# Patient Record
Sex: MALE | Race: WHITE | NOT HISPANIC OR LATINO | Employment: OTHER | URBAN - METROPOLITAN AREA
[De-identification: names, ages, dates, MRNs, and addresses within clinical notes are randomized per-mention and may not be internally consistent; named-entity substitution may affect disease eponyms.]

---

## 2019-08-26 DIAGNOSIS — I73.9 CLAUDICATION, INTERMITTENT: Primary | ICD-10-CM

## 2019-08-30 ENCOUNTER — HOSPITAL ENCOUNTER (OUTPATIENT)
Dept: RADIOLOGY | Facility: HOSPITAL | Age: 69
Discharge: HOME | End: 2019-08-30
Attending: INTERNAL MEDICINE
Payer: MEDICARE

## 2019-08-30 DIAGNOSIS — I73.9 CLAUDICATION, INTERMITTENT: ICD-10-CM

## 2019-08-30 PROCEDURE — 93925 LOWER EXTREMITY STUDY: CPT | Mod: 26,,, | Performed by: RADIOLOGY

## 2019-08-30 PROCEDURE — 93925 US LOWER EXTREMITY ARTERIES BILATERAL: ICD-10-PCS | Mod: 26,,, | Performed by: RADIOLOGY

## 2019-08-30 PROCEDURE — 93925 LOWER EXTREMITY STUDY: CPT | Mod: TC

## 2020-08-19 ENCOUNTER — LAB VISIT (OUTPATIENT)
Dept: LAB | Facility: HOSPITAL | Age: 70
End: 2020-08-19
Attending: INTERNAL MEDICINE
Payer: MEDICARE

## 2020-08-19 DIAGNOSIS — E11.65 TYPE 2 DIABETES MELLITUS WITH HYPERGLYCEMIA, WITH LONG-TERM CURRENT USE OF INSULIN: ICD-10-CM

## 2020-08-19 DIAGNOSIS — Z79.4 TYPE 2 DIABETES MELLITUS WITHOUT COMPLICATION, WITH LONG-TERM CURRENT USE OF INSULIN: ICD-10-CM

## 2020-08-19 DIAGNOSIS — Z79.4 TYPE 2 DIABETES MELLITUS WITH HYPERGLYCEMIA, WITH LONG-TERM CURRENT USE OF INSULIN: ICD-10-CM

## 2020-08-19 DIAGNOSIS — E11.9 TYPE 2 DIABETES MELLITUS WITHOUT COMPLICATION, WITH LONG-TERM CURRENT USE OF INSULIN: ICD-10-CM

## 2020-08-19 DIAGNOSIS — R60.0 BILATERAL LEG EDEMA: ICD-10-CM

## 2020-08-19 PROBLEM — K21.9 GASTRO-ESOPHAGEAL REFLUX DISEASE WITHOUT ESOPHAGITIS: Status: ACTIVE | Noted: 2018-04-23

## 2020-08-19 PROBLEM — I63.412 CEREBRAL INFARCTION DUE TO EMBOLISM OF LEFT MIDDLE CEREBRAL ARTERY: Status: ACTIVE | Noted: 2018-07-25

## 2020-08-19 PROBLEM — J44.9 COPD (CHRONIC OBSTRUCTIVE PULMONARY DISEASE): Status: ACTIVE | Noted: 2017-07-14

## 2020-08-19 PROBLEM — I73.9 CLAUDICATION: Status: ACTIVE | Noted: 2019-08-26

## 2020-08-19 PROBLEM — H26.9 CATARACT OF LEFT EYE: Status: ACTIVE | Noted: 2017-10-16

## 2020-08-19 PROBLEM — I69.351 HEMIPLEGIA AND HEMIPARESIS FOLLOWING CEREBRAL INFARCTION AFFECTING RIGHT DOMINANT SIDE: Status: ACTIVE | Noted: 2019-07-26

## 2020-08-19 PROBLEM — N52.9 MALE ERECTILE DYSFUNCTION: Status: ACTIVE | Noted: 2018-01-30

## 2020-08-19 LAB
ALBUMIN SERPL BCP-MCNC: 3.5 G/DL (ref 3.5–5.2)
ALP SERPL-CCNC: 94 U/L (ref 55–135)
ALT SERPL W/O P-5'-P-CCNC: 16 U/L (ref 10–44)
ANION GAP SERPL CALC-SCNC: 7 MMOL/L (ref 8–16)
AST SERPL-CCNC: 22 U/L (ref 10–40)
BILIRUB SERPL-MCNC: 0.3 MG/DL (ref 0.1–1)
BUN SERPL-MCNC: 16 MG/DL (ref 8–23)
CALCIUM SERPL-MCNC: 9.2 MG/DL (ref 8.7–10.5)
CHLORIDE SERPL-SCNC: 97 MMOL/L (ref 95–110)
CHOLEST SERPL-MCNC: 124 MG/DL (ref 120–199)
CHOLEST/HDLC SERPL: 2.8 {RATIO} (ref 2–5)
CO2 SERPL-SCNC: 36 MMOL/L (ref 23–29)
CREAT SERPL-MCNC: 1 MG/DL (ref 0.5–1.4)
ERYTHROCYTE [DISTWIDTH] IN BLOOD BY AUTOMATED COUNT: 14.1 % (ref 11.5–14.5)
EST. GFR  (AFRICAN AMERICAN): >60 ML/MIN/1.73 M^2
EST. GFR  (NON AFRICAN AMERICAN): >60 ML/MIN/1.73 M^2
ESTIMATED AVG GLUCOSE: 174 MG/DL (ref 68–131)
GLUCOSE SERPL-MCNC: 73 MG/DL (ref 70–110)
HBA1C MFR BLD HPLC: 7.7 % (ref 4–5.6)
HCT VFR BLD AUTO: 37.7 % (ref 40–54)
HDLC SERPL-MCNC: 44 MG/DL (ref 40–75)
HDLC SERPL: 35.5 % (ref 20–50)
HGB BLD-MCNC: 11.8 G/DL (ref 14–18)
LDLC SERPL CALC-MCNC: 53 MG/DL (ref 63–159)
MCH RBC QN AUTO: 28.6 PG (ref 27–31)
MCHC RBC AUTO-ENTMCNC: 31.3 G/DL (ref 32–36)
MCV RBC AUTO: 91 FL (ref 82–98)
NONHDLC SERPL-MCNC: 80 MG/DL
PLATELET # BLD AUTO: 428 K/UL (ref 150–350)
PMV BLD AUTO: 9.6 FL (ref 9.2–12.9)
POTASSIUM SERPL-SCNC: 3.8 MMOL/L (ref 3.5–5.1)
PROT SERPL-MCNC: 8 G/DL (ref 6–8.4)
RBC # BLD AUTO: 4.13 M/UL (ref 4.6–6.2)
SODIUM SERPL-SCNC: 140 MMOL/L (ref 136–145)
TRIGL SERPL-MCNC: 135 MG/DL (ref 30–150)
WBC # BLD AUTO: 7.7 K/UL (ref 3.9–12.7)

## 2020-08-19 PROCEDURE — 36415 COLL VENOUS BLD VENIPUNCTURE: CPT

## 2020-08-19 PROCEDURE — 85027 COMPLETE CBC AUTOMATED: CPT

## 2020-08-19 PROCEDURE — 83036 HEMOGLOBIN GLYCOSYLATED A1C: CPT

## 2020-08-19 PROCEDURE — 80053 COMPREHEN METABOLIC PANEL: CPT

## 2020-08-19 PROCEDURE — 80061 LIPID PANEL: CPT

## 2020-09-30 ENCOUNTER — HOSPITAL ENCOUNTER (OUTPATIENT)
Dept: CARDIOLOGY | Facility: HOSPITAL | Age: 70
Discharge: HOME | End: 2020-09-30
Attending: INTERNAL MEDICINE
Payer: MEDICARE

## 2020-09-30 VITALS — WEIGHT: 192 LBS | HEIGHT: 67 IN | BODY MASS INDEX: 30.13 KG/M2

## 2020-09-30 DIAGNOSIS — R60.0 BILATERAL LEG EDEMA: ICD-10-CM

## 2020-09-30 DIAGNOSIS — I73.9 PVD (PERIPHERAL VASCULAR DISEASE): ICD-10-CM

## 2020-09-30 LAB
AORTIC ROOT ANNULUS: 3.08 CM
AORTIC VALVE CUSP SEPERATION: 1.59 CM
AV INDEX (PROSTH): 0.89
AV MEAN GRADIENT: 4 MMHG
AV PEAK GRADIENT: 8 MMHG
AV VALVE AREA: 2.64 CM2
AV VELOCITY RATIO: 0.75
BSA FOR ECHO PROCEDURE: 2.03 M2
CV ECHO LV RWT: 0.6 CM
DOP CALC AO PEAK VEL: 1.4 M/S
DOP CALC AO VTI: 27.9 CM
DOP CALC LVOT AREA: 3 CM2
DOP CALC LVOT DIAMETER: 1.94 CM
DOP CALC LVOT PEAK VEL: 1.05 M/S
DOP CALC LVOT STROKE VOLUME: 73.65 CM3
DOP CALCLVOT PEAK VEL VTI: 24.93 CM
E WAVE DECELERATION TIME: 236.45 MSEC
E/A RATIO: 1.07
E/E' RATIO: 10.22 M/S
ECHO LV POSTERIOR WALL: 1.51 CM (ref 0.6–1.1)
FRACTIONAL SHORTENING: 27 % (ref 28–44)
INTERVENTRICULAR SEPTUM: 1.12 CM (ref 0.6–1.1)
IVRT: 58.99 MSEC
LA MAJOR: 5.64 CM
LA MINOR: 5.36 CM
LA WIDTH: 4.1 CM
LEFT ATRIUM SIZE: 2.3 CM
LEFT ATRIUM VOLUME INDEX: 22.2 ML/M2
LEFT ATRIUM VOLUME: 44.06 CM3
LEFT INTERNAL DIMENSION IN SYSTOLE: 3.65 CM (ref 2.1–4)
LEFT VENTRICLE DIASTOLIC VOLUME INDEX: 60.2 ML/M2
LEFT VENTRICLE DIASTOLIC VOLUME: 119.63 ML
LEFT VENTRICLE MASS INDEX: 135 G/M2
LEFT VENTRICLE SYSTOLIC VOLUME INDEX: 28.4 ML/M2
LEFT VENTRICLE SYSTOLIC VOLUME: 56.44 ML
LEFT VENTRICULAR INTERNAL DIMENSION IN DIASTOLE: 5.03 CM (ref 3.5–6)
LEFT VENTRICULAR MASS: 268.66 G
LV LATERAL E/E' RATIO: 10.22 M/S
LV SEPTAL E/E' RATIO: 10.22 M/S
MV PEAK A VEL: 0.86 M/S
MV PEAK E VEL: 0.92 M/S
MV STENOSIS PRESSURE HALF TIME: 68.57 MS
MV VALVE AREA P 1/2 METHOD: 3.21 CM2
PULM VEIN S/D RATIO: 1.8
PV PEAK D VEL: 0.45 M/S
PV PEAK S VEL: 0.81 M/S
PV PEAK VELOCITY: 0.95 CM/S
TDI LATERAL: 0.09 M/S
TDI SEPTAL: 0.09 M/S
TDI: 0.09 M/S

## 2020-09-30 PROCEDURE — 93306 ECHO (CUPID ONLY): ICD-10-PCS | Mod: 26,,, | Performed by: INTERNAL MEDICINE

## 2020-09-30 PROCEDURE — 93306 TTE W/DOPPLER COMPLETE: CPT | Mod: 26,,, | Performed by: INTERNAL MEDICINE

## 2020-09-30 PROCEDURE — 93306 TTE W/DOPPLER COMPLETE: CPT

## 2020-10-06 PROBLEM — I89.0 LYMPHEDEMA OF BOTH LOWER EXTREMITIES: Status: ACTIVE | Noted: 2020-10-06

## 2020-10-06 PROBLEM — I51.9 SYSTOLIC DYSFUNCTION: Status: ACTIVE | Noted: 2020-10-06

## 2020-10-28 ENCOUNTER — HOSPITAL ENCOUNTER (OUTPATIENT)
Dept: WOUND CARE | Facility: HOSPITAL | Age: 70
Discharge: HOME | End: 2020-10-28
Attending: SURGERY
Payer: MEDICARE

## 2020-10-28 VITALS
BODY MASS INDEX: 29.98 KG/M2 | HEIGHT: 67 IN | TEMPERATURE: 97 F | WEIGHT: 191 LBS | HEART RATE: 71 BPM | SYSTOLIC BLOOD PRESSURE: 156 MMHG | DIASTOLIC BLOOD PRESSURE: 70 MMHG

## 2020-10-28 DIAGNOSIS — E11.65 TYPE 2 DIABETES MELLITUS WITH HYPERGLYCEMIA, WITH LONG-TERM CURRENT USE OF INSULIN: ICD-10-CM

## 2020-10-28 DIAGNOSIS — E11.9 TYPE 2 DIABETES MELLITUS WITHOUT COMPLICATION, WITH LONG-TERM CURRENT USE OF INSULIN: ICD-10-CM

## 2020-10-28 DIAGNOSIS — Z79.4 TYPE 2 DIABETES MELLITUS WITHOUT COMPLICATION, WITH LONG-TERM CURRENT USE OF INSULIN: ICD-10-CM

## 2020-10-28 DIAGNOSIS — Z79.4 TYPE 2 DIABETES MELLITUS WITH HYPERGLYCEMIA, WITH LONG-TERM CURRENT USE OF INSULIN: ICD-10-CM

## 2020-10-28 DIAGNOSIS — I89.0 LYMPHEDEMA OF BOTH LOWER EXTREMITIES: Primary | ICD-10-CM

## 2020-10-28 PROCEDURE — 11720 DEBRIDE NAIL 1-5: CPT

## 2020-10-28 PROCEDURE — 11721 DEBRIDE NAIL 6 OR MORE: CPT

## 2020-10-28 PROCEDURE — 99203 OFFICE O/P NEW LOW 30 MIN: CPT

## 2020-10-28 PROCEDURE — 29581 APPL MULTLAYER CMPRN SYS LEG: CPT | Mod: 50

## 2020-10-28 RX ORDER — CEPHALEXIN 500 MG/1
500 CAPSULE ORAL EVERY 12 HOURS
Qty: 40 CAPSULE | Refills: 1 | Status: SHIPPED | OUTPATIENT
Start: 2020-10-28 | End: 2021-02-19

## 2020-10-28 NOTE — PROGRESS NOTES
"Subjective:       Patient ID: Mateo Foreman is a 70 y.o. male.    Chief Complaint: Venous Stasis (bilateral lower legs)    69 y/o male here today with bilateral lower extremity lymphedema. Seen by Dr. Silveira in his office 10/15/20. Currently taking Keflex and lasix 40mg. Daily. No open areas noted today. Patient states right leg "weeps" at times. BLE edema 2+. Doppler pulses audible. Seen by Dr. Silveira today. 1st set of measurements done for lymphedema pumps. Toenails 1-10 trimmed per Dr. Silveira. BLE Co-flex with calamine applied toes to knee. Patient to continue Keflex po. Rx for refill sent to pharmacy. Return in 1 week 11/4/20.    Review of Systems   Constitutional: Negative.    HENT: Negative.    Eyes: Negative.    Respiratory: Negative.    Cardiovascular: Negative.    Gastrointestinal: Negative.    Genitourinary: Negative.    Musculoskeletal: Negative.    Neurological: Negative.    Psychiatric/Behavioral: Negative.        Objective:      Physical Exam  Constitutional:       Appearance: He is well-developed.   HENT:      Head: Normocephalic.   Eyes:      Conjunctiva/sclera: Conjunctivae normal.      Pupils: Pupils are equal, round, and reactive to light.   Neck:      Musculoskeletal: Normal range of motion and neck supple.   Cardiovascular:      Rate and Rhythm: Normal rate and regular rhythm.      Heart sounds: Normal heart sounds.   Pulmonary:      Effort: Pulmonary effort is normal.      Breath sounds: Normal breath sounds.   Abdominal:      General: Bowel sounds are normal.      Palpations: Abdomen is soft.   Musculoskeletal: Normal range of motion.   Skin:     General: Skin is warm and dry.   Neurological:      Mental Status: He is alert and oriented to person, place, and time.      Deep Tendon Reflexes: Reflexes are normal and symmetric.         Assessment:       1. Lymphedema of both lower extremities           Wound 10/28/20 0830 Other (comment) Left lower Leg #1 (Active)   10/28/20 0830  "   Pre-existing: Yes   Primary Wound Type: Other   Side: Left   Orientation: lower   Location: Leg   Wound Number (optional): #1   Ankle-Brachial Index:    Pulses: doppler   Removal Indication and Assessment:    Wound Outcome:    (Retired) Wound Type:    (Retired) Wound Length (cm):    (Retired) Wound Width (cm):    (Retired) Depth (cm):    Wound Description (Comments):    Removal Indications:    Wound Image   10/28/20 1000   Dressing Appearance Open to air 10/28/20 1000   Drainage Amount None 10/28/20 1000   Appearance Red;Dry 10/28/20 1000   Tissue loss description Not applicable 10/28/20 1000   Red (%), Wound Tissue Color 100 % 10/28/20 1000   Periwound Area Edematous 10/28/20 1000   Wound Edges Undefined 10/28/20 1000   Care Cleansed with:;Soap and water 10/28/20 1000   Dressing Compression wrap 10/28/20 1000   Periwound Care Dry periwound area maintained 10/28/20 1000   Compression Two layer compression 10/28/20 1000   Dressing Change Due 11/04/20 10/28/20 1000            Wound 10/28/20 0830 Other (comment) Right lower Leg (Active)   10/28/20 0830    Pre-existing: Yes   Primary Wound Type: Other   Side: Right   Orientation: lower   Location: Leg   Wound Number (optional):    Ankle-Brachial Index:    Pulses: doppler   Removal Indication and Assessment:    Wound Outcome:    (Retired) Wound Type:    (Retired) Wound Length (cm):    (Retired) Wound Width (cm):    (Retired) Depth (cm):    Wound Description (Comments):    Removal Indications:    Wound Image   10/28/20 1000   Dressing Appearance Open to air 10/28/20 1000   Drainage Amount None 10/28/20 1000   Appearance Red;Blistered;Dry 10/28/20 1000   Tissue loss description Not applicable 10/28/20 1000   Red (%), Wound Tissue Color 100 % 10/28/20 1000   Periwound Area Edematous 10/28/20 1000   Wound Edges Undefined 10/28/20 1000   Care Cleansed with:;Soap and water 10/28/20 1000   Dressing Compression wrap;Applied 10/28/20 1000   Periwound Care Dry periwound area  maintained 10/28/20 1000   Compression Two layer compression 10/28/20 1000   Dressing Change Due 11/04/20 10/28/20 1000        Toenails trimmed per Dr. Silveira.  Cleansed with soap and water. Co-flex with calamine toes to knee BLE.  Change weekly.    Plan:        Dr. Silveira 11/4/20     10/28/2020 Edema Measurements  Edema:    Compression device in use:   Edema measurements:    Calf circumference measured at 21 cm from heel      Left: 38   cm  Right: 39.5      cm    Ankle circumference measured at 10cm from heel      Left: 31   cm  Right: 31      cm    Foot circumference measured at 11cm from heel      Left:  25  cm  Right:  26     cm      .With patient's verbal consent, nails were aggressively reduced and debrided x 10 to their soft tissue attachment mechanically and with electric , removing all offending nail and debris. Patient relates relief following the procedure. No anesthesia or hemostasis required. No blood loss.

## 2020-11-05 ENCOUNTER — HOSPITAL ENCOUNTER (OUTPATIENT)
Dept: WOUND CARE | Facility: HOSPITAL | Age: 70
Discharge: HOME | End: 2020-11-05
Attending: SURGERY
Payer: MEDICARE

## 2020-11-05 VITALS
DIASTOLIC BLOOD PRESSURE: 63 MMHG | WEIGHT: 191 LBS | TEMPERATURE: 98 F | SYSTOLIC BLOOD PRESSURE: 138 MMHG | HEIGHT: 67 IN | BODY MASS INDEX: 29.98 KG/M2 | HEART RATE: 74 BPM

## 2020-11-05 DIAGNOSIS — Z79.4 TYPE 2 DIABETES MELLITUS WITHOUT COMPLICATION, WITH LONG-TERM CURRENT USE OF INSULIN: ICD-10-CM

## 2020-11-05 DIAGNOSIS — E11.9 TYPE 2 DIABETES MELLITUS WITHOUT COMPLICATION, WITH LONG-TERM CURRENT USE OF INSULIN: ICD-10-CM

## 2020-11-05 DIAGNOSIS — I89.0 LYMPHEDEMA OF BOTH LOWER EXTREMITIES: Primary | ICD-10-CM

## 2020-11-05 DIAGNOSIS — I10 HTN, GOAL BELOW 130/80: ICD-10-CM

## 2020-11-05 DIAGNOSIS — J44.9 CHRONIC OBSTRUCTIVE PULMONARY DISEASE, UNSPECIFIED COPD TYPE: ICD-10-CM

## 2020-11-05 PROCEDURE — 29581 APPL MULTLAYER CMPRN SYS LEG: CPT | Mod: 50

## 2020-11-05 NOTE — PROGRESS NOTES
"Subjective:       Patient ID: Mateo Foreman is a 70 y.o. male.    Chief Complaint: No chief complaint on file.    69 y/o male here today with bilateral lower extremity lymphedema. Seen by Dr. Silveira in his office 10/15/20. Currently taking Keflex and lasix 40mg. Daily. No open areas noted today. Patient states right leg "weeps" at times. BLE edema 2+. Doppler pulses audible. Seen by Dr. Silveira today. 1st set of measurements done for lymphedema pumps. Toenails 1-10 trimmed per Dr. Silveira. BLE Co-flex with calamine applied toes to knee. Patient to continue Keflex po. Rx for refill sent to pharmacy. Return in 1 week 11/4/20.  11/05/2020- f/u with Dr. Silveira. 2nd set of lymphedema pump measurements done. Continue keflex. Cont POC. Pt to f/u with Dr. Silveira in 1 week 11/12/2020.    Review of Systems   Constitutional: Negative.    HENT: Negative.    Eyes: Negative.    Respiratory: Negative.    Cardiovascular: Negative.    Gastrointestinal: Negative.    Genitourinary: Negative.    Musculoskeletal: Negative.    Neurological: Negative.    Psychiatric/Behavioral: Negative.        Objective:      Physical Exam  Constitutional:       Appearance: He is well-developed.   HENT:      Head: Normocephalic.   Eyes:      Conjunctiva/sclera: Conjunctivae normal.      Pupils: Pupils are equal, round, and reactive to light.   Neck:      Musculoskeletal: Normal range of motion and neck supple.   Cardiovascular:      Rate and Rhythm: Normal rate and regular rhythm.      Heart sounds: Normal heart sounds.   Pulmonary:      Effort: Pulmonary effort is normal.      Breath sounds: Normal breath sounds.   Abdominal:      General: Bowel sounds are normal.      Palpations: Abdomen is soft.   Musculoskeletal: Normal range of motion.   Skin:     General: Skin is warm and dry.   Neurological:      Mental Status: He is alert and oriented to person, place, and time.      Deep Tendon Reflexes: Reflexes are normal and symmetric.         Assessment: "       1. Lymphedema of both lower extremities           Wound 10/28/20 0830 Other (comment) Left lower Leg #1 (Active)   10/28/20 0830    Pre-existing: Yes   Primary Wound Type: Other   Side: Left   Orientation: lower   Location: Leg   Wound Number (optional): #1   Ankle-Brachial Index:    Pulses: doppler   Removal Indication and Assessment:    Wound Outcome:    (Retired) Wound Type:    (Retired) Wound Length (cm):    (Retired) Wound Width (cm):    (Retired) Depth (cm):    Wound Description (Comments):    Removal Indications:    Wound Image   11/05/20 0800   Dressing Appearance Intact 11/05/20 0800   Drainage Amount None 11/05/20 0800   Appearance Pink 11/05/20 0800   Tissue loss description Not applicable 11/05/20 0800   Red (%), Wound Tissue Color 100 % 11/05/20 0800   Periwound Area Intact 11/05/20 0800   Wound Edges Rolled/closed 11/05/20 0800   Wound Length (cm) 0 cm 11/05/20 0800   Wound Width (cm) 0 cm 11/05/20 0800   Wound Depth (cm) 0 cm 11/05/20 0800   Wound Volume (cm^3) 0 cm^3 11/05/20 0800   Wound Surface Area (cm^2) 0 cm^2 11/05/20 0800   Care Cleansed with:;Soap and water 11/05/20 0800   Dressing Compression wrap 11/05/20 0800   Compression Two layer compression 11/05/20 0800   Dressing Change Due 11/12/20 11/05/20 0800            Wound 10/28/20 0830 Other (comment) Right lower Leg (Active)   10/28/20 0830    Pre-existing: Yes   Primary Wound Type: Other   Side: Right   Orientation: lower   Location: Leg   Wound Number (optional):    Ankle-Brachial Index:    Pulses: doppler   Removal Indication and Assessment:    Wound Outcome:    (Retired) Wound Type:    (Retired) Wound Length (cm):    (Retired) Wound Width (cm):    (Retired) Depth (cm):    Wound Description (Comments):    Removal Indications:    Wound Image    11/05/20 0800   Dressing Appearance Intact;Moist drainage 11/05/20 0800   Drainage Amount Small 11/05/20 0800   Drainage Characteristics/Odor Serosanguineous 11/05/20 0800   Appearance  Pink;Red;Moist 11/05/20 0800   Tissue loss description Partial thickness 11/05/20 0800   Red (%), Wound Tissue Color 100 % 11/05/20 0800   Periwound Area Intact;Edematous;Redness 11/05/20 0800   Wound Length (cm) 9 cm 11/05/20 0800   Wound Width (cm) 33 cm 11/05/20 0800   Wound Depth (cm) 0.1 cm 11/05/20 0800   Wound Volume (cm^3) 29.7 cm^3 11/05/20 0800   Wound Surface Area (cm^2) 297 cm^2 11/05/20 0800   Care Cleansed with:;Sterile normal saline 11/05/20 0800   Dressing Compression wrap 11/05/20 0800   Periwound Care Dry periwound area maintained 11/05/20 0800   Compression Two layer compression 11/05/20 0800   Dressing Change Due 11/12/20 11/05/20 0800       Cleanse wound with: Soap and water   Lidocaine: prn   Silver nitrate: prn   Primary dressing: Co-flex with calamine BLE toes to knee   Edema control: Co-flex with calamine BLE toes to knee   Frequency: Weekly   Follow-up: Dr. Silveira 11/12/20   Other Orders: Cont. Keflex po. 2nd set of lymphedema pump measurements done 11/05/20.    11/05/2020 Edema Measurements  Edema:    Compression device in use: coflex with calamine   Edema measurements:    Calf circumference measured at 21cm from heel      Left:  31  cm  Right:     35  cm    Ankle circumference measured at 10cm from heel      Left:  28  cm  Right:   29.1    cm    Foot circumference measured at 11cm from heel      Left:  26.1  cm  Right:    28.5   cm        Plan:                Follow up in about 1 week (around 11/12/2020).

## 2020-11-11 ENCOUNTER — HOSPITAL ENCOUNTER (OUTPATIENT)
Dept: WOUND CARE | Facility: HOSPITAL | Age: 70
Discharge: HOME | End: 2020-11-11
Attending: SURGERY
Payer: MEDICARE

## 2020-11-11 VITALS
DIASTOLIC BLOOD PRESSURE: 76 MMHG | SYSTOLIC BLOOD PRESSURE: 178 MMHG | BODY MASS INDEX: 29.98 KG/M2 | HEART RATE: 78 BPM | HEIGHT: 67 IN | WEIGHT: 191 LBS | TEMPERATURE: 97 F

## 2020-11-11 DIAGNOSIS — Z79.4 TYPE 2 DIABETES MELLITUS WITHOUT COMPLICATION, WITH LONG-TERM CURRENT USE OF INSULIN: ICD-10-CM

## 2020-11-11 DIAGNOSIS — I10 HTN, GOAL BELOW 130/80: ICD-10-CM

## 2020-11-11 DIAGNOSIS — E11.9 TYPE 2 DIABETES MELLITUS WITHOUT COMPLICATION, WITH LONG-TERM CURRENT USE OF INSULIN: ICD-10-CM

## 2020-11-11 DIAGNOSIS — I89.0 LYMPHEDEMA OF BOTH LOWER EXTREMITIES: Primary | ICD-10-CM

## 2020-11-11 PROCEDURE — 87076 CULTURE ANAEROBE IDENT EACH: CPT | Mod: 59

## 2020-11-11 PROCEDURE — 87070 CULTURE OTHR SPECIMN AEROBIC: CPT

## 2020-11-11 PROCEDURE — 87186 SC STD MICRODIL/AGAR DIL: CPT | Mod: 59

## 2020-11-11 PROCEDURE — 87077 CULTURE AEROBIC IDENTIFY: CPT

## 2020-11-11 PROCEDURE — 87075 CULTR BACTERIA EXCEPT BLOOD: CPT

## 2020-11-11 PROCEDURE — 29581 APPL MULTLAYER CMPRN SYS LEG: CPT | Mod: 50

## 2020-11-11 RX ORDER — SULFAMETHOXAZOLE AND TRIMETHOPRIM 400; 80 MG/1; MG/1
1 TABLET ORAL 2 TIMES DAILY
Qty: 30 TABLET | Refills: 1 | Status: SHIPPED | OUTPATIENT
Start: 2020-11-11 | End: 2020-12-03 | Stop reason: SDUPTHER

## 2020-11-11 NOTE — PROGRESS NOTES
"Subjective:       Patient ID: Mateo Foreman is a 70 y.o. male.    Chief Complaint: Wound Care    71 y/o male here today with bilateral lower extremity lymphedema. Seen by Dr. Silveira in his office 10/15/20. Currently taking Keflex and lasix 40mg. Daily. No open areas noted today. Patient states right leg "weeps" at times. BLE edema 2+. Doppler pulses audible. Seen by Dr. Silveira today. 1st set of measurements done for lymphedema pumps. Toenails 1-10 trimmed per Dr. Silveira. BLE Co-flex with calamine applied toes to knee. Patient to continue Keflex po. Rx for refill sent to pharmacy. Return in 1 week 11/4/20.  11/05/2020- f/u with Dr. Silveira. 2nd set of lymphedema pump measurements done. Continue keflex. Cont POC. Pt to f/u with Dr. Silveira in 1 week 11/12/2020.  11/11/2020: Follow up with . RLE reddened, edematous with slight weeping. Patient is to discontinue Keflex and   Bactrim is sent to patient's pharmacy. Lotrisone cream applied to RLE and co flex with calamine to BLE. 3rd set of lymphedema pump measurements taken in  Clinic. Follow up in 1 week with  on 11/18/2020.     Right leg:                                   Left Leg:  Base of foot: 25.5 cm               Base of foot: 24 cm  Ankle: 29 cm                             Ankle: 24 cm  Calf:40.5 cm                             Calf: 35 cm    Review of Systems   Constitutional: Negative.    HENT: Negative.    Eyes: Negative.    Respiratory: Negative.    Cardiovascular: Negative.    Gastrointestinal: Negative.    Genitourinary: Negative.    Musculoskeletal: Negative.    Neurological: Negative.    Psychiatric/Behavioral: Negative.        Objective:      Physical Exam  Constitutional:       Appearance: He is well-developed.   HENT:      Head: Normocephalic.   Eyes:      Conjunctiva/sclera: Conjunctivae normal.      Pupils: Pupils are equal, round, and reactive to light.   Neck:      Musculoskeletal: Normal range of motion and neck supple. "   Cardiovascular:      Rate and Rhythm: Normal rate and regular rhythm.      Heart sounds: Normal heart sounds.   Pulmonary:      Effort: Pulmonary effort is normal.      Breath sounds: Normal breath sounds.   Abdominal:      General: Bowel sounds are normal.      Palpations: Abdomen is soft.   Musculoskeletal: Normal range of motion.   Skin:     General: Skin is warm and dry.   Neurological:      Mental Status: He is alert and oriented to person, place, and time.      Deep Tendon Reflexes: Reflexes are normal and symmetric.         Assessment:       1. Lymphedema of both lower extremities    2. Type 2 diabetes mellitus without complication, with long-term current use of insulin           Wound 10/28/20 0830 Other (comment) Left lower Leg #1 (Active)   10/28/20 0830    Pre-existing: Yes   Primary Wound Type: Other   Side: Left   Orientation: lower   Location: Leg   Wound Number (optional): #1   Ankle-Brachial Index:    Pulses: doppler   Removal Indication and Assessment:    Wound Outcome:    (Retired) Wound Type:    (Retired) Wound Length (cm):    (Retired) Wound Width (cm):    (Retired) Depth (cm):    Wound Description (Comments):    Removal Indications:    Wound Image   11/11/20 0800   Dressing Appearance Intact;Dry 11/11/20 0800   Drainage Amount None 11/11/20 0800   Appearance Pink;Dry 11/11/20 0800   Tissue loss description Not applicable 11/11/20 0800   Red (%), Wound Tissue Color 100 % 11/11/20 0800   Periwound Area Edematous;Redness;Dry 11/11/20 0800   Wound Edges Rolled/closed 11/11/20 0800   Wound Length (cm) 0 cm 11/11/20 0800   Wound Width (cm) 0 cm 11/11/20 0800   Wound Depth (cm) 0 cm 11/11/20 0800   Wound Volume (cm^3) 0 cm^3 11/11/20 0800   Wound Surface Area (cm^2) 0 cm^2 11/11/20 0800   Care Cleansed with:;Sterile normal saline 11/11/20 0800   Dressing Applied;Compression wrap;Other (see comments) 11/11/20 0800   Compression Two layer compression 11/11/20 0800   Dressing Change Due 11/18/20  11/11/20 0800            Wound 10/28/20 0830 Other (comment) Right lower Leg (Active)   10/28/20 0830    Pre-existing: Yes   Primary Wound Type: Other   Side: Right   Orientation: lower   Location: Leg   Wound Number (optional):    Ankle-Brachial Index:    Pulses: doppler   Removal Indication and Assessment:    Wound Outcome:    (Retired) Wound Type:    (Retired) Wound Length (cm):    (Retired) Wound Width (cm):    (Retired) Depth (cm):    Wound Description (Comments):    Removal Indications:    Wound Image    11/11/20 0800   Dressing Appearance Intact;Moist drainage 11/11/20 0800   Drainage Amount Small 11/11/20 0800   Drainage Characteristics/Odor Serosanguineous;Malodorous 11/11/20 0800   Appearance Pink;Red;Moist 11/11/20 0800   Tissue loss description Partial thickness 11/11/20 0800   Red (%), Wound Tissue Color 100 % 11/11/20 0800   Periwound Area Edematous;Excoriated;Swelling 11/11/20 0800   Wound Length (cm) 9 cm 11/11/20 0800   Wound Width (cm) 33 cm 11/11/20 0800   Wound Depth (cm) 0 cm 11/11/20 0800   Wound Volume (cm^3) 0 cm^3 11/11/20 0800   Wound Surface Area (cm^2) 297 cm^2 11/11/20 0800   Care Cleansed with:;Sterile normal saline 11/11/20 0800   Dressing Applied;Compression wrap;Other (see comments) 11/11/20 0800   Periwound Care Topical treatment applied;Other (see comments) 11/11/20 0800   Compression Two layer compression 11/11/20 0800   Dressing Change Due 11/18/20 11/11/20 0800         Cleanse wound with: Soap and water   Lidocaine: prn   Silver nitrate: prn   Periwound care: Lotrisone cream to RLE  Primary dressing: Co-flex with calamine BLE toes to knee   Edema control: Co-flex with calamine BLE toes to knee   Frequency: Weekly   Follow-up: Dr. Silveira 11/12/20   Other Orders: Bactrim sent to patient's pharmacy. 3rd set of lymphedema pump measurements done 11/11/2020  Plan:                Follow up 11/18/2020

## 2020-11-15 LAB
BACTERIA SPEC AEROBE CULT: ABNORMAL

## 2020-11-16 LAB
BACTERIA SPEC ANAEROBE CULT: ABNORMAL
BACTERIA SPEC ANAEROBE CULT: ABNORMAL

## 2020-11-19 ENCOUNTER — HOSPITAL ENCOUNTER (OUTPATIENT)
Dept: WOUND CARE | Facility: HOSPITAL | Age: 70
Discharge: HOME | End: 2020-11-19
Attending: SURGERY
Payer: MEDICARE

## 2020-11-19 VITALS
HEART RATE: 76 BPM | TEMPERATURE: 97 F | WEIGHT: 191 LBS | SYSTOLIC BLOOD PRESSURE: 138 MMHG | DIASTOLIC BLOOD PRESSURE: 63 MMHG | HEIGHT: 67 IN | BODY MASS INDEX: 29.98 KG/M2

## 2020-11-19 DIAGNOSIS — Z79.4 TYPE 2 DIABETES MELLITUS WITH HYPERGLYCEMIA, WITH LONG-TERM CURRENT USE OF INSULIN: ICD-10-CM

## 2020-11-19 DIAGNOSIS — I89.0 LYMPHEDEMA OF BOTH LOWER EXTREMITIES: Primary | ICD-10-CM

## 2020-11-19 DIAGNOSIS — E11.65 TYPE 2 DIABETES MELLITUS WITH HYPERGLYCEMIA, WITH LONG-TERM CURRENT USE OF INSULIN: ICD-10-CM

## 2020-11-19 DIAGNOSIS — I73.9 CLAUDICATION: ICD-10-CM

## 2020-11-19 PROCEDURE — 29581 APPL MULTLAYER CMPRN SYS LEG: CPT

## 2020-11-19 NOTE — PROGRESS NOTES
"Subjective:       Patient ID: Mateo Foreman is a 70 y.o. male.    Chief Complaint: Wound Care    71 y/o male here today with bilateral lower extremity lymphedema. Seen by Dr. Silveira in his office 10/15/20. Currently taking Keflex and lasix 40mg. Daily. No open areas noted today. Patient states right leg "weeps" at times. BLE edema 2+. Doppler pulses audible. Seen by Dr. Silveira today. 1st set of measurements done for lymphedema pumps. Toenails 1-10 trimmed per Dr. Silveira. BLE Co-flex with calamine applied toes to knee. Patient to continue Keflex po. Rx for refill sent to pharmacy. Return in 1 week 11/4/20.  11/05/2020- f/u with Dr. Silveira. 2nd set of lymphedema pump measurements done. Continue keflex. Cont POC. Pt to f/u with Dr. Silveira in 1 week 11/12/2020.  11/11/2020: Follow up with . RLE reddened, edematous with slight weeping. Patient is to discontinue Keflex and   Bactrim is sent to patient's pharmacy. Lotrisone cream applied to RLE and co flex with calamine to BLE. 3rd set of lymphedema pump measurements taken in  Clinic. Follow up in 1 week with  on 11/18/2020.     Right leg:                                   Left Leg:  Base of foot: 25.5 cm               Base of foot: 24 cm  Ankle: 29 cm                             Ankle: 24 cm  Calf:40.5 cm                             Calf: 35 cm    11/19/2020- f/u with Dr. Silveira. 4th set of lymphedema pump measurements taken. Pt to complete bactrim. Nurse visit 11/25/2020, f/u with Dr. Silveira 12/03/2020.    Review of Systems   Constitutional: Negative.    HENT: Negative.    Eyes: Negative.    Respiratory: Negative.    Cardiovascular: Negative.    Gastrointestinal: Negative.    Genitourinary: Negative.    Musculoskeletal: Negative.    Neurological: Negative.    Psychiatric/Behavioral: Negative.        Objective:      Physical Exam  Constitutional:       Appearance: He is well-developed.   HENT:      Head: Normocephalic.   Eyes:      " Conjunctiva/sclera: Conjunctivae normal.      Pupils: Pupils are equal, round, and reactive to light.   Neck:      Musculoskeletal: Normal range of motion and neck supple.   Cardiovascular:      Rate and Rhythm: Normal rate and regular rhythm.      Heart sounds: Normal heart sounds.   Pulmonary:      Effort: Pulmonary effort is normal.      Breath sounds: Normal breath sounds.   Abdominal:      General: Bowel sounds are normal.      Palpations: Abdomen is soft.   Musculoskeletal: Normal range of motion.   Skin:     General: Skin is warm and dry.   Neurological:      Mental Status: He is alert and oriented to person, place, and time.      Deep Tendon Reflexes: Reflexes are normal and symmetric.         Assessment:       1. Lymphedema of both lower extremities           Wound 10/28/20 0830 Other (comment) Left lower Leg #1 (Active)   10/28/20 0830    Pre-existing: Yes   Primary Wound Type: Other   Side: Left   Orientation: lower   Location: Leg   Wound Number (optional): #1   Ankle-Brachial Index:    Pulses: doppler   Removal Indication and Assessment:    Wound Outcome:    (Retired) Wound Type:    (Retired) Wound Length (cm):    (Retired) Wound Width (cm):    (Retired) Depth (cm):    Wound Description (Comments):    Removal Indications:    Wound Image   11/19/20 0800   Dressing Appearance Dry;Intact 11/19/20 0800   Drainage Amount None 11/19/20 0800   Appearance Pink;Dry 11/19/20 0800   Tissue loss description Not applicable 11/19/20 0800   Red (%), Wound Tissue Color 100 % 11/19/20 0800   Periwound Area Edematous 11/19/20 0800   Wound Edges Rolled/closed 11/19/20 0800   Wound Length (cm) 0 cm 11/19/20 0800   Wound Width (cm) 0 cm 11/19/20 0800   Wound Depth (cm) 0 cm 11/19/20 0800   Wound Volume (cm^3) 0 cm^3 11/19/20 0800   Wound Surface Area (cm^2) 0 cm^2 11/19/20 0800   Care Cleansed with:;Soap and water 11/19/20 0800   Dressing Compression wrap 11/19/20 0800   Periwound Care Topical treatment applied 11/19/20  0800   Compression Two layer compression 11/19/20 0800   Dressing Change Due 11/25/20 11/19/20 0800            Wound 10/28/20 0830 Other (comment) Right lower Leg (Active)   10/28/20 0830    Pre-existing: Yes   Primary Wound Type: Other   Side: Right   Orientation: lower   Location: Leg   Wound Number (optional):    Ankle-Brachial Index:    Pulses: doppler   Removal Indication and Assessment:    Wound Outcome:    (Retired) Wound Type:    (Retired) Wound Length (cm):    (Retired) Wound Width (cm):    (Retired) Depth (cm):    Wound Description (Comments):    Removal Indications:    Wound Image    11/19/20 0800   Dressing Appearance Intact;Moist drainage 11/19/20 0800   Drainage Amount Small 11/19/20 0800   Drainage Characteristics/Odor Serosanguineous 11/19/20 0800   Appearance Pink;Red;Moist 11/19/20 0800   Tissue loss description Partial thickness 11/19/20 0800   Red (%), Wound Tissue Color 100 % 11/19/20 0800   Periwound Area Edematous 11/19/20 0800   Wound Length (cm) 9.7 cm 11/19/20 0800   Wound Width (cm) 32.5 cm 11/19/20 0800   Wound Depth (cm) 0 cm 11/19/20 0800   Wound Volume (cm^3) 0 cm^3 11/19/20 0800   Wound Surface Area (cm^2) 315.25 cm^2 11/19/20 0800   Care Cleansed with:;Sterile normal saline 11/19/20 0800   Dressing Compression wrap 11/19/20 0800   Periwound Care Topical treatment applied 11/19/20 0800   Compression Two layer compression 11/19/20 0800   Dressing Change Due 11/25/20 11/19/20 0800       Cleanse wound with: Soap and water   Lidocaine: prn   Silver nitrate: prn   Periwound care: Lotrisone cream to RLE   Primary dressing: Co-flex with calamine BLE toes to knee   Edema control: Co-flex with calamine BLE toes to knee   Frequency: Weekly   Follow-up: nurse visit 11/25/2020, Dr. Silveira 12/03/20   Other Orders: continue bactrim. 4th set of lymphedema pump measurements done 11/19/2020 11/19/2020 Edema Measurements  Edema:    Compression device in use:   Edema measurements:    Calf  circumference measured at 21cm from heel      Left: 29   cm  Right:   34.5    cm    Ankle circumference measured at 10cm from heel      Left: 27.2   cm  Right:    28   cm    Foot circumference measured at 11cm from heel      Left:  23.7  cm  Right:    25.5   cm        Plan:                Follow up in about 1 week (around 11/26/2020).

## 2020-11-25 ENCOUNTER — HOSPITAL ENCOUNTER (OUTPATIENT)
Dept: WOUND CARE | Facility: HOSPITAL | Age: 70
Discharge: HOME | End: 2020-11-25
Attending: SURGERY
Payer: MEDICARE

## 2020-11-25 DIAGNOSIS — I89.0 LYMPHEDEMA OF BOTH LOWER EXTREMITIES: Primary | ICD-10-CM

## 2020-11-25 PROCEDURE — 29581 APPL MULTLAYER CMPRN SYS LEG: CPT | Mod: 50

## 2020-11-25 NOTE — PROGRESS NOTES
"Subjective:       Patient ID: Mateo Foreman is a 70 y.o. male.    Chief Complaint: Wound Care    71 y/o male here today with bilateral lower extremity lymphedema. Seen by Dr. Silveira in his office 10/15/20. Currently taking Keflex and lasix 40mg. Daily. No open areas noted today. Patient states right leg "weeps" at times. BLE edema 2+. Doppler pulses audible. Seen by Dr. Silveira today. 1st set of measurements done for lymphedema pumps. Toenails 1-10 trimmed per Dr. Silveira. BLE Co-flex with calamine applied toes to knee. Patient to continue Keflex po. Rx for refill sent to pharmacy. Return in 1 week 11/4/20.  11/05/2020- f/u with Dr. Silveira. 2nd set of lymphedema pump measurements done. Continue keflex. Cont POC. Pt to f/u with Dr. Silveira in 1 week 11/12/2020.  11/11/2020: Follow up with . RLE reddened, edematous with slight weeping. Patient is to discontinue Keflex and   Bactrim is sent to patient's pharmacy. Lotrisone cream applied to RLE and co flex with calamine to BLE. 3rd set of lymphedema pump measurements taken in  Clinic. Follow up in 1 week with  on 11/18/2020.     Right leg:                                   Left Leg:  Base of foot: 25.5 cm               Base of foot: 24 cm  Ankle: 29 cm                             Ankle: 24 cm  Calf:40.5 cm                             Calf: 35 cm    11/19/2020- f/u with Dr. Silveira. 4th set of lymphedema pump measurements taken. Pt to complete bactrim. Nurse visit 11/25/2020, f/u with Dr. Silveira 12/03/2020.  11/25/2020- nurse visit for dressing change. Pt tolerated. F/u with Dr. Silveira 12/03/2020.    Review of Systems    Objective:      Physical Exam    Assessment:       1. Lymphedema of both lower extremities           Wound 10/28/20 0830 Other (comment) Left lower Leg #1 (Active)   10/28/20 0830    Pre-existing: Yes   Primary Wound Type: Other   Side: Left   Orientation: lower   Location: Leg   Wound Number (optional): #1   Ankle-Brachial " Index:    Pulses: doppler   Removal Indication and Assessment:    Wound Outcome:    (Retired) Wound Type:    (Retired) Wound Length (cm):    (Retired) Wound Width (cm):    (Retired) Depth (cm):    Wound Description (Comments):    Removal Indications:    Dressing Appearance Dry;Intact 11/25/20 0700   Drainage Amount None 11/25/20 0700   Appearance Pink;Dry 11/25/20 0700   Tissue loss description Not applicable 11/25/20 0700   Red (%), Wound Tissue Color 100 % 11/25/20 0700   Periwound Area Intact;Edematous 11/25/20 0700   Wound Edges Rolled/closed 11/25/20 0700   Wound Length (cm) 0 cm 11/25/20 0700   Wound Width (cm) 0 cm 11/25/20 0700   Wound Depth (cm) 0 cm 11/25/20 0700   Wound Volume (cm^3) 0 cm^3 11/25/20 0700   Wound Surface Area (cm^2) 0 cm^2 11/25/20 0700   Care Cleansed with:;Sterile normal saline 11/25/20 0700   Dressing Compression wrap 11/25/20 0700   Periwound Care Topical treatment applied 11/25/20 0700   Compression Two layer compression 11/25/20 0700   Dressing Change Due 12/03/20 11/25/20 0700            Wound 10/28/20 0830 Other (comment) Right lower Leg (Active)   10/28/20 0830    Pre-existing: Yes   Primary Wound Type: Other   Side: Right   Orientation: lower   Location: Leg   Wound Number (optional):    Ankle-Brachial Index:    Pulses: doppler   Removal Indication and Assessment:    Wound Outcome:    (Retired) Wound Type:    (Retired) Wound Length (cm):    (Retired) Wound Width (cm):    (Retired) Depth (cm):    Wound Description (Comments):    Removal Indications:    Dressing Appearance Intact;Moist drainage 11/25/20 0700   Drainage Amount Small 11/25/20 0700   Drainage Characteristics/Odor Serosanguineous 11/25/20 0700   Appearance Pink;Red;Moist 11/25/20 0700   Tissue loss description Partial thickness 11/25/20 0700   Red (%), Wound Tissue Color 100 % 11/25/20 0700   Periwound Area Edematous 11/25/20 0700   Wound Length (cm) 9.7 cm 11/25/20 0700   Wound Width (cm) 32.5 cm 11/25/20 0700   Wound  Depth (cm) 0 cm 11/25/20 0700   Wound Volume (cm^3) 0 cm^3 11/25/20 0700   Wound Surface Area (cm^2) 315.25 cm^2 11/25/20 0700   Care Cleansed with:;Sterile normal saline 11/25/20 0700   Dressing Compression wrap 11/25/20 0700   Periwound Care Topical treatment applied 11/25/20 0700   Compression Two layer compression 11/25/20 0700   Dressing Change Due 12/03/20 11/25/20 0700       Cleanse wound with: Soap and water   Lidocaine: prn   Silver nitrate: prn   Periwound care: Lotrisone cream to RLE   Primary dressing: Co-flex with calamine BLE toes to knee   Edema control: Co-flex with calamine BLE toes to knee   Frequency: Weekly   Follow-up: nurse visit 11/25/2020, Dr. Silveira 12/03/20   Other Orders: continue bactrim. 4th set of lymphedema pump measurements done 11/19/2020     Plan:

## 2020-12-03 ENCOUNTER — HOSPITAL ENCOUNTER (OUTPATIENT)
Dept: WOUND CARE | Facility: HOSPITAL | Age: 70
Discharge: HOME | End: 2020-12-03
Attending: SURGERY
Payer: MEDICARE

## 2020-12-03 VITALS
SYSTOLIC BLOOD PRESSURE: 162 MMHG | HEIGHT: 67 IN | WEIGHT: 191 LBS | TEMPERATURE: 98 F | DIASTOLIC BLOOD PRESSURE: 67 MMHG | HEART RATE: 70 BPM | BODY MASS INDEX: 29.98 KG/M2

## 2020-12-03 DIAGNOSIS — I89.0 LYMPHEDEMA OF BOTH LOWER EXTREMITIES: Primary | ICD-10-CM

## 2020-12-03 PROCEDURE — 29581 APPL MULTLAYER CMPRN SYS LEG: CPT | Mod: 50

## 2020-12-03 RX ORDER — SULFAMETHOXAZOLE AND TRIMETHOPRIM 400; 80 MG/1; MG/1
1 TABLET ORAL 2 TIMES DAILY
Qty: 30 TABLET | Refills: 1 | Status: SHIPPED | OUTPATIENT
Start: 2020-12-03 | End: 2021-01-12

## 2020-12-03 NOTE — PROGRESS NOTES
"Subjective:       Patient ID: Mateo Foreman is a 70 y.o. male.    Chief Complaint: Venous Stasis (bilateral lower legs)    69 y/o male here today with bilateral lower extremity lymphedema. Seen by Dr. Silveira in his office 10/15/20. Currently taking Keflex and lasix 40mg. Daily. No open areas noted today. Patient states right leg "weeps" at times. BLE edema 2+. Doppler pulses audible. Seen by Dr. Silveira today. 1st set of measurements done for lymphedema pumps. Toenails 1-10 trimmed per Dr. Silveira. BLE Co-flex with calamine applied toes to knee. Patient to continue Keflex po. Rx for refill sent to pharmacy. Return in 1 week 11/4/20.  11/05/2020- f/u with Dr. Silveira. 2nd set of lymphedema pump measurements done. Continue keflex. Cont POC. Pt to f/u with Dr. Silveira in 1 week 11/12/2020.  11/11/2020: Follow up with . RLE reddened, edematous with slight weeping. Patient is to discontinue Keflex and   Bactrim is sent to patient's pharmacy. Lotrisone cream applied to RLE and co flex with calamine to BLE. 3rd set of lymphedema pump measurements taken in  Clinic. Follow up in 1 week with  on 11/18/2020.     Right leg:                                   Left Leg:  Base of foot: 25.5 cm               Base of foot: 24 cm  Ankle: 29 cm                             Ankle: 24 cm  Calf:40.5 cm                             Calf: 35 cm    11/19/2020- f/u with Dr. Silveira. 4th set of lymphedema pump measurements taken. Pt to complete bactrim. Nurse visit 11/25/2020, f/u with Dr. Silveira 12/03/2020.  12/3/20: F/U with Dr. Silveira. BLE edema improved. Refill on Bactrim called in. Awaiting lymphedema pumps. Cont. POC. Next visit Dr. Silveira 12/10/20.    Review of Systems   Constitutional: Negative.    HENT: Negative.    Eyes: Negative.    Respiratory: Negative.    Cardiovascular: Negative.    Gastrointestinal: Negative.    Genitourinary: Negative.    Musculoskeletal: Negative.    Neurological: Negative.  "   Psychiatric/Behavioral: Negative.        Objective:      Physical Exam  Constitutional:       Appearance: He is well-developed.   HENT:      Head: Normocephalic.   Eyes:      Conjunctiva/sclera: Conjunctivae normal.      Pupils: Pupils are equal, round, and reactive to light.   Neck:      Musculoskeletal: Normal range of motion and neck supple.   Cardiovascular:      Rate and Rhythm: Normal rate and regular rhythm.      Heart sounds: Normal heart sounds.   Pulmonary:      Effort: Pulmonary effort is normal.      Breath sounds: Normal breath sounds.   Abdominal:      General: Bowel sounds are normal.      Palpations: Abdomen is soft.   Musculoskeletal: Normal range of motion.   Skin:     General: Skin is warm and dry.   Neurological:      Mental Status: He is alert and oriented to person, place, and time.      Deep Tendon Reflexes: Reflexes are normal and symmetric.         Assessment:       1. Lymphedema of both lower extremities           Wound 10/28/20 0830 Other (comment) Left lower Leg #1 (Active)   10/28/20 0830    Pre-existing: Yes   Primary Wound Type: Other   Side: Left   Orientation: lower   Location: Leg   Wound Number (optional): #1   Ankle-Brachial Index:    Pulses: doppler   Removal Indication and Assessment:    Wound Outcome:    (Retired) Wound Type:    (Retired) Wound Length (cm):    (Retired) Wound Width (cm):    (Retired) Depth (cm):    Wound Description (Comments):    Removal Indications:    Wound Image   12/03/20 0800   Dressing Appearance Intact;Dry 12/03/20 0800   Drainage Amount None 12/03/20 0800   Appearance Dry;Pink 12/03/20 0800   Tissue loss description Not applicable 12/03/20 0800   Red (%), Wound Tissue Color 100 % 12/03/20 0800   Periwound Area Hemosiderin Staining;Edematous 12/03/20 0800   Wound Length (cm) 0 cm 12/03/20 0800   Wound Width (cm) 0 cm 12/03/20 0800   Wound Depth (cm) 0 cm 12/03/20 0800   Wound Volume (cm^3) 0 cm^3 12/03/20 0800   Wound Surface Area (cm^2) 0 cm^2  12/03/20 0800   Care Cleansed with:;Soap and water 12/03/20 0800   Dressing Compression wrap 12/03/20 0800   Periwound Care Topical treatment applied 12/03/20 0800   Compression Two layer compression 12/03/20 0800   Dressing Change Due 12/10/20 12/03/20 0800            Wound 10/28/20 0830 Other (comment) Right lower Leg (Active)   10/28/20 0830    Pre-existing: Yes   Primary Wound Type: Other   Side: Right   Orientation: lower   Location: Leg   Wound Number (optional):    Ankle-Brachial Index:    Pulses: doppler   Removal Indication and Assessment:    Wound Outcome:    (Retired) Wound Type:    (Retired) Wound Length (cm):    (Retired) Wound Width (cm):    (Retired) Depth (cm):    Wound Description (Comments):    Removal Indications:    Wound Image   12/03/20 0800   Dressing Appearance Moist drainage;Intact 12/03/20 0800   Drainage Amount Small 12/03/20 0800   Drainage Characteristics/Odor Serosanguineous 12/03/20 0800   Appearance Red;Moist;Blistered 12/03/20 0800   Tissue loss description Partial thickness 12/03/20 0800   Red (%), Wound Tissue Color 100 % 12/03/20 0800   Periwound Area Edematous;Hemosiderin Staining;Blistered 12/03/20 0800   Wound Edges Undefined 12/03/20 0800   Care Cleansed with:;Sterile normal saline 12/03/20 0800   Dressing Compression wrap 12/03/20 0800   Periwound Care Topical treatment applied 12/03/20 0800   Compression Two layer compression 12/03/20 0800   Dressing Change Due 12/10/20 12/03/20 0800       Cleanse wound with: Soap and water   Lidocaine: prn   Silver nitrate: prn   Periwound care: Lotrisone cream to RLE   Primary dressing: Co-flex with calamine BLE toes to knee   Edema control: Co-flex with calamine BLE toes to knee   Frequency: Weekly   Follow-up:  Dr. Silveira 12/10/20   Other Orders: continue bactrim. 4th set of lymphedema pump measurements done 11/19/2020 12/03/2020 Edema Measurements  Edema:    Compression device in use:   Edema measurements:    Calf circumference  measured at 21cm from heel      Left: 29   cm  Right:   34.5    cm    Ankle circumference measured at 10cm from heel      Left: 27.2   cm  Right:    28   cm    Foot circumference measured at 11cm from heel      Left:  23.7  cm  Right:    25.5   cm        Plan:                Follow up in about 1 week (around 12/10/2020).

## 2020-12-10 ENCOUNTER — HOSPITAL ENCOUNTER (OUTPATIENT)
Dept: WOUND CARE | Facility: HOSPITAL | Age: 70
Discharge: HOME | End: 2020-12-10
Attending: SURGERY
Payer: MEDICARE

## 2020-12-10 VITALS
HEIGHT: 67 IN | TEMPERATURE: 97 F | BODY MASS INDEX: 29.98 KG/M2 | DIASTOLIC BLOOD PRESSURE: 73 MMHG | HEART RATE: 87 BPM | WEIGHT: 191 LBS | SYSTOLIC BLOOD PRESSURE: 157 MMHG

## 2020-12-10 DIAGNOSIS — J44.9 CHRONIC OBSTRUCTIVE PULMONARY DISEASE, UNSPECIFIED COPD TYPE: ICD-10-CM

## 2020-12-10 DIAGNOSIS — I69.351 HEMIPLEGIA AND HEMIPARESIS FOLLOWING CEREBRAL INFARCTION AFFECTING RIGHT DOMINANT SIDE: ICD-10-CM

## 2020-12-10 DIAGNOSIS — I10 HTN, GOAL BELOW 130/80: ICD-10-CM

## 2020-12-10 DIAGNOSIS — I89.0 LYMPHEDEMA OF BOTH LOWER EXTREMITIES: Primary | ICD-10-CM

## 2020-12-10 DIAGNOSIS — E78.5 DYSLIPIDEMIA ASSOCIATED WITH TYPE 2 DIABETES MELLITUS: ICD-10-CM

## 2020-12-10 DIAGNOSIS — E11.65 TYPE 2 DIABETES MELLITUS WITH HYPERGLYCEMIA, WITH LONG-TERM CURRENT USE OF INSULIN: ICD-10-CM

## 2020-12-10 DIAGNOSIS — Z79.4 TYPE 2 DIABETES MELLITUS WITH HYPERGLYCEMIA, WITH LONG-TERM CURRENT USE OF INSULIN: ICD-10-CM

## 2020-12-10 DIAGNOSIS — E11.69 DYSLIPIDEMIA ASSOCIATED WITH TYPE 2 DIABETES MELLITUS: ICD-10-CM

## 2020-12-10 PROCEDURE — 29581 APPL MULTLAYER CMPRN SYS LEG: CPT

## 2020-12-10 NOTE — PROGRESS NOTES
"Subjective:       Patient ID: Mateo Foreman is a 70 y.o. male.    Chief Complaint: Non-healing Wound Follow Up    71 y/o male here today with bilateral lower extremity lymphedema. Seen by Dr. Silveira in his office 10/15/20. Currently taking Keflex and lasix 40mg. Daily. No open areas noted today. Patient states right leg "weeps" at times. BLE edema 2+. Doppler pulses audible. Seen by Dr. Silveira today. 1st set of measurements done for lymphedema pumps. Toenails 1-10 trimmed per Dr. Silveira. BLE Co-flex with calamine applied toes to knee. Patient to continue Keflex po. Rx for refill sent to pharmacy. Return in 1 week 11/4/20.  11/05/2020- f/u with Dr. Silveira. 2nd set of lymphedema pump measurements done. Continue keflex. Cont POC. Pt to f/u with Dr. Silveira in 1 week 11/12/2020.  11/11/2020: Follow up with . RLE reddened, edematous with slight weeping. Patient is to discontinue Keflex and   Bactrim is sent to patient's pharmacy. Lotrisone cream applied to RLE and co flex with calamine to BLE. 3rd set of lymphedema pump measurements taken in  Clinic. Follow up in 1 week with  on 11/18/2020.     Right leg:                                   Left Leg:  Base of foot: 25.5 cm               Base of foot: 24 cm  Ankle: 29 cm                             Ankle: 24 cm  Calf:40.5 cm                             Calf: 35 cm    11/19/2020- f/u with Dr. Silveira. 4th set of lymphedema pump measurements taken. Pt to complete bactrim. Nurse visit 11/25/2020, f/u with Dr. Silveira 12/03/2020.  12/3/20: F/U with Dr. Silveira. BLE edema improved. Refill on Bactrim called in. Awaiting lymphedema pumps. Cont. POC. Next visit Dr. Silveira 12/10/20.  12/10/2020: Follow up with . BLE edema improving. Patient is awaiting lymphedema pumps and is instructed to bring them into the clinic when he receives them. Patient verbalized understanding. Continue current plan of care. Follow up in 1 week on 12/17/2020.     Review " of Systems   Constitutional: Negative.    HENT: Negative.    Eyes: Negative.    Respiratory: Negative.    Cardiovascular: Negative.    Gastrointestinal: Negative.    Genitourinary: Negative.    Musculoskeletal: Negative.    Neurological: Negative.    Psychiatric/Behavioral: Negative.        Objective:      Physical Exam  Constitutional:       Appearance: He is well-developed.   HENT:      Head: Normocephalic.   Eyes:      Conjunctiva/sclera: Conjunctivae normal.      Pupils: Pupils are equal, round, and reactive to light.   Neck:      Musculoskeletal: Normal range of motion and neck supple.   Cardiovascular:      Rate and Rhythm: Normal rate and regular rhythm.      Heart sounds: Normal heart sounds.   Pulmonary:      Effort: Pulmonary effort is normal.      Breath sounds: Normal breath sounds.   Abdominal:      General: Bowel sounds are normal.      Palpations: Abdomen is soft.   Musculoskeletal: Normal range of motion.   Skin:     General: Skin is warm and dry.   Neurological:      Mental Status: He is alert and oriented to person, place, and time.      Deep Tendon Reflexes: Reflexes are normal and symmetric.         Assessment:       1. Lymphedema of both lower extremities    2. Type 2 diabetes mellitus with hyperglycemia, with long-term current use of insulin           Wound 10/28/20 0830 Other (comment) Left lower Leg #1 (Active)   10/28/20 0830    Pre-existing: Yes   Primary Wound Type: Other   Side: Left   Orientation: lower   Location: Leg   Wound Number (optional): #1   Ankle-Brachial Index:    Pulses: doppler   Removal Indication and Assessment:    Wound Outcome:    (Retired) Wound Type:    (Retired) Wound Length (cm):    (Retired) Wound Width (cm):    (Retired) Depth (cm):    Wound Description (Comments):    Removal Indications:    Wound Image   12/10/20 0900   Dressing Appearance Intact;Dry 12/10/20 0900   Drainage Amount None 12/10/20 0900   Appearance Dry;Pink 12/10/20 0900   Periwound Area  Edematous;Dry;Hemosiderin Staining 12/10/20 0900   Care Cleansed with:;Sterile normal saline 12/10/20 0900   Dressing Applied;Compression wrap 12/10/20 0900   Compression Two layer compression 12/10/20 0900   Dressing Change Due 12/17/20 12/10/20 0900            Wound 10/28/20 0830 Other (comment) Right lower Leg (Active)   10/28/20 0830    Pre-existing: Yes   Primary Wound Type: Other   Side: Right   Orientation: lower   Location: Leg   Wound Number (optional):    Ankle-Brachial Index:    Pulses: doppler   Removal Indication and Assessment:    Wound Outcome:    (Retired) Wound Type:    (Retired) Wound Length (cm):    (Retired) Wound Width (cm):    (Retired) Depth (cm):    Wound Description (Comments):    Removal Indications:    Wound Image    12/10/20 0900   Dressing Appearance Intact;Moist drainage 12/10/20 0900   Drainage Amount Scant 12/10/20 0900   Appearance Red;Pink;Dry 12/10/20 0900   Tissue loss description Partial thickness 12/10/20 0900   Red (%), Wound Tissue Color 100 % 12/10/20 0900   Periwound Area Dry;Edematous 12/10/20 0900   Wound Edges Undefined 12/10/20 0900   Care Cleansed with:;Sterile normal saline 12/10/20 0900   Dressing Applied;Compression wrap;Other (see comments) 12/10/20 0900   Periwound Care Topical treatment applied;Other (see comments) 12/10/20 0900   Compression Two layer compression 12/10/20 0900   Dressing Change Due 12/17/20 12/10/20 0900         Cleanse wound with: Soap and water   Lidocaine: prn   Silver nitrate: prn   Periwound care: Lotrisone cream to RLE   Primary dressing: Co-flex with calamine BLE toes to knee   Edema control: Co-flex with calamine BLE toes to knee   Frequency: Weekly   Follow-up:  Dr. Silveira 12/17/2020   Other Orders: Awaiting lymphedema pumps    Plan:                Follow up 12/17/2020

## 2020-12-17 ENCOUNTER — HOSPITAL ENCOUNTER (OUTPATIENT)
Dept: WOUND CARE | Facility: HOSPITAL | Age: 70
Discharge: HOME | End: 2020-12-17
Attending: SURGERY
Payer: MEDICARE

## 2020-12-17 VITALS
SYSTOLIC BLOOD PRESSURE: 167 MMHG | DIASTOLIC BLOOD PRESSURE: 71 MMHG | WEIGHT: 191 LBS | TEMPERATURE: 97 F | HEART RATE: 75 BPM | HEIGHT: 67 IN | BODY MASS INDEX: 29.98 KG/M2

## 2020-12-17 DIAGNOSIS — Z79.4 TYPE 2 DIABETES MELLITUS WITH HYPERGLYCEMIA, WITH LONG-TERM CURRENT USE OF INSULIN: ICD-10-CM

## 2020-12-17 DIAGNOSIS — E11.65 TYPE 2 DIABETES MELLITUS WITH HYPERGLYCEMIA, WITH LONG-TERM CURRENT USE OF INSULIN: ICD-10-CM

## 2020-12-17 DIAGNOSIS — I89.0 LYMPHEDEMA OF BOTH LOWER EXTREMITIES: Primary | ICD-10-CM

## 2020-12-17 DIAGNOSIS — E11.69 DYSLIPIDEMIA ASSOCIATED WITH TYPE 2 DIABETES MELLITUS: ICD-10-CM

## 2020-12-17 DIAGNOSIS — E78.5 DYSLIPIDEMIA ASSOCIATED WITH TYPE 2 DIABETES MELLITUS: ICD-10-CM

## 2020-12-17 DIAGNOSIS — I73.9 CLAUDICATION: ICD-10-CM

## 2020-12-17 DIAGNOSIS — I10 HTN, GOAL BELOW 130/80: ICD-10-CM

## 2020-12-17 PROCEDURE — 29581 APPL MULTLAYER CMPRN SYS LEG: CPT | Mod: 50

## 2020-12-17 NOTE — PROGRESS NOTES
"Subjective:       Patient ID: Mateo Foreman is a 70 y.o. male.    Chief Complaint: Wound Care    71 y/o male here today with bilateral lower extremity lymphedema. Seen by Dr. Silveira in his office 10/15/20. Currently taking Keflex and lasix 40mg. Daily. No open areas noted today. Patient states right leg "weeps" at times. BLE edema 2+. Doppler pulses audible. Seen by Dr. Silveira today. 1st set of measurements done for lymphedema pumps. Toenails 1-10 trimmed per Dr. Silveira. BLE Co-flex with calamine applied toes to knee. Patient to continue Keflex po. Rx for refill sent to pharmacy. Return in 1 week 11/4/20.  11/05/2020- f/u with Dr. Silveira. 2nd set of lymphedema pump measurements done. Continue keflex. Cont POC. Pt to f/u with Dr. Silveira in 1 week 11/12/2020.  11/11/2020: Follow up with . RLE reddened, edematous with slight weeping. Patient is to discontinue Keflex and   Bactrim is sent to patient's pharmacy. Lotrisone cream applied to RLE and co flex with calamine to BLE. 3rd set of lymphedema pump measurements taken in  Clinic. Follow up in 1 week with  on 11/18/2020.     Right leg:                                   Left Leg:  Base of foot: 25.5 cm               Base of foot: 24 cm  Ankle: 29 cm                             Ankle: 24 cm  Calf:40.5 cm                             Calf: 35 cm    11/19/2020- f/u with Dr. Silveira. 4th set of lymphedema pump measurements taken. Pt to complete bactrim. Nurse visit 11/25/2020, f/u with Dr. Silveira 12/03/2020.  12/3/20: F/U with Dr. Silveira. BLE edema improved. Refill on Bactrim called in. Awaiting lymphedema pumps. Cont. POC. Next visit Dr. Silveira 12/10/20.  12/10/2020: Follow up with . BLE edema improving. Patient is awaiting lymphedema pumps and is instructed to bring them into the clinic when he receives them. Patient verbalized understanding. Continue current plan of care. Follow up in 1 week on 12/17/2020.   12/17/2020- f/u with Dr." Raoul. Pt has been using lymphedema pumps for one hour BID. Cont POC. Pt for nurse visit 12/24/2020 and f/u with Dr. Silveira 12/31/2020.     Review of Systems   Constitutional: Negative.    HENT: Negative.    Eyes: Negative.    Respiratory: Negative.    Cardiovascular: Negative.    Gastrointestinal: Negative.    Genitourinary: Negative.    Musculoskeletal: Negative.    Neurological: Negative.    Psychiatric/Behavioral: Negative.        Objective:      Physical Exam  Constitutional:       Appearance: He is well-developed.   HENT:      Head: Normocephalic.   Eyes:      Conjunctiva/sclera: Conjunctivae normal.      Pupils: Pupils are equal, round, and reactive to light.   Neck:      Musculoskeletal: Normal range of motion and neck supple.   Cardiovascular:      Rate and Rhythm: Normal rate and regular rhythm.      Heart sounds: Normal heart sounds.   Pulmonary:      Effort: Pulmonary effort is normal.      Breath sounds: Normal breath sounds.   Abdominal:      General: Bowel sounds are normal.      Palpations: Abdomen is soft.   Musculoskeletal: Normal range of motion.   Skin:     General: Skin is warm and dry.   Neurological:      Mental Status: He is alert and oriented to person, place, and time.      Deep Tendon Reflexes: Reflexes are normal and symmetric.         Assessment:       1. Lymphedema of both lower extremities           Wound 10/28/20 0830 Other (comment) Left lower Leg #1 (Active)   10/28/20 0830    Pre-existing: Yes   Primary Wound Type: Other   Side: Left   Orientation: lower   Location: Leg   Wound Number (optional): #1   Ankle-Brachial Index:    Pulses: doppler   Removal Indication and Assessment:    Wound Outcome:    (Retired) Wound Type:    (Retired) Wound Length (cm):    (Retired) Wound Width (cm):    (Retired) Depth (cm):    Wound Description (Comments):    Removal Indications:    Wound Image   12/17/20 0800   Dressing Appearance Intact 12/17/20 0800   Drainage Amount None 12/17/20 0800    Appearance Pink;Dry 12/17/20 0800   Tissue loss description Not applicable 12/17/20 0800   Red (%), Wound Tissue Color 100 % 12/17/20 0800   Periwound Area Edematous;Hemosiderin Staining 12/17/20 0800   Wound Length (cm) 0 cm 12/17/20 0800   Wound Width (cm) 0 cm 12/17/20 0800   Wound Depth (cm) 0 cm 12/17/20 0800   Wound Volume (cm^3) 0 cm^3 12/17/20 0800   Wound Surface Area (cm^2) 0 cm^2 12/17/20 0800   Care Cleansed with:;Sterile normal saline 12/17/20 0800   Dressing Compression wrap 12/17/20 0800   Compression Two layer compression 12/17/20 0800   Dressing Change Due 12/24/20 12/17/20 0800            Wound 10/28/20 0830 Other (comment) Right lower Leg (Active)   10/28/20 0830    Pre-existing: Yes   Primary Wound Type: Other   Side: Right   Orientation: lower   Location: Leg   Wound Number (optional):    Ankle-Brachial Index:    Pulses: doppler   Removal Indication and Assessment:    Wound Outcome:    (Retired) Wound Type:    (Retired) Wound Length (cm):    (Retired) Wound Width (cm):    (Retired) Depth (cm):    Wound Description (Comments):    Removal Indications:    Wound Image    12/17/20 0800   Dressing Appearance Intact 12/17/20 0800   Drainage Amount Scant 12/17/20 0800   Drainage Characteristics/Odor Serosanguineous 12/17/20 0800   Appearance Pink;Red;Moist 12/17/20 0800   Tissue loss description Partial thickness 12/17/20 0800   Red (%), Wound Tissue Color 100 % 12/17/20 0800   Periwound Area Edematous;Hemosiderin Staining 12/17/20 0800   Wound Edges Undefined 12/17/20 0800   Wound Length (cm) 5 cm 12/17/20 0800   Wound Width (cm) 5.5 cm 12/17/20 0800   Wound Depth (cm) 0.1 cm 12/17/20 0800   Wound Volume (cm^3) 2.75 cm^3 12/17/20 0800   Wound Surface Area (cm^2) 27.5 cm^2 12/17/20 0800   Care Cleansed with:;Sterile normal saline 12/17/20 0800   Dressing Compression wrap 12/17/20 0800   Periwound Care Topical treatment applied 12/17/20 0800   Compression Two layer compression 12/17/20 0800   Dressing  Change Due 12/24/20 12/17/20 0800       Cleanse wound with: Soap and water   Lidocaine: prn   Silver nitrate: prn   Periwound care: Lotrisone cream to RLE   Primary dressing: Co-flex with calamine BLE toes to knee   Edema control: Co-flex with calamine BLE toes to knee   Frequency: Weekly   Follow-up:  Dr. Silveira 12/24/2020   Other Orders: Continue to use lymphedema pumps for one hour twice a day.       Plan:                Follow up in about 2 weeks (around 12/31/2020).

## 2020-12-24 ENCOUNTER — HOSPITAL ENCOUNTER (OUTPATIENT)
Dept: WOUND CARE | Facility: HOSPITAL | Age: 70
Discharge: HOME | End: 2020-12-24
Attending: SURGERY
Payer: MEDICARE

## 2020-12-24 VITALS — SYSTOLIC BLOOD PRESSURE: 141 MMHG | HEART RATE: 77 BPM | TEMPERATURE: 98 F | DIASTOLIC BLOOD PRESSURE: 63 MMHG

## 2020-12-24 DIAGNOSIS — I89.0 LYMPHEDEMA OF BOTH LOWER EXTREMITIES: Primary | ICD-10-CM

## 2020-12-24 DIAGNOSIS — I10 HTN, GOAL BELOW 130/80: ICD-10-CM

## 2020-12-24 DIAGNOSIS — E11.69 DYSLIPIDEMIA ASSOCIATED WITH TYPE 2 DIABETES MELLITUS: ICD-10-CM

## 2020-12-24 DIAGNOSIS — Z79.4 TYPE 2 DIABETES MELLITUS WITH HYPERGLYCEMIA, WITH LONG-TERM CURRENT USE OF INSULIN: ICD-10-CM

## 2020-12-24 DIAGNOSIS — E11.65 TYPE 2 DIABETES MELLITUS WITH HYPERGLYCEMIA, WITH LONG-TERM CURRENT USE OF INSULIN: ICD-10-CM

## 2020-12-24 DIAGNOSIS — E78.5 DYSLIPIDEMIA ASSOCIATED WITH TYPE 2 DIABETES MELLITUS: ICD-10-CM

## 2020-12-24 DIAGNOSIS — I73.9 CLAUDICATION: ICD-10-CM

## 2020-12-24 PROCEDURE — 29581 APPL MULTLAYER CMPRN SYS LEG: CPT | Mod: 50

## 2020-12-24 NOTE — PROGRESS NOTES
"Subjective:       Patient ID: Mateo Foreman is a 70 y.o. male.    Chief Complaint: Non-healing Wound    Chief Complaint: Wound Care     71 y/o male here today with bilateral lower extremity lymphedema. Seen by Dr. Silveira in his office 10/15/20. Currently taking Keflex and lasix 40mg. Daily. No open areas noted today. Patient states right leg "weeps" at times. BLE edema 2+. Doppler pulses audible. Seen by Dr. Silveira today. 1st set of measurements done for lymphedema pumps. Toenails 1-10 trimmed per Dr. Silveira. BLE Co-flex with calamine applied toes to knee. Patient to continue Keflex po. Rx for refill sent to pharmacy. Return in 1 week 11/4/20.  11/05/2020- f/u with Dr. Silveira. 2nd set of lymphedema pump measurements done. Continue keflex. Cont POC. Pt to f/u with Dr. Silveira in 1 week 11/12/2020.  11/11/2020: Follow up with . RLE reddened, edematous with slight weeping. Patient is to discontinue Keflex and   Bactrim is sent to patient's pharmacy. Lotrisone cream applied to RLE and co flex with calamine to BLE. 3rd set of lymphedema pump measurements taken in  Clinic. Follow up in 1 week with  on 11/18/2020.      Right leg:                                   Left Leg:  Base of foot: 25.5 cm               Base of foot: 24 cm  Ankle: 29 cm                             Ankle: 24 cm  Calf:40.5 cm                             Calf: 35 cm     11/19/2020- f/u with Dr. Silveira. 4th set of lymphedema pump measurements taken. Pt to complete bactrim. Nurse visit 11/25/2020, f/u with Dr. Silveira 12/03/2020.  12/3/20: F/U with Dr. Silveira. BLE edema improved. Refill on Bactrim called in. Awaiting lymphedema pumps. Cont. POC. Next visit Dr. Silveira 12/10/20.  12/10/2020: Follow up with . BLE edema improving. Patient is awaiting lymphedema pumps and is instructed to bring them into the clinic when he receives them. Patient verbalized understanding. Continue current plan of care. Follow up in 1 week " on 12/17/2020.   12/17/2020- f/u with Dr. Silveira. Pt has been using lymphedema pumps for one hour BID. Cont POC. Pt for nurse visit 12/24/2020 and f/u with Dr. Silveira 12/31/2020.   12/24/20:  Nurse visit for wound assessment and dressing change.  2 layer coflex with calamine from toes to knee to BLEs.  F/U with Dr. Silveira in 1 week on 12/31/20.      Review of Systems    Objective:      Physical Exam    Assessment:       1. Lymphedema of both lower extremities    2. Dyslipidemia associated with type 2 diabetes mellitus    3. HTN, goal below 130/80    4. Claudication    5. Type 2 diabetes mellitus with hyperglycemia, with long-term current use of insulin           Wound 10/28/20 0830 Other (comment) Left lower Leg #1 (Active)   10/28/20 0830    Pre-existing: Yes   Primary Wound Type: Other   Side: Left   Orientation: lower   Location: Leg   Wound Number (optional): #1   Ankle-Brachial Index:    Pulses: doppler   Removal Indication and Assessment:    Wound Outcome:    (Retired) Wound Type:    (Retired) Wound Length (cm):    (Retired) Wound Width (cm):    (Retired) Depth (cm):    Wound Description (Comments):    Removal Indications:    Wound Image   12/24/20 0800   Dressing Appearance Intact 12/24/20 0800   Drainage Amount None 12/24/20 0800   Appearance Pink;Dry;Epithelialization 12/24/20 0800   Tissue loss description Not applicable 12/24/20 0800   Periwound Area Edematous;Hemosiderin Staining 12/24/20 0800   Care Cleansed with:;Soap and water 12/24/20 0800   Dressing Compression wrap 12/24/20 0800   Periwound Care Topical treatment applied 12/24/20 0800   Compression Two layer compression 12/24/20 0800   Dressing Change Due 12/31/20 12/24/20 0800            Wound 10/28/20 0830 Other (comment) Right lower Leg (Active)   10/28/20 0830    Pre-existing: Yes   Primary Wound Type: Other   Side: Right   Orientation: lower   Location: Leg   Wound Number (optional):    Ankle-Brachial Index:    Pulses: doppler   Removal  Indication and Assessment:    Wound Outcome:    (Retired) Wound Type:    (Retired) Wound Length (cm):    (Retired) Wound Width (cm):    (Retired) Depth (cm):    Wound Description (Comments):    Removal Indications:    Wound Image   12/24/20 0800   Dressing Appearance Intact 12/24/20 0800   Drainage Amount Scant 12/24/20 0800   Drainage Characteristics/Odor Serosanguineous 12/24/20 0800   Appearance Red;Epithelialization;Moist 12/24/20 0800   Tissue loss description Partial thickness 12/24/20 0800   Red (%), Wound Tissue Color 100 % 12/24/20 0800   Periwound Area Edematous;Hemosiderin Staining 12/24/20 0800   Wound Edges Undefined 12/24/20 0800   Wound Length (cm) 3.5 cm 12/24/20 0800   Wound Width (cm) 1 cm 12/24/20 0800   Wound Depth (cm) 0.1 cm 12/24/20 0800   Wound Volume (cm^3) 0.35 cm^3 12/24/20 0800   Wound Surface Area (cm^2) 3.5 cm^2 12/24/20 0800   Care Cleansed with:;Sterile normal saline 12/24/20 0800   Dressing Applied;Changed;Compression wrap 12/24/20 0800   Periwound Care Topical treatment applied 12/24/20 0800   Compression Two layer compression 12/24/20 0800   Dressing Change Due 12/31/20 12/24/20 0800         Lymphedema of both lower extremities  - Primary           Cleanse wound with: Soap and water   Lidocaine: prn   Silver nitrate: prn   Periwound care: Lotrisone cream to RLE   Primary dressing: Co-flex with calamine BLE toes to knee   Edema control: Co-flex with calamine BLE toes to knee   Frequency: Weekly     Other Orders: Continue to use lymphedema pumps for one hour twice a day.                       Plan:            Follow-up:  Dr. Silveira 12/31/2020

## 2020-12-31 ENCOUNTER — HOSPITAL ENCOUNTER (OUTPATIENT)
Dept: WOUND CARE | Facility: HOSPITAL | Age: 70
Discharge: HOME | End: 2020-12-31
Attending: SURGERY
Payer: MEDICARE

## 2020-12-31 VITALS
HEART RATE: 73 BPM | WEIGHT: 191 LBS | SYSTOLIC BLOOD PRESSURE: 143 MMHG | HEIGHT: 67 IN | BODY MASS INDEX: 29.98 KG/M2 | DIASTOLIC BLOOD PRESSURE: 79 MMHG | TEMPERATURE: 97 F

## 2020-12-31 DIAGNOSIS — Z79.4 TYPE 2 DIABETES MELLITUS WITH HYPERGLYCEMIA, WITH LONG-TERM CURRENT USE OF INSULIN: ICD-10-CM

## 2020-12-31 DIAGNOSIS — Z79.4 TYPE 2 DIABETES MELLITUS WITHOUT COMPLICATION, WITH LONG-TERM CURRENT USE OF INSULIN: ICD-10-CM

## 2020-12-31 DIAGNOSIS — I89.0 LYMPHEDEMA OF BOTH LOWER EXTREMITIES: Primary | ICD-10-CM

## 2020-12-31 DIAGNOSIS — E11.9 TYPE 2 DIABETES MELLITUS WITHOUT COMPLICATION, WITH LONG-TERM CURRENT USE OF INSULIN: ICD-10-CM

## 2020-12-31 DIAGNOSIS — E11.65 TYPE 2 DIABETES MELLITUS WITH HYPERGLYCEMIA, WITH LONG-TERM CURRENT USE OF INSULIN: ICD-10-CM

## 2020-12-31 PROCEDURE — 99213 OFFICE O/P EST LOW 20 MIN: CPT

## 2021-01-14 ENCOUNTER — HOSPITAL ENCOUNTER (OUTPATIENT)
Dept: WOUND CARE | Facility: HOSPITAL | Age: 71
Discharge: HOME | End: 2021-01-14
Attending: SURGERY
Payer: MEDICARE

## 2021-01-14 VITALS
SYSTOLIC BLOOD PRESSURE: 145 MMHG | HEART RATE: 78 BPM | HEIGHT: 67 IN | WEIGHT: 191 LBS | DIASTOLIC BLOOD PRESSURE: 65 MMHG | BODY MASS INDEX: 29.98 KG/M2 | TEMPERATURE: 98 F

## 2021-01-14 DIAGNOSIS — I73.9 CLAUDICATION: ICD-10-CM

## 2021-01-14 DIAGNOSIS — I89.0 LYMPHEDEMA OF BOTH LOWER EXTREMITIES: Primary | ICD-10-CM

## 2021-01-14 DIAGNOSIS — Z79.4 TYPE 2 DIABETES MELLITUS WITH HYPERGLYCEMIA, WITH LONG-TERM CURRENT USE OF INSULIN: ICD-10-CM

## 2021-01-14 DIAGNOSIS — E11.65 TYPE 2 DIABETES MELLITUS WITH HYPERGLYCEMIA, WITH LONG-TERM CURRENT USE OF INSULIN: ICD-10-CM

## 2021-01-14 DIAGNOSIS — I10 HTN, GOAL BELOW 130/80: ICD-10-CM

## 2021-01-14 PROCEDURE — 99212 OFFICE O/P EST SF 10 MIN: CPT

## 2021-02-19 ENCOUNTER — LAB VISIT (OUTPATIENT)
Dept: LAB | Facility: HOSPITAL | Age: 71
End: 2021-02-19
Attending: INTERNAL MEDICINE
Payer: MEDICARE

## 2021-02-19 DIAGNOSIS — E11.65 TYPE 2 DIABETES MELLITUS WITH HYPERGLYCEMIA, WITH LONG-TERM CURRENT USE OF INSULIN: ICD-10-CM

## 2021-02-19 DIAGNOSIS — Z12.5 PROSTATE CANCER SCREENING: ICD-10-CM

## 2021-02-19 DIAGNOSIS — Z79.4 TYPE 2 DIABETES MELLITUS WITH HYPERGLYCEMIA, WITH LONG-TERM CURRENT USE OF INSULIN: ICD-10-CM

## 2021-02-19 PROBLEM — I69.959 HEMIPLEGIA OF DOMINANT SIDE AS LATE EFFECT OF CEREBROVASCULAR DISEASE: Status: ACTIVE | Noted: 2021-02-19

## 2021-02-19 PROBLEM — E11.51 PERIPHERAL VASCULAR DISORDER DUE TO DIABETES MELLITUS: Status: ACTIVE | Noted: 2021-02-19

## 2021-02-19 PROBLEM — I63.9 CEREBROVASCULAR ACCIDENT: Status: ACTIVE | Noted: 2021-02-19

## 2021-02-19 LAB
ALBUMIN SERPL BCP-MCNC: 4 G/DL (ref 3.5–5.2)
ALP SERPL-CCNC: 69 U/L (ref 55–135)
ALT SERPL W/O P-5'-P-CCNC: 20 U/L (ref 10–44)
ANION GAP SERPL CALC-SCNC: 8 MMOL/L (ref 8–16)
AST SERPL-CCNC: 25 U/L (ref 10–40)
BASOPHILS # BLD AUTO: 0.03 K/UL (ref 0–0.2)
BASOPHILS NFR BLD: 0.5 % (ref 0–1.9)
BILIRUB SERPL-MCNC: 0.4 MG/DL (ref 0.1–1)
BUN SERPL-MCNC: 17 MG/DL (ref 8–23)
CALCIUM SERPL-MCNC: 9.4 MG/DL (ref 8.7–10.5)
CHLORIDE SERPL-SCNC: 101 MMOL/L (ref 95–110)
CHOLEST SERPL-MCNC: 119 MG/DL (ref 120–199)
CHOLEST/HDLC SERPL: 2.5 {RATIO} (ref 2–5)
CO2 SERPL-SCNC: 34 MMOL/L (ref 23–29)
COMPLEXED PSA SERPL-MCNC: 0.28 NG/ML (ref 0–4)
CREAT SERPL-MCNC: 1 MG/DL (ref 0.5–1.4)
DIFFERENTIAL METHOD: ABNORMAL
EOSINOPHIL # BLD AUTO: 0.2 K/UL (ref 0–0.5)
EOSINOPHIL NFR BLD: 2.5 % (ref 0–8)
ERYTHROCYTE [DISTWIDTH] IN BLOOD BY AUTOMATED COUNT: 14.7 % (ref 11.5–14.5)
EST. GFR  (AFRICAN AMERICAN): >60 ML/MIN/1.73 M^2
EST. GFR  (NON AFRICAN AMERICAN): >60 ML/MIN/1.73 M^2
ESTIMATED AVG GLUCOSE: 123 MG/DL (ref 68–131)
GLUCOSE SERPL-MCNC: 83 MG/DL (ref 70–110)
HBA1C MFR BLD: 5.9 % (ref 4–5.6)
HCT VFR BLD AUTO: 37.1 % (ref 40–54)
HDLC SERPL-MCNC: 47 MG/DL (ref 40–75)
HDLC SERPL: 39.5 % (ref 20–50)
HGB BLD-MCNC: 11.9 G/DL (ref 14–18)
IMM GRANULOCYTES # BLD AUTO: 0.02 K/UL (ref 0–0.04)
IMM GRANULOCYTES NFR BLD AUTO: 0.3 % (ref 0–0.5)
LDLC SERPL CALC-MCNC: 61.4 MG/DL (ref 63–159)
LYMPHOCYTES # BLD AUTO: 1.5 K/UL (ref 1–4.8)
LYMPHOCYTES NFR BLD: 23.6 % (ref 18–48)
MCH RBC QN AUTO: 29.5 PG (ref 27–31)
MCHC RBC AUTO-ENTMCNC: 32.1 G/DL (ref 32–36)
MCV RBC AUTO: 92 FL (ref 82–98)
MONOCYTES # BLD AUTO: 0.8 K/UL (ref 0.3–1)
MONOCYTES NFR BLD: 12.1 % (ref 4–15)
NEUTROPHILS # BLD AUTO: 3.9 K/UL (ref 1.8–7.7)
NEUTROPHILS NFR BLD: 61 % (ref 38–73)
NONHDLC SERPL-MCNC: 72 MG/DL
NRBC BLD-RTO: 0 /100 WBC
PLATELET # BLD AUTO: 344 K/UL (ref 150–350)
PMV BLD AUTO: 9.4 FL (ref 9.2–12.9)
POTASSIUM SERPL-SCNC: 3.9 MMOL/L (ref 3.5–5.1)
PROT SERPL-MCNC: 7.9 G/DL (ref 6–8.4)
RBC # BLD AUTO: 4.03 M/UL (ref 4.6–6.2)
SODIUM SERPL-SCNC: 143 MMOL/L (ref 136–145)
TRIGL SERPL-MCNC: 53 MG/DL (ref 30–150)
WBC # BLD AUTO: 6.44 K/UL (ref 3.9–12.7)

## 2021-02-19 PROCEDURE — 85025 COMPLETE CBC W/AUTO DIFF WBC: CPT

## 2021-02-19 PROCEDURE — 36415 COLL VENOUS BLD VENIPUNCTURE: CPT

## 2021-02-19 PROCEDURE — 80053 COMPREHEN METABOLIC PANEL: CPT

## 2021-02-19 PROCEDURE — 80061 LIPID PANEL: CPT

## 2021-02-19 PROCEDURE — 83036 HEMOGLOBIN GLYCOSYLATED A1C: CPT

## 2021-02-19 PROCEDURE — 84153 ASSAY OF PSA TOTAL: CPT

## 2021-08-20 PROBLEM — H26.9 CATARACT OF LEFT EYE: Status: RESOLVED | Noted: 2017-10-16 | Resolved: 2021-08-20

## 2021-08-20 PROBLEM — Z79.4 ENCOUNTER FOR LONG-TERM (CURRENT) USE OF INSULIN: Status: ACTIVE | Noted: 2021-08-20

## 2021-09-16 ENCOUNTER — HOSPITAL ENCOUNTER (INPATIENT)
Facility: HOSPITAL | Age: 71
LOS: 7 days | Discharge: NURSING, REHAB OR LONG-TERM CARE | DRG: 522 | End: 2021-09-23
Attending: EMERGENCY MEDICINE | Admitting: FAMILY MEDICINE
Payer: MEDICARE

## 2021-09-16 ENCOUNTER — ANESTHESIA EVENT (OUTPATIENT)
Dept: SURGERY | Facility: HOSPITAL | Age: 71
DRG: 522 | End: 2021-09-16
Payer: MEDICARE

## 2021-09-16 DIAGNOSIS — S72.001A CLOSED FRACTURE OF NECK OF RIGHT FEMUR, INITIAL ENCOUNTER: Primary | ICD-10-CM

## 2021-09-16 DIAGNOSIS — R52 PAIN: ICD-10-CM

## 2021-09-16 DIAGNOSIS — R06.02 SOB (SHORTNESS OF BREATH): ICD-10-CM

## 2021-09-16 DIAGNOSIS — Z79.4 TYPE 2 DIABETES MELLITUS WITHOUT COMPLICATION, WITH LONG-TERM CURRENT USE OF INSULIN: ICD-10-CM

## 2021-09-16 DIAGNOSIS — S72.001A CLOSED FRACTURE OF NECK OF RIGHT FEMUR: ICD-10-CM

## 2021-09-16 DIAGNOSIS — I15.2 HYPERTENSION ASSOCIATED WITH DIABETES: ICD-10-CM

## 2021-09-16 DIAGNOSIS — E78.5 DYSLIPIDEMIA ASSOCIATED WITH TYPE 2 DIABETES MELLITUS: ICD-10-CM

## 2021-09-16 DIAGNOSIS — E11.9 TYPE 2 DIABETES MELLITUS WITHOUT COMPLICATION, WITH LONG-TERM CURRENT USE OF INSULIN: ICD-10-CM

## 2021-09-16 DIAGNOSIS — E11.69 DYSLIPIDEMIA ASSOCIATED WITH TYPE 2 DIABETES MELLITUS: ICD-10-CM

## 2021-09-16 DIAGNOSIS — Z86.73 H/O: CVA (CEREBROVASCULAR ACCIDENT): ICD-10-CM

## 2021-09-16 DIAGNOSIS — J44.9 CHRONIC OBSTRUCTIVE PULMONARY DISEASE, UNSPECIFIED COPD TYPE: ICD-10-CM

## 2021-09-16 DIAGNOSIS — I89.0 LYMPHEDEMA OF BOTH LOWER EXTREMITIES: ICD-10-CM
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
## 2021-09-16 DIAGNOSIS — E11.59 HYPERTENSION ASSOCIATED WITH DIABETES: ICD-10-CM

## 2021-09-16 DIAGNOSIS — R09.02 OXYGEN DESATURATION: ICD-10-CM

## 2021-09-16 LAB
ALBUMIN SERPL BCP-MCNC: 3.7 G/DL (ref 3.5–5.2)
ALP SERPL-CCNC: 68 U/L (ref 55–135)
ALT SERPL W/O P-5'-P-CCNC: 15 U/L (ref 10–44)
ANION GAP SERPL CALC-SCNC: 14 MMOL/L (ref 8–16)
AST SERPL-CCNC: 22 U/L (ref 10–40)
BASOPHILS # BLD AUTO: 0.04 K/UL (ref 0–0.2)
BASOPHILS NFR BLD: 0.3 % (ref 0–1.9)
BILIRUB SERPL-MCNC: 0.4 MG/DL (ref 0.1–1)
BILIRUB UR QL STRIP: NEGATIVE
BUN SERPL-MCNC: 11 MG/DL (ref 8–23)
CALCIUM SERPL-MCNC: 9.2 MG/DL (ref 8.7–10.5)
CHLORIDE SERPL-SCNC: 103 MMOL/L (ref 95–110)
CLARITY UR: CLEAR
CO2 SERPL-SCNC: 26 MMOL/L (ref 23–29)
COLOR UR: YELLOW
CREAT SERPL-MCNC: 1.1 MG/DL (ref 0.5–1.4)
CTP QC/QA: YES
DIFFERENTIAL METHOD: ABNORMAL
EOSINOPHIL # BLD AUTO: 0.1 K/UL (ref 0–0.5)
EOSINOPHIL NFR BLD: 1 % (ref 0–8)
ERYTHROCYTE [DISTWIDTH] IN BLOOD BY AUTOMATED COUNT: 14.1 % (ref 11.5–14.5)
EST. GFR  (AFRICAN AMERICAN): >60 ML/MIN/1.73 M^2
EST. GFR  (NON AFRICAN AMERICAN): >60 ML/MIN/1.73 M^2
GLUCOSE SERPL-MCNC: 99 MG/DL (ref 70–110)
GLUCOSE UR QL STRIP: NEGATIVE
HCT VFR BLD AUTO: 36.7 % (ref 40–54)
HGB BLD-MCNC: 11.7 G/DL (ref 14–18)
HGB UR QL STRIP: NEGATIVE
IMM GRANULOCYTES # BLD AUTO: 0.12 K/UL (ref 0–0.04)
IMM GRANULOCYTES NFR BLD AUTO: 1 % (ref 0–0.5)
INR PPP: 1 (ref 0.8–1.2)
KETONES UR QL STRIP: NEGATIVE
LEUKOCYTE ESTERASE UR QL STRIP: NEGATIVE
LYMPHOCYTES # BLD AUTO: 1.2 K/UL (ref 1–4.8)
LYMPHOCYTES NFR BLD: 9.6 % (ref 18–48)
MCH RBC QN AUTO: 29.8 PG (ref 27–31)
MCHC RBC AUTO-ENTMCNC: 31.9 G/DL (ref 32–36)
MCV RBC AUTO: 94 FL (ref 82–98)
MONOCYTES # BLD AUTO: 0.9 K/UL (ref 0.3–1)
MONOCYTES NFR BLD: 7.3 % (ref 4–15)
NEUTROPHILS # BLD AUTO: 10 K/UL (ref 1.8–7.7)
NEUTROPHILS NFR BLD: 80.8 % (ref 38–73)
NITRITE UR QL STRIP: NEGATIVE
NRBC BLD-RTO: 0 /100 WBC
PH UR STRIP: 5 [PH] (ref 5–8)
PLATELET # BLD AUTO: 363 K/UL (ref 150–450)
PMV BLD AUTO: 9.5 FL (ref 9.2–12.9)
POCT GLUCOSE: 113 MG/DL (ref 70–110)
POCT GLUCOSE: 71 MG/DL (ref 70–110)
POTASSIUM SERPL-SCNC: 3.6 MMOL/L (ref 3.5–5.1)
PROT SERPL-MCNC: 7.4 G/DL (ref 6–8.4)
PROT UR QL STRIP: NEGATIVE
PROTHROMBIN TIME: 10.6 SEC (ref 9–12.5)
RBC # BLD AUTO: 3.92 M/UL (ref 4.6–6.2)
SARS-COV-2 RDRP RESP QL NAA+PROBE: NEGATIVE
SODIUM SERPL-SCNC: 143 MMOL/L (ref 136–145)
SP GR UR STRIP: 1.01 (ref 1–1.03)
URN SPEC COLLECT METH UR: NORMAL
UROBILINOGEN UR STRIP-ACNC: NEGATIVE EU/DL
WBC # BLD AUTO: 12.31 K/UL (ref 3.9–12.7)

## 2021-09-16 PROCEDURE — 25000003 PHARM REV CODE 250: Performed by: STUDENT IN AN ORGANIZED HEALTH CARE EDUCATION/TRAINING PROGRAM

## 2021-09-16 PROCEDURE — 80053 COMPREHEN METABOLIC PANEL: CPT | Performed by: EMERGENCY MEDICINE

## 2021-09-16 PROCEDURE — 85025 COMPLETE CBC W/AUTO DIFF WBC: CPT | Performed by: EMERGENCY MEDICINE

## 2021-09-16 PROCEDURE — 93010 ELECTROCARDIOGRAM REPORT: CPT | Mod: ,,, | Performed by: INTERNAL MEDICINE

## 2021-09-16 PROCEDURE — 63600175 PHARM REV CODE 636 W HCPCS: Performed by: STUDENT IN AN ORGANIZED HEALTH CARE EDUCATION/TRAINING PROGRAM

## 2021-09-16 PROCEDURE — 99285 EMERGENCY DEPT VISIT HI MDM: CPT | Mod: 25

## 2021-09-16 PROCEDURE — 85610 PROTHROMBIN TIME: CPT | Performed by: EMERGENCY MEDICINE

## 2021-09-16 PROCEDURE — 93010 EKG 12-LEAD: ICD-10-PCS | Mod: ,,, | Performed by: INTERNAL MEDICINE

## 2021-09-16 PROCEDURE — 11000001 HC ACUTE MED/SURG PRIVATE ROOM

## 2021-09-16 PROCEDURE — 93005 ELECTROCARDIOGRAM TRACING: CPT

## 2021-09-16 PROCEDURE — 25000003 PHARM REV CODE 250: Performed by: EMERGENCY MEDICINE

## 2021-09-16 PROCEDURE — 81003 URINALYSIS AUTO W/O SCOPE: CPT | Performed by: EMERGENCY MEDICINE

## 2021-09-16 PROCEDURE — U0002 COVID-19 LAB TEST NON-CDC: HCPCS | Performed by: EMERGENCY MEDICINE

## 2021-09-16 RX ORDER — TALC
6 POWDER (GRAM) TOPICAL NIGHTLY PRN
Status: DISCONTINUED | OUTPATIENT
Start: 2021-09-16 | End: 2021-09-23 | Stop reason: HOSPADM

## 2021-09-16 RX ORDER — AMOXICILLIN 250 MG
1 CAPSULE ORAL 2 TIMES DAILY
Status: DISCONTINUED | OUTPATIENT
Start: 2021-09-16 | End: 2021-09-23 | Stop reason: HOSPADM

## 2021-09-16 RX ORDER — SODIUM CHLORIDE 0.9 % (FLUSH) 0.9 %
5 SYRINGE (ML) INJECTION
Status: DISCONTINUED | OUTPATIENT
Start: 2021-09-16 | End: 2021-09-23 | Stop reason: HOSPADM

## 2021-09-16 RX ORDER — INSULIN ASPART 100 [IU]/ML
1-10 INJECTION, SOLUTION INTRAVENOUS; SUBCUTANEOUS
Status: DISCONTINUED | OUTPATIENT
Start: 2021-09-16 | End: 2021-09-23 | Stop reason: HOSPADM

## 2021-09-16 RX ORDER — LANOLIN ALCOHOL/MO/W.PET/CERES
1 CREAM (GRAM) TOPICAL DAILY
Status: DISCONTINUED | OUTPATIENT
Start: 2021-09-16 | End: 2021-09-23 | Stop reason: HOSPADM

## 2021-09-16 RX ORDER — MORPHINE SULFATE 2 MG/ML
2 INJECTION, SOLUTION INTRAMUSCULAR; INTRAVENOUS ONCE
Status: COMPLETED | OUTPATIENT
Start: 2021-09-16 | End: 2021-09-16

## 2021-09-16 RX ORDER — MORPHINE SULFATE 4 MG/ML
4 INJECTION, SOLUTION INTRAMUSCULAR; INTRAVENOUS ONCE
Status: DISCONTINUED | OUTPATIENT
Start: 2021-09-16 | End: 2021-09-16

## 2021-09-16 RX ORDER — ATORVASTATIN CALCIUM 20 MG/1
20 TABLET, FILM COATED ORAL NIGHTLY
Status: DISCONTINUED | OUTPATIENT
Start: 2021-09-16 | End: 2021-09-23 | Stop reason: HOSPADM

## 2021-09-16 RX ORDER — LATANOPROST 50 UG/ML
1 SOLUTION/ DROPS OPHTHALMIC NIGHTLY
Status: DISCONTINUED | OUTPATIENT
Start: 2021-09-16 | End: 2021-09-23 | Stop reason: HOSPADM

## 2021-09-16 RX ORDER — IBUPROFEN 200 MG
16 TABLET ORAL
Status: DISCONTINUED | OUTPATIENT
Start: 2021-09-16 | End: 2021-09-23 | Stop reason: HOSPADM

## 2021-09-16 RX ORDER — NALOXONE HCL 0.4 MG/ML
0.02 VIAL (ML) INJECTION
Status: DISCONTINUED | OUTPATIENT
Start: 2021-09-16 | End: 2021-09-23 | Stop reason: HOSPADM

## 2021-09-16 RX ORDER — ACETAMINOPHEN 325 MG/1
650 TABLET ORAL EVERY 8 HOURS PRN
Status: DISCONTINUED | OUTPATIENT
Start: 2021-09-16 | End: 2021-09-16

## 2021-09-16 RX ORDER — ACETAMINOPHEN 500 MG
1000 TABLET ORAL EVERY 8 HOURS PRN
Status: DISCONTINUED | OUTPATIENT
Start: 2021-09-16 | End: 2021-09-20

## 2021-09-16 RX ORDER — GLUCAGON 1 MG
1 KIT INJECTION
Status: DISCONTINUED | OUTPATIENT
Start: 2021-09-16 | End: 2021-09-23 | Stop reason: HOSPADM

## 2021-09-16 RX ORDER — IBUPROFEN 200 MG
24 TABLET ORAL
Status: DISCONTINUED | OUTPATIENT
Start: 2021-09-16 | End: 2021-09-23 | Stop reason: HOSPADM

## 2021-09-16 RX ORDER — LIDOCAINE 50 MG/G
1 PATCH TOPICAL DAILY PRN
Status: DISCONTINUED | OUTPATIENT
Start: 2021-09-16 | End: 2021-09-23 | Stop reason: HOSPADM

## 2021-09-16 RX ORDER — KETOROLAC TROMETHAMINE 30 MG/ML
30 INJECTION, SOLUTION INTRAMUSCULAR; INTRAVENOUS EVERY 6 HOURS PRN
Status: DISPENSED | OUTPATIENT
Start: 2021-09-16 | End: 2021-09-17

## 2021-09-16 RX ORDER — TRAZODONE HYDROCHLORIDE 100 MG/1
100 TABLET ORAL NIGHTLY
Status: DISCONTINUED | OUTPATIENT
Start: 2021-09-16 | End: 2021-09-23 | Stop reason: HOSPADM

## 2021-09-16 RX ORDER — FUROSEMIDE 40 MG/1
40 TABLET ORAL DAILY
Status: DISCONTINUED | OUTPATIENT
Start: 2021-09-16 | End: 2021-09-23 | Stop reason: HOSPADM

## 2021-09-16 RX ORDER — ACETAMINOPHEN 500 MG
1000 TABLET ORAL
Status: COMPLETED | OUTPATIENT
Start: 2021-09-16 | End: 2021-09-16

## 2021-09-16 RX ORDER — TALC
8 POWDER (GRAM) TOPICAL NIGHTLY PRN
Status: DISCONTINUED | OUTPATIENT
Start: 2021-09-16 | End: 2021-09-16 | Stop reason: DRUGHIGH

## 2021-09-16 RX ADMIN — MORPHINE SULFATE 2 MG: 2 INJECTION, SOLUTION INTRAMUSCULAR; INTRAVENOUS at 03:09

## 2021-09-16 RX ADMIN — ACETAMINOPHEN 1000 MG: 500 TABLET ORAL at 10:09

## 2021-09-16 RX ADMIN — ATORVASTATIN CALCIUM 20 MG: 20 TABLET, FILM COATED ORAL at 09:09

## 2021-09-16 RX ADMIN — TRAZODONE HYDROCHLORIDE 100 MG: 100 TABLET ORAL at 09:09

## 2021-09-16 RX ADMIN — FUROSEMIDE 40 MG: 40 TABLET ORAL at 03:09

## 2021-09-16 RX ADMIN — LATANOPROST 1 DROP: 50 SOLUTION OPHTHALMIC at 10:09

## 2021-09-16 RX ADMIN — FERROUS SULFATE TAB EC 325 MG (65 MG FE EQUIVALENT) 1 EACH: 325 (65 FE) TABLET DELAYED RESPONSE at 03:09

## 2021-09-16 RX ADMIN — KETOROLAC TROMETHAMINE 30 MG: 30 INJECTION, SOLUTION INTRAMUSCULAR at 09:09

## 2021-09-16 RX ADMIN — Medication 1 TABLET: at 09:09

## 2021-09-17 ENCOUNTER — ANESTHESIA (OUTPATIENT)
Dept: SURGERY | Facility: HOSPITAL | Age: 71
DRG: 522 | End: 2021-09-17
Payer: MEDICARE

## 2021-09-17 ENCOUNTER — PATIENT OUTREACH (OUTPATIENT)
Dept: ADMINISTRATIVE | Facility: OTHER | Age: 71
End: 2021-09-17

## 2021-09-17 LAB
ABO + RH BLD: NORMAL
ALBUMIN SERPL BCP-MCNC: 3.1 G/DL (ref 3.5–5.2)
ALP SERPL-CCNC: 71 U/L (ref 55–135)
ALT SERPL W/O P-5'-P-CCNC: 9 U/L (ref 10–44)
ANION GAP SERPL CALC-SCNC: 10 MMOL/L (ref 8–16)
AST SERPL-CCNC: 19 U/L (ref 10–40)
BILIRUB SERPL-MCNC: 0.5 MG/DL (ref 0.1–1)
BLD GP AB SCN CELLS X3 SERPL QL: NORMAL
BUN SERPL-MCNC: 13 MG/DL (ref 8–23)
CALCIUM SERPL-MCNC: 9.1 MG/DL (ref 8.7–10.5)
CHLORIDE SERPL-SCNC: 103 MMOL/L (ref 95–110)
CO2 SERPL-SCNC: 32 MMOL/L (ref 23–29)
CREAT SERPL-MCNC: 1.2 MG/DL (ref 0.5–1.4)
EST. GFR  (AFRICAN AMERICAN): >60 ML/MIN/1.73 M^2
EST. GFR  (NON AFRICAN AMERICAN): >60 ML/MIN/1.73 M^2
GLUCOSE SERPL-MCNC: 90 MG/DL (ref 70–110)
POCT GLUCOSE: 118 MG/DL (ref 70–110)
POCT GLUCOSE: 75 MG/DL (ref 70–110)
POCT GLUCOSE: 77 MG/DL (ref 70–110)
POTASSIUM SERPL-SCNC: 5.3 MMOL/L (ref 3.5–5.1)
PROT SERPL-MCNC: 6.3 G/DL (ref 6–8.4)
SODIUM SERPL-SCNC: 145 MMOL/L (ref 136–145)

## 2021-09-17 PROCEDURE — 37000008 HC ANESTHESIA 1ST 15 MINUTES: Performed by: ORTHOPAEDIC SURGERY

## 2021-09-17 PROCEDURE — 36415 COLL VENOUS BLD VENIPUNCTURE: CPT | Performed by: FAMILY MEDICINE

## 2021-09-17 PROCEDURE — 25000242 PHARM REV CODE 250 ALT 637 W/ HCPCS: Performed by: STUDENT IN AN ORGANIZED HEALTH CARE EDUCATION/TRAINING PROGRAM

## 2021-09-17 PROCEDURE — 27236 TREAT THIGH FRACTURE: CPT | Mod: RT,,, | Performed by: ORTHOPAEDIC SURGERY

## 2021-09-17 PROCEDURE — 37000009 HC ANESTHESIA EA ADD 15 MINS: Performed by: ORTHOPAEDIC SURGERY

## 2021-09-17 PROCEDURE — 36000710: Performed by: ORTHOPAEDIC SURGERY

## 2021-09-17 PROCEDURE — 63600175 PHARM REV CODE 636 W HCPCS: Performed by: STUDENT IN AN ORGANIZED HEALTH CARE EDUCATION/TRAINING PROGRAM

## 2021-09-17 PROCEDURE — 36415 COLL VENOUS BLD VENIPUNCTURE: CPT | Performed by: ANESTHESIOLOGY

## 2021-09-17 PROCEDURE — 27201423 OPTIME MED/SURG SUP & DEVICES STERILE SUPPLY: Performed by: ORTHOPAEDIC SURGERY

## 2021-09-17 PROCEDURE — 86900 BLOOD TYPING SEROLOGIC ABO: CPT | Performed by: ANESTHESIOLOGY

## 2021-09-17 PROCEDURE — 63600175 PHARM REV CODE 636 W HCPCS: Performed by: NURSE ANESTHETIST, CERTIFIED REGISTERED

## 2021-09-17 PROCEDURE — C1713 ANCHOR/SCREW BN/BN,TIS/BN: HCPCS | Performed by: ORTHOPAEDIC SURGERY

## 2021-09-17 PROCEDURE — 99223 PR INITIAL HOSPITAL CARE,LEVL III: ICD-10-PCS | Mod: 57,,, | Performed by: ORTHOPAEDIC SURGERY

## 2021-09-17 PROCEDURE — C9399 UNCLASSIFIED DRUGS OR BIOLOG: HCPCS | Performed by: STUDENT IN AN ORGANIZED HEALTH CARE EDUCATION/TRAINING PROGRAM

## 2021-09-17 PROCEDURE — 11000001 HC ACUTE MED/SURG PRIVATE ROOM

## 2021-09-17 PROCEDURE — 25000003 PHARM REV CODE 250: Performed by: STUDENT IN AN ORGANIZED HEALTH CARE EDUCATION/TRAINING PROGRAM

## 2021-09-17 PROCEDURE — 36000711: Performed by: ORTHOPAEDIC SURGERY

## 2021-09-17 PROCEDURE — 36415 COLL VENOUS BLD VENIPUNCTURE: CPT | Performed by: STUDENT IN AN ORGANIZED HEALTH CARE EDUCATION/TRAINING PROGRAM

## 2021-09-17 PROCEDURE — 27236 PR FEMORAL FX, OPEN TX: ICD-10-PCS | Mod: RT,,, | Performed by: ORTHOPAEDIC SURGERY

## 2021-09-17 PROCEDURE — 99223 1ST HOSP IP/OBS HIGH 75: CPT | Mod: 57,,, | Performed by: ORTHOPAEDIC SURGERY

## 2021-09-17 PROCEDURE — 25000003 PHARM REV CODE 250: Performed by: NURSE ANESTHETIST, CERTIFIED REGISTERED

## 2021-09-17 PROCEDURE — C1776 JOINT DEVICE (IMPLANTABLE): HCPCS | Performed by: ORTHOPAEDIC SURGERY

## 2021-09-17 PROCEDURE — 80053 COMPREHEN METABOLIC PANEL: CPT | Performed by: STUDENT IN AN ORGANIZED HEALTH CARE EDUCATION/TRAINING PROGRAM

## 2021-09-17 PROCEDURE — 71000033 HC RECOVERY, INTIAL HOUR: Performed by: ORTHOPAEDIC SURGERY

## 2021-09-17 DEVICE — HEAD FEMORAL UHR 28X48MM: Type: IMPLANTABLE DEVICE | Site: HIP | Status: FUNCTIONAL

## 2021-09-17 DEVICE — STEM FEMORAL CEM SZ 7 132 DEG: Type: IMPLANTABLE DEVICE | Site: HIP | Status: FUNCTIONAL

## 2021-09-17 DEVICE — CEMENT BONE ANTIBIO SIMPLEX P: Type: IMPLANTABLE DEVICE | Site: HIP | Status: FUNCTIONAL

## 2021-09-17 DEVICE — SPACER CENTRALIZER DIST UNIV 1: Type: IMPLANTABLE DEVICE | Site: HIP | Status: FUNCTIONAL

## 2021-09-17 DEVICE — PLUG BONE: Type: IMPLANTABLE DEVICE | Site: HIP | Status: FUNCTIONAL

## 2021-09-17 DEVICE — HEAD +0: Type: IMPLANTABLE DEVICE | Site: HIP | Status: FUNCTIONAL

## 2021-09-17 RX ORDER — ROCURONIUM BROMIDE 10 MG/ML
INJECTION, SOLUTION INTRAVENOUS
Status: DISCONTINUED | OUTPATIENT
Start: 2021-09-17 | End: 2021-09-17

## 2021-09-17 RX ORDER — NEOSTIGMINE METHYLSULFATE 1 MG/ML
INJECTION, SOLUTION INTRAVENOUS
Status: DISCONTINUED | OUTPATIENT
Start: 2021-09-17 | End: 2021-09-17

## 2021-09-17 RX ORDER — FENTANYL CITRATE 50 UG/ML
INJECTION, SOLUTION INTRAMUSCULAR; INTRAVENOUS
Status: DISCONTINUED | OUTPATIENT
Start: 2021-09-17 | End: 2021-09-17

## 2021-09-17 RX ORDER — HYDROMORPHONE HYDROCHLORIDE 2 MG/ML
0.2 INJECTION, SOLUTION INTRAMUSCULAR; INTRAVENOUS; SUBCUTANEOUS EVERY 5 MIN PRN
Status: DISCONTINUED | OUTPATIENT
Start: 2021-09-17 | End: 2021-09-17

## 2021-09-17 RX ORDER — CEFAZOLIN SODIUM 2 G/50ML
2 SOLUTION INTRAVENOUS
Status: COMPLETED | OUTPATIENT
Start: 2021-09-17 | End: 2021-09-18

## 2021-09-17 RX ORDER — ALBUTEROL SULFATE 2.5 MG/.5ML
10 SOLUTION RESPIRATORY (INHALATION)
Status: COMPLETED | OUTPATIENT
Start: 2021-09-17 | End: 2021-09-17

## 2021-09-17 RX ORDER — CARBOXYMETHYLCELLULOSE SODIUM 5 MG/ML
SOLUTION/ DROPS OPHTHALMIC
Status: DISCONTINUED | OUTPATIENT
Start: 2021-09-17 | End: 2021-09-17

## 2021-09-17 RX ORDER — PROPOFOL 10 MG/ML
VIAL (ML) INTRAVENOUS
Status: DISCONTINUED | OUTPATIENT
Start: 2021-09-17 | End: 2021-09-17

## 2021-09-17 RX ORDER — ENOXAPARIN SODIUM 100 MG/ML
40 INJECTION SUBCUTANEOUS EVERY 24 HOURS
Status: DISCONTINUED | OUTPATIENT
Start: 2021-09-17 | End: 2021-09-17

## 2021-09-17 RX ORDER — TRANEXAMIC ACID 100 MG/ML
INJECTION, SOLUTION INTRAVENOUS
Status: DISCONTINUED | OUTPATIENT
Start: 2021-09-17 | End: 2021-09-17

## 2021-09-17 RX ORDER — HYDROMORPHONE HYDROCHLORIDE 2 MG/ML
0.5 INJECTION, SOLUTION INTRAMUSCULAR; INTRAVENOUS; SUBCUTANEOUS EVERY 5 MIN PRN
Status: DISCONTINUED | OUTPATIENT
Start: 2021-09-17 | End: 2021-09-17

## 2021-09-17 RX ORDER — SODIUM CHLORIDE 0.9 % (FLUSH) 0.9 %
.1-1 SYRINGE (ML) INJECTION
Status: DISCONTINUED | OUTPATIENT
Start: 2021-09-17 | End: 2021-09-23 | Stop reason: HOSPADM

## 2021-09-17 RX ORDER — LIDOCAINE HYDROCHLORIDE 20 MG/ML
INJECTION, SOLUTION EPIDURAL; INFILTRATION; INTRACAUDAL; PERINEURAL
Status: DISCONTINUED | OUTPATIENT
Start: 2021-09-17 | End: 2021-09-17

## 2021-09-17 RX ORDER — CEFAZOLIN SODIUM 1 G/3ML
INJECTION, POWDER, FOR SOLUTION INTRAMUSCULAR; INTRAVENOUS
Status: DISCONTINUED | OUTPATIENT
Start: 2021-09-17 | End: 2021-09-17

## 2021-09-17 RX ORDER — MUPIROCIN 20 MG/G
OINTMENT TOPICAL 2 TIMES DAILY
Status: COMPLETED | OUTPATIENT
Start: 2021-09-17 | End: 2021-09-22

## 2021-09-17 RX ORDER — DEXAMETHASONE SODIUM PHOSPHATE 4 MG/ML
INJECTION, SOLUTION INTRA-ARTICULAR; INTRALESIONAL; INTRAMUSCULAR; INTRAVENOUS; SOFT TISSUE
Status: DISCONTINUED | OUTPATIENT
Start: 2021-09-17 | End: 2021-09-17

## 2021-09-17 RX ORDER — ACETAMINOPHEN 10 MG/ML
INJECTION, SOLUTION INTRAVENOUS
Status: DISCONTINUED | OUTPATIENT
Start: 2021-09-17 | End: 2021-09-17

## 2021-09-17 RX ORDER — SODIUM CHLORIDE, SODIUM LACTATE, POTASSIUM CHLORIDE, CALCIUM CHLORIDE 600; 310; 30; 20 MG/100ML; MG/100ML; MG/100ML; MG/100ML
INJECTION, SOLUTION INTRAVENOUS CONTINUOUS PRN
Status: DISCONTINUED | OUTPATIENT
Start: 2021-09-17 | End: 2021-09-17

## 2021-09-17 RX ORDER — ONDANSETRON 2 MG/ML
4 INJECTION INTRAMUSCULAR; INTRAVENOUS DAILY PRN
Status: COMPLETED | OUTPATIENT
Start: 2021-09-17 | End: 2021-09-17

## 2021-09-17 RX ORDER — MIDAZOLAM HYDROCHLORIDE 1 MG/ML
INJECTION INTRAMUSCULAR; INTRAVENOUS
Status: DISCONTINUED | OUTPATIENT
Start: 2021-09-17 | End: 2021-09-17

## 2021-09-17 RX ORDER — SODIUM CHLORIDE 0.9 % (FLUSH) 0.9 %
3 SYRINGE (ML) INJECTION
Status: DISCONTINUED | OUTPATIENT
Start: 2021-09-17 | End: 2021-09-23 | Stop reason: HOSPADM

## 2021-09-17 RX ORDER — ENOXAPARIN SODIUM 100 MG/ML
40 INJECTION SUBCUTANEOUS DAILY
Status: DISCONTINUED | OUTPATIENT
Start: 2021-09-18 | End: 2021-09-23 | Stop reason: HOSPADM

## 2021-09-17 RX ADMIN — FENTANYL CITRATE 50 MCG: 50 INJECTION, SOLUTION INTRAMUSCULAR; INTRAVENOUS at 02:09

## 2021-09-17 RX ADMIN — LIDOCAINE HYDROCHLORIDE 100 MG: 20 INJECTION, SOLUTION EPIDURAL; INFILTRATION; INTRACAUDAL; PERINEURAL at 01:09

## 2021-09-17 RX ADMIN — ALBUTEROL SULFATE 2.5 MG: 2.5 SOLUTION RESPIRATORY (INHALATION) at 11:09

## 2021-09-17 RX ADMIN — MUPIROCIN: 20 OINTMENT TOPICAL at 08:09

## 2021-09-17 RX ADMIN — CEFAZOLIN SODIUM 2 G: 2 SOLUTION INTRAVENOUS at 09:09

## 2021-09-17 RX ADMIN — ATORVASTATIN CALCIUM 20 MG: 20 TABLET, FILM COATED ORAL at 08:09

## 2021-09-17 RX ADMIN — PROPOFOL 30 MG: 10 INJECTION, EMULSION INTRAVENOUS at 02:09

## 2021-09-17 RX ADMIN — MIDAZOLAM HYDROCHLORIDE 0.5 MG: 1 INJECTION, SOLUTION INTRAMUSCULAR; INTRAVENOUS at 12:09

## 2021-09-17 RX ADMIN — FENTANYL CITRATE 25 MCG: 50 INJECTION, SOLUTION INTRAMUSCULAR; INTRAVENOUS at 02:09

## 2021-09-17 RX ADMIN — Medication 1 TABLET: at 08:09

## 2021-09-17 RX ADMIN — FERROUS SULFATE TAB EC 325 MG (65 MG FE EQUIVALENT) 1 EACH: 325 (65 FE) TABLET DELAYED RESPONSE at 08:09

## 2021-09-17 RX ADMIN — GLYCOPYRROLATE 0.6 MG: 0.2 INJECTION, SOLUTION INTRAMUSCULAR; INTRAVITREAL at 03:09

## 2021-09-17 RX ADMIN — FENTANYL CITRATE 100 MCG: 50 INJECTION, SOLUTION INTRAMUSCULAR; INTRAVENOUS at 01:09

## 2021-09-17 RX ADMIN — CEFAZOLIN 2 G: 330 INJECTION, POWDER, FOR SOLUTION INTRAMUSCULAR; INTRAVENOUS at 01:09

## 2021-09-17 RX ADMIN — FENTANYL CITRATE 50 MCG: 50 INJECTION, SOLUTION INTRAMUSCULAR; INTRAVENOUS at 01:09

## 2021-09-17 RX ADMIN — INSULIN DETEMIR 20 UNITS: 100 INJECTION, SOLUTION SUBCUTANEOUS at 08:09

## 2021-09-17 RX ADMIN — CARBOXYMETHYLCELLULOSE SODIUM 2 DROP: 5 SOLUTION/ DROPS OPHTHALMIC at 01:09

## 2021-09-17 RX ADMIN — TRANEXAMIC ACID 1000 MG: 100 INJECTION, SOLUTION INTRAVENOUS at 01:09

## 2021-09-17 RX ADMIN — SODIUM CHLORIDE, SODIUM LACTATE, POTASSIUM CHLORIDE, AND CALCIUM CHLORIDE: .6; .31; .03; .02 INJECTION, SOLUTION INTRAVENOUS at 12:09

## 2021-09-17 RX ADMIN — LATANOPROST 1 DROP: 50 SOLUTION OPHTHALMIC at 08:09

## 2021-09-17 RX ADMIN — KETOROLAC TROMETHAMINE 30 MG: 30 INJECTION, SOLUTION INTRAMUSCULAR at 09:09

## 2021-09-17 RX ADMIN — ROCURONIUM BROMIDE 50 MG: 10 INJECTION, SOLUTION INTRAVENOUS at 01:09

## 2021-09-17 RX ADMIN — ACETAMINOPHEN 1000 MG: 10 INJECTION, SOLUTION INTRAVENOUS at 03:09

## 2021-09-17 RX ADMIN — FENTANYL CITRATE 25 MCG: 50 INJECTION, SOLUTION INTRAMUSCULAR; INTRAVENOUS at 03:09

## 2021-09-17 RX ADMIN — SODIUM CHLORIDE, SODIUM LACTATE, POTASSIUM CHLORIDE, AND CALCIUM CHLORIDE: .6; .31; .03; .02 INJECTION, SOLUTION INTRAVENOUS at 03:09

## 2021-09-17 RX ADMIN — INSULIN ASPART 2 UNITS: 100 INJECTION, SOLUTION INTRAVENOUS; SUBCUTANEOUS at 09:09

## 2021-09-17 RX ADMIN — ACETAMINOPHEN 1000 MG: 500 TABLET ORAL at 09:09

## 2021-09-17 RX ADMIN — PROPOFOL 170 MG: 10 INJECTION, EMULSION INTRAVENOUS at 01:09

## 2021-09-17 RX ADMIN — TRAZODONE HYDROCHLORIDE 100 MG: 100 TABLET ORAL at 08:09

## 2021-09-17 RX ADMIN — NEOSTIGMINE METHYLSULFATE 4 MG: 1 INJECTION INTRAVENOUS at 03:09

## 2021-09-17 RX ADMIN — FUROSEMIDE 40 MG: 40 TABLET ORAL at 08:09

## 2021-09-17 RX ADMIN — FENTANYL CITRATE 50 MCG: 50 INJECTION, SOLUTION INTRAMUSCULAR; INTRAVENOUS at 12:09

## 2021-09-17 RX ADMIN — ONDANSETRON 8 MG: 2 INJECTION, SOLUTION INTRAMUSCULAR; INTRAVENOUS at 03:09

## 2021-09-17 RX ADMIN — DEXAMETHASONE SODIUM PHOSPHATE 4 MG: 4 INJECTION, SOLUTION INTRA-ARTICULAR; INTRALESIONAL; INTRAMUSCULAR; INTRAVENOUS; SOFT TISSUE at 01:09

## 2021-09-17 RX ADMIN — TRANEXAMIC ACID 1000 MG: 100 INJECTION, SOLUTION INTRAVENOUS at 03:09

## 2021-09-18 LAB
ALBUMIN SERPL BCP-MCNC: 2.8 G/DL (ref 3.5–5.2)
ALP SERPL-CCNC: 67 U/L (ref 55–135)
ALT SERPL W/O P-5'-P-CCNC: 9 U/L (ref 10–44)
ANION GAP SERPL CALC-SCNC: 10 MMOL/L (ref 8–16)
AST SERPL-CCNC: 34 U/L (ref 10–40)
BASOPHILS # BLD AUTO: 0.01 K/UL (ref 0–0.2)
BASOPHILS NFR BLD: 0.1 % (ref 0–1.9)
BILIRUB SERPL-MCNC: 0.4 MG/DL (ref 0.1–1)
BUN SERPL-MCNC: 15 MG/DL (ref 8–23)
CALCIUM SERPL-MCNC: 8 MG/DL (ref 8.7–10.5)
CHLORIDE SERPL-SCNC: 97 MMOL/L (ref 95–110)
CO2 SERPL-SCNC: 31 MMOL/L (ref 23–29)
CREAT SERPL-MCNC: 1.4 MG/DL (ref 0.5–1.4)
DIFFERENTIAL METHOD: ABNORMAL
EOSINOPHIL # BLD AUTO: 0 K/UL (ref 0–0.5)
EOSINOPHIL NFR BLD: 0 % (ref 0–8)
ERYTHROCYTE [DISTWIDTH] IN BLOOD BY AUTOMATED COUNT: 13.9 % (ref 11.5–14.5)
EST. GFR  (AFRICAN AMERICAN): 58 ML/MIN/1.73 M^2
EST. GFR  (NON AFRICAN AMERICAN): 51 ML/MIN/1.73 M^2
GLUCOSE SERPL-MCNC: 187 MG/DL (ref 70–110)
HCT VFR BLD AUTO: 31.7 % (ref 40–54)
HGB BLD-MCNC: 10.1 G/DL (ref 14–18)
IMM GRANULOCYTES # BLD AUTO: 0.02 K/UL (ref 0–0.04)
IMM GRANULOCYTES NFR BLD AUTO: 0.3 % (ref 0–0.5)
LYMPHOCYTES # BLD AUTO: 0.7 K/UL (ref 1–4.8)
LYMPHOCYTES NFR BLD: 9.5 % (ref 18–48)
MAGNESIUM SERPL-MCNC: 1.2 MG/DL (ref 1.6–2.6)
MCH RBC QN AUTO: 28.9 PG (ref 27–31)
MCHC RBC AUTO-ENTMCNC: 31.9 G/DL (ref 32–36)
MCV RBC AUTO: 91 FL (ref 82–98)
MONOCYTES # BLD AUTO: 0.9 K/UL (ref 0.3–1)
MONOCYTES NFR BLD: 11.4 % (ref 4–15)
NEUTROPHILS # BLD AUTO: 6.1 K/UL (ref 1.8–7.7)
NEUTROPHILS NFR BLD: 78.7 % (ref 38–73)
NRBC BLD-RTO: 0 /100 WBC
PHOSPHATE SERPL-MCNC: 4.4 MG/DL (ref 2.7–4.5)
PLATELET # BLD AUTO: 282 K/UL (ref 150–450)
PMV BLD AUTO: 9.3 FL (ref 9.2–12.9)
POCT GLUCOSE: 156 MG/DL (ref 70–110)
POCT GLUCOSE: 200 MG/DL (ref 70–110)
POCT GLUCOSE: 208 MG/DL (ref 70–110)
POCT GLUCOSE: 210 MG/DL (ref 70–110)
POTASSIUM SERPL-SCNC: 4.2 MMOL/L (ref 3.5–5.1)
PROT SERPL-MCNC: 5.7 G/DL (ref 6–8.4)
RBC # BLD AUTO: 3.5 M/UL (ref 4.6–6.2)
SODIUM SERPL-SCNC: 138 MMOL/L (ref 136–145)
WBC # BLD AUTO: 7.7 K/UL (ref 3.9–12.7)

## 2021-09-18 PROCEDURE — 25000003 PHARM REV CODE 250: Performed by: STUDENT IN AN ORGANIZED HEALTH CARE EDUCATION/TRAINING PROGRAM

## 2021-09-18 PROCEDURE — 97530 THERAPEUTIC ACTIVITIES: CPT

## 2021-09-18 PROCEDURE — 11000001 HC ACUTE MED/SURG PRIVATE ROOM

## 2021-09-18 PROCEDURE — 97535 SELF CARE MNGMENT TRAINING: CPT

## 2021-09-18 PROCEDURE — 97166 OT EVAL MOD COMPLEX 45 MIN: CPT

## 2021-09-18 PROCEDURE — 63600175 PHARM REV CODE 636 W HCPCS: Performed by: STUDENT IN AN ORGANIZED HEALTH CARE EDUCATION/TRAINING PROGRAM

## 2021-09-18 PROCEDURE — 84100 ASSAY OF PHOSPHORUS: CPT | Performed by: STUDENT IN AN ORGANIZED HEALTH CARE EDUCATION/TRAINING PROGRAM

## 2021-09-18 PROCEDURE — 80053 COMPREHEN METABOLIC PANEL: CPT | Performed by: STUDENT IN AN ORGANIZED HEALTH CARE EDUCATION/TRAINING PROGRAM

## 2021-09-18 PROCEDURE — 94761 N-INVAS EAR/PLS OXIMETRY MLT: CPT

## 2021-09-18 PROCEDURE — 97162 PT EVAL MOD COMPLEX 30 MIN: CPT | Performed by: PHYSICAL THERAPIST

## 2021-09-18 PROCEDURE — 36415 COLL VENOUS BLD VENIPUNCTURE: CPT | Performed by: STUDENT IN AN ORGANIZED HEALTH CARE EDUCATION/TRAINING PROGRAM

## 2021-09-18 PROCEDURE — 97530 THERAPEUTIC ACTIVITIES: CPT | Performed by: PHYSICAL THERAPIST

## 2021-09-18 PROCEDURE — 83735 ASSAY OF MAGNESIUM: CPT | Performed by: STUDENT IN AN ORGANIZED HEALTH CARE EDUCATION/TRAINING PROGRAM

## 2021-09-18 PROCEDURE — 85025 COMPLETE CBC W/AUTO DIFF WBC: CPT | Performed by: STUDENT IN AN ORGANIZED HEALTH CARE EDUCATION/TRAINING PROGRAM

## 2021-09-18 PROCEDURE — 27000221 HC OXYGEN, UP TO 24 HOURS

## 2021-09-18 RX ORDER — HYDROCODONE BITARTRATE AND ACETAMINOPHEN 5; 325 MG/1; MG/1
1 TABLET ORAL EVERY 6 HOURS PRN
Status: DISCONTINUED | OUTPATIENT
Start: 2021-09-18 | End: 2021-09-23 | Stop reason: HOSPADM

## 2021-09-18 RX ORDER — HYDROCODONE BITARTRATE AND ACETAMINOPHEN 5; 325 MG/1; MG/1
1 TABLET ORAL EVERY 8 HOURS PRN
Status: DISCONTINUED | OUTPATIENT
Start: 2021-09-18 | End: 2021-09-18

## 2021-09-18 RX ADMIN — INSULIN ASPART 2 UNITS: 100 INJECTION, SOLUTION INTRAVENOUS; SUBCUTANEOUS at 05:09

## 2021-09-18 RX ADMIN — TRAZODONE HYDROCHLORIDE 100 MG: 100 TABLET ORAL at 08:09

## 2021-09-18 RX ADMIN — ENOXAPARIN SODIUM 40 MG: 40 INJECTION SUBCUTANEOUS at 09:09

## 2021-09-18 RX ADMIN — CEFAZOLIN SODIUM 2 G: 2 SOLUTION INTRAVENOUS at 05:09

## 2021-09-18 RX ADMIN — HYDROCODONE BITARTRATE AND ACETAMINOPHEN 1 TABLET: 5; 325 TABLET ORAL at 11:09

## 2021-09-18 RX ADMIN — ACETAMINOPHEN 1000 MG: 500 TABLET ORAL at 05:09

## 2021-09-18 RX ADMIN — LATANOPROST 1 DROP: 50 SOLUTION OPHTHALMIC at 08:09

## 2021-09-18 RX ADMIN — MUPIROCIN: 20 OINTMENT TOPICAL at 09:09

## 2021-09-18 RX ADMIN — FERROUS SULFATE TAB EC 325 MG (65 MG FE EQUIVALENT) 1 EACH: 325 (65 FE) TABLET DELAYED RESPONSE at 09:09

## 2021-09-18 RX ADMIN — ATORVASTATIN CALCIUM 20 MG: 20 TABLET, FILM COATED ORAL at 08:09

## 2021-09-18 RX ADMIN — FUROSEMIDE 40 MG: 40 TABLET ORAL at 09:09

## 2021-09-18 RX ADMIN — Medication 1 TABLET: at 09:09

## 2021-09-18 RX ADMIN — INSULIN ASPART 4 UNITS: 100 INJECTION, SOLUTION INTRAVENOUS; SUBCUTANEOUS at 11:09

## 2021-09-18 RX ADMIN — MUPIROCIN: 20 OINTMENT TOPICAL at 08:09

## 2021-09-18 RX ADMIN — HYDROCODONE BITARTRATE AND ACETAMINOPHEN 1 TABLET: 5; 325 TABLET ORAL at 05:09

## 2021-09-18 RX ADMIN — INSULIN DETEMIR 20 UNITS: 100 INJECTION, SOLUTION SUBCUTANEOUS at 08:09

## 2021-09-18 RX ADMIN — Medication 1 TABLET: at 08:09

## 2021-09-19 LAB
ALBUMIN SERPL BCP-MCNC: 2.9 G/DL (ref 3.5–5.2)
ALP SERPL-CCNC: 63 U/L (ref 55–135)
ALT SERPL W/O P-5'-P-CCNC: 13 U/L (ref 10–44)
ANION GAP SERPL CALC-SCNC: 10 MMOL/L (ref 8–16)
AST SERPL-CCNC: 37 U/L (ref 10–40)
BASOPHILS # BLD AUTO: 0.02 K/UL (ref 0–0.2)
BASOPHILS NFR BLD: 0.3 % (ref 0–1.9)
BILIRUB SERPL-MCNC: 0.4 MG/DL (ref 0.1–1)
BUN SERPL-MCNC: 12 MG/DL (ref 8–23)
CALCIUM SERPL-MCNC: 8 MG/DL (ref 8.7–10.5)
CHLORIDE SERPL-SCNC: 98 MMOL/L (ref 95–110)
CO2 SERPL-SCNC: 32 MMOL/L (ref 23–29)
CREAT SERPL-MCNC: 1.1 MG/DL (ref 0.5–1.4)
DIFFERENTIAL METHOD: ABNORMAL
EOSINOPHIL # BLD AUTO: 0.1 K/UL (ref 0–0.5)
EOSINOPHIL NFR BLD: 1 % (ref 0–8)
ERYTHROCYTE [DISTWIDTH] IN BLOOD BY AUTOMATED COUNT: 13.9 % (ref 11.5–14.5)
EST. GFR  (AFRICAN AMERICAN): >60 ML/MIN/1.73 M^2
EST. GFR  (NON AFRICAN AMERICAN): >60 ML/MIN/1.73 M^2
GLUCOSE SERPL-MCNC: 157 MG/DL (ref 70–110)
HCT VFR BLD AUTO: 30.4 % (ref 40–54)
HGB BLD-MCNC: 9.5 G/DL (ref 14–18)
IMM GRANULOCYTES # BLD AUTO: 0.02 K/UL (ref 0–0.04)
IMM GRANULOCYTES NFR BLD AUTO: 0.3 % (ref 0–0.5)
LYMPHOCYTES # BLD AUTO: 0.9 K/UL (ref 1–4.8)
LYMPHOCYTES NFR BLD: 12.2 % (ref 18–48)
MAGNESIUM SERPL-MCNC: 1.4 MG/DL (ref 1.6–2.6)
MCH RBC QN AUTO: 28.7 PG (ref 27–31)
MCHC RBC AUTO-ENTMCNC: 31.3 G/DL (ref 32–36)
MCV RBC AUTO: 92 FL (ref 82–98)
MONOCYTES # BLD AUTO: 0.9 K/UL (ref 0.3–1)
MONOCYTES NFR BLD: 12.3 % (ref 4–15)
NEUTROPHILS # BLD AUTO: 5.3 K/UL (ref 1.8–7.7)
NEUTROPHILS NFR BLD: 73.9 % (ref 38–73)
NRBC BLD-RTO: 0 /100 WBC
PHOSPHATE SERPL-MCNC: 3 MG/DL (ref 2.7–4.5)
PLATELET # BLD AUTO: 257 K/UL (ref 150–450)
PMV BLD AUTO: 9.5 FL (ref 9.2–12.9)
POCT GLUCOSE: 151 MG/DL (ref 70–110)
POCT GLUCOSE: 162 MG/DL (ref 70–110)
POCT GLUCOSE: 200 MG/DL (ref 70–110)
POCT GLUCOSE: 208 MG/DL (ref 70–110)
POCT GLUCOSE: 251 MG/DL (ref 70–110)
POTASSIUM SERPL-SCNC: 3.5 MMOL/L (ref 3.5–5.1)
PROT SERPL-MCNC: 6.2 G/DL (ref 6–8.4)
RBC # BLD AUTO: 3.31 M/UL (ref 4.6–6.2)
SODIUM SERPL-SCNC: 140 MMOL/L (ref 136–145)
WBC # BLD AUTO: 7.14 K/UL (ref 3.9–12.7)

## 2021-09-19 PROCEDURE — 80053 COMPREHEN METABOLIC PANEL: CPT | Performed by: STUDENT IN AN ORGANIZED HEALTH CARE EDUCATION/TRAINING PROGRAM

## 2021-09-19 PROCEDURE — 11000001 HC ACUTE MED/SURG PRIVATE ROOM

## 2021-09-19 PROCEDURE — 25000003 PHARM REV CODE 250: Performed by: STUDENT IN AN ORGANIZED HEALTH CARE EDUCATION/TRAINING PROGRAM

## 2021-09-19 PROCEDURE — 63600175 PHARM REV CODE 636 W HCPCS: Performed by: STUDENT IN AN ORGANIZED HEALTH CARE EDUCATION/TRAINING PROGRAM

## 2021-09-19 PROCEDURE — 36415 COLL VENOUS BLD VENIPUNCTURE: CPT | Performed by: STUDENT IN AN ORGANIZED HEALTH CARE EDUCATION/TRAINING PROGRAM

## 2021-09-19 PROCEDURE — 83735 ASSAY OF MAGNESIUM: CPT | Performed by: STUDENT IN AN ORGANIZED HEALTH CARE EDUCATION/TRAINING PROGRAM

## 2021-09-19 PROCEDURE — 85025 COMPLETE CBC W/AUTO DIFF WBC: CPT | Performed by: STUDENT IN AN ORGANIZED HEALTH CARE EDUCATION/TRAINING PROGRAM

## 2021-09-19 PROCEDURE — 84100 ASSAY OF PHOSPHORUS: CPT | Performed by: STUDENT IN AN ORGANIZED HEALTH CARE EDUCATION/TRAINING PROGRAM

## 2021-09-19 RX ADMIN — ATORVASTATIN CALCIUM 20 MG: 20 TABLET, FILM COATED ORAL at 09:09

## 2021-09-19 RX ADMIN — Medication 1 TABLET: at 09:09

## 2021-09-19 RX ADMIN — Medication 1 TABLET: at 08:09

## 2021-09-19 RX ADMIN — FUROSEMIDE 40 MG: 40 TABLET ORAL at 08:09

## 2021-09-19 RX ADMIN — ENOXAPARIN SODIUM 40 MG: 40 INJECTION SUBCUTANEOUS at 08:09

## 2021-09-19 RX ADMIN — INSULIN ASPART 2 UNITS: 100 INJECTION, SOLUTION INTRAVENOUS; SUBCUTANEOUS at 05:09

## 2021-09-19 RX ADMIN — ACETAMINOPHEN 1000 MG: 500 TABLET ORAL at 08:09

## 2021-09-19 RX ADMIN — HYDROCODONE BITARTRATE AND ACETAMINOPHEN 1 TABLET: 5; 325 TABLET ORAL at 05:09

## 2021-09-19 RX ADMIN — TRAZODONE HYDROCHLORIDE 100 MG: 100 TABLET ORAL at 09:09

## 2021-09-19 RX ADMIN — HYDROCODONE BITARTRATE AND ACETAMINOPHEN 1 TABLET: 5; 325 TABLET ORAL at 11:09

## 2021-09-19 RX ADMIN — INSULIN ASPART 4 UNITS: 100 INJECTION, SOLUTION INTRAVENOUS; SUBCUTANEOUS at 11:09

## 2021-09-19 RX ADMIN — LATANOPROST 1 DROP: 50 SOLUTION OPHTHALMIC at 09:09

## 2021-09-19 RX ADMIN — FERROUS SULFATE TAB EC 325 MG (65 MG FE EQUIVALENT) 1 EACH: 325 (65 FE) TABLET DELAYED RESPONSE at 08:09

## 2021-09-19 RX ADMIN — MUPIROCIN: 20 OINTMENT TOPICAL at 08:09

## 2021-09-19 RX ADMIN — MUPIROCIN: 20 OINTMENT TOPICAL at 09:09

## 2021-09-19 RX ADMIN — INSULIN DETEMIR 20 UNITS: 100 INJECTION, SOLUTION SUBCUTANEOUS at 09:09

## 2021-09-20 PROBLEM — R09.02 OXYGEN DESATURATION: Status: ACTIVE | Noted: 2021-09-20

## 2021-09-20 LAB
POCT GLUCOSE: 144 MG/DL (ref 70–110)
POCT GLUCOSE: 191 MG/DL (ref 70–110)
POCT GLUCOSE: 198 MG/DL (ref 70–110)
POCT GLUCOSE: 229 MG/DL (ref 70–110)

## 2021-09-20 PROCEDURE — 94760 N-INVAS EAR/PLS OXIMETRY 1: CPT

## 2021-09-20 PROCEDURE — 93005 ELECTROCARDIOGRAM TRACING: CPT

## 2021-09-20 PROCEDURE — 11000001 HC ACUTE MED/SURG PRIVATE ROOM

## 2021-09-20 PROCEDURE — 25000003 PHARM REV CODE 250: Performed by: STUDENT IN AN ORGANIZED HEALTH CARE EDUCATION/TRAINING PROGRAM

## 2021-09-20 PROCEDURE — 94799 UNLISTED PULMONARY SVC/PX: CPT

## 2021-09-20 PROCEDURE — 93010 EKG 12-LEAD: ICD-10-PCS | Mod: ,,, | Performed by: INTERNAL MEDICINE

## 2021-09-20 PROCEDURE — 97530 THERAPEUTIC ACTIVITIES: CPT | Mod: CO

## 2021-09-20 PROCEDURE — 97530 THERAPEUTIC ACTIVITIES: CPT | Mod: CQ

## 2021-09-20 PROCEDURE — 93010 ELECTROCARDIOGRAM REPORT: CPT | Mod: ,,, | Performed by: INTERNAL MEDICINE

## 2021-09-20 PROCEDURE — 94640 AIRWAY INHALATION TREATMENT: CPT

## 2021-09-20 PROCEDURE — 27000221 HC OXYGEN, UP TO 24 HOURS

## 2021-09-20 PROCEDURE — 94761 N-INVAS EAR/PLS OXIMETRY MLT: CPT

## 2021-09-20 PROCEDURE — 63600175 PHARM REV CODE 636 W HCPCS: Performed by: STUDENT IN AN ORGANIZED HEALTH CARE EDUCATION/TRAINING PROGRAM

## 2021-09-20 PROCEDURE — 99900035 HC TECH TIME PER 15 MIN (STAT)

## 2021-09-20 PROCEDURE — 25000242 PHARM REV CODE 250 ALT 637 W/ HCPCS: Performed by: STUDENT IN AN ORGANIZED HEALTH CARE EDUCATION/TRAINING PROGRAM

## 2021-09-20 PROCEDURE — 97110 THERAPEUTIC EXERCISES: CPT | Mod: CQ

## 2021-09-20 RX ORDER — NAPROXEN 250 MG/1
500 TABLET ORAL 2 TIMES DAILY WITH MEALS
Status: DISCONTINUED | OUTPATIENT
Start: 2021-09-20 | End: 2021-09-23 | Stop reason: HOSPADM

## 2021-09-20 RX ORDER — IPRATROPIUM BROMIDE AND ALBUTEROL SULFATE 2.5; .5 MG/3ML; MG/3ML
3 SOLUTION RESPIRATORY (INHALATION)
Status: DISCONTINUED | OUTPATIENT
Start: 2021-09-20 | End: 2021-09-23

## 2021-09-20 RX ADMIN — NAPROXEN 500 MG: 250 TABLET ORAL at 05:09

## 2021-09-20 RX ADMIN — INSULIN ASPART 2 UNITS: 100 INJECTION, SOLUTION INTRAVENOUS; SUBCUTANEOUS at 05:09

## 2021-09-20 RX ADMIN — HYDROCODONE BITARTRATE AND ACETAMINOPHEN 1 TABLET: 5; 325 TABLET ORAL at 05:09

## 2021-09-20 RX ADMIN — IPRATROPIUM BROMIDE AND ALBUTEROL SULFATE 3 ML: .5; 2.5 SOLUTION RESPIRATORY (INHALATION) at 07:09

## 2021-09-20 RX ADMIN — ATORVASTATIN CALCIUM 20 MG: 20 TABLET, FILM COATED ORAL at 08:09

## 2021-09-20 RX ADMIN — TRAZODONE HYDROCHLORIDE 100 MG: 100 TABLET ORAL at 08:09

## 2021-09-20 RX ADMIN — MUPIROCIN: 20 OINTMENT TOPICAL at 09:09

## 2021-09-20 RX ADMIN — HYDROCODONE BITARTRATE AND ACETAMINOPHEN 1 TABLET: 5; 325 TABLET ORAL at 11:09

## 2021-09-20 RX ADMIN — HYDROCODONE BITARTRATE AND ACETAMINOPHEN 1 TABLET: 5; 325 TABLET ORAL at 08:09

## 2021-09-20 RX ADMIN — IPRATROPIUM BROMIDE AND ALBUTEROL SULFATE 3 ML: .5; 2.5 SOLUTION RESPIRATORY (INHALATION) at 12:09

## 2021-09-20 RX ADMIN — LATANOPROST 1 DROP: 50 SOLUTION OPHTHALMIC at 08:09

## 2021-09-20 RX ADMIN — Medication 1 TABLET: at 08:09

## 2021-09-20 RX ADMIN — ENOXAPARIN SODIUM 40 MG: 40 INJECTION SUBCUTANEOUS at 09:09

## 2021-09-20 RX ADMIN — Medication 1 TABLET: at 09:09

## 2021-09-20 RX ADMIN — IPRATROPIUM BROMIDE AND ALBUTEROL SULFATE 3 ML: .5; 2.5 SOLUTION RESPIRATORY (INHALATION) at 04:09

## 2021-09-20 RX ADMIN — FUROSEMIDE 40 MG: 40 TABLET ORAL at 09:09

## 2021-09-20 RX ADMIN — MUPIROCIN: 20 OINTMENT TOPICAL at 08:09

## 2021-09-20 RX ADMIN — FERROUS SULFATE TAB EC 325 MG (65 MG FE EQUIVALENT) 1 EACH: 325 (65 FE) TABLET DELAYED RESPONSE at 09:09

## 2021-09-21 ENCOUNTER — PATIENT OUTREACH (OUTPATIENT)
Dept: ADMINISTRATIVE | Facility: OTHER | Age: 71
End: 2021-09-21

## 2021-09-21 LAB
D DIMER PPP IA.FEU-MCNC: 0.89 MG/L FEU
POCT GLUCOSE: 130 MG/DL (ref 70–110)
POCT GLUCOSE: 230 MG/DL (ref 70–110)
POCT GLUCOSE: 260 MG/DL (ref 70–110)
POCT GLUCOSE: 376 MG/DL (ref 70–110)

## 2021-09-21 PROCEDURE — 25000242 PHARM REV CODE 250 ALT 637 W/ HCPCS: Performed by: STUDENT IN AN ORGANIZED HEALTH CARE EDUCATION/TRAINING PROGRAM

## 2021-09-21 PROCEDURE — 27000221 HC OXYGEN, UP TO 24 HOURS

## 2021-09-21 PROCEDURE — 94761 N-INVAS EAR/PLS OXIMETRY MLT: CPT

## 2021-09-21 PROCEDURE — 97530 THERAPEUTIC ACTIVITIES: CPT | Mod: CQ

## 2021-09-21 PROCEDURE — 94799 UNLISTED PULMONARY SVC/PX: CPT

## 2021-09-21 PROCEDURE — 25000003 PHARM REV CODE 250: Performed by: STUDENT IN AN ORGANIZED HEALTH CARE EDUCATION/TRAINING PROGRAM

## 2021-09-21 PROCEDURE — 11000001 HC ACUTE MED/SURG PRIVATE ROOM

## 2021-09-21 PROCEDURE — 36415 COLL VENOUS BLD VENIPUNCTURE: CPT | Performed by: STUDENT IN AN ORGANIZED HEALTH CARE EDUCATION/TRAINING PROGRAM

## 2021-09-21 PROCEDURE — 97535 SELF CARE MNGMENT TRAINING: CPT | Mod: CO

## 2021-09-21 PROCEDURE — 97116 GAIT TRAINING THERAPY: CPT | Mod: CQ

## 2021-09-21 PROCEDURE — 94640 AIRWAY INHALATION TREATMENT: CPT

## 2021-09-21 PROCEDURE — 99900035 HC TECH TIME PER 15 MIN (STAT)

## 2021-09-21 PROCEDURE — 97530 THERAPEUTIC ACTIVITIES: CPT | Mod: CO

## 2021-09-21 PROCEDURE — 63600175 PHARM REV CODE 636 W HCPCS: Performed by: STUDENT IN AN ORGANIZED HEALTH CARE EDUCATION/TRAINING PROGRAM

## 2021-09-21 PROCEDURE — 94760 N-INVAS EAR/PLS OXIMETRY 1: CPT

## 2021-09-21 PROCEDURE — 25500020 PHARM REV CODE 255: Performed by: FAMILY MEDICINE

## 2021-09-21 PROCEDURE — 85379 FIBRIN DEGRADATION QUANT: CPT | Performed by: STUDENT IN AN ORGANIZED HEALTH CARE EDUCATION/TRAINING PROGRAM

## 2021-09-21 RX ADMIN — IPRATROPIUM BROMIDE AND ALBUTEROL SULFATE 3 ML: .5; 2.5 SOLUTION RESPIRATORY (INHALATION) at 11:09

## 2021-09-21 RX ADMIN — NAPROXEN 500 MG: 250 TABLET ORAL at 09:09

## 2021-09-21 RX ADMIN — FUROSEMIDE 40 MG: 40 TABLET ORAL at 09:09

## 2021-09-21 RX ADMIN — Medication 1 TABLET: at 09:09

## 2021-09-21 RX ADMIN — ATORVASTATIN CALCIUM 20 MG: 20 TABLET, FILM COATED ORAL at 08:09

## 2021-09-21 RX ADMIN — HYDROCODONE BITARTRATE AND ACETAMINOPHEN 1 TABLET: 5; 325 TABLET ORAL at 11:09

## 2021-09-21 RX ADMIN — MUPIROCIN: 20 OINTMENT TOPICAL at 09:09

## 2021-09-21 RX ADMIN — IOHEXOL 100 ML: 350 INJECTION, SOLUTION INTRAVENOUS at 11:09

## 2021-09-21 RX ADMIN — NAPROXEN 500 MG: 250 TABLET ORAL at 05:09

## 2021-09-21 RX ADMIN — FERROUS SULFATE TAB EC 325 MG (65 MG FE EQUIVALENT) 1 EACH: 325 (65 FE) TABLET DELAYED RESPONSE at 09:09

## 2021-09-21 RX ADMIN — Medication 1 TABLET: at 08:09

## 2021-09-21 RX ADMIN — TRAZODONE HYDROCHLORIDE 100 MG: 100 TABLET ORAL at 08:09

## 2021-09-21 RX ADMIN — MUPIROCIN: 20 OINTMENT TOPICAL at 08:09

## 2021-09-21 RX ADMIN — HYDROCODONE BITARTRATE AND ACETAMINOPHEN 1 TABLET: 5; 325 TABLET ORAL at 08:09

## 2021-09-21 RX ADMIN — IPRATROPIUM BROMIDE AND ALBUTEROL SULFATE 3 ML: .5; 2.5 SOLUTION RESPIRATORY (INHALATION) at 03:09

## 2021-09-21 RX ADMIN — IPRATROPIUM BROMIDE AND ALBUTEROL SULFATE 3 ML: .5; 2.5 SOLUTION RESPIRATORY (INHALATION) at 08:09

## 2021-09-21 RX ADMIN — LATANOPROST 1 DROP: 50 SOLUTION OPHTHALMIC at 08:09

## 2021-09-21 RX ADMIN — INSULIN ASPART 4 UNITS: 100 INJECTION, SOLUTION INTRAVENOUS; SUBCUTANEOUS at 11:09

## 2021-09-21 RX ADMIN — ENOXAPARIN SODIUM 40 MG: 40 INJECTION SUBCUTANEOUS at 09:09

## 2021-09-22 ENCOUNTER — PATIENT OUTREACH (OUTPATIENT)
Dept: ADMINISTRATIVE | Facility: OTHER | Age: 71
End: 2021-09-22

## 2021-09-22 LAB
POCT GLUCOSE: 241 MG/DL (ref 70–110)
POCT GLUCOSE: 257 MG/DL (ref 70–110)
POCT GLUCOSE: 286 MG/DL (ref 70–110)
POCT GLUCOSE: 331 MG/DL (ref 70–110)

## 2021-09-22 PROCEDURE — 94640 AIRWAY INHALATION TREATMENT: CPT

## 2021-09-22 PROCEDURE — 97116 GAIT TRAINING THERAPY: CPT

## 2021-09-22 PROCEDURE — 11000001 HC ACUTE MED/SURG PRIVATE ROOM

## 2021-09-22 PROCEDURE — 25000003 PHARM REV CODE 250: Performed by: STUDENT IN AN ORGANIZED HEALTH CARE EDUCATION/TRAINING PROGRAM

## 2021-09-22 PROCEDURE — 63600175 PHARM REV CODE 636 W HCPCS: Performed by: STUDENT IN AN ORGANIZED HEALTH CARE EDUCATION/TRAINING PROGRAM

## 2021-09-22 PROCEDURE — 97530 THERAPEUTIC ACTIVITIES: CPT | Mod: CO

## 2021-09-22 PROCEDURE — 94799 UNLISTED PULMONARY SVC/PX: CPT

## 2021-09-22 PROCEDURE — 25000242 PHARM REV CODE 250 ALT 637 W/ HCPCS: Performed by: STUDENT IN AN ORGANIZED HEALTH CARE EDUCATION/TRAINING PROGRAM

## 2021-09-22 PROCEDURE — 94761 N-INVAS EAR/PLS OXIMETRY MLT: CPT

## 2021-09-22 PROCEDURE — 94760 N-INVAS EAR/PLS OXIMETRY 1: CPT

## 2021-09-22 PROCEDURE — 99900035 HC TECH TIME PER 15 MIN (STAT)

## 2021-09-22 PROCEDURE — 27000221 HC OXYGEN, UP TO 24 HOURS

## 2021-09-22 RX ORDER — HYDROCODONE BITARTRATE AND ACETAMINOPHEN 5; 325 MG/1; MG/1
1 TABLET ORAL EVERY 6 HOURS PRN
Qty: 7 TABLET | Refills: 0 | Status: SHIPPED | OUTPATIENT
Start: 2021-09-22 | End: 2021-10-21

## 2021-09-22 RX ADMIN — IPRATROPIUM BROMIDE AND ALBUTEROL SULFATE 3 ML: .5; 2.5 SOLUTION RESPIRATORY (INHALATION) at 11:09

## 2021-09-22 RX ADMIN — HYDROCODONE BITARTRATE AND ACETAMINOPHEN 1 TABLET: 5; 325 TABLET ORAL at 08:09

## 2021-09-22 RX ADMIN — MUPIROCIN: 20 OINTMENT TOPICAL at 08:09

## 2021-09-22 RX ADMIN — ENOXAPARIN SODIUM 40 MG: 40 INJECTION SUBCUTANEOUS at 08:09

## 2021-09-22 RX ADMIN — INSULIN ASPART 6 UNITS: 100 INJECTION, SOLUTION INTRAVENOUS; SUBCUTANEOUS at 12:09

## 2021-09-22 RX ADMIN — IPRATROPIUM BROMIDE AND ALBUTEROL SULFATE 3 ML: .5; 2.5 SOLUTION RESPIRATORY (INHALATION) at 07:09

## 2021-09-22 RX ADMIN — INSULIN ASPART 8 UNITS: 100 INJECTION, SOLUTION INTRAVENOUS; SUBCUTANEOUS at 06:09

## 2021-09-22 RX ADMIN — NAPROXEN 500 MG: 250 TABLET ORAL at 04:09

## 2021-09-22 RX ADMIN — ATORVASTATIN CALCIUM 20 MG: 20 TABLET, FILM COATED ORAL at 08:09

## 2021-09-22 RX ADMIN — Medication 1 TABLET: at 08:09

## 2021-09-22 RX ADMIN — LATANOPROST 1 DROP: 50 SOLUTION OPHTHALMIC at 08:09

## 2021-09-22 RX ADMIN — FUROSEMIDE 40 MG: 40 TABLET ORAL at 08:09

## 2021-09-22 RX ADMIN — IPRATROPIUM BROMIDE AND ALBUTEROL SULFATE 3 ML: .5; 2.5 SOLUTION RESPIRATORY (INHALATION) at 03:09

## 2021-09-22 RX ADMIN — NAPROXEN 500 MG: 250 TABLET ORAL at 08:09

## 2021-09-22 RX ADMIN — INSULIN ASPART 4 UNITS: 100 INJECTION, SOLUTION INTRAVENOUS; SUBCUTANEOUS at 05:09

## 2021-09-22 RX ADMIN — FERROUS SULFATE TAB EC 325 MG (65 MG FE EQUIVALENT) 1 EACH: 325 (65 FE) TABLET DELAYED RESPONSE at 08:09

## 2021-09-22 RX ADMIN — TRAZODONE HYDROCHLORIDE 100 MG: 100 TABLET ORAL at 08:09

## 2021-09-23 VITALS
BODY MASS INDEX: 30.17 KG/M2 | RESPIRATION RATE: 18 BRPM | SYSTOLIC BLOOD PRESSURE: 132 MMHG | WEIGHT: 192.25 LBS | TEMPERATURE: 98 F | DIASTOLIC BLOOD PRESSURE: 60 MMHG | HEIGHT: 67 IN | HEART RATE: 70 BPM | OXYGEN SATURATION: 97 %

## 2021-09-23 LAB
POCT GLUCOSE: 176 MG/DL (ref 70–110)
POCT GLUCOSE: 225 MG/DL (ref 70–110)

## 2021-09-23 PROCEDURE — 25000242 PHARM REV CODE 250 ALT 637 W/ HCPCS: Performed by: STUDENT IN AN ORGANIZED HEALTH CARE EDUCATION/TRAINING PROGRAM

## 2021-09-23 PROCEDURE — 25000003 PHARM REV CODE 250: Performed by: STUDENT IN AN ORGANIZED HEALTH CARE EDUCATION/TRAINING PROGRAM

## 2021-09-23 PROCEDURE — 99900035 HC TECH TIME PER 15 MIN (STAT)

## 2021-09-23 PROCEDURE — 97530 THERAPEUTIC ACTIVITIES: CPT | Mod: CO

## 2021-09-23 PROCEDURE — 27000221 HC OXYGEN, UP TO 24 HOURS

## 2021-09-23 PROCEDURE — 63600175 PHARM REV CODE 636 W HCPCS: Performed by: STUDENT IN AN ORGANIZED HEALTH CARE EDUCATION/TRAINING PROGRAM

## 2021-09-23 PROCEDURE — 94761 N-INVAS EAR/PLS OXIMETRY MLT: CPT

## 2021-09-23 PROCEDURE — 97116 GAIT TRAINING THERAPY: CPT

## 2021-09-23 PROCEDURE — 94640 AIRWAY INHALATION TREATMENT: CPT

## 2021-09-23 RX ORDER — IPRATROPIUM BROMIDE AND ALBUTEROL SULFATE 2.5; .5 MG/3ML; MG/3ML
3 SOLUTION RESPIRATORY (INHALATION)
Status: DISCONTINUED | OUTPATIENT
Start: 2021-09-23 | End: 2021-09-23 | Stop reason: HOSPADM

## 2021-09-23 RX ADMIN — HYDROCODONE BITARTRATE AND ACETAMINOPHEN 1 TABLET: 5; 325 TABLET ORAL at 11:09

## 2021-09-23 RX ADMIN — INSULIN ASPART 4 UNITS: 100 INJECTION, SOLUTION INTRAVENOUS; SUBCUTANEOUS at 11:09

## 2021-09-23 RX ADMIN — ENOXAPARIN SODIUM 40 MG: 40 INJECTION SUBCUTANEOUS at 08:09

## 2021-09-23 RX ADMIN — NAPROXEN 500 MG: 250 TABLET ORAL at 08:09

## 2021-09-23 RX ADMIN — FERROUS SULFATE TAB EC 325 MG (65 MG FE EQUIVALENT) 1 EACH: 325 (65 FE) TABLET DELAYED RESPONSE at 08:09

## 2021-09-23 RX ADMIN — FUROSEMIDE 40 MG: 40 TABLET ORAL at 08:09

## 2021-09-23 RX ADMIN — IPRATROPIUM BROMIDE AND ALBUTEROL SULFATE 3 ML: .5; 2.5 SOLUTION RESPIRATORY (INHALATION) at 07:09

## 2021-09-23 RX ADMIN — Medication 1 TABLET: at 08:09

## 2021-09-27 ENCOUNTER — PATIENT OUTREACH (OUTPATIENT)
Dept: ADMINISTRATIVE | Facility: OTHER | Age: 71
End: 2021-09-27

## 2021-10-04 DIAGNOSIS — R32 URINARY INCONTINENCE, UNSPECIFIED TYPE: Primary | ICD-10-CM

## 2021-10-10 PROCEDURE — G0180 PR HOME HEALTH MD CERTIFICATION: ICD-10-PCS | Mod: ,,, | Performed by: STUDENT IN AN ORGANIZED HEALTH CARE EDUCATION/TRAINING PROGRAM

## 2021-10-10 PROCEDURE — G0180 MD CERTIFICATION HHA PATIENT: HCPCS | Mod: ,,, | Performed by: STUDENT IN AN ORGANIZED HEALTH CARE EDUCATION/TRAINING PROGRAM

## 2021-10-12 ENCOUNTER — TELEPHONE (OUTPATIENT)
Dept: ORTHOPEDICS | Facility: CLINIC | Age: 71
End: 2021-10-12
Payer: MEDICARE

## 2021-10-25 ENCOUNTER — EXTERNAL HOME HEALTH (OUTPATIENT)
Dept: HOME HEALTH SERVICES | Facility: HOSPITAL | Age: 71
End: 2021-10-25
Payer: MEDICARE

## 2021-10-27 ENCOUNTER — TELEPHONE (OUTPATIENT)
Dept: ORTHOPEDICS | Facility: CLINIC | Age: 71
End: 2021-10-27
Payer: MEDICARE

## 2021-10-27 DIAGNOSIS — Z96.649 S/P HIP HEMIARTHROPLASTY: ICD-10-CM

## 2021-10-27 DIAGNOSIS — S72.001A CLOSED FRACTURE OF NECK OF RIGHT FEMUR, INITIAL ENCOUNTER: Primary | ICD-10-CM

## 2021-11-05 ENCOUNTER — OFFICE VISIT (OUTPATIENT)
Dept: ORTHOPEDICS | Facility: CLINIC | Age: 71
End: 2021-11-05
Payer: MEDICARE

## 2021-11-05 ENCOUNTER — HOSPITAL ENCOUNTER (OUTPATIENT)
Dept: RADIOLOGY | Facility: HOSPITAL | Age: 71
Discharge: HOME | End: 2021-11-05
Attending: ORTHOPAEDIC SURGERY
Payer: MEDICARE

## 2021-11-05 VITALS
DIASTOLIC BLOOD PRESSURE: 69 MMHG | WEIGHT: 171.19 LBS | HEIGHT: 67 IN | HEART RATE: 74 BPM | BODY MASS INDEX: 26.87 KG/M2 | SYSTOLIC BLOOD PRESSURE: 154 MMHG

## 2021-11-05 DIAGNOSIS — S72.001A CLOSED FRACTURE OF NECK OF RIGHT FEMUR, INITIAL ENCOUNTER: ICD-10-CM

## 2021-11-05 PROCEDURE — 3044F PR MOST RECENT HEMOGLOBIN A1C LEVEL <7.0%: ICD-10-PCS | Mod: CPTII,S$GLB,, | Performed by: ORTHOPAEDIC SURGERY

## 2021-11-05 PROCEDURE — 1125F PR PAIN SEVERITY QUANTIFIED, PAIN PRESENT: ICD-10-PCS | Mod: CPTII,S$GLB,, | Performed by: ORTHOPAEDIC SURGERY

## 2021-11-05 PROCEDURE — 3288F PR FALLS RISK ASSESSMENT DOCUMENTED: ICD-10-PCS | Mod: CPTII,S$GLB,, | Performed by: ORTHOPAEDIC SURGERY

## 2021-11-05 PROCEDURE — 1159F PR MEDICATION LIST DOCUMENTED IN MEDICAL RECORD: ICD-10-PCS | Mod: CPTII,S$GLB,, | Performed by: ORTHOPAEDIC SURGERY

## 2021-11-05 PROCEDURE — 3078F DIAST BP <80 MM HG: CPT | Mod: CPTII,S$GLB,, | Performed by: ORTHOPAEDIC SURGERY

## 2021-11-05 PROCEDURE — 99024 POSTOP FOLLOW-UP VISIT: CPT | Mod: S$GLB,,, | Performed by: ORTHOPAEDIC SURGERY

## 2021-11-05 PROCEDURE — 1101F PT FALLS ASSESS-DOCD LE1/YR: CPT | Mod: CPTII,S$GLB,, | Performed by: ORTHOPAEDIC SURGERY

## 2021-11-05 PROCEDURE — 1101F PR PT FALLS ASSESS DOC 0-1 FALLS W/OUT INJ PAST YR: ICD-10-PCS | Mod: CPTII,S$GLB,, | Performed by: ORTHOPAEDIC SURGERY

## 2021-11-05 PROCEDURE — 3077F SYST BP >= 140 MM HG: CPT | Mod: CPTII,S$GLB,, | Performed by: ORTHOPAEDIC SURGERY

## 2021-11-05 PROCEDURE — 3078F PR MOST RECENT DIASTOLIC BLOOD PRESSURE < 80 MM HG: ICD-10-PCS | Mod: CPTII,S$GLB,, | Performed by: ORTHOPAEDIC SURGERY

## 2021-11-05 PROCEDURE — 73502 X-RAY EXAM HIP UNI 2-3 VIEWS: CPT | Mod: 26,RT,, | Performed by: RADIOLOGY

## 2021-11-05 PROCEDURE — 3288F FALL RISK ASSESSMENT DOCD: CPT | Mod: CPTII,S$GLB,, | Performed by: ORTHOPAEDIC SURGERY

## 2021-11-05 PROCEDURE — 73502 X-RAY EXAM HIP UNI 2-3 VIEWS: CPT | Mod: TC,FY,RT

## 2021-11-05 PROCEDURE — 1160F PR REVIEW ALL MEDS BY PRESCRIBER/CLIN PHARMACIST DOCUMENTED: ICD-10-PCS | Mod: CPTII,S$GLB,, | Performed by: ORTHOPAEDIC SURGERY

## 2021-11-05 PROCEDURE — 99999 PR PBB SHADOW E&M-EST. PATIENT-LVL IV: CPT | Mod: PBBFAC,,, | Performed by: ORTHOPAEDIC SURGERY

## 2021-11-05 PROCEDURE — 99999 PR PBB SHADOW E&M-EST. PATIENT-LVL IV: ICD-10-PCS | Mod: PBBFAC,,, | Performed by: ORTHOPAEDIC SURGERY

## 2021-11-05 PROCEDURE — 3008F PR BODY MASS INDEX (BMI) DOCUMENTED: ICD-10-PCS | Mod: CPTII,S$GLB,, | Performed by: ORTHOPAEDIC SURGERY

## 2021-11-05 PROCEDURE — 3008F BODY MASS INDEX DOCD: CPT | Mod: CPTII,S$GLB,, | Performed by: ORTHOPAEDIC SURGERY

## 2021-11-05 PROCEDURE — 1160F RVW MEDS BY RX/DR IN RCRD: CPT | Mod: CPTII,S$GLB,, | Performed by: ORTHOPAEDIC SURGERY

## 2021-11-05 PROCEDURE — 99024 PR POST-OP FOLLOW-UP VISIT: ICD-10-PCS | Mod: S$GLB,,, | Performed by: ORTHOPAEDIC SURGERY

## 2021-11-05 PROCEDURE — 3044F HG A1C LEVEL LT 7.0%: CPT | Mod: CPTII,S$GLB,, | Performed by: ORTHOPAEDIC SURGERY

## 2021-11-05 PROCEDURE — 1125F AMNT PAIN NOTED PAIN PRSNT: CPT | Mod: CPTII,S$GLB,, | Performed by: ORTHOPAEDIC SURGERY

## 2021-11-05 PROCEDURE — 73502 XR HIP WITH PELVIS WHEN PERFORMED, 2 OR 3  VIEWS RIGHT: ICD-10-PCS | Mod: 26,RT,, | Performed by: RADIOLOGY

## 2021-11-05 PROCEDURE — 3077F PR MOST RECENT SYSTOLIC BLOOD PRESSURE >= 140 MM HG: ICD-10-PCS | Mod: CPTII,S$GLB,, | Performed by: ORTHOPAEDIC SURGERY

## 2021-11-05 PROCEDURE — 1159F MED LIST DOCD IN RCRD: CPT | Mod: CPTII,S$GLB,, | Performed by: ORTHOPAEDIC SURGERY

## 2021-12-03 ENCOUNTER — LAB VISIT (OUTPATIENT)
Dept: LAB | Facility: HOSPITAL | Age: 71
End: 2021-12-03
Attending: INTERNAL MEDICINE
Payer: MEDICARE

## 2021-12-03 DIAGNOSIS — Z79.4 TYPE 2 DIABETES MELLITUS WITHOUT COMPLICATION, WITH LONG-TERM CURRENT USE OF INSULIN: ICD-10-CM

## 2021-12-03 DIAGNOSIS — E11.9 TYPE 2 DIABETES MELLITUS WITHOUT COMPLICATION, WITH LONG-TERM CURRENT USE OF INSULIN: ICD-10-CM

## 2021-12-03 LAB
ALBUMIN SERPL BCP-MCNC: 3.7 G/DL (ref 3.5–5.2)
ALP SERPL-CCNC: 61 U/L (ref 55–135)
ALT SERPL W/O P-5'-P-CCNC: 23 U/L (ref 10–44)
ANION GAP SERPL CALC-SCNC: 11 MMOL/L (ref 8–16)
AST SERPL-CCNC: 19 U/L (ref 10–40)
BASOPHILS # BLD AUTO: 0.03 K/UL (ref 0–0.2)
BASOPHILS NFR BLD: 0.4 % (ref 0–1.9)
BILIRUB SERPL-MCNC: 0.6 MG/DL (ref 0.1–1)
BUN SERPL-MCNC: 16 MG/DL (ref 8–23)
CALCIUM SERPL-MCNC: 9.2 MG/DL (ref 8.7–10.5)
CHLORIDE SERPL-SCNC: 98 MMOL/L (ref 95–110)
CHOLEST SERPL-MCNC: 139 MG/DL (ref 120–199)
CHOLEST/HDLC SERPL: 3.1 {RATIO} (ref 2–5)
CO2 SERPL-SCNC: 30 MMOL/L (ref 23–29)
CREAT SERPL-MCNC: 1.2 MG/DL (ref 0.5–1.4)
DIFFERENTIAL METHOD: ABNORMAL
EOSINOPHIL # BLD AUTO: 0.2 K/UL (ref 0–0.5)
EOSINOPHIL NFR BLD: 2.3 % (ref 0–8)
ERYTHROCYTE [DISTWIDTH] IN BLOOD BY AUTOMATED COUNT: 14.9 % (ref 11.5–14.5)
EST. GFR  (AFRICAN AMERICAN): >60 ML/MIN/1.73 M^2
EST. GFR  (NON AFRICAN AMERICAN): >60 ML/MIN/1.73 M^2
ESTIMATED AVG GLUCOSE: 171 MG/DL (ref 68–131)
GLUCOSE SERPL-MCNC: 195 MG/DL (ref 70–110)
HBA1C MFR BLD: 7.6 % (ref 4–5.6)
HCT VFR BLD AUTO: 34 % (ref 40–54)
HDLC SERPL-MCNC: 45 MG/DL (ref 40–75)
HDLC SERPL: 32.4 % (ref 20–50)
HGB BLD-MCNC: 10.6 G/DL (ref 14–18)
IMM GRANULOCYTES # BLD AUTO: 0.03 K/UL (ref 0–0.04)
IMM GRANULOCYTES NFR BLD AUTO: 0.4 % (ref 0–0.5)
LDLC SERPL CALC-MCNC: 80 MG/DL (ref 63–159)
LYMPHOCYTES # BLD AUTO: 1.6 K/UL (ref 1–4.8)
LYMPHOCYTES NFR BLD: 22.8 % (ref 18–48)
MCH RBC QN AUTO: 28.6 PG (ref 27–31)
MCHC RBC AUTO-ENTMCNC: 31.2 G/DL (ref 32–36)
MCV RBC AUTO: 92 FL (ref 82–98)
MONOCYTES # BLD AUTO: 0.9 K/UL (ref 0.3–1)
MONOCYTES NFR BLD: 12.5 % (ref 4–15)
NEUTROPHILS # BLD AUTO: 4.2 K/UL (ref 1.8–7.7)
NEUTROPHILS NFR BLD: 61.6 % (ref 38–73)
NONHDLC SERPL-MCNC: 94 MG/DL
NRBC BLD-RTO: 0 /100 WBC
PLATELET # BLD AUTO: 384 K/UL (ref 150–450)
PMV BLD AUTO: 9.4 FL (ref 9.2–12.9)
POTASSIUM SERPL-SCNC: 4.8 MMOL/L (ref 3.5–5.1)
PROT SERPL-MCNC: 7 G/DL (ref 6–8.4)
RBC # BLD AUTO: 3.71 M/UL (ref 4.6–6.2)
SODIUM SERPL-SCNC: 139 MMOL/L (ref 136–145)
TRIGL SERPL-MCNC: 70 MG/DL (ref 30–150)
WBC # BLD AUTO: 6.89 K/UL (ref 3.9–12.7)

## 2021-12-03 PROCEDURE — 80061 LIPID PANEL: CPT | Performed by: INTERNAL MEDICINE

## 2021-12-03 PROCEDURE — 83036 HEMOGLOBIN GLYCOSYLATED A1C: CPT | Performed by: INTERNAL MEDICINE

## 2021-12-03 PROCEDURE — 80053 COMPREHEN METABOLIC PANEL: CPT | Performed by: INTERNAL MEDICINE

## 2021-12-03 PROCEDURE — 85025 COMPLETE CBC W/AUTO DIFF WBC: CPT | Performed by: INTERNAL MEDICINE

## 2021-12-03 PROCEDURE — 36415 COLL VENOUS BLD VENIPUNCTURE: CPT | Performed by: INTERNAL MEDICINE

## 2021-12-29 ENCOUNTER — TELEPHONE (OUTPATIENT)
Dept: ORTHOPEDICS | Facility: CLINIC | Age: 71
End: 2021-12-29
Payer: MEDICARE

## 2021-12-29 DIAGNOSIS — S72.001A CLOSED FRACTURE OF NECK OF RIGHT FEMUR, INITIAL ENCOUNTER: Primary | ICD-10-CM

## 2022-01-05 ENCOUNTER — OFFICE VISIT (OUTPATIENT)
Dept: ORTHOPEDICS | Facility: CLINIC | Age: 72
End: 2022-01-05
Payer: MEDICARE

## 2022-01-05 ENCOUNTER — HOSPITAL ENCOUNTER (OUTPATIENT)
Dept: RADIOLOGY | Facility: HOSPITAL | Age: 72
Discharge: HOME | End: 2022-01-05
Attending: ORTHOPAEDIC SURGERY
Payer: MEDICARE

## 2022-01-05 VITALS
BODY MASS INDEX: 27.64 KG/M2 | WEIGHT: 176.13 LBS | HEART RATE: 89 BPM | SYSTOLIC BLOOD PRESSURE: 143 MMHG | HEIGHT: 67 IN | DIASTOLIC BLOOD PRESSURE: 66 MMHG

## 2022-01-05 DIAGNOSIS — Z96.649 S/P HIP HEMIARTHROPLASTY: Primary | ICD-10-CM

## 2022-01-05 DIAGNOSIS — S72.001A CLOSED FRACTURE OF NECK OF RIGHT FEMUR, INITIAL ENCOUNTER: ICD-10-CM

## 2022-01-05 PROCEDURE — 3288F PR FALLS RISK ASSESSMENT DOCUMENTED: ICD-10-PCS | Mod: CPTII,S$GLB,, | Performed by: ORTHOPAEDIC SURGERY

## 2022-01-05 PROCEDURE — 1160F PR REVIEW ALL MEDS BY PRESCRIBER/CLIN PHARMACIST DOCUMENTED: ICD-10-PCS | Mod: CPTII,S$GLB,, | Performed by: ORTHOPAEDIC SURGERY

## 2022-01-05 PROCEDURE — 3078F DIAST BP <80 MM HG: CPT | Mod: CPTII,S$GLB,, | Performed by: ORTHOPAEDIC SURGERY

## 2022-01-05 PROCEDURE — 3008F BODY MASS INDEX DOCD: CPT | Mod: CPTII,S$GLB,, | Performed by: ORTHOPAEDIC SURGERY

## 2022-01-05 PROCEDURE — 99214 OFFICE O/P EST MOD 30 MIN: CPT | Mod: S$GLB,,, | Performed by: ORTHOPAEDIC SURGERY

## 2022-01-05 PROCEDURE — 1101F PR PT FALLS ASSESS DOC 0-1 FALLS W/OUT INJ PAST YR: ICD-10-PCS | Mod: CPTII,S$GLB,, | Performed by: ORTHOPAEDIC SURGERY

## 2022-01-05 PROCEDURE — 73502 X-RAY EXAM HIP UNI 2-3 VIEWS: CPT | Mod: 26,RT,, | Performed by: RADIOLOGY

## 2022-01-05 PROCEDURE — 99999 PR PBB SHADOW E&M-EST. PATIENT-LVL III: ICD-10-PCS | Mod: PBBFAC,,, | Performed by: ORTHOPAEDIC SURGERY

## 2022-01-05 PROCEDURE — 3008F PR BODY MASS INDEX (BMI) DOCUMENTED: ICD-10-PCS | Mod: CPTII,S$GLB,, | Performed by: ORTHOPAEDIC SURGERY

## 2022-01-05 PROCEDURE — 3077F SYST BP >= 140 MM HG: CPT | Mod: CPTII,S$GLB,, | Performed by: ORTHOPAEDIC SURGERY

## 2022-01-05 PROCEDURE — 73502 XR HIP WITH PELVIS WHEN PERFORMED, 2 OR 3  VIEWS RIGHT: ICD-10-PCS | Mod: 26,RT,, | Performed by: RADIOLOGY

## 2022-01-05 PROCEDURE — 3288F FALL RISK ASSESSMENT DOCD: CPT | Mod: CPTII,S$GLB,, | Performed by: ORTHOPAEDIC SURGERY

## 2022-01-05 PROCEDURE — 1159F MED LIST DOCD IN RCRD: CPT | Mod: CPTII,S$GLB,, | Performed by: ORTHOPAEDIC SURGERY

## 2022-01-05 PROCEDURE — 1125F AMNT PAIN NOTED PAIN PRSNT: CPT | Mod: CPTII,S$GLB,, | Performed by: ORTHOPAEDIC SURGERY

## 2022-01-05 PROCEDURE — 1160F RVW MEDS BY RX/DR IN RCRD: CPT | Mod: CPTII,S$GLB,, | Performed by: ORTHOPAEDIC SURGERY

## 2022-01-05 PROCEDURE — 3077F PR MOST RECENT SYSTOLIC BLOOD PRESSURE >= 140 MM HG: ICD-10-PCS | Mod: CPTII,S$GLB,, | Performed by: ORTHOPAEDIC SURGERY

## 2022-01-05 PROCEDURE — 99999 PR PBB SHADOW E&M-EST. PATIENT-LVL III: CPT | Mod: PBBFAC,,, | Performed by: ORTHOPAEDIC SURGERY

## 2022-01-05 PROCEDURE — 1125F PR PAIN SEVERITY QUANTIFIED, PAIN PRESENT: ICD-10-PCS | Mod: CPTII,S$GLB,, | Performed by: ORTHOPAEDIC SURGERY

## 2022-01-05 PROCEDURE — 73502 X-RAY EXAM HIP UNI 2-3 VIEWS: CPT | Mod: TC,FY,RT

## 2022-01-05 PROCEDURE — 1159F PR MEDICATION LIST DOCUMENTED IN MEDICAL RECORD: ICD-10-PCS | Mod: CPTII,S$GLB,, | Performed by: ORTHOPAEDIC SURGERY

## 2022-01-05 PROCEDURE — 3078F PR MOST RECENT DIASTOLIC BLOOD PRESSURE < 80 MM HG: ICD-10-PCS | Mod: CPTII,S$GLB,, | Performed by: ORTHOPAEDIC SURGERY

## 2022-01-05 PROCEDURE — 99214 PR OFFICE/OUTPT VISIT, EST, LEVL IV, 30-39 MIN: ICD-10-PCS | Mod: S$GLB,,, | Performed by: ORTHOPAEDIC SURGERY

## 2022-01-05 PROCEDURE — 1101F PT FALLS ASSESS-DOCD LE1/YR: CPT | Mod: CPTII,S$GLB,, | Performed by: ORTHOPAEDIC SURGERY

## 2022-01-11 NOTE — PROGRESS NOTES
Patient ID:   Mateo Foreman is a 71 y.o. male.    Chief Complaint:   3+ months s/p R hip bipolar hemiarthroplasty    HPI:   The patient is returning for evaluation of the right hip. He he states that he is doing okay. He is using a walker. He wants to know when he can discontinue use of the walker. He denies any significant pain.     Medications:    Current Outpatient Medications:     atorvastatin (LIPITOR) 20 MG tablet, Take 1 tablet by mouth once daily for 90 days, Disp: 90 tablet, Rfl: 3    blood sugar diagnostic (ONE TOUCH TEST) Strp, 1 strip by Misc.(Non-Drug; Combo Route) route Daily., Disp: 1 Bottle, Rfl: 11    ferrous sulfate 325 mg (65 mg iron) Tab, Take 325 mg by mouth daily with breakfast., Disp: , Rfl:     furosemide (LASIX) 40 MG tablet, Take 1 tablet (40 mg total) by mouth once daily. FOR LEG FLUID, Disp: 90 tablet, Rfl: 3    latanoprost 0.005 % ophthalmic solution, Place 1 drop into both eyes every evening., Disp: 1 Bottle, Rfl: 6    metFORMIN (GLUCOPHAGE) 1000 MG tablet, TAKE 1 TABLET BY MOUTH TWICE DAILY WITH MEALS, Disp: 180 tablet, Rfl: 3    Allergies:  Review of patient's allergies indicates:  No Known Allergies    Past Medical History:  Past Medical History:   Diagnosis Date    Anemia     Cataract     Chronic cough     Diabetes mellitus type II     GERD (gastroesophageal reflux disease)     Glaucoma     Hyperlipidemia         Past Surgical History:  History reviewed. No pertinent surgical history.    Social History:  Social History     Occupational History     Employer: [employer]   Tobacco Use    Smoking status: Former Smoker     Packs/day: 0.20     Years: 45.00     Pack years: 9.00     Types: Cigarettes    Smokeless tobacco: Never Used   Substance and Sexual Activity    Alcohol use: Not Currently    Drug use: No    Sexual activity: Not on file       Family History:  Family History   Problem Relation Age of Onset    Cancer Mother     Diabetes Father     Amblyopia  "Neg Hx     Blindness Neg Hx     Cataracts Neg Hx     Glaucoma Neg Hx     Hypertension Neg Hx     Macular degeneration Neg Hx     Retinal detachment Neg Hx     Strabismus Neg Hx     Stroke Neg Hx     Thyroid disease Neg Hx         ROS:  Review of Systems   Musculoskeletal: Positive for joint pain and muscle weakness.   All other systems reviewed and are negative.      Vitals:  BP (!) 143/66   Pulse 89   Ht 5' 7" (1.702 m)   Wt 79.9 kg (176 lb 2.4 oz)   BMI 27.59 kg/m²     Physical Examination:  Comprehensive Orthopaedic Musculoskeletal Exam    General        Constitutional: appears stated age, well-developed and well-nourished    Scleral icterus: no    Labored breathing: no    Psychiatric: normal mood and affect and no acute distress    Neurological: alert and oriented x3    Skin: intact    Lymphadenopathy: none     Ortho Exam   Walks with a non-antalgic gait.   No leg length discrepancy  Log rolling non-painful.    Imaging:  I have ordered and independently reviewed the following imaging studies performed at [facility] today  Right hip XR series shows a well seated, well aligned cemented bipolar hemiarthroplasty    Assessment:  1. S/P hip hemiarthroplasty      Plan:  The patient may return to activity as tolerated. He may wean from the walker when he is able to transition to a cane. He will return as needed.      No follow-ups on file.        "

## 2022-04-20 ENCOUNTER — HOSPITAL ENCOUNTER (OUTPATIENT)
Dept: WOUND CARE | Facility: HOSPITAL | Age: 72
Discharge: HOME | End: 2022-04-20
Attending: INTERNAL MEDICINE
Payer: MEDICARE

## 2022-04-20 ENCOUNTER — HOSPITAL ENCOUNTER (OUTPATIENT)
Dept: RADIOLOGY | Facility: HOSPITAL | Age: 72
Discharge: HOME | End: 2022-04-20
Attending: SURGERY
Payer: MEDICARE

## 2022-04-20 VITALS
WEIGHT: 173 LBS | TEMPERATURE: 98 F | BODY MASS INDEX: 27.15 KG/M2 | HEART RATE: 80 BPM | SYSTOLIC BLOOD PRESSURE: 181 MMHG | HEIGHT: 67 IN | DIASTOLIC BLOOD PRESSURE: 79 MMHG

## 2022-04-20 DIAGNOSIS — I87.303 CHRONIC VENOUS HYPERTENSION (IDIOPATHIC) WITHOUT COMPLICATIONS OF BILATERAL LOWER EXTREMITY: ICD-10-CM

## 2022-04-20 DIAGNOSIS — E11.51 PERIPHERAL VASCULAR DISORDER DUE TO DIABETES MELLITUS: Primary | ICD-10-CM

## 2022-04-20 DIAGNOSIS — E11.51 PERIPHERAL VASCULAR DISORDER DUE TO DIABETES MELLITUS: ICD-10-CM

## 2022-04-20 DIAGNOSIS — E11.65 TYPE 2 DIABETES MELLITUS WITH HYPERGLYCEMIA, WITHOUT LONG-TERM CURRENT USE OF INSULIN: ICD-10-CM

## 2022-04-20 DIAGNOSIS — I89.0 LYMPHEDEMA OF BOTH LOWER EXTREMITIES: ICD-10-CM

## 2022-04-20 PROCEDURE — 87075 CULTR BACTERIA EXCEPT BLOOD: CPT | Performed by: SURGERY

## 2022-04-20 PROCEDURE — 87076 CULTURE ANAEROBE IDENT EACH: CPT | Performed by: SURGERY

## 2022-04-20 PROCEDURE — 71046 X-RAY EXAM CHEST 2 VIEWS: CPT | Mod: 26,,, | Performed by: RADIOLOGY

## 2022-04-20 PROCEDURE — 71046 X-RAY EXAM CHEST 2 VIEWS: CPT | Mod: TC,FY

## 2022-04-20 PROCEDURE — 29581 APPL MULTLAYER CMPRN SYS LEG: CPT

## 2022-04-20 PROCEDURE — 87186 SC STD MICRODIL/AGAR DIL: CPT | Mod: 59 | Performed by: SURGERY

## 2022-04-20 PROCEDURE — 87070 CULTURE OTHR SPECIMN AEROBIC: CPT | Performed by: SURGERY

## 2022-04-20 PROCEDURE — 87077 CULTURE AEROBIC IDENTIFY: CPT | Mod: 59 | Performed by: SURGERY

## 2022-04-20 PROCEDURE — 99204 OFFICE O/P NEW MOD 45 MIN: CPT | Mod: 25

## 2022-04-20 PROCEDURE — 71046 XR CHEST PA AND LATERAL: ICD-10-PCS | Mod: 26,,, | Performed by: RADIOLOGY

## 2022-04-20 NOTE — PROGRESS NOTES
Subjective:       Patient ID: Mateo Foreman is a 72 y.o. male.    Chief Complaint: No chief complaint on file.    4/20/22; Admit to wound care with RLE venous insuffiencey. HX: DM noncompliant and uncontrolled, HTN, LYMPHEDEMA does not wear compression, HDL. Presents with BLE edema, right leg drainage x 4 months per sister. Sister reports pt cut leg back in August 2021 that healed with local treatment and edema, drainage started a few months after. Denies fever, pain or chills. No antibiotics currently. Right leg wound culture taken, labs, CXR, US BLE ordered and HA1C ordered due to patient stating he never checks his blood sugar. Initiated 2 layer compression to RLE tolerated well. RX given for Clindamycin PO. Educated to take fluid pills as ordered, elevate legs as much as possible and to decrease sodium intake in diet. Sister and patient verbalized understanding. Follow up 1 week.     Review of Systems   Constitutional: Negative.    HENT: Negative.    Eyes: Negative.    Respiratory: Negative.    Cardiovascular: Negative.    Gastrointestinal: Negative.    Genitourinary: Negative.    Musculoskeletal: Negative.    Neurological: Negative.    Psychiatric/Behavioral: Negative.          Objective:      Temp:  [98.2 °F (36.8 °C)]   Pulse:  [80]   BP: (181)/(79)   Physical Exam  Constitutional:       Appearance: He is well-developed.   HENT:      Head: Normocephalic.   Eyes:      Conjunctiva/sclera: Conjunctivae normal.      Pupils: Pupils are equal, round, and reactive to light.   Cardiovascular:      Rate and Rhythm: Normal rate and regular rhythm.      Heart sounds: Normal heart sounds.   Pulmonary:      Effort: Pulmonary effort is normal.      Breath sounds: Normal breath sounds.   Abdominal:      General: Bowel sounds are normal.      Palpations: Abdomen is soft.   Musculoskeletal:         General: Normal range of motion.      Cervical back: Normal range of motion and neck supple.   Skin:     General: Skin is  warm and dry.   Neurological:      Mental Status: He is alert and oriented to person, place, and time.      Deep Tendon Reflexes: Reflexes are normal and symmetric.            Altered Skin Integrity 04/20/22 0825 Left lower Leg Other (comment) (Active)   04/20/22 0825   Altered Skin Integrity Present on Admission:    Side: Left   Orientation: lower   Location: Leg   Wound Number:    Is this injury device related?:    Primary Wound Type: Other   Description of Altered Skin Integrity:    Removal Indication and Assessment:    Wound Outcome:    (Retired) Wound Length (cm):    (Retired) Wound Width (cm):    (Retired) Depth (cm):    Wound Description (Comments):    Removal Indications:    Dressing Appearance Open to air;Dry 04/20/22 0900   Appearance Dry 04/20/22 0900   Tissue loss description Not applicable 04/20/22 0900   Periwound Area Edematous 04/20/22 0900   Care Cleansed with:;Soap and water 04/20/22 0900   Dressing Applied;Tubular bandage 04/20/22 0900   Periwound Care Topical treatment applied 04/20/22 0900   Compression Tubular elasticized bandage 04/20/22 0900   Dressing Change Due 04/27/22 04/20/22 0900            Altered Skin Integrity Right lower Leg #2 Venous Ulcer Partial thickness tissue loss. Shallow open ulcer with a red or pink wound bed, without slough. Intact or Open/Ruptured Serum-filled blister. (Active)       Altered Skin Integrity Present on Admission:    Side: Right   Orientation: lower   Location: Leg   Wound Number: #2   Is this injury device related?:    Primary Wound Type: Venous ulcer   Description of Altered Skin Integrity: Partial thickness tissue loss. Shallow open ulcer with a red or pink wound bed, without slough. Intact or Open/Ruptured Serum-filled blister.   Removal Indication and Assessment:    Wound Outcome:    (Retired) Wound Length (cm):    (Retired) Wound Width (cm):    (Retired) Depth (cm):    Wound Description (Comments):    Removal Indications:    Wound Image     04/20/22  0900   Description of Altered Skin Integrity Partial thickness tissue loss. Shallow open ulcer with a red or pink wound bed, without slough. Intact or Open/Ruptured Serum-filled blister. 04/20/22 0900   Dressing Appearance Saturated;Open to air 04/20/22 0900   Drainage Amount Copious 04/20/22 0900   Drainage Characteristics/Odor Serous 04/20/22 0900   Appearance Red;Pink;Moist 04/20/22 0900   Tissue loss description Partial thickness 04/20/22 0900   Red (%), Wound Tissue Color 100 % 04/20/22 0900   Periwound Area Edematous;Redness 04/20/22 0900   Wound Edges Undefined 04/20/22 0900   Wound Length (cm) 9 cm 04/20/22 0900   Wound Width (cm) 38 cm 04/20/22 0900   Wound Depth (cm) 0.1 cm 04/20/22 0900   Wound Volume (cm^3) 34.2 cm^3 04/20/22 0900   Wound Surface Area (cm^2) 342 cm^2 04/20/22 0900   Care Cleansed with:;Soap and water 04/20/22 0900   Dressing Applied;Silver;Calcium alginate;Absorptive Pad;Compression wrap 04/20/22 0900   Periwound Care Absorptive dressing applied;Topical treatment applied 04/20/22 0900   Compression Two layer compression 04/20/22 0900   Dressing Change Due 04/27/22 04/20/22 0900         Assessment:         ICD-10-CM ICD-9-CM   1. Peripheral vascular disorder due to diabetes mellitus  E11.51 250.70     443.81   2. Lymphedema of both lower extremities  I89.0 457.1   3. Chronic venous hypertension (idiopathic) without complications of bilateral lower extremity  I87.303 459.30   4. Type 2 diabetes mellitus with hyperglycemia, without long-term current use of insulin  E11.65 250.00     790.29         Plan:   Tissue pathology and/or culture taken:  [x] Yes [] No   Sharp debridement performed:   [] Yes [x] No   Labs ordered this visit:   [x] Yes [] No   Imaging ordered this visit:   [x] Yes [] No           Orders Placed This Encounter   Procedures    Anaerobic culture          Aerobic culture          US Lower Extremity Veins Bilateral Insufficiency     Standing Status:   Future     Standing  Expiration Date:   4/20/2023     Order Specific Question:   Reason for Exam:     Answer:   venous insuffiency     Order Specific Question:   May the Radiologist modify the order per protocol to meet the clinical needs of the patient?     Answer:   Yes     Order Specific Question:   Release to patient     Answer:   Immediate    X-Ray Chest PA And Lateral     Standing Status:   Future     Number of Occurrences:   1     Standing Expiration Date:   4/20/2023     Order Specific Question:   May the Radiologist modify the order per protocol to meet the clinical needs of the patient?     Answer:   Yes     Order Specific Question:   Release to patient     Answer:   Immediate    CBC auto differential     Standing Status:   Future     Number of Occurrences:   1     Standing Expiration Date:   6/19/2023    Comprehensive metabolic panel     Standing Status:   Future     Number of Occurrences:   1     Standing Expiration Date:   6/19/2023    Hemoglobin A1c     Standing Status:   Future     Number of Occurrences:   1     Standing Expiration Date:   6/19/2023    Ambulatory referral/consult to Wound Clinic     Standing Status:   Standing     Number of Occurrences:   1     Referral Priority:   Routine     Referral Type:   Consultation     Referral Reason:   Specialty Services Required     Requested Specialty:   Wound Care     Number of Visits Requested:   1    Change dressing     Right Lower Leg  Cleanse wound with:Normal Saline  Lidocaine:PRN  Silver nitrate:PRN   Periwound care: Sween and antifungal cream to BLE  Primary dressing:Calcium alginate AG to any open areas  Secondary dressing:Large Mextra and Abd pad x 2, cast padding  Edema Control:RLE 2 layer compression toe to knee. LLE Tubi  D toe to knee. Elevate BLE as much as possible  Frequency:weekly in clinic  Follow-up:4/27/22     Other Orders: CBC, CMP, HA1C, CXR, BLE US  and ordered t4/20/22.   Right leg culture taken and RX given for Clindamycin Po  4/20/22        Follow up in about 1 week (around 4/27/2022) for .          Time spent 30 minutes.

## 2022-04-25 LAB
BACTERIA SPEC AEROBE CULT: ABNORMAL
BACTERIA SPEC ANAEROBE CULT: ABNORMAL

## 2022-04-27 ENCOUNTER — HOSPITAL ENCOUNTER (OUTPATIENT)
Dept: RADIOLOGY | Facility: HOSPITAL | Age: 72
Discharge: HOME | End: 2022-04-27
Attending: SURGERY
Payer: MEDICARE

## 2022-04-27 ENCOUNTER — HOSPITAL ENCOUNTER (OUTPATIENT)
Dept: WOUND CARE | Facility: HOSPITAL | Age: 72
Discharge: HOME | End: 2022-04-27
Attending: SURGERY
Payer: MEDICARE

## 2022-04-27 VITALS
HEIGHT: 67 IN | SYSTOLIC BLOOD PRESSURE: 199 MMHG | WEIGHT: 173 LBS | DIASTOLIC BLOOD PRESSURE: 80 MMHG | BODY MASS INDEX: 27.15 KG/M2 | HEART RATE: 104 BPM | TEMPERATURE: 98 F

## 2022-04-27 DIAGNOSIS — I87.303 CHRONIC VENOUS HYPERTENSION (IDIOPATHIC) WITHOUT COMPLICATIONS OF BILATERAL LOWER EXTREMITY: ICD-10-CM

## 2022-04-27 DIAGNOSIS — I89.0 LYMPHEDEMA OF BOTH LOWER EXTREMITIES: ICD-10-CM

## 2022-04-27 DIAGNOSIS — E11.51 PERIPHERAL VASCULAR DISORDER DUE TO DIABETES MELLITUS: ICD-10-CM

## 2022-04-27 DIAGNOSIS — I89.0 LYMPHEDEMA OF BOTH LOWER EXTREMITIES: Primary | ICD-10-CM

## 2022-04-27 PROCEDURE — 29581 APPL MULTLAYER CMPRN SYS LEG: CPT

## 2022-04-27 PROCEDURE — 93970 EXTREMITY STUDY: CPT | Mod: 26,,, | Performed by: RADIOLOGY

## 2022-04-27 PROCEDURE — 93970 US LOWER EXTREMITY VEINS BILATERAL INSUFFICIENCY: ICD-10-PCS | Mod: 26,,, | Performed by: RADIOLOGY

## 2022-04-27 PROCEDURE — 11719 TRIM NAIL(S) ANY NUMBER: CPT

## 2022-04-27 PROCEDURE — 93970 EXTREMITY STUDY: CPT | Mod: TC

## 2022-04-27 PROCEDURE — 11721 DEBRIDE NAIL 6 OR MORE: CPT

## 2022-04-27 NOTE — PROGRESS NOTES
Subjective:       Patient ID: Mateo Foreman is a 72 y.o. male.    Chief Complaint: Venous Stasis (Rt. Lower Leg Lymphedema with healing wound)    4/27/2022: Patient seen by Dr. Silveira for Bilateral Lower extremity Lymphedema. Discussed Rt. leg condition which is improving,open wounds appear closed. Instructed to continue present treatment with 2 layer  compression to Rt. Leg and Tubi  to left. Encouraged to keep legs elevated frequently and continue taking present medications for edema. Patient and sister verbalized understanding  of care . Follow up with Dr. Silveira in 3 weeks (5/18/2022)    Review of Systems   Constitutional: Negative.    HENT: Negative.    Eyes: Negative.    Respiratory: Negative.    Cardiovascular: Negative.    Gastrointestinal: Negative.    Genitourinary: Negative.    Musculoskeletal: Negative.    Neurological: Negative.    Psychiatric/Behavioral: Negative.          Objective:      Temp:  [98.4 °F (36.9 °C)]   Pulse:  [104]   BP: (199)/(80)   Physical Exam  Constitutional:       Appearance: He is well-developed.   HENT:      Head: Normocephalic.   Eyes:      Conjunctiva/sclera: Conjunctivae normal.      Pupils: Pupils are equal, round, and reactive to light.   Cardiovascular:      Rate and Rhythm: Normal rate and regular rhythm.      Heart sounds: Normal heart sounds.   Pulmonary:      Effort: Pulmonary effort is normal.      Breath sounds: Normal breath sounds.   Abdominal:      General: Bowel sounds are normal.      Palpations: Abdomen is soft.   Musculoskeletal:         General: Normal range of motion.      Cervical back: Normal range of motion and neck supple.   Skin:     General: Skin is warm and dry.   Neurological:      Mental Status: He is alert and oriented to person, place, and time.      Deep Tendon Reflexes: Reflexes are normal and symmetric.            Altered Skin Integrity 04/20/22 0825 Left lower Leg Other (comment) (Active)   04/20/22 0825   Altered Skin Integrity  Present on Admission:    Side: Left   Orientation: lower   Location: Leg   Wound Number:    Is this injury device related?:    Primary Wound Type: Other   Description of Altered Skin Integrity:    Removal Indication and Assessment:    Wound Outcome:    (Retired) Wound Length (cm):    (Retired) Wound Width (cm):    (Retired) Depth (cm):    Wound Description (Comments):    Removal Indications:    Dressing Appearance Open to air 04/27/22 1700   Appearance Dry 04/27/22 1700   Tissue loss description Not applicable 04/27/22 1700   Periwound Area Edematous 04/27/22 1700   Care Cleansed with:;Soap and water 04/27/22 1700   Dressing Applied;Tubular bandage 04/27/22 1700   Periwound Care Moisturizer applied 04/27/22 1700   Compression Tubular elasticized bandage 04/27/22 1700            Altered Skin Integrity Right lower Leg #2 Venous Ulcer Partial thickness tissue loss. Shallow open ulcer with a red or pink wound bed, without slough. Intact or Open/Ruptured Serum-filled blister. (Active)       Altered Skin Integrity Present on Admission:    Side: Right   Orientation: lower   Location: Leg   Wound Number: #2   Is this injury device related?:    Primary Wound Type: Venous ulcer   Description of Altered Skin Integrity: Partial thickness tissue loss. Shallow open ulcer with a red or pink wound bed, without slough. Intact or Open/Ruptured Serum-filled blister.   Removal Indication and Assessment:    Wound Outcome:    (Retired) Wound Length (cm):    (Retired) Wound Width (cm):    (Retired) Depth (cm):    Wound Description (Comments):    Removal Indications:    Wound Image     04/27/22 1700   Description of Altered Skin Integrity Partial thickness tissue loss. Shallow open ulcer with a red or pink wound bed, without slough. Intact or Open/Ruptured Serum-filled blister. 04/27/22 1700   Dressing Appearance Moist drainage 04/27/22 1700   Drainage Amount Moderate 04/27/22 1700   Drainage Characteristics/Odor Serous 04/27/22 1700    Appearance Pink;Red 04/27/22 1700   Tissue loss description Partial thickness 04/27/22 1700   Red (%), Wound Tissue Color 100 % 04/27/22 1700   Periwound Area Edematous 04/27/22 1700   Wound Edges Undefined 04/27/22 1700   Care Cleansed with:;Soap and water 04/27/22 1700   Dressing Applied;Calcium alginate;Compression wrap 04/27/22 1700   Periwound Care Absorptive dressing applied 04/27/22 1700   Compression Two layer compression 04/27/22 1700   Off Loading Off loading shoe 04/27/22 1700   Dressing Change Due 05/04/22 04/27/22 1700         Assessment:         ICD-10-CM ICD-9-CM   1. Lymphedema of both lower extremities  I89.0 457.1         Plan:   Tissue pathology and/or culture taken:  [] Yes [x] No   Sharp debridement performed:   [] Yes [x] No   Labs ordered this visit:   [] Yes [x] No   Imaging ordered this visit:   [] Yes [x] No           Orders Placed This Encounter   Procedures    Change dressing     Comment    Left Lower Leg: Tubi  D    Right Lower Leg   Cleanse wound with:Normal Saline   Lidocaine:PRN   Silver nitrate:PRN   Periwound care:   Primary dressing:Sween and Antifungal Cream to BLE  Edema Control:RLE 2 layer compression toe to knee. Co-Flex with Calamine  Frequency:weekly in clinic  as Nurse Visit  Follow-up: Dr. Silveira in 3 weeks (5/18/2022)        Follow up in about 3 weeks (around 5/18/2022) for Dr. Silveira.      With patient's verbal consent, nails were aggressively reduced and debrided x 10 to their soft tissue attachment mechanically and with electric , removing all offending nail and debris. Patient relates relief following the procedure. No anesthesia or hemostasis required. No blood loss.     Time spent 15 minutes.

## 2022-05-04 ENCOUNTER — HOSPITAL ENCOUNTER (OUTPATIENT)
Dept: WOUND CARE | Facility: HOSPITAL | Age: 72
Discharge: HOME | End: 2022-05-04
Attending: SURGERY
Payer: MEDICARE

## 2022-05-04 DIAGNOSIS — I87.303 CHRONIC VENOUS HYPERTENSION (IDIOPATHIC) WITHOUT COMPLICATIONS OF BILATERAL LOWER EXTREMITY: ICD-10-CM

## 2022-05-04 DIAGNOSIS — I89.0 LYMPHEDEMA OF BOTH LOWER EXTREMITIES: Primary | ICD-10-CM

## 2022-05-04 PROCEDURE — 29581 APPL MULTLAYER CMPRN SYS LEG: CPT

## 2022-05-04 NOTE — PROGRESS NOTES
Subjective:       Patient ID: Mateo Foreman is a 72 y.o. male.    Chief Complaint: Non-healing Wound Follow Up    5/4/22: Nurse visit for dressing change. No complaints. Continued with current plan of care.    Review of Systems      Objective:         Physical Exam       Altered Skin Integrity 04/20/22 0825 Left lower Leg Other (comment) (Active)   04/20/22 0825   Altered Skin Integrity Present on Admission:    Side: Left   Orientation: lower   Location: Leg   Wound Number:    Is this injury device related?:    Primary Wound Type: Other   Description of Altered Skin Integrity:    Removal Indication and Assessment:    Wound Outcome:    (Retired) Wound Length (cm):    (Retired) Wound Width (cm):    (Retired) Depth (cm):    Wound Description (Comments):    Removal Indications:    Description of Altered Skin Integrity Intact skin with non-blanchable redness of localized area 05/04/22 1300   Dressing Appearance Dry;Intact 05/04/22 1300   Drainage Amount None 05/04/22 1300   Appearance Red;Dry 05/04/22 1300   Tissue loss description Not applicable 05/04/22 1300   Red (%), Wound Tissue Color 100 % 05/04/22 1300   Periwound Area Intact;Dry;Edematous;Redness 05/04/22 1300   Wound Edges Rolled/closed 05/04/22 1300   Wound Length (cm) 0 cm 05/04/22 1300   Wound Width (cm) 0 cm 05/04/22 1300   Wound Depth (cm) 0 cm 05/04/22 1300   Wound Volume (cm^3) 0 cm^3 05/04/22 1300   Wound Surface Area (cm^2) 0 cm^2 05/04/22 1300   Care Cleansed with:;Soap and water;Applied:;Moisturizing agent 05/04/22 1300   Dressing Applied;Tubular bandage 05/04/22 1300   Periwound Care Absorptive dressing applied;Moisturizer applied 05/04/22 1300   Compression Tubular elasticized bandage 05/04/22 1300   Dressing Change Due 05/11/22 05/04/22 1300            Altered Skin Integrity Right lower Leg #2 Venous Ulcer Partial thickness tissue loss. Shallow open ulcer with a red or pink wound bed, without slough. Intact or Open/Ruptured Serum-filled  blister. (Active)       Altered Skin Integrity Present on Admission:    Side: Right   Orientation: lower   Location: Leg   Wound Number: #2   Is this injury device related?:    Primary Wound Type: Venous ulcer   Description of Altered Skin Integrity: Partial thickness tissue loss. Shallow open ulcer with a red or pink wound bed, without slough. Intact or Open/Ruptured Serum-filled blister.   Removal Indication and Assessment:    Wound Outcome:    (Retired) Wound Length (cm):    (Retired) Wound Width (cm):    (Retired) Depth (cm):    Wound Description (Comments):    Removal Indications:    Drainage Amount None 05/04/22 1300   Appearance Pink;Dry 05/04/22 1300   Tissue loss description Not applicable 05/04/22 1300   Red (%), Wound Tissue Color 100 % 05/04/22 1300   Periwound Area Intact;Dry;Edematous 05/04/22 1300   Wound Edges Rolled/closed 05/04/22 1300   Wound Length (cm) 0 cm 05/04/22 1300   Wound Width (cm) 0 cm 05/04/22 1300   Wound Depth (cm) 0 cm 05/04/22 1300   Wound Volume (cm^3) 0 cm^3 05/04/22 1300   Wound Surface Area (cm^2) 0 cm^2 05/04/22 1300   Care Cleansed with:;Soap and water 05/04/22 1300   Dressing Applied;Compression wrap 05/04/22 1300   Periwound Care Moisturizer applied 05/04/22 1300   Compression Two layer compression 05/04/22 1300   Dressing Change Due 05/11/22 05/04/22 1300         Assessment:         ICD-10-CM ICD-9-CM   1. Lymphedema of both lower extremities  I89.0 457.1   2. Chronic venous hypertension (idiopathic) without complications of bilateral lower extremity  I87.303 459.30         Plan:   Tissue pathology and/or culture taken:  [] Yes [x] No   Sharp debridement performed:   [] Yes [x] No   Labs ordered this visit:   [] Yes [x] No   Imaging ordered this visit:   [] Yes [x] No   Left Lower Leg: Tubi  D     Right Lower Leg   Cleanse wound with:Normal Saline   Lidocaine:PRN   Silver nitrate:PRN   Periwound care:   Primary dressing:Sween and Antifungal Cream to BLE   Edema  Control:RLE 2 layer compression toe to knee. Co-Flex with Calamine   Frequency:weekly in clinic  as Nurse Visit   Follow-up: Dr. Silveira in 3 weeks (5/18/2022)        No orders of the defined types were placed in this encounter.       Follow up in about 2 weeks (around 5/18/2022) for .

## 2022-05-11 ENCOUNTER — HOSPITAL ENCOUNTER (OUTPATIENT)
Dept: WOUND CARE | Facility: HOSPITAL | Age: 72
Discharge: HOME | End: 2022-05-11
Attending: SURGERY
Payer: MEDICARE

## 2022-05-11 DIAGNOSIS — I89.0 LYMPHEDEMA OF BOTH LOWER EXTREMITIES: Primary | ICD-10-CM

## 2022-05-11 PROCEDURE — 29581 APPL MULTLAYER CMPRN SYS LEG: CPT

## 2022-05-11 NOTE — PROGRESS NOTES
Subjective:       Patient ID: Mateo Foreman is a 72 y.o. male.    Chief Complaint: Venous Stasis    5/11/22  Nurse visit.  No complaints, no signs or symptoms of infection.  Continue POC.  F/u with Dr Silveira in 1 wk 5/18/22.    Review of Systems      Objective:        Physical Exam       Altered Skin Integrity 04/20/22 0825 Left lower Leg Other (comment) (Active)   04/20/22 0825   Altered Skin Integrity Present on Admission:    Side: Left   Orientation: lower   Location: Leg   Wound Number:    Is this injury device related?:    Primary Wound Type: Other   Description of Altered Skin Integrity:    Removal Indication and Assessment:    Wound Outcome:    (Retired) Wound Length (cm):    (Retired) Wound Width (cm):    (Retired) Depth (cm):    Wound Description (Comments):    Removal Indications:    Wound Image   05/11/22 1300   Description of Altered Skin Integrity Intact skin with non-blanchable redness of localized area 05/11/22 1300   Dressing Appearance Dry;Intact 05/11/22 1300   Drainage Amount None 05/11/22 1300   Appearance Red;Dry 05/11/22 1300   Tissue loss description Not applicable 05/11/22 1300   Red (%), Wound Tissue Color 100 % 05/11/22 1300   Periwound Area Intact;Dry;Edematous;Redness 05/11/22 1300   Wound Edges Rolled/closed 05/11/22 1300   Wound Length (cm) 0 cm 05/11/22 1300   Wound Width (cm) 0 cm 05/11/22 1300   Wound Depth (cm) 0 cm 05/11/22 1300   Wound Volume (cm^3) 0 cm^3 05/11/22 1300   Wound Surface Area (cm^2) 0 cm^2 05/11/22 1300   Care Cleansed with:;Soap and water;Applied:;Moisturizing agent 05/11/22 1300   Dressing Applied;Tubular bandage 05/11/22 1300   Periwound Care Absorptive dressing applied;Moisturizer applied;Topical treatment applied;Other (see comments) 05/11/22 1300   Compression Tubular elasticized bandage 05/11/22 1300   Dressing Change Due 05/18/22 05/11/22 1300            Altered Skin Integrity Right lower Leg #2 Venous Ulcer Partial thickness tissue loss. Shallow open  ulcer with a red or pink wound bed, without slough. Intact or Open/Ruptured Serum-filled blister. (Active)       Altered Skin Integrity Present on Admission:    Side: Right   Orientation: lower   Location: Leg   Wound Number: #2   Is this injury device related?:    Primary Wound Type: Venous ulcer   Description of Altered Skin Integrity: Partial thickness tissue loss. Shallow open ulcer with a red or pink wound bed, without slough. Intact or Open/Ruptured Serum-filled blister.   Removal Indication and Assessment:    Wound Outcome:    (Retired) Wound Length (cm):    (Retired) Wound Width (cm):    (Retired) Depth (cm):    Wound Description (Comments):    Removal Indications:    Wound Image   05/11/22 1300   Dressing Appearance Intact 05/11/22 1300   Drainage Amount None 05/11/22 1300   Appearance Pink;Dry 05/11/22 1300   Tissue loss description Not applicable 05/11/22 1300   Red (%), Wound Tissue Color 100 % 05/11/22 1300   Periwound Area Intact;Dry;Edematous;Hemosiderin Staining 05/11/22 1300   Wound Edges Rolled/closed 05/11/22 1300   Wound Length (cm) 0 cm 05/11/22 1300   Wound Width (cm) 0 cm 05/11/22 1300   Wound Depth (cm) 0 cm 05/11/22 1300   Wound Volume (cm^3) 0 cm^3 05/11/22 1300   Wound Surface Area (cm^2) 0 cm^2 05/11/22 1300   Care Cleansed with:;Soap and water 05/11/22 1300   Dressing Applied;Compression wrap 05/11/22 1300   Periwound Care Moisture barrier applied;Topical treatment applied 05/11/22 1300   Compression Two layer compression 05/11/22 1300   Dressing Change Due 05/18/22 05/11/22 1300         Assessment:         ICD-10-CM ICD-9-CM   1. Lymphedema of both lower extremities  I89.0 457.1         Plan:   Tissue pathology and/or culture taken:  [] Yes [x] No   Sharp debridement performed:   [] Yes [x] No   Labs ordered this visit:   [] Yes [x] No   Imaging ordered this visit:   [] Yes [x] No           No orders of the defined types were placed in this encounter.     Left Lower Leg: Tubi  D      Right Lower Leg   Cleanse wound with:Normal Saline   Lidocaine:PRN   Silver nitrate:PRN   Periwound care:   Primary dressing:Sween and Antifungal Cream to BLE   Edema Control:RLE 2 layer compression toe to knee. Co-Flex with Calamine   Frequency:weekly in clinic  as Nurse Visit   Follow-up: Dr. Silveira in 1 week (5/18/2022)    Follow up in about 1 week (around 5/18/2022) for Dr Silveira.

## 2022-05-18 ENCOUNTER — HOSPITAL ENCOUNTER (OUTPATIENT)
Dept: WOUND CARE | Facility: HOSPITAL | Age: 72
Discharge: HOME | End: 2022-05-18
Attending: SURGERY
Payer: MEDICARE

## 2022-05-18 VITALS
WEIGHT: 173 LBS | DIASTOLIC BLOOD PRESSURE: 78 MMHG | TEMPERATURE: 98 F | HEIGHT: 67 IN | BODY MASS INDEX: 27.15 KG/M2 | SYSTOLIC BLOOD PRESSURE: 178 MMHG | HEART RATE: 71 BPM

## 2022-05-18 DIAGNOSIS — R52 PAIN: Primary | ICD-10-CM

## 2022-05-18 DIAGNOSIS — E11.51 PERIPHERAL VASCULAR DISORDER DUE TO DIABETES MELLITUS: ICD-10-CM

## 2022-05-18 DIAGNOSIS — I73.9 PVD (PERIPHERAL VASCULAR DISEASE): ICD-10-CM

## 2022-05-18 PROCEDURE — 99212 OFFICE O/P EST SF 10 MIN: CPT

## 2022-05-18 NOTE — PROGRESS NOTES
Subjective:       Patient ID: Mateo Foreman is a 72 y.o. male.    Chief Complaint: Wound Care    Patient to wound care center with no new problems or complaints. New orders noted     Review of Systems   Constitutional: Negative.    HENT: Negative.    Eyes: Negative.    Respiratory: Negative.    Cardiovascular: Negative.    Gastrointestinal: Negative.    Genitourinary: Negative.    Musculoskeletal: Negative.    Neurological: Negative.    Psychiatric/Behavioral: Negative.          Objective:        Physical Exam  Constitutional:       Appearance: He is well-developed.   HENT:      Head: Normocephalic.   Eyes:      Conjunctiva/sclera: Conjunctivae normal.      Pupils: Pupils are equal, round, and reactive to light.   Cardiovascular:      Rate and Rhythm: Normal rate and regular rhythm.      Heart sounds: Normal heart sounds.   Pulmonary:      Effort: Pulmonary effort is normal.      Breath sounds: Normal breath sounds.   Abdominal:      General: Bowel sounds are normal.      Palpations: Abdomen is soft.   Musculoskeletal:         General: Normal range of motion.      Cervical back: Normal range of motion and neck supple.   Skin:     General: Skin is warm and dry.   Neurological:      Mental Status: He is alert and oriented to person, place, and time.      Deep Tendon Reflexes: Reflexes are normal and symmetric.            Altered Skin Integrity 04/20/22 0825 Left lower Leg Other (comment) (Active)   04/20/22 0825   Altered Skin Integrity Present on Admission:    Side: Left   Orientation: lower   Location: Leg   Wound Number:    Is this injury device related?:    Primary Wound Type: Other   Description of Altered Skin Integrity:    Removal Indication and Assessment:    Wound Outcome:    (Retired) Wound Length (cm):    (Retired) Wound Width (cm):    (Retired) Depth (cm):    Wound Description (Comments):    Removal Indications:    Wound Image   05/18/22 1500   Care Cleansed with:;Soap and water 05/18/22 1500    Dressing Applied;Tubular bandage 05/18/22 1500   Periwound Care Moisturizer applied 05/18/22 1500   Compression Tubular elasticized bandage 05/18/22 1500   Dressing Change Due 06/01/22 05/18/22 1500            Altered Skin Integrity Right lower Leg #2 Venous Ulcer Partial thickness tissue loss. Shallow open ulcer with a red or pink wound bed, without slough. Intact or Open/Ruptured Serum-filled blister. (Active)       Altered Skin Integrity Present on Admission:    Side: Right   Orientation: lower   Location: Leg   Wound Number: #2   Is this injury device related?:    Primary Wound Type: Venous ulcer   Description of Altered Skin Integrity: Partial thickness tissue loss. Shallow open ulcer with a red or pink wound bed, without slough. Intact or Open/Ruptured Serum-filled blister.   Removal Indication and Assessment:    Wound Outcome:    (Retired) Wound Length (cm):    (Retired) Wound Width (cm):    (Retired) Depth (cm):    Wound Description (Comments):    Removal Indications:    Wound Image   05/18/22 1500   Dressing Appearance Intact 05/18/22 1500   Drainage Amount None 05/18/22 1500   Appearance Intact;Pink;Dry 05/18/22 1500   Tissue loss description Not applicable 05/18/22 1500   Care Cleansed with:;Soap and water;Sterile normal saline 05/18/22 1500   Dressing Applied;Changed;Calcium alginate;Island/border;Tubular bandage 05/18/22 1500   Periwound Care Topical treatment applied 05/18/22 1500   Compression Tubular elasticized bandage 05/18/22 1500   Dressing Change Due 06/01/22 05/18/22 1500         Assessment:         ICD-10-CM ICD-9-CM   1. Pain  R52 780.96   2. PVD (peripheral vascular disease)  I73.9 443.9         Plan:   Tissue pathology and/or culture taken:  [] Yes [x] No   Sharp debridement performed:   [] Yes [x] No   Labs ordered this visit:   [] Yes [x] No   Imaging ordered this visit:   [] Yes [x] No           Orders Placed This Encounter   Procedures    Change dressing     Left Lower Leg: Tubi   E    Right Lower Leg   Cleanse wound with:Normal Saline   Lidocaine:PRN   Silver nitrate:PRN   Periwound care: Sween and Antifungal Cream to BLE   Primary dressing: Aquacel AG  Edema Control: RLE TubiGrip E compression toe to knee.    Frequency: weekly in clinic  as Nurse Visit   Follow-up: Dr. Silveira in 2 weeks 06/01/22        Follow up in about 2 weeks (around 6/1/2022).            Time spent 15 minutes.

## 2022-05-25 ENCOUNTER — HOSPITAL ENCOUNTER (OUTPATIENT)
Dept: WOUND CARE | Facility: HOSPITAL | Age: 72
Discharge: HOME | End: 2022-05-25
Attending: SURGERY
Payer: MEDICARE

## 2022-05-25 VITALS
HEART RATE: 89 BPM | WEIGHT: 173 LBS | HEIGHT: 67 IN | TEMPERATURE: 98 F | SYSTOLIC BLOOD PRESSURE: 152 MMHG | DIASTOLIC BLOOD PRESSURE: 65 MMHG | BODY MASS INDEX: 27.15 KG/M2

## 2022-05-25 DIAGNOSIS — L03.90 CELLULITIS, UNSPECIFIED CELLULITIS SITE: ICD-10-CM

## 2022-05-25 DIAGNOSIS — I89.0 LYMPHEDEMA OF BOTH LOWER EXTREMITIES: Primary | ICD-10-CM

## 2022-05-25 PROCEDURE — 29581 APPL MULTLAYER CMPRN SYS LEG: CPT

## 2022-05-25 NOTE — PROGRESS NOTES
"   Subjective:       Patient ID: Mateo Foreman is a 72 y.o. male.    Chief Complaint: Wound Care    5/25/22 F/u with Dr Silveira r/t lymphedema to BLE.  Pt having trouble speaking, family states "this has been going on for a few days", blood sugar currently 167, no other signs or symptoms.  Wound cultures have resulted, current abx stopped, pt and family provided with physical prescription for Cipro PO BID.  New POC established and reviewed with pt and family, pt and family verbalized understanding.  Pt tolerated compression wraps well.  Pt and family agreed to f/u with PCP r/t high A1C and trouble speaking.  F/u with Dr Silveira on 6/2/22.    Review of Systems   Constitutional: Negative.    HENT: Negative.    Eyes: Negative.    Respiratory: Negative.    Cardiovascular: Negative.    Gastrointestinal: Negative.    Genitourinary: Negative.    Musculoskeletal: Negative.    Neurological: Negative.    Psychiatric/Behavioral: Negative.          Objective:        Physical Exam  Constitutional:       Appearance: He is well-developed.   HENT:      Head: Normocephalic.   Eyes:      Conjunctiva/sclera: Conjunctivae normal.      Pupils: Pupils are equal, round, and reactive to light.   Cardiovascular:      Rate and Rhythm: Normal rate and regular rhythm.      Heart sounds: Normal heart sounds.   Pulmonary:      Effort: Pulmonary effort is normal.      Breath sounds: Normal breath sounds.   Abdominal:      General: Bowel sounds are normal.      Palpations: Abdomen is soft.   Musculoskeletal:         General: Normal range of motion.      Cervical back: Normal range of motion and neck supple.   Skin:     General: Skin is warm and dry.   Neurological:      Mental Status: He is alert and oriented to person, place, and time.      Deep Tendon Reflexes: Reflexes are normal and symmetric.            Altered Skin Integrity 04/20/22 0825 Left lower Leg Other (comment) (Active)   04/20/22 0825   Altered Skin Integrity Present on Admission:  "   Side: Left   Orientation: lower   Location: Leg   Wound Number:    Is this injury device related?:    Primary Wound Type: Other   Description of Altered Skin Integrity:    Removal Indication and Assessment:    Wound Outcome:    (Retired) Wound Length (cm):    (Retired) Wound Width (cm):    (Retired) Depth (cm):    Wound Description (Comments):    Removal Indications:    Wound Image   05/25/22 1100   Description of Altered Skin Integrity Intact skin with non-blanchable redness of localized area 05/25/22 1100   Dressing Appearance Dry;Intact 05/25/22 1100   Drainage Amount None 05/25/22 1100   Appearance Red;Dry;Intact;Closed/resurfaced 05/25/22 1100   Tissue loss description Not applicable 05/25/22 1100   Red (%), Wound Tissue Color 100 % 05/25/22 1100   Periwound Area Intact;Dry;Hemosiderin Staining;Edematous;Redness 05/25/22 1100   Wound Edges Rolled/closed 05/25/22 1100   Wound Length (cm) 0 cm 05/25/22 1100   Wound Width (cm) 0 cm 05/25/22 1100   Wound Depth (cm) 0 cm 05/25/22 1100   Wound Volume (cm^3) 0 cm^3 05/25/22 1100   Wound Surface Area (cm^2) 0 cm^2 05/25/22 1100   Care Cleansed with:;Soap and water 05/25/22 1100   Dressing Applied;Other (comment);Compression wrap 05/25/22 1100   Periwound Care Topical treatment applied;Moisturizer applied 05/25/22 1100   Compression Two layer compression 05/25/22 1100   Dressing Change Due 05/31/22 05/25/22 1100            Altered Skin Integrity Right lower Leg #2 Venous Ulcer Partial thickness tissue loss. Shallow open ulcer with a red or pink wound bed, without slough. Intact or Open/Ruptured Serum-filled blister. (Active)       Altered Skin Integrity Present on Admission:    Side: Right   Orientation: lower   Location: Leg   Wound Number: #2   Is this injury device related?:    Primary Wound Type: Venous ulcer   Description of Altered Skin Integrity: Partial thickness tissue loss. Shallow open ulcer with a red or pink wound bed, without slough. Intact or  Open/Ruptured Serum-filled blister.   Removal Indication and Assessment:    Wound Outcome:    (Retired) Wound Length (cm):    (Retired) Wound Width (cm):    (Retired) Depth (cm):    Wound Description (Comments):    Removal Indications:    Wound Image   05/25/22 1100   Dressing Appearance Dry;Intact 05/25/22 1100   Drainage Amount None 05/25/22 1100   Appearance Intact;Pink;Dry 05/25/22 1100   Tissue loss description Not applicable 05/25/22 1100   Red (%), Wound Tissue Color 100 % 05/25/22 1100   Periwound Area Intact;Dry;Hemosiderin Staining;Pink;Edematous 05/25/22 1100   Wound Edges Rolled/closed 05/25/22 1100   Wound Length (cm) 0 cm 05/25/22 1100   Wound Width (cm) 0 cm 05/25/22 1100   Wound Depth (cm) 0 cm 05/25/22 1100   Wound Volume (cm^3) 0 cm^3 05/25/22 1100   Wound Surface Area (cm^2) 0 cm^2 05/25/22 1100   Care Cleansed with:;Soap and water 05/25/22 1100   Dressing Applied;Other (comment);Compression wrap;Silver;Calcium alginate 05/25/22 1100   Periwound Care Topical treatment applied;Moisturizer applied 05/25/22 1100   Compression Two layer compression 05/25/22 1100   Dressing Change Due 05/31/22 05/25/22 1100         Assessment:         ICD-10-CM ICD-9-CM   1. Lymphedema of both lower extremities  I89.0 457.1   2. Cellulitis, unspecified cellulitis site  L03.90 682.9         Plan:   Tissue pathology and/or culture taken:  [] Yes [x] No   Sharp debridement performed:   [] Yes [x] No   Labs ordered this visit:   [] Yes [x] No   Imaging ordered this visit:   [] Yes [x] No           Orders Placed This Encounter   Procedures    Change dressing     Right Lower Leg   Cleanse wound with:Normal Saline   Lidocaine:PRN   Silver nitrate:PRN   Periwound care: Aquacel AG   Primary dressing: Sween and Gentamicin cream to BLE   Edema Control: Coflex with calamine to BLE  Frequency: twice weekly in clinic   Follow-up: Dr. Silveira in 1 week 6/2/22    Left Lower Leg   Cleanse wound with:Normal Saline   Lidocaine:PRN    Silver nitrate:PRN   Primary dressing: Sween and Gentamicin cream to BLE   Edema Control: Coflex with calamine to BLE  Frequency: twice weekly in clinic   Follow-up: Dr. Silveira in 1 week 6/2/22 5/25/22 Stop current abx, given physical prescription for Cipro PO BID as per wound culture results        Follow up in about 8 days (around 6/2/2022) for Dr Silveira.            Time spent 15 minutes.

## 2022-05-31 ENCOUNTER — HOSPITAL ENCOUNTER (OUTPATIENT)
Dept: WOUND CARE | Facility: HOSPITAL | Age: 72
Discharge: HOME | End: 2022-05-31
Attending: SURGERY
Payer: MEDICARE

## 2022-05-31 DIAGNOSIS — I73.9 PVD (PERIPHERAL VASCULAR DISEASE): ICD-10-CM

## 2022-05-31 DIAGNOSIS — I89.0 LYMPHEDEMA OF BOTH LOWER EXTREMITIES: Primary | ICD-10-CM

## 2022-05-31 DIAGNOSIS — L03.90 CELLULITIS, UNSPECIFIED CELLULITIS SITE: ICD-10-CM

## 2022-05-31 DIAGNOSIS — E11.65 TYPE 2 DIABETES MELLITUS WITH HYPERGLYCEMIA, WITHOUT LONG-TERM CURRENT USE OF INSULIN: ICD-10-CM

## 2022-05-31 DIAGNOSIS — E11.51 PERIPHERAL VASCULAR DISORDER DUE TO DIABETES MELLITUS: ICD-10-CM

## 2022-05-31 PROCEDURE — 29581 APPL MULTLAYER CMPRN SYS LEG: CPT

## 2022-05-31 NOTE — PROGRESS NOTES
Subjective:       Patient ID: Mateo Foreman is a 72 y.o. male.    Chief Complaint: Non-healing Wound Follow Up    5/31/22:  Nurse visit for dressing change.  Care tolerated well.     Review of Systems      Objective:        Physical Exam       Altered Skin Integrity 04/20/22 0825 Left lower Leg Other (comment) (Active)   04/20/22 0825   Altered Skin Integrity Present on Admission:    Side: Left   Orientation: lower   Location: Leg   Wound Number:    Is this injury device related?:    Primary Wound Type: Other   Description of Altered Skin Integrity:    Removal Indication and Assessment:    Wound Outcome:    (Retired) Wound Length (cm):    (Retired) Wound Width (cm):    (Retired) Depth (cm):    Wound Description (Comments):    Removal Indications:    Dressing Appearance Dry;Intact;Clean 05/31/22 1000   Appearance Dry 05/31/22 1000   Periwound Area Edematous;Hemosiderin Staining;Pink 05/31/22 1000   Care Cleansed with:;Soap and water 05/31/22 1000   Dressing Applied;Compression wrap 05/31/22 1000   Periwound Care Moisturizer applied 05/31/22 1000   Compression Two layer compression 05/31/22 1000   Dressing Change Due 06/02/22 05/31/22 1000            Altered Skin Integrity Right lower Leg #2 Venous Ulcer Partial thickness tissue loss. Shallow open ulcer with a red or pink wound bed, without slough. Intact or Open/Ruptured Serum-filled blister. (Active)       Altered Skin Integrity Present on Admission:    Side: Right   Orientation: lower   Location: Leg   Wound Number: #2   Is this injury device related?:    Primary Wound Type: Venous ulcer   Description of Altered Skin Integrity: Partial thickness tissue loss. Shallow open ulcer with a red or pink wound bed, without slough. Intact or Open/Ruptured Serum-filled blister.   Removal Indication and Assessment:    Wound Outcome:    (Retired) Wound Length (cm):    (Retired) Wound Width (cm):    (Retired) Depth (cm):    Wound Description (Comments):    Removal  Indications:    Dressing Appearance Dry;Intact;Clean 05/31/22 1000   Periwound Area Edematous;Hemosiderin Staining;Pink 05/31/22 1000   Care Cleansed with:;Soap and water 05/31/22 1000   Dressing Applied;Changed;Compression wrap 05/31/22 1000   Periwound Care Moisturizer applied 05/31/22 1000   Compression Two layer compression 05/31/22 1000   Dressing Change Due 06/02/22 05/31/22 1000         Assessment:         ICD-10-CM ICD-9-CM   1. Lymphedema of both lower extremities  I89.0 457.1   2. Cellulitis, unspecified cellulitis site  L03.90 682.9   3. PVD (peripheral vascular disease)  I73.9 443.9   4. Peripheral vascular disorder due to diabetes mellitus  E11.51 250.70     443.81   5. Type 2 diabetes mellitus with hyperglycemia, without long-term current use of insulin  E11.65 250.00     790.29   Right Lower Leg   Cleanse wound with:Normal Saline   Lidocaine:PRN   Silver nitrate:PRN   Periwound care: Aquacel AG   Primary dressing: Sween and Gentamicin cream to BLE   Edema Control: Coflex with calamine to BLE   Frequency: twice weekly in clinic   Follow-up: Dr. Silveira in 1 week 6/2/22     Left Lower Leg   Cleanse wound with:Normal Saline   Lidocaine:PRN   Silver nitrate:PRN   Primary dressing: Sween and Gentamicin cream to BLE   Edema Control: Coflex with calamine to BLE   Frequency: twice weekly in clinic   Follow-up: Dr. Silveira in 1 week 6/2/22 5/25/22 Stop current abx, given physical prescription for Cipro PO BID as per wound culture results      Plan:   Tissue pathology and/or culture taken:  [] Yes [x] No   Sharp debridement performed:   [] Yes [x] No   Labs ordered this visit:   [] Yes [x] No   Imaging ordered this visit:   [] Yes [x] No           No orders of the defined types were placed in this encounter.       Follow up in about 1 week (around 6/7/2022).

## 2022-06-02 ENCOUNTER — HOSPITAL ENCOUNTER (OUTPATIENT)
Dept: WOUND CARE | Facility: HOSPITAL | Age: 72
Discharge: HOME | End: 2022-06-02
Attending: SURGERY
Payer: MEDICARE

## 2022-06-02 VITALS
HEART RATE: 79 BPM | WEIGHT: 173 LBS | HEIGHT: 67 IN | BODY MASS INDEX: 27.15 KG/M2 | SYSTOLIC BLOOD PRESSURE: 180 MMHG | TEMPERATURE: 98 F | DIASTOLIC BLOOD PRESSURE: 75 MMHG

## 2022-06-02 DIAGNOSIS — I89.0 LYMPHEDEMA OF BOTH LOWER EXTREMITIES: Primary | ICD-10-CM

## 2022-06-02 DIAGNOSIS — E11.51 PERIPHERAL VASCULAR DISORDER DUE TO DIABETES MELLITUS: ICD-10-CM

## 2022-06-02 PROCEDURE — 29581 APPL MULTLAYER CMPRN SYS LEG: CPT | Mod: 50

## 2022-06-02 NOTE — PROGRESS NOTES
Subjective:       Patient ID: Mateo Foreman is a 72 y.o. male.    Chief Complaint: Wound Care    6/2/22: Follow up for BLE edema. No open wounds at today's visit, edema noted to BLE. Continuned with current plan of care.     Review of Systems   Constitutional: Negative.    HENT: Negative.    Eyes: Negative.    Respiratory: Negative.    Cardiovascular: Negative.    Gastrointestinal: Negative.    Genitourinary: Negative.    Musculoskeletal: Negative.    Neurological: Negative.    Psychiatric/Behavioral: Negative.          Objective:        Physical Exam  Constitutional:       Appearance: He is well-developed.   HENT:      Head: Normocephalic.   Eyes:      Conjunctiva/sclera: Conjunctivae normal.      Pupils: Pupils are equal, round, and reactive to light.   Cardiovascular:      Rate and Rhythm: Normal rate and regular rhythm.      Heart sounds: Normal heart sounds.   Pulmonary:      Effort: Pulmonary effort is normal.      Breath sounds: Normal breath sounds.   Abdominal:      General: Bowel sounds are normal.      Palpations: Abdomen is soft.   Musculoskeletal:         General: Normal range of motion.      Cervical back: Normal range of motion and neck supple.   Skin:     General: Skin is warm and dry.   Neurological:      Mental Status: He is alert and oriented to person, place, and time.      Deep Tendon Reflexes: Reflexes are normal and symmetric.            Altered Skin Integrity 04/20/22 0825 Left lower Leg Other (comment) (Active)   04/20/22 0825   Altered Skin Integrity Present on Admission:    Side: Left   Orientation: lower   Location: Leg   Wound Number:    Is this injury device related?:    Primary Wound Type: Other   Description of Altered Skin Integrity:    Removal Indication and Assessment:    Wound Outcome:    (Retired) Wound Length (cm):    (Retired) Wound Width (cm):    (Retired) Depth (cm):    Wound Description (Comments):    Removal Indications:    Wound Image   06/02/22 1000   Description of  Altered Skin Integrity Intact skin with non-blanchable redness of localized area 06/02/22 1000   Dressing Appearance Dry;Intact 06/02/22 1000   Appearance Red;Dry 06/02/22 1000   Tissue loss description Not applicable 06/02/22 1000   Red (%), Wound Tissue Color 100 % 06/02/22 1000   Periwound Area Edematous;Dry;Intact 06/02/22 1000   Wound Edges Rolled/closed 06/02/22 1000   Wound Length (cm) 0 cm 06/02/22 1000   Wound Width (cm) 0 cm 06/02/22 1000   Wound Depth (cm) 0 cm 06/02/22 1000   Wound Volume (cm^3) 0 cm^3 06/02/22 1000   Wound Surface Area (cm^2) 0 cm^2 06/02/22 1000   Care Cleansed with:;Soap and water 06/02/22 1000   Dressing Compression wrap 06/02/22 1000   Periwound Care Topical treatment applied;Absorptive dressing applied 06/02/22 1000   Compression Two layer compression 06/02/22 1000   Dressing Change Due 06/06/22 06/02/22 1000            Altered Skin Integrity Right lower Leg #2 Venous Ulcer Partial thickness tissue loss. Shallow open ulcer with a red or pink wound bed, without slough. Intact or Open/Ruptured Serum-filled blister. (Active)       Altered Skin Integrity Present on Admission:    Side: Right   Orientation: lower   Location: Leg   Wound Number: #2   Is this injury device related?:    Primary Wound Type: Venous ulcer   Description of Altered Skin Integrity: Partial thickness tissue loss. Shallow open ulcer with a red or pink wound bed, without slough. Intact or Open/Ruptured Serum-filled blister.   Removal Indication and Assessment:    Wound Outcome:    (Retired) Wound Length (cm):    (Retired) Wound Width (cm):    (Retired) Depth (cm):    Wound Description (Comments):    Removal Indications:    Wound Image    06/02/22 1000   Description of Altered Skin Integrity Intact skin with non-blanchable redness of localized area 06/02/22 1000   Dressing Appearance Dry;Intact 06/02/22 1000   Drainage Amount None 06/02/22 1000   Appearance Red;Dry 06/02/22 1000   Tissue loss description Not  applicable 06/02/22 1000   Red (%), Wound Tissue Color 100 % 06/02/22 1000   Periwound Area Intact;Dry;Edematous 06/02/22 1000   Wound Edges Rolled/closed 06/02/22 1000   Wound Length (cm) 0 cm 06/02/22 1000   Wound Width (cm) 0 cm 06/02/22 1000   Wound Depth (cm) 0 cm 06/02/22 1000   Wound Volume (cm^3) 0 cm^3 06/02/22 1000   Wound Surface Area (cm^2) 0 cm^2 06/02/22 1000   Care Cleansed with:;Soap and water 06/02/22 1000   Dressing Applied;Compression wrap 06/02/22 1000   Periwound Care Topical treatment applied;Absorptive dressing applied 06/02/22 1000   Compression Two layer compression 06/02/22 1000   Dressing Change Due 06/06/22 06/02/22 1000         Assessment:         ICD-10-CM ICD-9-CM   1. Lymphedema of both lower extremities  I89.0 457.1   2. Peripheral vascular disorder due to diabetes mellitus  E11.51 250.70     443.81         Plan:   Tissue pathology and/or culture taken:  [] Yes [x] No   Sharp debridement performed:   [] Yes [x] No   Labs ordered this visit:   [] Yes [x] No   Imaging ordered this visit:   [] Yes [x] No           Orders Placed This Encounter   Procedures    Change dressing     Right Lower Leg   Cleanse wound with:Normal Saline   Lidocaine:PRN   Silver nitrate:PRN   Periwound care: Aquacel AG to any open areas  Primary dressing: Sween and Gentamicin cream to BLE   Edema Control: Coflex with calamine to BLE   Frequency: Weekly in clinic   Follow-up: Dr. Silveira in 1 week 6/16/22     Left Lower Leg   Cleanse wound with:Normal Saline   Lidocaine:PRN   Silver nitrate:PRN   Primary dressing: Sween and Gentamicin cream to BLE   Edema Control: Coflex with calamine to BLE   Frequency: Weekly in clinic   Follow-up: Dr. Silveira in 1 week 6/16/22     Continue Cipro PO BID as ordered.        Follow up in about 2 weeks (around 6/16/2022) for .          Time spent 15 minutes.

## 2022-06-06 ENCOUNTER — HOSPITAL ENCOUNTER (OUTPATIENT)
Dept: WOUND CARE | Facility: HOSPITAL | Age: 72
Discharge: HOME | End: 2022-06-06
Attending: SURGERY
Payer: MEDICARE

## 2022-06-06 PROCEDURE — 29581 APPL MULTLAYER CMPRN SYS LEG: CPT

## 2022-06-06 NOTE — PROGRESS NOTES
Subjective:       Patient ID: Mateo Foreman is a 72 y.o. male.    Chief Complaint: Wound Care    Nurse visit for dressing change, tolerated well.     Review of Systems      Objective:        Physical Exam       Altered Skin Integrity 04/20/22 0825 Left lower Leg Other (comment) (Active)   04/20/22 0825   Altered Skin Integrity Present on Admission:    Side: Left   Orientation: lower   Location: Leg   Wound Number:    Is this injury device related?:    Primary Wound Type: Other   Description of Altered Skin Integrity:    Removal Indication and Assessment:    Wound Outcome:    (Retired) Wound Length (cm):    (Retired) Wound Width (cm):    (Retired) Depth (cm):    Wound Description (Comments):    Removal Indications:    Dressing Appearance Dry;Intact 06/06/22 1500   Appearance Intact;Dry 06/06/22 1500   Tissue loss description Not applicable 06/06/22 1500   Periwound Area Intact;Edematous 06/06/22 1500   Wound Edges Rolled/closed 06/06/22 1500   Wound Length (cm) 0 cm 06/06/22 1500   Wound Width (cm) 0 cm 06/06/22 1500   Wound Depth (cm) 0 cm 06/06/22 1500   Wound Volume (cm^3) 0 cm^3 06/06/22 1500   Wound Surface Area (cm^2) 0 cm^2 06/06/22 1500   Care Cleansed with:;Soap and water 06/06/22 1500   Dressing Applied;Changed;Compression wrap 06/06/22 1500   Periwound Care Topical treatment applied 06/06/22 1500   Compression Two layer compression 06/06/22 1500   Dressing Change Due 06/16/22 06/06/22 1500            Altered Skin Integrity Right lower Leg #2 Venous Ulcer Partial thickness tissue loss. Shallow open ulcer with a red or pink wound bed, without slough. Intact or Open/Ruptured Serum-filled blister. (Active)       Altered Skin Integrity Present on Admission:    Side: Right   Orientation: lower   Location: Leg   Wound Number: #2   Is this injury device related?:    Primary Wound Type: Venous ulcer   Description of Altered Skin Integrity: Partial thickness tissue loss. Shallow open ulcer with a red or pink  wound bed, without slough. Intact or Open/Ruptured Serum-filled blister.   Removal Indication and Assessment:    Wound Outcome:    (Retired) Wound Length (cm):    (Retired) Wound Width (cm):    (Retired) Depth (cm):    Wound Description (Comments):    Removal Indications:    Dressing Appearance Dry;Intact 06/06/22 1500   Drainage Amount None 06/06/22 1500   Appearance Intact;Red;Dry 06/06/22 1500   Tissue loss description Not applicable 06/06/22 1500   Red (%), Wound Tissue Color 100 % 06/06/22 1500   Periwound Area Intact;Dry;Edematous 06/06/22 1500   Wound Edges Rolled/closed 06/06/22 1500   Wound Length (cm) 0 cm 06/06/22 1500   Wound Width (cm) 0 cm 06/06/22 1500   Wound Depth (cm) 0 cm 06/06/22 1500   Wound Volume (cm^3) 0 cm^3 06/06/22 1500   Wound Surface Area (cm^2) 0 cm^2 06/06/22 1500   Care Cleansed with:;Soap and water 06/06/22 1500   Dressing Applied;Changed;Calcium alginate;Silver;Compression wrap 06/06/22 1500   Periwound Care Topical treatment applied 06/06/22 1500   Compression Two layer compression 06/06/22 1500   Dressing Change Due 06/16/22 06/06/22 1500         Assessment:       No diagnosis found.      Plan:   Tissue pathology and/or culture taken:  [] Yes [x] No   Sharp debridement performed:   [] Yes [x] No   Labs ordered this visit:   [] Yes [x] No   Imaging ordered this visit:   [] Yes [x] No         Right Lower Leg   Cleanse wound with:Normal Saline   Lidocaine:PRN   Silver nitrate:PRN   Periwound care: Aquacel AG to any open areas   Primary dressing: Sween and Gentamicin cream to BLE   Edema Control: Coflex with calamine to BLE   Frequency: Weekly in clinic   Follow-up: Dr. Silveira in 1 week 6/16/22     Left Lower Leg   Cleanse wound with:Normal Saline   Lidocaine:PRN   Silver nitrate:PRN   Primary dressing: Sween and Gentamicin cream to BLE   Edema Control: Coflex with calamine to BLE   Frequency: Weekly in clinic   Follow-up: Dr. Silveira in 1 week 6/16/22     Continue Cipro PO BID as  ordered.        Follow up in about 3 days (around 6/9/2022).

## 2022-06-16 ENCOUNTER — HOSPITAL ENCOUNTER (OUTPATIENT)
Dept: WOUND CARE | Facility: HOSPITAL | Age: 72
Discharge: HOME | End: 2022-06-16
Attending: SURGERY
Payer: MEDICARE

## 2022-06-16 VITALS
HEART RATE: 81 BPM | HEIGHT: 68 IN | TEMPERATURE: 98 F | DIASTOLIC BLOOD PRESSURE: 77 MMHG | WEIGHT: 173 LBS | SYSTOLIC BLOOD PRESSURE: 180 MMHG | BODY MASS INDEX: 26.22 KG/M2

## 2022-06-16 DIAGNOSIS — I89.0 LYMPHEDEMA OF BOTH LOWER EXTREMITIES: Primary | ICD-10-CM

## 2022-06-16 PROCEDURE — 99212 OFFICE O/P EST SF 10 MIN: CPT

## 2022-06-16 NOTE — PROGRESS NOTES
[facility]    Subjective:       Patient ID: Mateo Foreman is a 72 y.o. male.    Chief Complaint: Wound Care    Patient to wound care with no new problems or complaints, wounds are healed.  Rx for compression stockings given    Review of Systems   Constitutional: Negative.    HENT: Negative.    Eyes: Negative.    Respiratory: Negative.    Cardiovascular: Negative.    Gastrointestinal: Negative.    Genitourinary: Negative.    Musculoskeletal: Negative.    Neurological: Negative.    Psychiatric/Behavioral: Negative.          Objective:        Physical Exam  Constitutional:       Appearance: He is well-developed.   HENT:      Head: Normocephalic.   Eyes:      Conjunctiva/sclera: Conjunctivae normal.      Pupils: Pupils are equal, round, and reactive to light.   Cardiovascular:      Rate and Rhythm: Normal rate and regular rhythm.      Heart sounds: Normal heart sounds.   Pulmonary:      Effort: Pulmonary effort is normal.      Breath sounds: Normal breath sounds.   Abdominal:      General: Bowel sounds are normal.      Palpations: Abdomen is soft.   Musculoskeletal:         General: Normal range of motion.      Cervical back: Normal range of motion and neck supple.   Skin:     General: Skin is warm and dry.   Neurological:      Mental Status: He is alert and oriented to person, place, and time.      Deep Tendon Reflexes: Reflexes are normal and symmetric.            Altered Skin Integrity 04/20/22 0825 Left lower Leg Other (comment) (Active)   04/20/22 0825   Altered Skin Integrity Present on Admission:    Side: Left   Orientation: lower   Location: Leg   Wound Number:    Is this injury device related?:    Primary Wound Type: Other   Description of Altered Skin Integrity:    Removal Indication and Assessment:    Wound Outcome:    (Retired) Wound Length (cm):    (Retired) Wound Width (cm):    (Retired) Depth (cm):    Wound Description (Comments):    Removal Indications:     Wound Image   06/16/22 1100   Dressing Appearance Dry 06/16/22 1100   Appearance Intact;Dry 06/16/22 1100   Tissue loss description Not applicable 06/16/22 1100   Periwound Area Intact;Edematous 06/16/22 1100   Wound Edges Rolled/closed 06/16/22 1100   Wound Length (cm) 0 cm 06/16/22 1100   Wound Width (cm) 0 cm 06/16/22 1100   Wound Depth (cm) 0 cm 06/16/22 1100   Wound Volume (cm^3) 0 cm^3 06/16/22 1100   Wound Surface Area (cm^2) 0 cm^2 06/16/22 1100   Care Cleansed with:;Soap and water 06/16/22 1100   Dressing Tubular bandage 06/16/22 1100   Periwound Care Moisturizer applied 06/16/22 1100   Compression Tubular elasticized bandage 06/16/22 1100   Dressing Change Due 06/30/22 06/16/22 1100            Altered Skin Integrity Right lower Leg #2 Venous Ulcer Partial thickness tissue loss. Shallow open ulcer with a red or pink wound bed, without slough. Intact or Open/Ruptured Serum-filled blister. (Active)       Altered Skin Integrity Present on Admission:    Side: Right   Orientation: lower   Location: Leg   Wound Number: #2   Is this injury device related?:    Primary Wound Type: Venous ulcer   Description of Altered Skin Integrity: Partial thickness tissue loss. Shallow open ulcer with a red or pink wound bed, without slough. Intact or Open/Ruptured Serum-filled blister.   Removal Indication and Assessment:    Wound Outcome:    (Retired) Wound Length (cm):    (Retired) Wound Width (cm):    (Retired) Depth (cm):    Wound Description (Comments):    Removal Indications:    Wound Image   06/16/22 1100   Dressing Appearance Dry;Intact 06/16/22 1100   Drainage Amount None 06/16/22 1100   Appearance Intact;Red 06/16/22 1100   Tissue loss description Not applicable 06/16/22 1100   Periwound Area Intact;Dry 06/16/22 1100   Wound Edges Rolled/closed 06/16/22 1100   Wound Length (cm) 0 cm 06/16/22 1100   Wound Width (cm) 0 cm 06/16/22 1100   Wound Depth (cm) 0 cm 06/16/22 1100   Wound Volume (cm^3) 0 cm^3 06/16/22 1100    Wound Surface Area (cm^2) 0 cm^2 06/16/22 1100   Care Cleansed with:;Soap and water 06/16/22 1100   Dressing Applied;Changed;Tubular bandage 06/16/22 1100   Periwound Care Moisturizer applied 06/16/22 1100   Compression Tubular elasticized bandage 06/16/22 1100   Dressing Change Due 06/30/22 06/16/22 1100         Assessment:         ICD-10-CM ICD-9-CM   1. Lymphedema of both lower extremities  I89.0 457.1         Plan:   Tissue pathology and/or culture taken:  [] Yes [x] No   Sharp debridement performed:   [] Yes [x] No   Labs ordered this visit:   [] Yes [x] No   Imaging ordered this visit:   [] Yes [x] No           Orders Placed This Encounter   Procedures    Change dressing     BLE:  Cleanse wound with:Normal Saline   Lidocaine:PRN   Silver nitrate:PRN   Periwound care: Sween Cream  Primary dressing: Tubi  F  Edema Control: Elevate when possible  Follow-up: Dr. Silveira in 2 weeks 6/30/22     Other Orders: Rx for compression stockings given        Follow up in about 2 weeks (around 6/30/2022).          Time spent 15 minutes.

## 2022-06-30 ENCOUNTER — HOSPITAL ENCOUNTER (OUTPATIENT)
Dept: WOUND CARE | Facility: HOSPITAL | Age: 72
Discharge: HOME | End: 2022-06-30
Attending: SURGERY
Payer: MEDICARE

## 2022-06-30 VITALS
BODY MASS INDEX: 26.22 KG/M2 | TEMPERATURE: 98 F | HEIGHT: 68 IN | WEIGHT: 173 LBS | SYSTOLIC BLOOD PRESSURE: 183 MMHG | DIASTOLIC BLOOD PRESSURE: 74 MMHG | HEART RATE: 73 BPM

## 2022-06-30 DIAGNOSIS — I89.0 LYMPHEDEMA OF BOTH LOWER EXTREMITIES: Primary | ICD-10-CM

## 2022-06-30 DIAGNOSIS — E11.51 PERIPHERAL VASCULAR DISORDER DUE TO DIABETES MELLITUS: ICD-10-CM

## 2022-06-30 PROCEDURE — 99212 OFFICE O/P EST SF 10 MIN: CPT

## 2022-06-30 NOTE — PROGRESS NOTES
[facility]    Subjective:       Patient ID: Mateo Foreman is a 72 y.o. male.    Chief Complaint: Wound Care    6/30/22 F/u with Dr Silveira.  All wounds are closed at this time.  RLE +3 edema, redness present, LLE +1 edema, redness present.  They were unable to get the compression stockings yet.  Continue POC.  F/u with Dr Silveira [id number] 7/14/22.    Review of Systems   Constitutional: Negative.    HENT: Negative.    Eyes: Negative.    Respiratory: Negative.    Cardiovascular: Negative.    Gastrointestinal: Negative.    Genitourinary: Negative.    Musculoskeletal: Negative.    Neurological: Negative.    Psychiatric/Behavioral: Negative.          Objective:        Physical Exam  Constitutional:       Appearance: He is well-developed.   HENT:      Head: Normocephalic.   Eyes:      Conjunctiva/sclera: Conjunctivae normal.      Pupils: Pupils are equal, round, and reactive to light.   Cardiovascular:      Rate and Rhythm: Normal rate and regular rhythm.      Heart sounds: Normal heart sounds.   Pulmonary:      Effort: Pulmonary effort is normal.      Breath sounds: Normal breath sounds.   Abdominal:      General: Bowel sounds are normal.      Palpations: Abdomen is soft.   Musculoskeletal:         General: Normal range of motion.      Cervical back: Normal range of motion and neck supple.   Skin:     General: Skin is warm and dry.   Neurological:      Mental Status: He is alert and oriented to person, place, and time.      Deep Tendon Reflexes: Reflexes are normal and symmetric.            Altered Skin Integrity 04/20/22 0825 Left lower Leg Other (comment) (Active)   04/20/22 0825   Altered Skin Integrity Present on Admission:    Side: Left   Orientation: lower   Location: Leg   Wound Number:    Is this injury device related?:    Primary Wound Type: Other   Description of Altered Skin Integrity:    Removal Indication and Assessment:    Wound Outcome:    (Retired) Wound  Length (cm):    (Retired) Wound Width (cm):    (Retired) Depth (cm):    Wound Description (Comments):    Removal Indications:    Wound Image   06/30/22 1100   Dressing Appearance Open to air 06/30/22 1100   Drainage Amount None 06/30/22 1100   Appearance Closed/resurfaced 06/30/22 1100   Tissue loss description Not applicable 06/30/22 1100   Periwound Area Intact;Edematous;Hemosiderin Staining 06/30/22 1100   Wound Edges Rolled/closed 06/30/22 1100   Wound Length (cm) 0 cm 06/30/22 1100   Wound Width (cm) 0 cm 06/30/22 1100   Wound Depth (cm) 0 cm 06/30/22 1100   Wound Volume (cm^3) 0 cm^3 06/30/22 1100   Wound Surface Area (cm^2) 0 cm^2 06/30/22 1100   Care Cleansed with:;Soap and water 06/30/22 1100   Dressing Tubular bandage 06/30/22 1100   Periwound Care Moisturizer applied 06/30/22 1100   Compression Tubular elasticized bandage 06/30/22 1100   Dressing Change Due 07/01/22 06/30/22 1100            Altered Skin Integrity Right lower Leg #2 Venous Ulcer Partial thickness tissue loss. Shallow open ulcer with a red or pink wound bed, without slough. Intact or Open/Ruptured Serum-filled blister. (Active)       Altered Skin Integrity Present on Admission:    Side: Right   Orientation: lower   Location: Leg   Wound Number: #2   Is this injury device related?:    Primary Wound Type: Venous ulcer   Description of Altered Skin Integrity: Partial thickness tissue loss. Shallow open ulcer with a red or pink wound bed, without slough. Intact or Open/Ruptured Serum-filled blister.   Removal Indication and Assessment:    Wound Outcome:    (Retired) Wound Length (cm):    (Retired) Wound Width (cm):    (Retired) Depth (cm):    Wound Description (Comments):    Removal Indications:    Wound Image   06/30/22 1100   Dressing Appearance Open to air 06/30/22 1100   Drainage Amount None 06/30/22 1100   Appearance Closed/resurfaced 06/30/22 1100   Tissue loss description Not applicable 06/30/22 1100   Periwound Area  Intact;Dry;Hemosiderin Staining;Redness;Edematous;Swelling 06/30/22 1100   Wound Edges Rolled/closed 06/30/22 1100   Wound Length (cm) 0 cm 06/30/22 1100   Wound Width (cm) 0 cm 06/30/22 1100   Wound Depth (cm) 0 cm 06/30/22 1100   Wound Volume (cm^3) 0 cm^3 06/30/22 1100   Wound Surface Area (cm^2) 0 cm^2 06/30/22 1100   Care Cleansed with:;Soap and water 06/30/22 1100   Dressing Applied;Tubular bandage 06/30/22 1100   Periwound Care Moisturizer applied 06/30/22 1100   Compression Tubular elasticized bandage 06/30/22 1100   Dressing Change Due 07/01/22 06/30/22 1100         Assessment:         ICD-10-CM ICD-9-CM   1. Lymphedema of both lower extremities  I89.0 457.1   2. Peripheral vascular disorder due to diabetes mellitus  E11.51 250.70     443.81         Plan:   Tissue pathology and/or culture taken:  [] Yes [x] No   Sharp debridement performed:   [] Yes [x] No   Labs ordered this visit:   [] Yes [x] No   Imaging ordered this visit:   [] Yes [x] No           Orders Placed This Encounter   Procedures    Change dressing     BLE:   Cleanse wound with:Normal Saline   Lidocaine:PRN   Silver nitrate:PRN   Periwound care: Sween Cream   Primary dressing: Tubi  F double to BLE  Edema Control: Elevate when possible   Follow-up: Dr. Silveira in 2 weeks 7/14/22     Other Orders: Rx for compression stockings given        Follow up in about 2 weeks (around 7/14/2022) for Dr Silveira.      time spent 15 minutes.

## 2022-07-14 ENCOUNTER — HOSPITAL ENCOUNTER (OUTPATIENT)
Dept: WOUND CARE | Facility: HOSPITAL | Age: 72
Discharge: HOME | End: 2022-07-14
Attending: SURGERY
Payer: MEDICARE

## 2022-07-14 DIAGNOSIS — I89.0 LYMPHEDEMA OF BOTH LOWER EXTREMITIES: Primary | ICD-10-CM

## 2022-07-14 PROCEDURE — 99213 OFFICE O/P EST LOW 20 MIN: CPT

## 2022-07-14 NOTE — PROGRESS NOTES
[facility]    Subjective:       Patient ID: Mateo Foreman is a 72 y.o. male.    Chief Complaint: Venous Stasis (Both lower legs)    7/14/22: Nurse visit for dressing change. No open areas to BLE.    Review of Systems      Objective:        Physical Exam       Altered Skin Integrity 04/20/22 0825 Left lower Leg Other (comment) (Active)   04/20/22 0825   Altered Skin Integrity Present on Admission:    Side: Left   Orientation: lower   Location: Leg   Wound Number:    Is this injury device related?:    Primary Wound Type: Other   Description of Altered Skin Integrity:    Removal Indication and Assessment:    Wound Outcome:    (Retired) Wound Length (cm):    (Retired) Wound Width (cm):    (Retired) Depth (cm):    Wound Description (Comments):    Removal Indications:    Wound Image   07/14/22 1000   Dressing Appearance Intact;Dry 07/14/22 1000   Drainage Amount None 07/14/22 1000   Appearance Closed/resurfaced 07/14/22 1000   Tissue loss description Not applicable 07/14/22 1000   Red (%), Wound Tissue Color 100 % 07/14/22 1000   Periwound Area Hemosiderin Staining 07/14/22 1000   Wound Edges Rolled/closed 07/14/22 1000   Care Cleansed with:;Soap and water 07/14/22 1000   Dressing Applied;Tubular bandage 07/14/22 1000   Periwound Care Moisturizer applied 07/14/22 1000   Compression Tubular elasticized bandage 07/14/22 1000   Dressing Change Due 07/15/22 07/14/22 1000            Altered Skin Integrity Right lower Leg #2 Venous Ulcer Partial thickness tissue loss. Shallow open ulcer with a red or pink wound bed, without slough. Intact or Open/Ruptured Serum-filled blister. (Active)       Altered Skin Integrity Present on Admission:    Side: Right   Orientation: lower   Location: Leg   Wound Number: #2   Is this injury device related?:    Primary Wound Type: Venous ulcer   Description of Altered Skin Integrity: Partial thickness tissue loss. Shallow open ulcer with a red or  pink wound bed, without slough. Intact or Open/Ruptured Serum-filled blister.   Removal Indication and Assessment:    Wound Outcome:    (Retired) Wound Length (cm):    (Retired) Wound Width (cm):    (Retired) Depth (cm):    Wound Description (Comments):    Removal Indications:    Wound Image   07/14/22 1000   Dressing Appearance Intact;Dry 07/14/22 1000   Drainage Amount None 07/14/22 1000   Appearance Dry;Closed/resurfaced 07/14/22 1000   Tissue loss description Not applicable 07/14/22 1000   Red (%), Wound Tissue Color 100 % 07/14/22 1000   Periwound Area Dry;Hemosiderin Staining 07/14/22 1000   Wound Edges Rolled/closed 07/14/22 1000   Care Cleansed with:;Soap and water 07/14/22 1000   Dressing Applied;Tubular bandage 07/14/22 1000   Periwound Care Moisturizer applied 07/14/22 1000   Compression Tubular elasticized bandage 07/14/22 1000   Dressing Change Due 07/15/22 07/14/22 1000         Assessment:         ICD-10-CM ICD-9-CM   1. Lymphedema of both lower extremities  I89.0 457.1         Plan:   Tissue pathology and/or culture taken:  [] Yes [x] No   Sharp debridement performed:   [] Yes [x] No   Labs ordered this visit:   [] Yes [x] No   Imaging ordered this visit:   [] Yes [x] No   BLE:   Cleanse wound with:Normal Saline   Lidocaine:PRN   Silver nitrate:PRN   Periwound care: Sween Cream   Primary dressing: Tubi  F double to BLE   Edema Control: Elevate when possible   Follow-up: Dr. Silveira in 2 weeks 7/27/22    Other Orders: Rx for compression stockings given        No orders of the defined types were placed in this encounter.       Follow up in about 13 days (around 7/27/2022).

## 2022-07-22 ENCOUNTER — HOSPITAL ENCOUNTER (INPATIENT)
Facility: HOSPITAL | Age: 72
LOS: 3 days | Discharge: NURSING, REHAB OR LONG-TERM CARE | DRG: 372 | End: 2022-07-28
Attending: EMERGENCY MEDICINE | Admitting: INTERNAL MEDICINE
Payer: MEDICARE

## 2022-07-22 DIAGNOSIS — E87.6 HYPOKALEMIA: ICD-10-CM

## 2022-07-22 DIAGNOSIS — N48.1 BALANITIS: ICD-10-CM

## 2022-07-22 DIAGNOSIS — D72.829 LEUKOCYTOSIS, UNSPECIFIED TYPE: ICD-10-CM

## 2022-07-22 DIAGNOSIS — Z86.73 H/O: CVA (CEREBROVASCULAR ACCIDENT): ICD-10-CM

## 2022-07-22 DIAGNOSIS — D72.829 LEUKOCYTOSIS: ICD-10-CM

## 2022-07-22 DIAGNOSIS — K52.9 COLITIS: ICD-10-CM

## 2022-07-22 DIAGNOSIS — J18.9 PNEUMONIA DUE TO INFECTIOUS ORGANISM, UNSPECIFIED LATERALITY, UNSPECIFIED PART OF LUNG: Primary | ICD-10-CM

## 2022-07-22 DIAGNOSIS — E78.5 DYSLIPIDEMIA ASSOCIATED WITH TYPE 2 DIABETES MELLITUS: ICD-10-CM

## 2022-07-22 DIAGNOSIS — D64.9 NORMOCYTIC ANEMIA: ICD-10-CM

## 2022-07-22 DIAGNOSIS — R94.31 PROLONGED Q-T INTERVAL ON ECG: ICD-10-CM

## 2022-07-22 DIAGNOSIS — E11.65 TYPE 2 DIABETES MELLITUS WITH HYPERGLYCEMIA, WITH LONG-TERM CURRENT USE OF INSULIN: ICD-10-CM

## 2022-07-22 DIAGNOSIS — Z79.4 TYPE 2 DIABETES MELLITUS WITH HYPERGLYCEMIA, WITH LONG-TERM CURRENT USE OF INSULIN: ICD-10-CM

## 2022-07-22 DIAGNOSIS — E11.69 DYSLIPIDEMIA ASSOCIATED WITH TYPE 2 DIABETES MELLITUS: ICD-10-CM

## 2022-07-22 DIAGNOSIS — R33.9 URINARY RETENTION: ICD-10-CM

## 2022-07-22 DIAGNOSIS — A04.72 C. DIFFICILE COLITIS: ICD-10-CM
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
## 2022-07-22 LAB
ALBUMIN SERPL BCP-MCNC: 2.7 G/DL (ref 3.5–5.2)
ALLENS TEST: ABNORMAL
ALP SERPL-CCNC: 113 U/L (ref 55–135)
ALT SERPL W/O P-5'-P-CCNC: 13 U/L (ref 10–44)
ANION GAP SERPL CALC-SCNC: 18 MMOL/L (ref 8–16)
ANION GAP SERPL CALC-SCNC: 20 MMOL/L (ref 8–16)
AST SERPL-CCNC: 18 U/L (ref 10–40)
B-OH-BUTYR BLD STRIP-SCNC: 2.9 MMOL/L (ref 0–0.5)
BACTERIA #/AREA URNS HPF: NORMAL /HPF
BASOPHILS # BLD AUTO: 0.11 K/UL (ref 0–0.2)
BASOPHILS NFR BLD: 0.5 % (ref 0–1.9)
BILIRUB SERPL-MCNC: 0.5 MG/DL (ref 0.1–1)
BILIRUB UR QL STRIP: NEGATIVE
BNP SERPL-MCNC: 53 PG/ML (ref 0–99)
BUN SERPL-MCNC: 21 MG/DL (ref 8–23)
BUN SERPL-MCNC: 27 MG/DL (ref 8–23)
CALCIUM SERPL-MCNC: 7.7 MG/DL (ref 8.7–10.5)
CALCIUM SERPL-MCNC: 8.2 MG/DL (ref 8.7–10.5)
CHLORIDE SERPL-SCNC: 88 MMOL/L (ref 95–110)
CHLORIDE SERPL-SCNC: 93 MMOL/L (ref 95–110)
CK SERPL-CCNC: 250 U/L (ref 20–200)
CLARITY UR: CLEAR
CO2 SERPL-SCNC: 26 MMOL/L (ref 23–29)
CO2 SERPL-SCNC: 27 MMOL/L (ref 23–29)
COLOR UR: COLORLESS
CREAT SERPL-MCNC: 1.2 MG/DL (ref 0.5–1.4)
CREAT SERPL-MCNC: 1.5 MG/DL (ref 0.5–1.4)
CTP QC/QA: YES
DELSYS: ABNORMAL
DIFFERENTIAL METHOD: ABNORMAL
EOSINOPHIL # BLD AUTO: 0 K/UL (ref 0–0.5)
EOSINOPHIL NFR BLD: 0 % (ref 0–8)
ERYTHROCYTE [DISTWIDTH] IN BLOOD BY AUTOMATED COUNT: 12.4 % (ref 11.5–14.5)
EST. GFR  (AFRICAN AMERICAN): 53 ML/MIN/1.73 M^2
EST. GFR  (AFRICAN AMERICAN): >60 ML/MIN/1.73 M^2
EST. GFR  (NON AFRICAN AMERICAN): 46 ML/MIN/1.73 M^2
EST. GFR  (NON AFRICAN AMERICAN): >60 ML/MIN/1.73 M^2
ESTIMATED AVG GLUCOSE: ABNORMAL MG/DL (ref 68–131)
GLUCOSE SERPL-MCNC: 373 MG/DL (ref 70–110)
GLUCOSE SERPL-MCNC: 399 MG/DL (ref 70–110)
GLUCOSE UR QL STRIP: ABNORMAL
HBA1C MFR BLD: >14 % (ref 4–5.6)
HCO3 UR-SCNC: 35.1 MMOL/L (ref 24–28)
HCT VFR BLD AUTO: 38.8 % (ref 40–54)
HCT VFR BLD CALC: 41 %PCV (ref 36–54)
HGB BLD-MCNC: 12.8 G/DL (ref 14–18)
HGB BLD-MCNC: 14 G/DL
HGB UR QL STRIP: ABNORMAL
IMM GRANULOCYTES # BLD AUTO: 0.13 K/UL (ref 0–0.04)
IMM GRANULOCYTES NFR BLD AUTO: 0.6 % (ref 0–0.5)
KETONES UR QL STRIP: ABNORMAL
LACTATE SERPL-SCNC: 1.8 MMOL/L (ref 0.5–2.2)
LEUKOCYTE ESTERASE UR QL STRIP: NEGATIVE
LYMPHOCYTES # BLD AUTO: 0.6 K/UL (ref 1–4.8)
LYMPHOCYTES NFR BLD: 2.8 % (ref 18–48)
MAGNESIUM SERPL-MCNC: 1.7 MG/DL (ref 1.6–2.6)
MCH RBC QN AUTO: 28.3 PG (ref 27–31)
MCHC RBC AUTO-ENTMCNC: 33 G/DL (ref 32–36)
MCV RBC AUTO: 86 FL (ref 82–98)
MICROSCOPIC COMMENT: NORMAL
MODE: ABNORMAL
MONOCYTES # BLD AUTO: 1.2 K/UL (ref 0.3–1)
MONOCYTES NFR BLD: 5.8 % (ref 4–15)
NEUTROPHILS # BLD AUTO: 18.3 K/UL (ref 1.8–7.7)
NEUTROPHILS NFR BLD: 90.3 % (ref 38–73)
NITRITE UR QL STRIP: NEGATIVE
NRBC BLD-RTO: 0 /100 WBC
PCO2 BLDA: 57.6 MMHG (ref 35–45)
PH SMN: 7.39 [PH] (ref 7.35–7.45)
PH UR STRIP: 6 [PH] (ref 5–8)
PLATELET # BLD AUTO: 534 K/UL (ref 150–450)
PMV BLD AUTO: 9.9 FL (ref 9.2–12.9)
PO2 BLDA: 18 MMHG (ref 40–60)
POC BE: 10 MMOL/L
POC SATURATED O2: 26 % (ref 95–100)
POC TCO2: 37 MMOL/L (ref 24–29)
POTASSIUM BLD-SCNC: 2.6 MMOL/L (ref 3.5–5.1)
POTASSIUM SERPL-SCNC: 2.6 MMOL/L (ref 3.5–5.1)
POTASSIUM SERPL-SCNC: 3.2 MMOL/L (ref 3.5–5.1)
PROCALCITONIN SERPL IA-MCNC: 0.16 NG/ML
PROT SERPL-MCNC: 6.3 G/DL (ref 6–8.4)
PROT UR QL STRIP: NEGATIVE
RBC # BLD AUTO: 4.52 M/UL (ref 4.6–6.2)
RBC #/AREA URNS HPF: 2 /HPF (ref 0–4)
SAMPLE: ABNORMAL
SARS-COV-2 RDRP RESP QL NAA+PROBE: NEGATIVE
SITE: ABNORMAL
SODIUM BLD-SCNC: 136 MMOL/L (ref 136–145)
SODIUM SERPL-SCNC: 135 MMOL/L (ref 136–145)
SODIUM SERPL-SCNC: 137 MMOL/L (ref 136–145)
SP GR UR STRIP: >1.03 (ref 1–1.03)
TROPONIN I SERPL DL<=0.01 NG/ML-MCNC: 0.01 NG/ML (ref 0–0.03)
TROPONIN I SERPL DL<=0.01 NG/ML-MCNC: 0.02 NG/ML (ref 0–0.03)
URN SPEC COLLECT METH UR: ABNORMAL
UROBILINOGEN UR STRIP-ACNC: NEGATIVE EU/DL
WBC # BLD AUTO: 20.29 K/UL (ref 3.9–12.7)
WBC #/AREA URNS HPF: 0 /HPF (ref 0–5)
YEAST URNS QL MICRO: NORMAL

## 2022-07-22 PROCEDURE — 99900035 HC TECH TIME PER 15 MIN (STAT)

## 2022-07-22 PROCEDURE — 87040 BLOOD CULTURE FOR BACTERIA: CPT | Mod: 59 | Performed by: STUDENT IN AN ORGANIZED HEALTH CARE EDUCATION/TRAINING PROGRAM

## 2022-07-22 PROCEDURE — 99285 EMERGENCY DEPT VISIT HI MDM: CPT | Mod: 25

## 2022-07-22 PROCEDURE — 96367 TX/PROPH/DG ADDL SEQ IV INF: CPT

## 2022-07-22 PROCEDURE — 83605 ASSAY OF LACTIC ACID: CPT | Performed by: STUDENT IN AN ORGANIZED HEALTH CARE EDUCATION/TRAINING PROGRAM

## 2022-07-22 PROCEDURE — 93010 ELECTROCARDIOGRAM REPORT: CPT | Mod: ,,, | Performed by: INTERNAL MEDICINE

## 2022-07-22 PROCEDURE — 93010 EKG 12-LEAD: ICD-10-PCS | Mod: ,,, | Performed by: INTERNAL MEDICINE

## 2022-07-22 PROCEDURE — G0378 HOSPITAL OBSERVATION PER HR: HCPCS

## 2022-07-22 PROCEDURE — 82550 ASSAY OF CK (CPK): CPT | Performed by: EMERGENCY MEDICINE

## 2022-07-22 PROCEDURE — 63600175 PHARM REV CODE 636 W HCPCS: Performed by: EMERGENCY MEDICINE

## 2022-07-22 PROCEDURE — 25500020 PHARM REV CODE 255: Performed by: EMERGENCY MEDICINE

## 2022-07-22 PROCEDURE — 63600175 PHARM REV CODE 636 W HCPCS: Performed by: STUDENT IN AN ORGANIZED HEALTH CARE EDUCATION/TRAINING PROGRAM

## 2022-07-22 PROCEDURE — 84484 ASSAY OF TROPONIN QUANT: CPT | Mod: 91 | Performed by: EMERGENCY MEDICINE

## 2022-07-22 PROCEDURE — 80053 COMPREHEN METABOLIC PANEL: CPT | Performed by: EMERGENCY MEDICINE

## 2022-07-22 PROCEDURE — 96365 THER/PROPH/DIAG IV INF INIT: CPT

## 2022-07-22 PROCEDURE — 94761 N-INVAS EAR/PLS OXIMETRY MLT: CPT

## 2022-07-22 PROCEDURE — 83880 ASSAY OF NATRIURETIC PEPTIDE: CPT | Performed by: STUDENT IN AN ORGANIZED HEALTH CARE EDUCATION/TRAINING PROGRAM

## 2022-07-22 PROCEDURE — 82803 BLOOD GASES ANY COMBINATION: CPT

## 2022-07-22 PROCEDURE — 96372 THER/PROPH/DIAG INJ SC/IM: CPT | Mod: 59 | Performed by: STUDENT IN AN ORGANIZED HEALTH CARE EDUCATION/TRAINING PROGRAM

## 2022-07-22 PROCEDURE — 93005 ELECTROCARDIOGRAM TRACING: CPT

## 2022-07-22 PROCEDURE — 82010 KETONE BODYS QUAN: CPT | Performed by: EMERGENCY MEDICINE

## 2022-07-22 PROCEDURE — 83036 HEMOGLOBIN GLYCOSYLATED A1C: CPT | Performed by: STUDENT IN AN ORGANIZED HEALTH CARE EDUCATION/TRAINING PROGRAM

## 2022-07-22 PROCEDURE — 25000003 PHARM REV CODE 250: Performed by: STUDENT IN AN ORGANIZED HEALTH CARE EDUCATION/TRAINING PROGRAM

## 2022-07-22 PROCEDURE — 84145 PROCALCITONIN (PCT): CPT | Performed by: STUDENT IN AN ORGANIZED HEALTH CARE EDUCATION/TRAINING PROGRAM

## 2022-07-22 PROCEDURE — U0002 COVID-19 LAB TEST NON-CDC: HCPCS | Performed by: EMERGENCY MEDICINE

## 2022-07-22 PROCEDURE — 25000003 PHARM REV CODE 250: Performed by: EMERGENCY MEDICINE

## 2022-07-22 PROCEDURE — 96366 THER/PROPH/DIAG IV INF ADDON: CPT

## 2022-07-22 PROCEDURE — 85025 COMPLETE CBC W/AUTO DIFF WBC: CPT | Performed by: EMERGENCY MEDICINE

## 2022-07-22 PROCEDURE — 81000 URINALYSIS NONAUTO W/SCOPE: CPT | Performed by: EMERGENCY MEDICINE

## 2022-07-22 PROCEDURE — 83735 ASSAY OF MAGNESIUM: CPT | Performed by: EMERGENCY MEDICINE

## 2022-07-22 PROCEDURE — 96361 HYDRATE IV INFUSION ADD-ON: CPT

## 2022-07-22 PROCEDURE — C9399 UNCLASSIFIED DRUGS OR BIOLOG: HCPCS | Performed by: STUDENT IN AN ORGANIZED HEALTH CARE EDUCATION/TRAINING PROGRAM

## 2022-07-22 PROCEDURE — 80048 BASIC METABOLIC PNL TOTAL CA: CPT | Mod: XB | Performed by: STUDENT IN AN ORGANIZED HEALTH CARE EDUCATION/TRAINING PROGRAM

## 2022-07-22 RX ORDER — FUROSEMIDE 20 MG/1
20 TABLET ORAL DAILY
COMMUNITY
Start: 2022-07-14

## 2022-07-22 RX ORDER — INSULIN ASPART 100 [IU]/ML
1-10 INJECTION, SOLUTION INTRAVENOUS; SUBCUTANEOUS EVERY 6 HOURS PRN
Status: DISCONTINUED | OUTPATIENT
Start: 2022-07-22 | End: 2022-07-28 | Stop reason: HOSPADM

## 2022-07-22 RX ORDER — GLUCAGON 1 MG
1 KIT INJECTION
Status: DISCONTINUED | OUTPATIENT
Start: 2022-07-22 | End: 2022-07-28 | Stop reason: HOSPADM

## 2022-07-22 RX ORDER — MAGNESIUM SULFATE HEPTAHYDRATE 40 MG/ML
4 INJECTION, SOLUTION INTRAVENOUS ONCE
Status: COMPLETED | OUTPATIENT
Start: 2022-07-22 | End: 2022-07-22

## 2022-07-22 RX ORDER — ENOXAPARIN SODIUM 100 MG/ML
40 INJECTION SUBCUTANEOUS EVERY 24 HOURS
Status: DISCONTINUED | OUTPATIENT
Start: 2022-07-22 | End: 2022-07-28 | Stop reason: HOSPADM

## 2022-07-22 RX ORDER — SODIUM CHLORIDE 0.9 % (FLUSH) 0.9 %
10 SYRINGE (ML) INJECTION
Status: DISCONTINUED | OUTPATIENT
Start: 2022-07-22 | End: 2022-07-28 | Stop reason: HOSPADM

## 2022-07-22 RX ORDER — TALC
6 POWDER (GRAM) TOPICAL NIGHTLY PRN
Status: CANCELLED | OUTPATIENT
Start: 2022-07-22

## 2022-07-22 RX ORDER — METRONIDAZOLE 500 MG/100ML
500 INJECTION, SOLUTION INTRAVENOUS
Status: DISCONTINUED | OUTPATIENT
Start: 2022-07-22 | End: 2022-07-22

## 2022-07-22 RX ORDER — ATORVASTATIN CALCIUM 20 MG/1
20 TABLET, FILM COATED ORAL DAILY
Status: DISCONTINUED | OUTPATIENT
Start: 2022-07-23 | End: 2022-07-23

## 2022-07-22 RX ORDER — SODIUM CHLORIDE FOR INHALATION 3 %
4 VIAL, NEBULIZER (ML) INHALATION
Status: DISCONTINUED | OUTPATIENT
Start: 2022-07-22 | End: 2022-07-28 | Stop reason: HOSPADM

## 2022-07-22 RX ORDER — CLINDAMYCIN HYDROCHLORIDE 300 MG/1
300 CAPSULE ORAL 3 TIMES DAILY
COMMUNITY
Start: 2022-05-20 | End: 2022-07-22

## 2022-07-22 RX ORDER — TALC
6 POWDER (GRAM) TOPICAL NIGHTLY PRN
Status: DISCONTINUED | OUTPATIENT
Start: 2022-07-22 | End: 2022-07-28 | Stop reason: HOSPADM

## 2022-07-22 RX ORDER — SODIUM CHLORIDE 9 MG/ML
INJECTION, SOLUTION INTRAVENOUS CONTINUOUS
Status: DISCONTINUED | OUTPATIENT
Start: 2022-07-22 | End: 2022-07-28 | Stop reason: HOSPADM

## 2022-07-22 RX ORDER — POTASSIUM CHLORIDE 20 MEQ/1
40 TABLET, EXTENDED RELEASE ORAL ONCE
Status: COMPLETED | OUTPATIENT
Start: 2022-07-22 | End: 2022-07-22

## 2022-07-22 RX ORDER — ALBUTEROL SULFATE 2.5 MG/.5ML
2.5 SOLUTION RESPIRATORY (INHALATION)
Status: DISCONTINUED | OUTPATIENT
Start: 2022-07-22 | End: 2022-07-28 | Stop reason: HOSPADM

## 2022-07-22 RX ORDER — POTASSIUM CHLORIDE 600 MG/1
8 TABLET, FILM COATED, EXTENDED RELEASE ORAL DAILY
COMMUNITY
Start: 2022-07-14 | End: 2022-07-22

## 2022-07-22 RX ADMIN — IOHEXOL 75 ML: 350 INJECTION, SOLUTION INTRAVENOUS at 02:07

## 2022-07-22 RX ADMIN — POTASSIUM CHLORIDE 40 MEQ: 1500 TABLET, EXTENDED RELEASE ORAL at 10:07

## 2022-07-22 RX ADMIN — INSULIN DETEMIR 10 UNITS: 100 INJECTION, SOLUTION SUBCUTANEOUS at 09:07

## 2022-07-22 RX ADMIN — PIPERACILLIN AND TAZOBACTAM 4.5 G: 4; .5 INJECTION, POWDER, LYOPHILIZED, FOR SOLUTION INTRAVENOUS; PARENTERAL at 03:07

## 2022-07-22 RX ADMIN — MAGNESIUM SULFATE 2 G: 2 INJECTION INTRAVENOUS at 08:07

## 2022-07-22 RX ADMIN — VANCOMYCIN HYDROCHLORIDE 1750 MG: 500 INJECTION, POWDER, LYOPHILIZED, FOR SOLUTION INTRAVENOUS at 04:07

## 2022-07-22 RX ADMIN — SODIUM CHLORIDE, SODIUM LACTATE, POTASSIUM CHLORIDE, AND CALCIUM CHLORIDE 1000 ML: .6; .31; .03; .02 INJECTION, SOLUTION INTRAVENOUS at 08:07

## 2022-07-22 RX ADMIN — ENOXAPARIN SODIUM 40 MG: 100 INJECTION SUBCUTANEOUS at 08:07

## 2022-07-22 RX ADMIN — SODIUM CHLORIDE 1000 ML: 0.9 INJECTION, SOLUTION INTRAVENOUS at 12:07

## 2022-07-22 RX ADMIN — PIPERACILLIN AND TAZOBACTAM 4.5 G: 4; .5 INJECTION, POWDER, LYOPHILIZED, FOR SOLUTION INTRAVENOUS; PARENTERAL at 10:07

## 2022-07-22 RX ADMIN — POTASSIUM BICARBONATE 25 MEQ: 978 TABLET, EFFERVESCENT ORAL at 12:07

## 2022-07-22 RX ADMIN — Medication 6 MG: at 10:07

## 2022-07-22 NOTE — PHARMACY MED REC
"Admission Medication History     The home medication history was taken by Theresa Vazquez CPhT.    Medication history obtained from, Patient's Sister Verified    You may go to "Admission" then "Reconcile Home Medications" tabs to review and/or act upon these items.      The home medication list has been updated by the Pharmacy department.    Please read ALL comments highlighted in yellow.    Please address this information as you see fit.     Feel free to contact us if you have any questions or require assistance.      The medications listed below were removed from the home medication list.  Please reorder if appropriate:  Patient reports no longer taking the following medication(s):   Cipro 500 mg   Latanoprost 0.005% eye drops   Clindamycin 300 mg   Potassium Chloride 8 meq        Theresa Vazquez CPhT.  Ext [phone]                .          "

## 2022-07-22 NOTE — ED NOTES
Pt had incontinent episode. Pt changed and pericare provided. Pt placed in purewick external cath.

## 2022-07-22 NOTE — ED NOTES
Called to patient bedside. Pt stating he has to urinate. Pt unable to void at this time. Bladder scan preformed. Greater than 270 in bladder. MD informed.

## 2022-07-22 NOTE — ED PROVIDER NOTES
Encounter Date: 7/22/2022       History     Chief Complaint   Patient presents with    Fall     Neighbors called EMS when they found patient on ground after a fall. Found patient to be awake, alert with c/o left sided back pain. Found patient to have . States compliant with meds.      HPI   72-year-old male with a history of hypertension, hyperlipidemia, diabetes, presenting after fall in the bathroom earlier today, found by neighbor reportedly  Patient states that he went to use the bathroom, and tripped and fell, landing on his left side, denies head strike, but was unable to get up afterward and was lying there for hours before being found  Denies pain anywhere, denies having chest pain or shortness of breath prior to falling    Review of patient's allergies indicates:  No Known Allergies  Past Medical History:   Diagnosis Date    Anemia     Cataract     Chronic cough     Diabetes mellitus type II     GERD (gastroesophageal reflux disease)     Glaucoma     Hyperlipidemia      No past surgical history on file.  Family History   Problem Relation Age of Onset    Cancer Mother     Diabetes Father     Amblyopia Neg Hx     Blindness Neg Hx     Cataracts Neg Hx     Glaucoma Neg Hx     Hypertension Neg Hx     Macular degeneration Neg Hx     Retinal detachment Neg Hx     Strabismus Neg Hx     Stroke Neg Hx     Thyroid disease Neg Hx      Social History     Tobacco Use    Smoking status: Former Smoker     Packs/day: 0.20     Years: 45.00     Pack years: 9.00     Types: Cigarettes    Smokeless tobacco: Never Used   Substance Use Topics    Alcohol use: Not Currently    Drug use: No     Review of Systems   Constitutional: Negative for appetite change, chills, diaphoresis and fever.   HENT: Negative for congestion, nosebleeds, sore throat, trouble swallowing and voice change.    Eyes: Negative for photophobia, pain, redness and itching.   Respiratory: Negative for cough, chest tightness and  shortness of breath.    Cardiovascular: Negative for chest pain and leg swelling.   Gastrointestinal: Negative for abdominal pain, constipation, diarrhea, nausea and vomiting.   Genitourinary: Negative for decreased urine volume, difficulty urinating, dysuria and frequency.   Musculoskeletal: Negative for gait problem.   Skin: Negative for color change, rash and wound.   Neurological: Negative for dizziness, facial asymmetry, speech difficulty and headaches.   Psychiatric/Behavioral: Negative for agitation, confusion and suicidal ideas.   All other systems reviewed and are negative.      Physical Exam     Initial Vitals [07/22/22 1050]   BP Pulse Resp Temp SpO2   (!) 115/56 81 16 98.7 °F (37.1 °C) 96 %      MAP       --         Physical Exam    Nursing note and vitals reviewed.  Constitutional:   EXAM  General: Awake, alert and oriented. No acute distress.     Head: normocephalic and atraumatic     Eyes: Conjunctivae are clear without exudates or hemorrhage. Sclera is non-icteric. EOM are intact. Eyelids are normal in appearance without swelling or lesions.     Ears: The external ear and ear canal are non-tender and without swelling. The canal is clear without discharge. Hearing intact.     Nose: Nares are patent bilaterally.     Neck: The neck is supple. Trachea is midline. Full ROM.     Cardiac: Regular rate.     Respiratory: No signs of respiratory distress. No audible wheezes.     Abdominal: Non-distended.     Extremities: Upper and lower extremities are atraumatic in appearance without tenderness or deformity. No swelling or erythema. Full range of motion.     Skin: Appropriate color for ethnicity.     Neurological: The patient is awake, alert and oriented to person, place, and time with normal speech.     Psychiatric: Appropriate mood and affect.     In light of current/ongoing global covid-19 pandemic, all my encounters w pt were with full ppe including but not limited to gown, gloves, n95, eye protection OR  from >6 ft away.             ED Course   Procedures  Labs Reviewed   CBC W/ AUTO DIFFERENTIAL - Abnormal; Notable for the following components:       Result Value    WBC 20.29 (*)     RBC 4.52 (*)     Hemoglobin 12.8 (*)     Hematocrit 38.8 (*)     Platelets 534 (*)     Immature Granulocytes 0.6 (*)     Gran # (ANC) 18.3 (*)     Immature Grans (Abs) 0.13 (*)     Lymph # 0.6 (*)     Mono # 1.2 (*)     Gran % 90.3 (*)     Lymph % 2.8 (*)     All other components within normal limits   COMPREHENSIVE METABOLIC PANEL - Abnormal; Notable for the following components:    Sodium 135 (*)     Potassium 2.6 (*)     Chloride 88 (*)     Glucose 399 (*)     BUN 27 (*)     Creatinine 1.5 (*)     Calcium 8.2 (*)     Albumin 2.7 (*)     Anion Gap 20 (*)     eGFR if  53 (*)     eGFR if non  46 (*)     All other components within normal limits    Narrative:     K critical result(s) called and verbal readback obtained from Nicki Royal RN. by RL1 07/22/2022 12:25   CK - Abnormal; Notable for the following components:     (*)     All other components within normal limits   BETA - HYDROXYBUTYRATE, SERUM - Abnormal; Notable for the following components:    Beta-Hydroxybutyrate 2.9 (*)     All other components within normal limits   ISTAT PROCEDURE - Abnormal; Notable for the following components:    POC PCO2 57.6 (*)     POC PO2 18 (*)     POC HCO3 35.1 (*)     POC SATURATED O2 26 (*)     POC Potassium 2.6 (*)     POC TCO2 37 (*)     All other components within normal limits   TROPONIN I   MAGNESIUM   MAGNESIUM    Narrative:     add-on MG   URINALYSIS, REFLEX TO URINE CULTURE   TROPONIN I   SARS-COV-2 RDRP GENE     EKG Readings: (Independently Interpreted)   Normal sinus rhythm, rate 82, prolonged QT, no signs of ischemia     ECG Results          EKG 12-lead (Final result)  Result time 07/22/22 13:01:34    Final result by Interface, Lab In [facility] (07/22/22 13:01:34)                 Narrative:     Test Reason : W19.XXXA,    Vent. Rate : 082 BPM     Atrial Rate : 082 BPM     P-R Int : 134 ms          QRS Dur : 086 ms      QT Int : 560 ms       P-R-T Axes : 033 019 071 degrees     QTc Int : 654 ms    Normal sinus rhythm  Nonspecific ST and T wave abnormality  Prolonged QT  Abnormal ECG  When compared with ECG of 20-SEP-2021 13:13,  QT has lengthened  Confirmed by Adali Marcum MD ([id number]) on 7/22/2022 1:01:28 PM    Referred By: [email or web address]   SELF           Confirmed By:Adali Marcum MD                            Imaging Results          CT Abdomen Pelvis With Contrast (Final result)  Result time 07/22/22 14:39:50    Final result by Merlin Elliott III, MD (07/22/22 14:39:50)                 Impression:      Posterior basal areas of pulmonary infiltrate versus atelectasis.    Thickening of the sigmoid colon could be related to chronic muscle hypertrophy from diverticular disease.  Colitis cannot be entirely excluded.    No evidence of trauma.    Coronary artery calcifications.      Electronically signed by: eMrlin Elliott MD  Date:    07/22/2022  Time:    14:39             Narrative:    EXAMINATION:  CT ABDOMEN PELVIS WITH CONTRAST    CLINICAL HISTORY:  abdominal pain after falling, L sided;    FINDINGS:  Patient was administered 75 cc of Omnipaque 350 intravenously.    There are posterior basal areas of pulmonary infiltrate.  The liver, gallbladder, biliary tree, spleen, stomach, pancreas, and duodenum show nothing unusual.  The adrenal glands are not enlarged.  The kidneys enhance normally.  No hydronephrosis is seen.  The ureters are normal in course and caliber.  No para-aortic or retroperitoneal adenopathy is seen.  The pelvic organs are unremarkable.  There may be some mild thickening of the sigmoid colon.  The appendix is normal.  No obstruction, ileus, or perforation seen.  The bowel loops are unremarkable.  No ascites is seen.  No para-aortic or retroperitoneal adenopathy is seen.  There is plaque of the  aorta and all of its branches.  There is tortuosity of the colon.  There is postoperative change of the right femur.  The bones demonstrate degenerative change.  No active contrast extravasation is seen.  There are coronary artery calcifications.  The kidneys enhance and excrete normally.  No organ trauma seen.  There are few small lymph  nodes in the mesentery and retroperitoneum.  There is fatty sparing near the falciform ligament.                               X-Ray Chest AP Portable (Final result)  Result time 07/22/22 13:10:57    Final result by Sacha Singh MD (07/22/22 13:10:57)                 Impression:      1. No acute cardiopulmonary process appreciated.      Electronically signed by: Sacha Singh  Date:    07/22/2022  Time:    13:10             Narrative:    EXAMINATION:  XR CHEST AP PORTABLE    CLINICAL HISTORY:  Unspecified fall, initial encounter    TECHNIQUE:  Single frontal portable view of the chest was performed.    COMPARISON:  Chest radiograph 04/20/2022    FINDINGS:  Cardiomediastinal silhouette is within normal limits.    No focal consolidation, overt interstitial edema, sizable pleural effusion or pneumothorax.    Multilevel degenerative changes of the imaged spine.                               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  This involves thinning of the corpus callosum.  No acute process seen.    Sinusitis change and evidence prior functional endoscopic sinus surgery.    No acute process seen.      Electronically signed by: Merlin Elliott MD  Date:    07/22/2022  Time:    11:48             Narrative:    EXAMINATION:  CT HEAD WITHOUT CONTRAST    CLINICAL HISTORY:  sp fall ?headstrike;    FINDINGS:  There is a remote infarct of the left frontal lobe anterior parietal  cingulate gyrus distribution.  This was seen on the prior study dated 09/16/2021.    There is mild ex vacuo dilatation of the left lateral ventricle.  No acute bleed, mass, or mass effect is seen.  The posterior fossa contents are unremarkable.  No acute process seen.  There is prior endoscopic sinus surgery.  There is sinusitis change.  No skull lesion or skull fracture seen.  There is thinning of the corpus callosum.                                 Medications   vancomycin (VANCOCIN) 1,750 mg in dextrose 5 % 500 mL IVPB (has no administration in time range)   potassium bicarbonate disintegrating tablet 25 mEq (25 mEq Oral Given 7/22/22 1233)   sodium chloride 0.9% bolus 1,000 mL (1,000 mLs Intravenous New Bag 7/22/22 1233)   iohexoL (OMNIPAQUE 350) injection 75 mL (75 mLs Intravenous Given 7/22/22 1417)   piperacillin-tazobactam 4.5 g in dextrose 5 % 100 mL IVPB (ready to mix system) (4.5 g Intravenous New Bag 7/22/22 1509)     Medical Decision Making:   Clinical Tests:   Lab Tests: Ordered and Reviewed  Radiological Study: Ordered and Reviewed  Medical Tests: Reviewed and Ordered  ED Management:  Patient presenting after fall.  No signs of injury.  CT head negative for any acute findings, chest x-ray negative for pneumonia, pneumothorax, other signs of injury.  However, labs show a leukocytosis as well as hypokalemia, hyponatremia, acute kidney injury.  Hyperglycemia.  IV fluids given, EF 50% for.  CT abdomen pelvis shows possible pneumonia.  Unable to give a urine as patient is incontinent.  Broad-spectrum antibiotics, admission.                      Clinical Impression:   Final diagnoses:  [W19.XXXA] Fall  [J18.9] Pneumonia due to infectious organism, unspecified laterality, unspecified part of lung (Primary)          ED Disposition Condition    Observation               Stefanie Mayen MD  07/22/22 6416

## 2022-07-22 NOTE — H&P
[facility] Medicine H&P Note     Admitting Team: [facility] Hospitalist Team B  Attending Physician: Dr. Maximus Pollack  Resident: Gareth   Intern: Christiano    Date of Admit: 7/22/2022    Chief Complaint   Fall x1 today     Subjective:      History of Present Illness:  Mr. Mateo Foreman is a 71 y.o. 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     Patient was brought in by EMS after falling in the bathroom and unsure how long he was down for but believes it was around 3 hours. He then was able to call his neighbors to come help him. He also endorsed multiple epsides of diarreha daily for the last week, trouble swallowing liquids and feels like the room is spinning. He denied previous falls, weakness, LOC, CP, SOB, vomiting, fever, and chills    Spoke with patient's sister, Norma ([phone]), she states that he has a history of CVA 2019 with residual cognitive and motor dysfunction of the right side and is unstable when mobilizing, mild aphasia, and short term memory problems. She reported that patient lives alone but she goes to his house to take care of him once a week. She was concerned yesterday when she went to see him because he lost a significant amount of weight and had barely eaten any of his groceries from the week prior due to nausea and diarrhea. However, he has been drinking an increased amount of fluids. Patient has been on antibiotics (Cipro and clinda) for 2 months for chronic LE wounds that were recently discontinued  two weeks ago because wounds have healed well.     Upon on arrival to the ED patient afebrile, VSS and spO2 99% on RA. Initial significant lab findings include K+ 2.6,  corrected to 140 with a , CL 88, AG 20 (PH 7.393, pCO2 57.6),  BUN/Cr 27/1.5, , B-hydroxy 2.9,  H/H  12.8/38.8, platelets 534, WBC Differential:  90.3% N,  0.6% Bands, 2.8% L, 5.8/% M, 0 % Eo, 0.5%. UA remarkable for 4+ glucose, 2+ ketones. Given one dose of zosyn and vanc, 1L IVF, and 25 mEq of K.    Past Medical History:  Past Medical History:   Diagnosis Date    Anemia     Cataract     Chronic cough     Diabetes mellitus type II     GERD (gastroesophageal reflux disease)     Glaucoma     Hyperlipidemia        Past Surgical History:  No past surgical history on file.    Allergies:  Review of patient's allergies indicates:  No Known Allergies    Home Medications:  Prior to Admission medications    Medication Sig Start Date End Date Taking? Authorizing Provider   atorvastatin (LIPITOR) 20 MG tablet Take 1 tablet by mouth once daily for 90 days 12/20/21  Yes Tremaine Finch MD   ferrous sulfate 325 mg (65 mg iron) Tab Take 325 mg by mouth daily with breakfast.   Yes Historical Provider   furosemide (LASIX) 20 MG tablet Take 20 mg by mouth once daily. 7/14/22  Yes Historical Provider   gentamicin (GARAMYCIN) 0.1 % ointment Apply locally with xeroform  Patient taking differently: Apply topically every 14 (fourteen) days. 5/25/22  Yes    metFORMIN (GLUCOPHAGE) 1000 MG tablet TAKE 1 TABLET BY MOUTH TWICE DAILY WITH MEALS 12/20/21  Yes Tremaine Finch MD   blood sugar diagnostic (ONE TOUCH TEST) Strp 1 strip by Misc.(Non-Drug; Combo Route) route Daily. 9/18/12   Noemy Hampton MD   ciprofloxacin HCl (CIPRO) 500 MG tablet take 1 tablet by mouth twice daily 5/25/22 7/22/22     clindamycin (CLEOCIN) 300 MG capsule Take 300 mg by mouth 3 (three) times daily. 5/20/22 7/22/22  Historical Provider   furosemide (LASIX) 40 MG tablet Take 1 tablet (40 mg total) by mouth once daily. FOR LEG FLUID 12/7/21 7/22/22  Tremaine Finch MD   latanoprost 0.005 % ophthalmic solution Place 1 drop into both eyes every evening. 4/12/13 7/22/22  Dominic Suh, OD   potassium chloride (KLOR-CON) 8 MEQ TbSR Take 8 mEq by mouth once  "daily. 22  Historical Provider       Family History:  Family History   Problem Relation Age of Onset    Cancer Mother     Diabetes Father     Amblyopia Neg Hx     Blindness Neg Hx     Cataracts Neg Hx     Glaucoma Neg Hx     Hypertension Neg Hx     Macular degeneration Neg Hx     Retinal detachment Neg Hx     Strabismus Neg Hx     Stroke Neg Hx     Thyroid disease Neg Hx        Social History:  Social History     Tobacco Use    Smoking status: Former Smoker     Packs/day: 0.20     Years: 45.00     Pack years: 9.00     Types: Cigarettes    Smokeless tobacco: Never Used   Substance Use Topics    Alcohol use: Not Currently    Drug use: No       Review of Systems:  Pertinent items are noted in HPI.     Health Maintaince :   Primary Care Physician: Dr. Tremaine Wakefield    Immunizations:   TDap yes, Flu yes, Pna unknown , COVID 2 vaccines needs booster     Cancer Screening:    Colonoscopy: overdue last done in      Objective:   Last 24 Hour Vital Signs:  BP  Min: 115/56  Max: 115/56  Temp  Av.7 °F (37.1 °C)  Min: 98.7 °F (37.1 °C)  Max: 98.7 °F (37.1 °C)  Pulse  Av  Min: 81  Max: 81  Resp  Av  Min: 16  Max: 16  SpO2  Av %  Min: 96 %  Max: 96 %  Height  Av' 7" (170.2 cm)  Min: 5' 7" (170.2 cm)  Max: 5' 7" (170.2 cm)  Weight  Av.6 kg (160 lb)  Min: 72.6 kg (160 lb)  Max: 72.6 kg (160 lb)  Body mass index is 25.06 kg/m².  No intake/output data recorded.    Physical Examination:  General: A&Ox3, laying in bed difficult to understand due to loose dentures   HEENT: PERRL, EOMI, moist mucus membranes w/ no erythema noted, no facial asymmetry noted, lower back pain noted with movement   Neck: Trachea midline, no masses, no lymphadenopathy  Cardiovascular: Regular rate and rhythm, normal S1 and S2, no murmurs, rubs or gallops  Pulm: CTAB, no wheezes or crackles; no respiratory distress  Abdomen: Soft, non-tender, non-distended; no organomegaly  Skin: No rashes or erythema " noted, no ecchymosis  Extremities: 1+bilateral edema with compression dressings  Pulses: 2+ and symmetric  Neurological: A&Ox3, mild cognitive and motor deficits from prior stroke, R sided weakness   Psychiatric: apporpiate for baseline     Laboratory:  CBC:   Lab Results   Component Value Date    WBC 20.29 (H) 07/22/2022    HGB 12.8 (L) 07/22/2022    HCT 41 07/22/2022     (H) 07/22/2022    MCV 86 07/22/2022    RDW 12.4 07/22/2022     WBC Differential:  90.3% N,  0.6% Bands, 2.8% L, 5.8/% M, 0 % Eo, 0.5%  BMP:   Lab Results   Component Value Date     (L) 07/22/2022    K 2.6 (LL) 07/22/2022    CL 88 (L) 07/22/2022    CO2 27 07/22/2022    BUN 27 (H) 07/22/2022    CREATININE 1.5 (H) 07/22/2022     (H) 07/22/2022    CALCIUM 8.2 (L) 07/22/2022    MG 1.7 07/22/2022    PHOS 3.0 09/19/2021     LFTs:   Lab Results   Component Value Date    PROT 6.3 07/22/2022    ALBUMIN 2.7 (L) 07/22/2022    BILITOT 0.5 07/22/2022    AST 18 07/22/2022    ALKPHOS 113 07/22/2022    ALT 13 07/22/2022     Coags:   Lab Results   Component Value Date    INR 1.0 09/16/2021     FLP:   Lab Results   Component Value Date    CHOL 139 12/03/2021    HDL 45 12/03/2021    LDLCALC 80.0 12/03/2021    TRIG 70 12/03/2021    CHOLHDL 32.4 12/03/2021     DM:   Lab Results   Component Value Date    HGBA1C 11.0 (H) 04/20/2022    HGBA1C 7.6 (H) 12/03/2021    HGBA1C 5.9 (H) 02/19/2021    GLUF 312 (H) 12/02/2004    LDLCALC 80.0 12/03/2021    CREATININE 1.5 (H) 07/22/2022     Thyroid:   Lab Results   Component Value Date    TSH 1.273 02/23/2015     Anemia:   Lab Results   Component Value Date    IRON 106 09/16/2011    TIBC 396 09/16/2011    FERRITIN 85 09/16/2011    AWGKCEHV50 368 12/02/2008    FOLATE 13.4 12/02/2008     Cardiac:   Lab Results   Component Value Date    TROPONINI 0.013 07/22/2022     Urinalysis:   Lab Results   Component Value Date    COLORU Colorless (A) 07/22/2022    SPECGRAV >1.030 (A) 07/22/2022    NITRITE Negative 07/22/2022     KETONESU 2+ (A) 07/22/2022    UROBILINOGEN Negative 07/22/2022    WBCUA 0 07/22/2022       Trended Lab Data:  Recent Labs   Lab 07/22/22  1120 07/22/22  1402   WBC 20.29*  --    HGB 12.8*  --    HCT 38.8* 41   *  --    MCV 86  --    RDW 12.4  --    *  --    K 2.6*  --    CL 88*  --    CO2 27  --    BUN 27*  --    CREATININE 1.5*  --    *  --    PROT 6.3  --    ALBUMIN 2.7*  --    BILITOT 0.5  --    AST 18  --    ALKPHOS 113  --    ALT 13  --        Trended Cardiac Data:  Recent Labs   Lab 07/22/22  1120 07/22/22  1541   TROPONINI 0.021 0.013       Microbiology:  Blood clx pending, Respiratory clx pending,  UA no signs of infection     Other Results:  EKG (my interpretation): NSR with lengthened QTc of 654     Radiology:  Imaging Results          CT Abdomen Pelvis With Contrast (Final result)  Result time 07/22/22 14:39:50    Final result by Merlin Elliott III, MD (07/22/22 14:39:50)                 Impression:      Posterior basal areas of pulmonary infiltrate versus atelectasis.    Thickening of the sigmoid colon could be related to chronic muscle hypertrophy from diverticular disease.  Colitis cannot be entirely excluded.    No evidence of trauma.    Coronary artery calcifications.      Electronically signed by: Merlin Elliott MD  Date:    07/22/2022  Time:    14:39             Narrative:    EXAMINATION:  CT ABDOMEN PELVIS WITH CONTRAST    CLINICAL HISTORY:  abdominal pain after falling, L sided;    FINDINGS:  Patient was administered 75 cc of Omnipaque 350 intravenously.    There are posterior basal areas of pulmonary infiltrate.  The liver, gallbladder, biliary tree, spleen, stomach, pancreas, and duodenum show nothing unusual.  The adrenal glands are not enlarged.  The kidneys enhance normally.  No hydronephrosis is seen.  The ureters are normal in course and caliber.  No para-aortic or retroperitoneal adenopathy is seen.  The pelvic organs are unremarkable.  There may be some mild  thickening of the sigmoid colon.  The appendix is normal.  No obstruction, ileus, or perforation seen.  The bowel loops are unremarkable.  No ascites is seen.  No para-aortic or retroperitoneal adenopathy is seen.  There is plaque of the aorta and all of its branches.  There is tortuosity of the colon.  There is postoperative change of the right femur.  The bones demonstrate degenerative change.  No active contrast extravasation is seen.  There are coronary artery calcifications.  The kidneys enhance and excrete normally.  No organ trauma seen.  There are few small lymph  nodes in the mesentery and retroperitoneum.  There is fatty sparing near the falciform ligament.                               X-Ray Chest AP Portable (Final result)  Result time 07/22/22 13:10:57    Final result by Sacha Singh MD (07/22/22 13:10:57)                 Impression:      1. No acute cardiopulmonary process appreciated.      Electronically signed by: Sacha Singh  Date:    07/22/2022  Time:    13:10             Narrative:    EXAMINATION:  XR CHEST AP PORTABLE    CLINICAL HISTORY:  Unspecified fall, initial encounter    TECHNIQUE:  Single frontal portable view of the chest was performed.    COMPARISON:  Chest radiograph 04/20/2022    FINDINGS:  Cardiomediastinal silhouette is within normal limits.    No focal consolidation, overt interstitial edema, sizable pleural effusion or pneumothorax.    Multilevel degenerative changes of the imaged spine.                               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  This involves thinning of the corpus callosum.  No acute process seen.    Sinusitis change and evidence prior functional endoscopic sinus surgery.    No acute process seen.      Electronically signed by: Merlin  MD Earl  Date:    07/22/2022  Time:    11:48             Narrative:    EXAMINATION:  CT HEAD WITHOUT CONTRAST    CLINICAL HISTORY:  sp fall ?headstrike;    FINDINGS:  There is a remote infarct of the left frontal lobe anterior parietal cingulate gyrus distribution.  This was seen on the prior study dated 09/16/2021.    There is mild ex vacuo dilatation of the left lateral ventricle.  No acute bleed, mass, or mass effect is seen.  The posterior fossa contents are unremarkable.  No acute process seen.  There is prior endoscopic sinus surgery.  There is sinusitis change.  No skull lesion or skull fracture seen.  There is thinning of the corpus callosum.                                Assessment:   Mr. Mateo Foreman is a 71 y.o. 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 Patient has a 1 week history of decreased PO 2/2 multiple episodes of diarrhea. Patient admitted for acute diarrhea concerning for colitis and electrolyte defiency.      Plan:   Suspected colitis/ acute diarrhea x1wk/leuokcytosis   - multiple episodes of diarrhea x1wk, acute weight loss, nausea and    decreased PO  -WBC 20; repeat CBC in am   -given 1 dose of vanc and zosyn in the ED, 1L IVF   -blood clx, resp clx pending   -cxr no acute findings; CT suspicion of pneumonia- unlikely due to normal   procal, afebrile, VSS, denies cough, SOB   -CT Thickening of the sigmoid colon could be related to chronic muscle hypertrophy from diverticular disease.  Colitis cannot be entirely excluded  -started on zosyn q8 and oral vanc (cancel if stools are normal and cdif canceled)   -procal 0.16   -cdiff EIA     Hypokalemia  -K 2.9 on admission and given 25 mEq of K in the ER   -EKG with prolonged qtc   -repeat CMP K 3.2; repleat PRN  -likely 2/2 to diarrhea and  decreased PO     Prolonged qtc  -ECG significant for qtc of 654  -Replacing mg   -trend ecg   -will avoid qtc prolonging meds     dysphagia    -reported difficulty swallowing liquids the last few days   -bedside swallow assessment ordered; NPO until evaluated     Chronic bilateal LE lymphedema   -follows with wound care and Dr. Silveira  -consult wound care for treatment while inpatient     T2DM   - on admission; A1c 14 (a1c: 11 5/25/22)  -hold at home metformin; did not previously take insulin at home   -started sliding scale and 10 units of basal inulin daily    Anemia of chronic disease  -H/H on admission 12.8/38.8 (11.7/38 on 4/20)  -Iron panel, folate, b12, ordered    HLD   -continue at home Lipitor 20 mg   -am lipid panel ordered       Diet: NPO   Code:FULL  PPx:Heparin   Dispo: admitted for leukocytosis workup       Alicia Alvarado MD  [facility] Internal Medicine HO-I    [facility] Medicine Hospitalist Pager numbers:   [facility] Hospitalist Medicine Team A (Jamie/Michelle): [phone]  [facility] Hospitalist Medicine Team B (Elvin/Ranjeet):  [phone]

## 2022-07-23 LAB
ALBUMIN SERPL BCP-MCNC: 2.2 G/DL (ref 3.5–5.2)
ALP SERPL-CCNC: 117 U/L (ref 55–135)
ALT SERPL W/O P-5'-P-CCNC: 13 U/L (ref 10–44)
ANION GAP SERPL CALC-SCNC: 13 MMOL/L (ref 8–16)
AST SERPL-CCNC: 22 U/L (ref 10–40)
BASOPHILS # BLD AUTO: 0.07 K/UL (ref 0–0.2)
BASOPHILS NFR BLD: 0.5 % (ref 0–1.9)
BILIRUB SERPL-MCNC: 0.3 MG/DL (ref 0.1–1)
BUN SERPL-MCNC: 15 MG/DL (ref 8–23)
CALCIUM SERPL-MCNC: 8.2 MG/DL (ref 8.7–10.5)
CHLORIDE SERPL-SCNC: 96 MMOL/L (ref 95–110)
CHOLEST SERPL-MCNC: 71 MG/DL (ref 120–199)
CHOLEST/HDLC SERPL: 3.2 {RATIO} (ref 2–5)
CO2 SERPL-SCNC: 31 MMOL/L (ref 23–29)
CREAT SERPL-MCNC: 1 MG/DL (ref 0.5–1.4)
DIFFERENTIAL METHOD: ABNORMAL
EOSINOPHIL # BLD AUTO: 0.2 K/UL (ref 0–0.5)
EOSINOPHIL NFR BLD: 1.7 % (ref 0–8)
ERYTHROCYTE [DISTWIDTH] IN BLOOD BY AUTOMATED COUNT: 12.5 % (ref 11.5–14.5)
EST. GFR  (AFRICAN AMERICAN): >60 ML/MIN/1.73 M^2
EST. GFR  (NON AFRICAN AMERICAN): >60 ML/MIN/1.73 M^2
FERRITIN SERPL-MCNC: 334 NG/ML (ref 20–300)
FOLATE SERPL-MCNC: 5 NG/ML (ref 4–24)
GLUCOSE SERPL-MCNC: 209 MG/DL (ref 70–110)
HCT VFR BLD AUTO: 36.6 % (ref 40–54)
HDLC SERPL-MCNC: 22 MG/DL (ref 40–75)
HDLC SERPL: 31 % (ref 20–50)
HGB BLD-MCNC: 12.1 G/DL (ref 14–18)
IMM GRANULOCYTES # BLD AUTO: 0.07 K/UL (ref 0–0.04)
IMM GRANULOCYTES NFR BLD AUTO: 0.5 % (ref 0–0.5)
IRON SERPL-MCNC: 40 UG/DL (ref 45–160)
LDLC SERPL CALC-MCNC: 25 MG/DL (ref 63–159)
LYMPHOCYTES # BLD AUTO: 1.1 K/UL (ref 1–4.8)
LYMPHOCYTES NFR BLD: 8.3 % (ref 18–48)
MCH RBC QN AUTO: 28.5 PG (ref 27–31)
MCHC RBC AUTO-ENTMCNC: 33.1 G/DL (ref 32–36)
MCV RBC AUTO: 86 FL (ref 82–98)
MONOCYTES # BLD AUTO: 1 K/UL (ref 0.3–1)
MONOCYTES NFR BLD: 7.6 % (ref 4–15)
NEUTROPHILS # BLD AUTO: 11.1 K/UL (ref 1.8–7.7)
NEUTROPHILS NFR BLD: 81.4 % (ref 38–73)
NONHDLC SERPL-MCNC: 49 MG/DL
NRBC BLD-RTO: 0 /100 WBC
PLATELET # BLD AUTO: 502 K/UL (ref 150–450)
PMV BLD AUTO: 10.6 FL (ref 9.2–12.9)
POCT GLUCOSE: 211 MG/DL (ref 70–110)
POCT GLUCOSE: 227 MG/DL (ref 70–110)
POCT GLUCOSE: 245 MG/DL (ref 70–110)
POCT GLUCOSE: 272 MG/DL (ref 70–110)
POCT GLUCOSE: 275 MG/DL (ref 70–110)
POTASSIUM SERPL-SCNC: 2.8 MMOL/L (ref 3.5–5.1)
PROT SERPL-MCNC: 5.7 G/DL (ref 6–8.4)
RBC # BLD AUTO: 4.24 M/UL (ref 4.6–6.2)
SATURATED IRON: 20 % (ref 20–50)
SODIUM SERPL-SCNC: 140 MMOL/L (ref 136–145)
TOTAL IRON BINDING CAPACITY: 198 UG/DL (ref 250–450)
TRANSFERRIN SERPL-MCNC: 134 MG/DL (ref 200–375)
TRIGL SERPL-MCNC: 120 MG/DL (ref 30–150)
TSH SERPL DL<=0.005 MIU/L-ACNC: 0.5 UIU/ML (ref 0.4–4)
VIT B12 SERPL-MCNC: 352 PG/ML (ref 210–950)
WBC # BLD AUTO: 13.58 K/UL (ref 3.9–12.7)

## 2022-07-23 PROCEDURE — 25000003 PHARM REV CODE 250: Performed by: INTERNAL MEDICINE

## 2022-07-23 PROCEDURE — 84466 ASSAY OF TRANSFERRIN: CPT | Performed by: STUDENT IN AN ORGANIZED HEALTH CARE EDUCATION/TRAINING PROGRAM

## 2022-07-23 PROCEDURE — 93010 ELECTROCARDIOGRAM REPORT: CPT | Mod: ,,, | Performed by: INTERNAL MEDICINE

## 2022-07-23 PROCEDURE — 87449 NOS EACH ORGANISM AG IA: CPT | Performed by: STUDENT IN AN ORGANIZED HEALTH CARE EDUCATION/TRAINING PROGRAM

## 2022-07-23 PROCEDURE — 36415 COLL VENOUS BLD VENIPUNCTURE: CPT | Performed by: STUDENT IN AN ORGANIZED HEALTH CARE EDUCATION/TRAINING PROGRAM

## 2022-07-23 PROCEDURE — 94761 N-INVAS EAR/PLS OXIMETRY MLT: CPT

## 2022-07-23 PROCEDURE — 85025 COMPLETE CBC W/AUTO DIFF WBC: CPT | Performed by: STUDENT IN AN ORGANIZED HEALTH CARE EDUCATION/TRAINING PROGRAM

## 2022-07-23 PROCEDURE — 93010 EKG 12-LEAD: ICD-10-PCS | Mod: ,,, | Performed by: INTERNAL MEDICINE

## 2022-07-23 PROCEDURE — 82746 ASSAY OF FOLIC ACID SERUM: CPT | Performed by: STUDENT IN AN ORGANIZED HEALTH CARE EDUCATION/TRAINING PROGRAM

## 2022-07-23 PROCEDURE — 82728 ASSAY OF FERRITIN: CPT | Performed by: STUDENT IN AN ORGANIZED HEALTH CARE EDUCATION/TRAINING PROGRAM

## 2022-07-23 PROCEDURE — 96375 TX/PRO/DX INJ NEW DRUG ADDON: CPT

## 2022-07-23 PROCEDURE — 82607 VITAMIN B-12: CPT | Performed by: STUDENT IN AN ORGANIZED HEALTH CARE EDUCATION/TRAINING PROGRAM

## 2022-07-23 PROCEDURE — 93005 ELECTROCARDIOGRAM TRACING: CPT

## 2022-07-23 PROCEDURE — 63600175 PHARM REV CODE 636 W HCPCS: Performed by: STUDENT IN AN ORGANIZED HEALTH CARE EDUCATION/TRAINING PROGRAM

## 2022-07-23 PROCEDURE — 96372 THER/PROPH/DIAG INJ SC/IM: CPT | Performed by: STUDENT IN AN ORGANIZED HEALTH CARE EDUCATION/TRAINING PROGRAM

## 2022-07-23 PROCEDURE — 80061 LIPID PANEL: CPT

## 2022-07-23 PROCEDURE — 97530 THERAPEUTIC ACTIVITIES: CPT

## 2022-07-23 PROCEDURE — 97165 OT EVAL LOW COMPLEX 30 MIN: CPT

## 2022-07-23 PROCEDURE — 80053 COMPREHEN METABOLIC PANEL: CPT | Performed by: STUDENT IN AN ORGANIZED HEALTH CARE EDUCATION/TRAINING PROGRAM

## 2022-07-23 PROCEDURE — C9399 UNCLASSIFIED DRUGS OR BIOLOG: HCPCS | Performed by: STUDENT IN AN ORGANIZED HEALTH CARE EDUCATION/TRAINING PROGRAM

## 2022-07-23 PROCEDURE — 25000003 PHARM REV CODE 250: Performed by: STUDENT IN AN ORGANIZED HEALTH CARE EDUCATION/TRAINING PROGRAM

## 2022-07-23 PROCEDURE — 84443 ASSAY THYROID STIM HORMONE: CPT | Performed by: STUDENT IN AN ORGANIZED HEALTH CARE EDUCATION/TRAINING PROGRAM

## 2022-07-23 PROCEDURE — 96366 THER/PROPH/DIAG IV INF ADDON: CPT

## 2022-07-23 PROCEDURE — 99900035 HC TECH TIME PER 15 MIN (STAT)

## 2022-07-23 PROCEDURE — G0378 HOSPITAL OBSERVATION PER HR: HCPCS

## 2022-07-23 RX ORDER — POTASSIUM CHLORIDE 7.45 MG/ML
10 INJECTION INTRAVENOUS ONCE
Status: COMPLETED | OUTPATIENT
Start: 2022-07-23 | End: 2022-07-23

## 2022-07-23 RX ORDER — POTASSIUM CHLORIDE 20 MEQ/1
40 TABLET, EXTENDED RELEASE ORAL ONCE
Status: DISCONTINUED | OUTPATIENT
Start: 2022-07-23 | End: 2022-07-24

## 2022-07-23 RX ORDER — ATORVASTATIN CALCIUM 20 MG/1
20 TABLET, FILM COATED ORAL DAILY
Status: DISCONTINUED | OUTPATIENT
Start: 2022-07-24 | End: 2022-07-28 | Stop reason: HOSPADM

## 2022-07-23 RX ADMIN — SODIUM CHLORIDE: 0.9 INJECTION, SOLUTION INTRAVENOUS at 06:07

## 2022-07-23 RX ADMIN — Medication 125 MG: at 11:07

## 2022-07-23 RX ADMIN — INSULIN ASPART 4 UNITS: 100 INJECTION, SOLUTION INTRAVENOUS; SUBCUTANEOUS at 04:07

## 2022-07-23 RX ADMIN — INSULIN ASPART 6 UNITS: 100 INJECTION, SOLUTION INTRAVENOUS; SUBCUTANEOUS at 11:07

## 2022-07-23 RX ADMIN — INSULIN ASPART 4 UNITS: 100 INJECTION, SOLUTION INTRAVENOUS; SUBCUTANEOUS at 06:07

## 2022-07-23 RX ADMIN — ENOXAPARIN SODIUM 40 MG: 100 INJECTION SUBCUTANEOUS at 04:07

## 2022-07-23 RX ADMIN — Medication 125 MG: at 05:07

## 2022-07-23 RX ADMIN — POTASSIUM CHLORIDE 10 MEQ: 7.46 INJECTION, SOLUTION INTRAVENOUS at 10:07

## 2022-07-23 RX ADMIN — PIPERACILLIN AND TAZOBACTAM 4.5 G: 4; .5 INJECTION, POWDER, LYOPHILIZED, FOR SOLUTION INTRAVENOUS; PARENTERAL at 11:07

## 2022-07-23 RX ADMIN — PIPERACILLIN AND TAZOBACTAM 4.5 G: 4; .5 INJECTION, POWDER, LYOPHILIZED, FOR SOLUTION INTRAVENOUS; PARENTERAL at 06:07

## 2022-07-23 RX ADMIN — ATORVASTATIN CALCIUM 20 MG: 20 TABLET, FILM COATED ORAL at 08:07

## 2022-07-23 RX ADMIN — INSULIN DETEMIR 10 UNITS: 100 INJECTION, SOLUTION SUBCUTANEOUS at 11:07

## 2022-07-23 RX ADMIN — PIPERACILLIN AND TAZOBACTAM 4.5 G: 4; .5 INJECTION, POWDER, LYOPHILIZED, FOR SOLUTION INTRAVENOUS; PARENTERAL at 04:07

## 2022-07-23 RX ADMIN — INSULIN ASPART 3 UNITS: 100 INJECTION, SOLUTION INTRAVENOUS; SUBCUTANEOUS at 12:07

## 2022-07-23 RX ADMIN — SODIUM CHLORIDE: 0.9 INJECTION, SOLUTION INTRAVENOUS at 12:07

## 2022-07-23 NOTE — PLAN OF CARE
Problem: Adult Inpatient Plan of Care  Goal: Plan of Care Review  Outcome: Ongoing, Progressing     VIRTUAL NURSE:  Cued into patient's room.  Permission received per patient to turn camera to view patient.  Introduced as VN for night shift that will be working with floor nurse and nursing assistant.  Educated patient on VN's role in patient care and  VIP model.  Plan of care reviewed with patient.  Education per flowsheet.   Informed patient that staff will round on them every 2 hours but to use call light for any other needs they may have; informed of fall risk and fall precautions.  Patient verbalized understanding.  Call light within reach; bed siderails up x3.  Opportunity given for questions and questions answered.  Admission assessment questions answered.  Patient denies complaints or any needs at this time. Instructed to call for assistance.  Will cont to monitor and intervene as needed.    Labs, notes, orders, and careplan reviewed.       07/22/22 2130   Patient Request   Patient Requested no complaints or needs currently   Admission   Initial VN Admission Questions Complete   Communication Issues? Technical Issue;Patient Hearing   Shift   Virtual Nurse - Rounding Complete   Pain Management Interventions pain management plan reviewed with patient/caregiver   Virtual Nurse - Patient Verbalized Approval Of VN Rounding;Camera Use   Type of Frequent Check   Type Patient Rounds   Safety/Activity   Patient Rounds bed in low position;placement of personal items at bedside;bed wheels locked;call light in patient/parent reach;visualized patient;clutter free environment maintained   Safety Promotion/Fall Prevention assistive device/personal item within reach;commode/urinal/bedpan at bedside;diversional activities provided;Fall Risk reviewed with patient/family;high risk medications identified;medications reviewed;nonskid shoes/socks when out of bed;room near unit station;side rails raised x 3;supervised  activity;instructed to call staff for mobility   Safety Precautions emergency equipment at bedside   Positioning   Body Position neutral body alignment;neutral head position   Head of Bed (HOB) Positioning HOB at 45 degrees   Pain/Comfort/Sleep   Preferred Pain Scale number (Numeric Rating Pain Scale)   Comfort/Acceptable Pain Level 0   Pain Rating (0-10): Rest 0   Sleep/Rest/Relaxation no problem identified;awake

## 2022-07-23 NOTE — NURSING
Performed a nursing bedside swallow assessment on pt. Pt tolerated well. Pt gag reflex present. Pt able to swallow oral secretions and liquids spontaneously and without difficulty.  Will notify MD for diet order. Will continue to monitor.

## 2022-07-23 NOTE — PROGRESS NOTES
"[facility] IM Resident HO-I Progress Note    Admitting Team: [facility] Hospitalist Team B  Attending Physician: Dr. Maximus Pollack  Resident: Gareth   Intern: Christiano    Subjective:      Patient resting comfortably and in no acute distress. His back pain from yesterday has significantly improved and is agreeable to work wit PT/OT. He denied any episodes of diarrhea since he has been admitted and is looking forward to eating breakfast. He denied SOB/CP/N/V/ pain.    Objective:   Last 24 Hour Vital Signs:  BP  Min: 115/56  Max: 167/76  Temp  Av °F (36.7 °C)  Min: 97.2 °F (36.2 °C)  Max: 98.7 °F (37.1 °C)  Pulse  Av.4  Min: 61  Max: 83  Resp  Av.3  Min: 16  Max: 18  SpO2  Av.1 %  Min: 93 %  Max: 98 %  Height  Av' 7" (170.2 cm)  Min: 5' 7" (170.2 cm)  Max: 5' 7" (170.2 cm)  Weight  Av kg (147 lb 9.9 oz)  Min: 62.9 kg (138 lb 10.7 oz)  Max: 72.6 kg (160 lb)  I/O last 3 completed shifts:  In: 100 [IV Piggyback:100]  Out: 700 [Urine:700]    Physical Examination:  General: A&Ox3, resting comfortably in bed  HEENT: PERRL, EOMI, moist mucus membranes w/ no erythema noted, no facial asymmetry noted  Neck: Trachea midline, no masses, no lymphadenopathy  Cardiovascular: Regular rate and rhythm, normal S1 and S2, no murmurs, rubs or gallops  Pulm: CTAB, no wheezes or crackles; no respiratory distress  Abdomen: Soft, non-tender, non-distended; no organomegaly  Skin: No rashes or erythema noted, no ecchymosis  Extremities: Atraumatic, no cyanosis or edema  Pulses: 2+ and symmetric  Neurological: A&Ox3, no focal deficits  Psychiatric: Normal mood and affect, thought content normal    Laboratory:  Recent Labs   Lab 22  1120 22  1402 22  1825 22  0625   WBC 20.29*  --   --  13.58*   HGB 12.8*  --   --  12.1*   HCT 38.8* 41  --  36.6*   *  --   --  502*   *  --  137 140   K 2.6*  --  3.2* 2.8*   CL 88*  --  93* 96   CREATININE 1.5*  --  1.2 1.0   BUN 27*  --  21 15   CO2 27  --  26 31* "   ALT 13  --   --  13   AST 18  --   --  22       Microbiology:  Blood clx showed no growth, pending cdif and resp clx     Other Results:  EKG (my interpretation):NSR improved QTc 540 (654 on initial EKG)    Radiology:  CT Abdomen Pelvis With Contrast   Final Result      Posterior basal areas of pulmonary infiltrate versus atelectasis.      Thickening of the sigmoid colon could be related to chronic muscle hypertrophy from diverticular disease.  Colitis cannot be entirely excluded.      No evidence of trauma.      Coronary artery calcifications.         Electronically signed by: Merlin Elliott MD   Date:    07/22/2022   Time:    14:39      X-Ray Chest AP Portable   Final Result      1. No acute cardiopulmonary process appreciated.         Electronically signed by: Sacha Singh   Date:    07/22/2022   Time:    13:10      CT Head Without Contrast   Final Result      Remote infarct left frontal anterior parietal medial hemisphere and cingulate gyrus.  This involves thinning of the corpus callosum.  No acute process seen.      Sinusitis change and evidence prior functional endoscopic sinus surgery.      No acute process seen.         Electronically signed by: Merlin Elliott MD   Date:    07/22/2022   Time:    11:48        Current Medications:     Infusions:   sodium chloride 0.9% 5 mL/hr at 07/23/22 0612        Scheduled:   atorvastatin  20 mg Oral Daily    enoxaparin  40 mg Subcutaneous Daily    piperacillin-tazobactam (ZOSYN) IVPB  4.5 g Intravenous Q8H    potassium chloride  10 mEq Intravenous Once    potassium chloride  40 mEq Oral Once        PRN:  albuterol sulfate, dextrose 10%, dextrose 10%, glucagon (human recombinant), insulin aspart U-100, melatonin, sars-cov-2 (covid-19), sodium chloride 0.9%, sodium chloride 3%    Assessment:   Mr. Mateo Foreman is a 71 y.o. male with a PMH of CVA (2019)  with residual cognitive and motor dysfunction, T2DM, HLD, cataracts, R hip and chronic lymphedema presenting to  the ED after falling backwards while trying to use the bathroom and down for an unknown amount of time. Patient has a 1 week history of decreased PO 2/2 multiple episodes of diarrhea. Patient admitted for leukocytosis,  acute diarrhea concerning for colitis and electrolyte defiency. Patient started on Zosyn q8. Over night, patient's WBC has decreased, he has not had diarrhea since admitted and his electrolyte deficiency is improving. Will continue to monitor.   Plan:     Suspected colitis/ acute diarrhea x1wk/leuokcytosis   - multiple episodes of diarrhea x1wk, acute weight loss, nausea and    decreased PO  -WBC 20; repeat CBC in am   -given 1 dose of vanc and zosyn in the ED, 1L IVF   -blood clx, resp clx pending   -cxr no acute findings; CT suspicion of pneumonia- unlikely due to normal   procal, afebrile, VSS, denies cough, SOB   -started on zosyn q8   -procal 0.16   -cdiff EIA; d/c if patient stool is normal      Hypokalemia  -K 2.9 on admission and given 25 mEq of K in the ER   -EKG with prolonged qtc   -repeat CMP K 3.2 07/22; repleat PRN  -07/23 am CMP: K 2.8; given KCL 40 mEq tablet and 1 time KCL 10 mEq IV     Prolonged qtc  -ECG significant for qtc of 654; improved and repeat ekg 07/23 540  -Replacing mg; F/U am mg   -trend ecg   -will avoid qtc prolonging meds      dysphagia    -reported difficulty swallowing liquids the last few days   -bedside swallow assessment ordered; patinet tolerated well   -ordered diabetic diet       Chronic bilateal LE lymphedema   -follows with wound care and Dr. Silveira  -consult wound care for treatment while inpatient      T2DM   - on admission; A1c 14 (a1c: 11 5/25/22)  -hold at home metformin; did not previously take insulin at home   -started sliding scale and 10 units of basal inulin daily     Anemia of chronic disease  -H/H on admission 12.8/38.8 (11.7/38 on 4/20)  -Iron panel, folate, b12, ordered     HLD   -continue at home Lipitor 20 mg   -am lipid panel  ordered; chol 71, ,  HDL 22, LDL 25  -likely decreased 2/2 decreased PO        Diet: diabetic   Code:FULL  PPx:Heparin   Dispo: admitted for leukocytosis workup       Alicia Alvarado MD  [facility] Internal Medicine HO-I  [facility] Hospitalist Team B    [facility] Medicine Hospitalist Pager numbers:   [facility] Hospitalist Medicine Team A (Jamie/Michelle): [phone]  [facility] Hospitalist Medicine Team B (Elvin/Ranjeet):  [phone]

## 2022-07-23 NOTE — PT/OT/SLP EVAL
Occupational Therapy   Evaluation/tx    Name: Mateo Foreman  MRN: [id number]  Admitting Diagnosis:  Fall  Recent Surgery: * No surgery found *      Recommendations:     Discharge Recommendations: nursing facility, skilled  Discharge Equipment Recommendations:  shower chair  Barriers to discharge:  Decreased caregiver support    Assessment:   Pt presents w/ decreased overall endurance/conditioning, balance/mobility & coordination/cognition w/ subsequent decline in (I)/safety w/ BADLs, fxnl mobility & fxnl t/f's.  OT 3x/wk to increase phys/fxnl status & maximize potential to achieve established goals for d/c-->SNF w/ rec shower chair.    Mateo Foreman is a 72 y.o. male with a medical diagnosis of Fall.  He presents with . Performance deficits affecting function: weakness, impaired endurance, impaired self care skills, impaired functional mobility, impaired sensation, decreased coordination, impaired cognition, impaired balance, gait instability, decreased upper extremity function, decreased lower extremity function, decreased safety awareness, decreased ROM, edema.      Rehab Prognosis: Fair; patient would benefit from acute skilled OT services to address these deficits and reach maximum level of function.       Plan:     Patient to be seen 3 x/week to address the above listed problems via self-care/home management, therapeutic activities, therapeutic exercises  · Plan of Care Expires: 08/23/22  · Plan of Care Reviewed with: patient    Subjective     Chief Complaint: N/D  Patient/Family Comments/goals: return home    Occupational Profile:  Living Environment: alone in [facility] w/ 5STE & BHR not accessible simultaneously  Previous level of function: MI via RW  Roles and Routines: IADLs  Equipment Used at Home:  walker, rolling, other (see comments) (elevated toilet)  Assistance upon Discharge: sister & next door neighbor check on him & provide transportation    Pain/Comfort:  · Pain Rating 1: 0/10  · Pain Rating  "Post-Intervention 1: 0/10    Patients cultural, spiritual, Oriental orthodox conflicts given the current situation:      Objective:     Communicated with: nsg prior to session.  Patient found HOB elevated with bed alarm, peripheral IV, Condom Catheter upon OT entry to room.    General Precautions: Standard, special contact, fall   Orthopedic Precautions:N/A   Braces: N/A  Respiratory Status: Room air    Occupational Performance:    Bed Mobility:    · Patient completed Supine to Sit with moderate assistance and with side rail  · Patient completed Sit to Supine with maximal assistance    Functional Mobility/Transfers:  · NT  · Functional Mobility: NT    Activities of Daily Living:  · Feeding:  supervision w/ HOB fully elevated    Cognitive/Visual Perceptual:  AO4  STM deficits & mild aphasia at baseline    Physical Exam:  L shldr ~120*; elb-->Ds grossly WFL  R shldr AAROM w/ active hold to 90*    Sit balance: F- to F    AMPAC 6 Click ADL:  AMPAC Total Score: 16    Treatment & Education:  Pt found in supine & agreeable to OT eval/tx this date.  Pt AO4 & perf the following:  -sup-->EOB via bed rail use w/ Mod A & required varying CG-Min A for static sitting 2/2 c/o signif dizziness.  Pt instructed in & perf PLBing at EOB, however pt c/o increasing "room spinning" & returned to supine w/ Max A  Pt remained in full supine x ~2 min w/ reported resolution of symptoms. Nsg made aware.  Edu/tx re: PLBing, general safety techs & HEP. Pt verbalized understanding, but questionable retention.      Education:    Patient left supine with all lines intact, call button in reach, bed alarm on and nsg notified    GOALS:   Multidisciplinary Problems     Occupational Therapy Goals        Problem: Occupational Therapy    Goal Priority Disciplines Outcome Interventions   Occupational Therapy Goal     OT, PT/OT Ongoing, Progressing    Description: Goals to be met by: 08/23     Patient will increase functional independence with ADLs by " performing:    LE Dressing with Stand-by Assistance and Assistive Devices as needed.  Grooming while standing at sink with Stand-by Assistance.  Toileting from toilet with Supervision for hygiene and clothing management.   Toilet transfer to toilet with Stand-by Assistance.  Increased functional strength to WFL for ADLs.                     History:     Past Medical History:   Diagnosis Date    Anemia     Cataract     Chronic cough     Diabetes mellitus type II     GERD (gastroesophageal reflux disease)     Glaucoma     Hyperlipidemia        No past surgical history on file.    Time Tracking:     OT Date of Treatment: 07/23/22  OT Start Time: 0848  OT Stop Time: 0911  OT Total Time (min): 23 min    Billable Minutes:Evaluation 10  Therapeutic Activity 13  Total Time 23    7/23/2022

## 2022-07-24 LAB
ALBUMIN SERPL BCP-MCNC: 2.1 G/DL (ref 3.5–5.2)
ALP SERPL-CCNC: 98 U/L (ref 55–135)
ALT SERPL W/O P-5'-P-CCNC: 16 U/L (ref 10–44)
ANION GAP SERPL CALC-SCNC: 13 MMOL/L (ref 8–16)
AST SERPL-CCNC: 26 U/L (ref 10–40)
BASOPHILS # BLD AUTO: 0.12 K/UL (ref 0–0.2)
BASOPHILS NFR BLD: 0.7 % (ref 0–1.9)
BILIRUB SERPL-MCNC: 0.3 MG/DL (ref 0.1–1)
BUN SERPL-MCNC: 11 MG/DL (ref 8–23)
CALCIUM SERPL-MCNC: 8.1 MG/DL (ref 8.7–10.5)
CHLORIDE SERPL-SCNC: 95 MMOL/L (ref 95–110)
CO2 SERPL-SCNC: 30 MMOL/L (ref 23–29)
CREAT SERPL-MCNC: 0.9 MG/DL (ref 0.5–1.4)
DIFFERENTIAL METHOD: ABNORMAL
EOSINOPHIL # BLD AUTO: 0.4 K/UL (ref 0–0.5)
EOSINOPHIL NFR BLD: 2.2 % (ref 0–8)
ERYTHROCYTE [DISTWIDTH] IN BLOOD BY AUTOMATED COUNT: 12.9 % (ref 11.5–14.5)
EST. GFR  (AFRICAN AMERICAN): >60 ML/MIN/1.73 M^2
EST. GFR  (NON AFRICAN AMERICAN): >60 ML/MIN/1.73 M^2
GLUCOSE SERPL-MCNC: 103 MG/DL (ref 70–110)
HCT VFR BLD AUTO: 39.3 % (ref 40–54)
HGB BLD-MCNC: 12.7 G/DL (ref 14–18)
IMM GRANULOCYTES # BLD AUTO: 0.12 K/UL (ref 0–0.04)
IMM GRANULOCYTES NFR BLD AUTO: 0.7 % (ref 0–0.5)
LYMPHOCYTES # BLD AUTO: 1.6 K/UL (ref 1–4.8)
LYMPHOCYTES NFR BLD: 9.3 % (ref 18–48)
MAGNESIUM SERPL-MCNC: 2.1 MG/DL (ref 1.6–2.6)
MCH RBC QN AUTO: 28.3 PG (ref 27–31)
MCHC RBC AUTO-ENTMCNC: 32.3 G/DL (ref 32–36)
MCV RBC AUTO: 88 FL (ref 82–98)
MONOCYTES # BLD AUTO: 1.4 K/UL (ref 0.3–1)
MONOCYTES NFR BLD: 8.6 % (ref 4–15)
NEUTROPHILS # BLD AUTO: 13.1 K/UL (ref 1.8–7.7)
NEUTROPHILS NFR BLD: 78.5 % (ref 38–73)
NRBC BLD-RTO: 0 /100 WBC
PLATELET # BLD AUTO: 480 K/UL (ref 150–450)
PMV BLD AUTO: 10.1 FL (ref 9.2–12.9)
POCT GLUCOSE: 130 MG/DL (ref 70–110)
POCT GLUCOSE: 171 MG/DL (ref 70–110)
POCT GLUCOSE: 188 MG/DL (ref 70–110)
POCT GLUCOSE: 193 MG/DL (ref 70–110)
POCT GLUCOSE: 337 MG/DL (ref 70–110)
POTASSIUM SERPL-SCNC: 3.3 MMOL/L (ref 3.5–5.1)
PROT SERPL-MCNC: 5.7 G/DL (ref 6–8.4)
RBC # BLD AUTO: 4.49 M/UL (ref 4.6–6.2)
SODIUM SERPL-SCNC: 138 MMOL/L (ref 136–145)
WBC # BLD AUTO: 16.63 K/UL (ref 3.9–12.7)

## 2022-07-24 PROCEDURE — 25000003 PHARM REV CODE 250

## 2022-07-24 PROCEDURE — 36415 COLL VENOUS BLD VENIPUNCTURE: CPT | Performed by: STUDENT IN AN ORGANIZED HEALTH CARE EDUCATION/TRAINING PROGRAM

## 2022-07-24 PROCEDURE — 25000003 PHARM REV CODE 250: Performed by: STUDENT IN AN ORGANIZED HEALTH CARE EDUCATION/TRAINING PROGRAM

## 2022-07-24 PROCEDURE — 80053 COMPREHEN METABOLIC PANEL: CPT | Performed by: STUDENT IN AN ORGANIZED HEALTH CARE EDUCATION/TRAINING PROGRAM

## 2022-07-24 PROCEDURE — 96366 THER/PROPH/DIAG IV INF ADDON: CPT

## 2022-07-24 PROCEDURE — 83735 ASSAY OF MAGNESIUM: CPT | Performed by: STUDENT IN AN ORGANIZED HEALTH CARE EDUCATION/TRAINING PROGRAM

## 2022-07-24 PROCEDURE — 63600175 PHARM REV CODE 636 W HCPCS: Performed by: STUDENT IN AN ORGANIZED HEALTH CARE EDUCATION/TRAINING PROGRAM

## 2022-07-24 PROCEDURE — 85025 COMPLETE CBC W/AUTO DIFF WBC: CPT | Performed by: STUDENT IN AN ORGANIZED HEALTH CARE EDUCATION/TRAINING PROGRAM

## 2022-07-24 PROCEDURE — 94761 N-INVAS EAR/PLS OXIMETRY MLT: CPT

## 2022-07-24 PROCEDURE — 99900035 HC TECH TIME PER 15 MIN (STAT)

## 2022-07-24 PROCEDURE — G0378 HOSPITAL OBSERVATION PER HR: HCPCS

## 2022-07-24 PROCEDURE — 96372 THER/PROPH/DIAG INJ SC/IM: CPT | Performed by: STUDENT IN AN ORGANIZED HEALTH CARE EDUCATION/TRAINING PROGRAM

## 2022-07-24 RX ORDER — SCOLOPAMINE TRANSDERMAL SYSTEM 1 MG/1
1 PATCH, EXTENDED RELEASE TRANSDERMAL
Status: DISCONTINUED | OUTPATIENT
Start: 2022-07-24 | End: 2022-07-28 | Stop reason: HOSPADM

## 2022-07-24 RX ORDER — POTASSIUM CHLORIDE 20 MEQ/1
20 TABLET, EXTENDED RELEASE ORAL 2 TIMES DAILY
Status: DISCONTINUED | OUTPATIENT
Start: 2022-07-24 | End: 2022-07-24

## 2022-07-24 RX ADMIN — Medication 125 MG: at 05:07

## 2022-07-24 RX ADMIN — INSULIN DETEMIR 10 UNITS: 100 INJECTION, SOLUTION SUBCUTANEOUS at 08:07

## 2022-07-24 RX ADMIN — PIPERACILLIN AND TAZOBACTAM 4.5 G: 4; .5 INJECTION, POWDER, LYOPHILIZED, FOR SOLUTION INTRAVENOUS; PARENTERAL at 02:07

## 2022-07-24 RX ADMIN — INSULIN ASPART 1 UNITS: 100 INJECTION, SOLUTION INTRAVENOUS; SUBCUTANEOUS at 01:07

## 2022-07-24 RX ADMIN — PIPERACILLIN AND TAZOBACTAM 4.5 G: 4; .5 INJECTION, POWDER, LYOPHILIZED, FOR SOLUTION INTRAVENOUS; PARENTERAL at 06:07

## 2022-07-24 RX ADMIN — PIPERACILLIN AND TAZOBACTAM 4.5 G: 4; .5 INJECTION, POWDER, LYOPHILIZED, FOR SOLUTION INTRAVENOUS; PARENTERAL at 11:07

## 2022-07-24 RX ADMIN — ENOXAPARIN SODIUM 40 MG: 100 INJECTION SUBCUTANEOUS at 05:07

## 2022-07-24 RX ADMIN — ATORVASTATIN CALCIUM 20 MG: 20 TABLET, FILM COATED ORAL at 08:07

## 2022-07-24 RX ADMIN — Medication 125 MG: at 12:07

## 2022-07-24 RX ADMIN — POTASSIUM BICARBONATE 20 MEQ: 391 TABLET, EFFERVESCENT ORAL at 12:07

## 2022-07-24 RX ADMIN — SCOPALAMINE 1 PATCH: 1 PATCH, EXTENDED RELEASE TRANSDERMAL at 10:07

## 2022-07-24 NOTE — PLAN OF CARE
Pt AAOx4. No complaints of pain. Medications administered per MAR. IV abx infusing. On RA. Cardiac monitoring in progress. Blood glucose monitored, supplemental insulin given. Safety maintained with bed alarm set, side rails raised, and call light in reach.

## 2022-07-24 NOTE — PT/OT/SLP PROGRESS
Physical Therapy      Patient Name:  Mateo Foreman   MRN:  [id number]    Patient not seen today secondary to Other (Comment) (Patient refused PT evaluation today due to being too tired/fatigued.). Will follow-up tomorrow as able/appropriate.

## 2022-07-24 NOTE — PROGRESS NOTES
[facility] IM Resident HO-I Progress Note    Admitting Team: [facility] Hospitalist Team B  Attending Physician: Dr. Maximus Pollack  Resident: Lisa  Intern: Bennie    Subjective:      NAEO. Resting comfortably in bed this morning. Admits to nausea, denies emesis.     Objective:   Last 24 Hour Vital Signs:  BP  Min: 129/57  Max: 158/75  Temp  Av.7 °F (36.5 °C)  Min: 97.1 °F (36.2 °C)  Max: 98.4 °F (36.9 °C)  Pulse  Av.7  Min: 61  Max: 71  Resp  Av.6  Min: 17  Max: 20  SpO2  Av.7 %  Min: 94 %  Max: 98 %  Weight  Av.6 kg (146 lb 13.2 oz)  Min: 66.6 kg (146 lb 13.2 oz)  Max: 66.6 kg (146 lb 13.2 oz)  I/O last 3 completed shifts:  In: 150 [IV Piggyback:150]  Out: 1250 [Urine:1250]    Physical Exam  Vitals and nursing note reviewed.   Constitutional:       General: He is not in acute distress.     Appearance: He is not toxic-appearing.   HENT:      Head: Normocephalic and atraumatic.      Right Ear: External ear normal.      Left Ear: External ear normal.      Nose: Nose normal. No congestion or rhinorrhea.      Mouth/Throat:      Mouth: Mucous membranes are moist.      Pharynx: Oropharynx is clear.   Eyes:      Extraocular Movements: Extraocular movements intact.      Conjunctiva/sclera: Conjunctivae normal.   Cardiovascular:      Rate and Rhythm: Normal rate and regular rhythm.   Pulmonary:      Effort: Pulmonary effort is normal.      Breath sounds: No wheezing, rhonchi or rales.   Abdominal:      General: Abdomen is flat. Bowel sounds are normal. There is no distension.      Palpations: Abdomen is soft.      Tenderness: There is no abdominal tenderness.   Musculoskeletal:      Right lower leg: No edema.      Left lower leg: No edema.   Skin:     General: Skin is warm and dry.      Capillary Refill: Capillary refill takes less than 2 seconds.   Neurological:      General: No focal deficit present.      Mental Status: He is alert and oriented to person, place, and time.       Laboratory:  Recent Labs   Lab  07/22/22  1120 07/22/22  1402 07/22/22  1825 07/23/22  0625 07/24/22  0612   WBC 20.29*  --   --  13.58* 16.63*   HGB 12.8*  --   --  12.1* 12.7*   HCT 38.8* 41  --  36.6* 39.3*   *  --   --  502* 480*   *  --  137 140 138   K 2.6*  --  3.2* 2.8* 3.3*   CL 88*  --  93* 96 95   CREATININE 1.5*  --  1.2 1.0 0.9   BUN 27*  --  21 15 11   CO2 27  --  26 31* 30*   ALT 13  --   --  13 16   AST 18  --   --  22 26       Microbiology:  Blood clx showed no growth, pending cdif and resp clx     Other Results:  EKG (my interpretation):NSR improved QTc 540 (654 on initial EKG)    Radiology:  CT Abdomen Pelvis With Contrast   Final Result      Posterior basal areas of pulmonary infiltrate versus atelectasis.      Thickening of the sigmoid colon could be related to chronic muscle hypertrophy from diverticular disease.  Colitis cannot be entirely excluded.      No evidence of trauma.      Coronary artery calcifications.         Electronically signed by: Merlin Elliott MD   Date:    07/22/2022   Time:    14:39      X-Ray Chest AP Portable   Final Result      1. No acute cardiopulmonary process appreciated.         Electronically signed by: Sacha Singh   Date:    07/22/2022   Time:    13:10      CT Head Without Contrast   Final Result      Remote infarct left frontal anterior parietal medial hemisphere and cingulate gyrus.  This involves thinning of the corpus callosum.  No acute process seen.      Sinusitis change and evidence prior functional endoscopic sinus surgery.      No acute process seen.         Electronically signed by: Merlin Elliott MD   Date:    07/22/2022   Time:    11:48        Current Medications:     Infusions:   sodium chloride 0.9% 5 mL/hr at 07/23/22 0612        Scheduled:   atorvastatin  20 mg Oral Daily    enoxaparin  40 mg Subcutaneous Daily    insulin detemir U-100  10 Units Subcutaneous Daily    piperacillin-tazobactam (ZOSYN) IVPB  4.5 g Intravenous Q8H    potassium chloride  40 mEq  Oral Once    scopolamine  1 patch Transdermal Q3 Days    vancomycin  125 mg Oral Q6H        PRN:  albuterol sulfate, dextrose 10%, dextrose 10%, glucagon (human recombinant), insulin aspart U-100, melatonin, sars-cov-2 (covid-19), sodium chloride 0.9%, sodium chloride 3%    Assessment:     Mr. Mateo Foreman is a 71 y.o. 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 Patient has a 1 week history of decreased PO 2/2 multiple episodes of diarrhea. Patient admitted for leukocytosis, acute diarrhea concerning for colitis and electrolyte defiency. Treating with zosyn. C diff EIA pending.     Plan:     Suspected colitis/ acute diarrhea x1wk/leuokcytosis   - multiple episodes of diarrhea x1wk, acute weight loss, nausea and decreased PO  - Initial WBC 20  - given 1 dose of vanc and zosyn in the ED, 1L IVF   - blood clx, c diff EIA pending    - Continue zosyn       Hypokalemia- improving   - K 2.9 on admission and given 25 mEq of K in the ER   - EKG with prolonged qtc   - Replete PRN     Prolonged qtc  - ECG significant for qtc of 654; improved and repeat ekg 07/23 540  - avoid qtc prolonging meds      dysphagia    - reported difficulty swallowing liquids the last few days PTA  - bedside swallow assessment ordered; patinet tolerated well   - diabetic diet      Chronic bilateal LE lymphedema   - follows with wound care and Dr. Silveira  - consult wound care for treatment while inpatient      T2DM   -  on admission; A1c 14 (a1c: 11 5/25/22)  - hold at home metformin; did not previously take insulin at home   - SSI, 10 units of basal insulin daily     Anemia of chronic disease  - H/H on admission 12.8/38.8 (11.7/38 on 4/20)  - Iron/TIBC 40/198, ferritin 334, B12 352, folate 5     HLD   -continue at home Lipitor 20 mg   -am  lipid panel ordered; chol 71, ,  HDL 22, LDL 25        Diet: diabetic   Code: FULL  PPx: Heparin   Dispo: pending clinical improvement       Tonya Dela Cruz MD  [facility] Internal Medicine -II  \[facility]\"" Hospitalist Team B    \[facility]\"" Medicine Hospitalist Pager numbers:   \[facility]\"" Hospitalist Medicine Team A (Jamie/Michelle): [phone]  \[facility]\"" Hospitalist Medicine Team B (Elvin/Ranjeet):  [phone]

## 2022-07-25 PROBLEM — A04.72 C. DIFFICILE COLITIS: Status: ACTIVE | Noted: 2022-07-25

## 2022-07-25 LAB
ALBUMIN SERPL BCP-MCNC: 2 G/DL (ref 3.5–5.2)
ALP SERPL-CCNC: 95 U/L (ref 55–135)
ALT SERPL W/O P-5'-P-CCNC: 12 U/L (ref 10–44)
ANION GAP SERPL CALC-SCNC: 13 MMOL/L (ref 8–16)
AST SERPL-CCNC: 30 U/L (ref 10–40)
BASOPHILS # BLD AUTO: 0.09 K/UL (ref 0–0.2)
BASOPHILS NFR BLD: 0.6 % (ref 0–1.9)
BILIRUB SERPL-MCNC: 0.4 MG/DL (ref 0.1–1)
BUN SERPL-MCNC: 9 MG/DL (ref 8–23)
C DIFF GDH STL QL: POSITIVE
C DIFF TOX A+B STL QL IA: POSITIVE
CALCIUM SERPL-MCNC: 8.1 MG/DL (ref 8.7–10.5)
CHLORIDE SERPL-SCNC: 95 MMOL/L (ref 95–110)
CO2 SERPL-SCNC: 32 MMOL/L (ref 23–29)
CREAT SERPL-MCNC: 1 MG/DL (ref 0.5–1.4)
DIFFERENTIAL METHOD: ABNORMAL
EOSINOPHIL # BLD AUTO: 0.2 K/UL (ref 0–0.5)
EOSINOPHIL NFR BLD: 1.1 % (ref 0–8)
ERYTHROCYTE [DISTWIDTH] IN BLOOD BY AUTOMATED COUNT: 12.9 % (ref 11.5–14.5)
EST. GFR  (AFRICAN AMERICAN): >60 ML/MIN/1.73 M^2
EST. GFR  (NON AFRICAN AMERICAN): >60 ML/MIN/1.73 M^2
GLUCOSE SERPL-MCNC: 90 MG/DL (ref 70–110)
HCT VFR BLD AUTO: 40.8 % (ref 40–54)
HGB BLD-MCNC: 13 G/DL (ref 14–18)
IMM GRANULOCYTES # BLD AUTO: 0.09 K/UL (ref 0–0.04)
IMM GRANULOCYTES NFR BLD AUTO: 0.6 % (ref 0–0.5)
LYMPHOCYTES # BLD AUTO: 1.4 K/UL (ref 1–4.8)
LYMPHOCYTES NFR BLD: 9.4 % (ref 18–48)
MCH RBC QN AUTO: 28 PG (ref 27–31)
MCHC RBC AUTO-ENTMCNC: 31.9 G/DL (ref 32–36)
MCV RBC AUTO: 88 FL (ref 82–98)
MONOCYTES # BLD AUTO: 1 K/UL (ref 0.3–1)
MONOCYTES NFR BLD: 6.6 % (ref 4–15)
NEUTROPHILS # BLD AUTO: 12.1 K/UL (ref 1.8–7.7)
NEUTROPHILS NFR BLD: 81.7 % (ref 38–73)
NRBC BLD-RTO: 0 /100 WBC
PLATELET # BLD AUTO: 548 K/UL (ref 150–450)
PMV BLD AUTO: 9.4 FL (ref 9.2–12.9)
POCT GLUCOSE: 106 MG/DL (ref 70–110)
POCT GLUCOSE: 143 MG/DL (ref 70–110)
POCT GLUCOSE: 186 MG/DL (ref 70–110)
POCT GLUCOSE: 253 MG/DL (ref 70–110)
POCT GLUCOSE: 82 MG/DL (ref 70–110)
POTASSIUM SERPL-SCNC: 2.8 MMOL/L (ref 3.5–5.1)
PROT SERPL-MCNC: 5.5 G/DL (ref 6–8.4)
RBC # BLD AUTO: 4.65 M/UL (ref 4.6–6.2)
SODIUM SERPL-SCNC: 140 MMOL/L (ref 136–145)
WBC # BLD AUTO: 14.79 K/UL (ref 3.9–12.7)

## 2022-07-25 PROCEDURE — 97530 THERAPEUTIC ACTIVITIES: CPT

## 2022-07-25 PROCEDURE — 96366 THER/PROPH/DIAG IV INF ADDON: CPT

## 2022-07-25 PROCEDURE — 85025 COMPLETE CBC W/AUTO DIFF WBC: CPT | Performed by: STUDENT IN AN ORGANIZED HEALTH CARE EDUCATION/TRAINING PROGRAM

## 2022-07-25 PROCEDURE — 92610 EVALUATE SWALLOWING FUNCTION: CPT

## 2022-07-25 PROCEDURE — 87106 FUNGI IDENTIFICATION YEAST: CPT | Performed by: INTERNAL MEDICINE

## 2022-07-25 PROCEDURE — 97535 SELF CARE MNGMENT TRAINING: CPT

## 2022-07-25 PROCEDURE — 87205 SMEAR GRAM STAIN: CPT | Performed by: INTERNAL MEDICINE

## 2022-07-25 PROCEDURE — 80053 COMPREHEN METABOLIC PANEL: CPT | Performed by: STUDENT IN AN ORGANIZED HEALTH CARE EDUCATION/TRAINING PROGRAM

## 2022-07-25 PROCEDURE — 99900035 HC TECH TIME PER 15 MIN (STAT)

## 2022-07-25 PROCEDURE — 25000003 PHARM REV CODE 250: Performed by: STUDENT IN AN ORGANIZED HEALTH CARE EDUCATION/TRAINING PROGRAM

## 2022-07-25 PROCEDURE — 25000003 PHARM REV CODE 250

## 2022-07-25 PROCEDURE — 97162 PT EVAL MOD COMPLEX 30 MIN: CPT

## 2022-07-25 PROCEDURE — 63600175 PHARM REV CODE 636 W HCPCS: Performed by: STUDENT IN AN ORGANIZED HEALTH CARE EDUCATION/TRAINING PROGRAM

## 2022-07-25 PROCEDURE — 87070 CULTURE OTHR SPECIMN AEROBIC: CPT | Performed by: INTERNAL MEDICINE

## 2022-07-25 PROCEDURE — 94761 N-INVAS EAR/PLS OXIMETRY MLT: CPT

## 2022-07-25 PROCEDURE — 36415 COLL VENOUS BLD VENIPUNCTURE: CPT | Performed by: STUDENT IN AN ORGANIZED HEALTH CARE EDUCATION/TRAINING PROGRAM

## 2022-07-25 PROCEDURE — 11000001 HC ACUTE MED/SURG PRIVATE ROOM

## 2022-07-25 PROCEDURE — 27000207 HC ISOLATION

## 2022-07-25 RX ORDER — POTASSIUM CHLORIDE 7.45 MG/ML
10 INJECTION INTRAVENOUS
Status: COMPLETED | OUTPATIENT
Start: 2022-07-25 | End: 2022-07-25

## 2022-07-25 RX ADMIN — INSULIN ASPART 2 UNITS: 100 INJECTION, SOLUTION INTRAVENOUS; SUBCUTANEOUS at 12:07

## 2022-07-25 RX ADMIN — PIPERACILLIN AND TAZOBACTAM 4.5 G: 4; .5 INJECTION, POWDER, LYOPHILIZED, FOR SOLUTION INTRAVENOUS; PARENTERAL at 06:07

## 2022-07-25 RX ADMIN — POTASSIUM CHLORIDE 10 MEQ: 7.46 INJECTION, SOLUTION INTRAVENOUS at 12:07

## 2022-07-25 RX ADMIN — Medication 125 MG: at 11:07

## 2022-07-25 RX ADMIN — ATORVASTATIN CALCIUM 20 MG: 20 TABLET, FILM COATED ORAL at 09:07

## 2022-07-25 RX ADMIN — POTASSIUM CHLORIDE 10 MEQ: 7.46 INJECTION, SOLUTION INTRAVENOUS at 11:07

## 2022-07-25 RX ADMIN — INSULIN DETEMIR 10 UNITS: 100 INJECTION, SOLUTION SUBCUTANEOUS at 09:07

## 2022-07-25 RX ADMIN — ENOXAPARIN SODIUM 40 MG: 100 INJECTION SUBCUTANEOUS at 05:07

## 2022-07-25 RX ADMIN — Medication 6 MG: at 10:07

## 2022-07-25 RX ADMIN — Medication 125 MG: at 05:07

## 2022-07-25 RX ADMIN — INSULIN ASPART 6 UNITS: 100 INJECTION, SOLUTION INTRAVENOUS; SUBCUTANEOUS at 05:07

## 2022-07-25 RX ADMIN — POTASSIUM CHLORIDE 10 MEQ: 7.46 INJECTION, SOLUTION INTRAVENOUS at 01:07

## 2022-07-25 NOTE — PROGRESS NOTES
Inpatient consult to Social Work [[id number]] (Order [id number])  Consult  Date and Time: 2022 11:47 AM Department: [facility] Acute Rel By: Jessica Gilliland RN (auto-released) Authorizing: Maximus Pollack MD           Order Information    Order Date/Time Release Date/Time Start Date/Time End Date/Time   22 11:47 AM 22 11:47 AM 22 11:45 AM 22 11:45 AM       Order Details    Frequency Duration Priority Order Class   Once 1  occurrence [facility] Performed               Original Order    Ordered On Ordered By    2022 11:47 AM Jessica Gilliland RN                       Inpatient consult to Social Work: Patient Communication     Not Released  Not seen     Order Questions    Question Answer   Reason for Consult SNF                      Collection Information          Consult Order Info    ID Description Priority Start Date Start Time   [id number] Inpatient consult to Social Work Routine 2022 11:45 AM   Provider Specialty Referred to   ______________________________________ _____________________________________         Acknowledgement Info    For At Acknowledged By Acknowledged On   Placing Order 22 1147 Carrie Woodruff RN 22 1148                   Verbal Order Info    Action Created on Order Mode Entered by Responsible Provider Signed by Signed on   Ordering 22 1147 Per nursing order: no cosign required               Patient Information    Patient Name   Mateo Foreman Legal Sex   Male    1950 [facility]          Additional Information    Associated Reports   View Parent Encounter   Priority and Order Details         Order Provider Info        Office phone Pager E-mail   Ordering User Jessica Gilliland [phone] -- [email or web address]   Authorizing Provider Maximus Pollack [phone] -- --   Attending Provider Maximus Pollack [phone] -- --       Inpatient consult to Social Work  [[id number]]    Electronically signed by: Jessica Gilliland RN on 07/25/22 1147 Status: Active   Ordering user: Jessica Gilliland RN 07/25/22 1147 Ordering provider: Maximus Pollack MD   Authorized by: Maximus Pollack MD Ordering mode: Per nursing order: no cosign required     Questionnaire    Question Answer   Reason for Consult SNF

## 2022-07-25 NOTE — PT/OT/SLP PROGRESS
Occupational Therapy   Treatment    Name: Mateo Foreman  MRN: [id number]  Admitting Diagnosis:  C. difficile colitis       Recommendations:     Discharge Recommendations: nursing facility, skilled  Discharge Equipment Recommendations:  walker, rolling  Barriers to discharge:  Decreased caregiver support    Assessment:     Mateo Foreman is a 72 y.o. male with a medical diagnosis of C. difficile colitis.  He presents with deconditioning, c/o continued BM during session with attempts to stand/t/f to bedside chair . Performance deficits affecting function are weakness, impaired endurance, impaired self care skills, impaired functional mobility, gait instability, decreased lower extremity function, decreased upper extremity function, impaired cognition, impaired balance, decreased safety awareness, pain, decreased ROM, impaired coordination, impaired fine motor, edema, impaired cardiopulmonary response to activity.     Rehab Prognosis:  Good; patient would benefit from acute skilled OT services to address these deficits and reach maximum level of function.       Plan:     Patient to be seen 3 x/week to address the above listed problems via self-care/home management, therapeutic activities, therapeutic exercises  · Plan of Care Expires: 08/23/22  · Plan of Care Reviewed with: patient    Subjective     Pain/Comfort:  · Pain Rating 1:  (did not rate)  · Location - Orientation 1: generalized  · Location 1:  (body)    Objective:     Communicated with: nsg prior to session.  Patient found HOB elevated with telemetry, peripheral IV upon OT entry to room.    General Precautions: Standard, fall   Orthopedic Precautions:N/A   Braces: N/A  Respiratory Status: Room air     Occupational Performance:     Bed Mobility:    · Patient completed Rolling/Turning to Left with  minimum assistance  · Patient completed Rolling/Turning to Right with minimum assistance  · Patient completed Scooting/Bridging with stand by assistance to bridge pelvis  "in bed, Mod A initially to scoot forwards to EOB while seated but able to complete small scoots to EOB with CGA after increased time and trials.   · Patient completed Supine to Sit with moderate assistance and 2 persons  · Patient completed Sit to Supine with moderate assistance     Functional Mobility/Transfers:  · Patient completed Sit <> Stand Transfer with minimum assistance and of 2 persons  with  rolling walker   Functional Mobility: Pt with fair- to Poor+ dynamic seated and standing balance.  · Limited tolerance for seated EOB and stance 2/2 continued c/o "room spinning"/dizziness while seated EOB but BP taken with no current concerns for orthostatic hypotention    Activities of Daily Living:  · Lower Body Dressing: maximal assistance and total assistance for LE clothing management in supine for toileting  · Toileting: maximal assistance and total assistance for pericare via rolling      AMPAC 6 Click ADL: 15    Treatment & Education:  Pt educated on role of OT and POC.   Pt performing skills as listed above.     Patient left HOB elevated with all lines intact, call button in reach, bed alarm on and nsg notifiedEducation:      GOALS:   Multidisciplinary Problems     Occupational Therapy Goals        Problem: Occupational Therapy    Goal Priority Disciplines Outcome Interventions   Occupational Therapy Goal     OT, PT/OT Ongoing, Progressing    Description: Goals to be met by: 08/23     Patient will increase functional independence with ADLs by performing:    LE Dressing with Stand-by Assistance and Assistive Devices as needed.  Grooming while standing at sink with Stand-by Assistance.  Toileting from toilet with Supervision for hygiene and clothing management.   Toilet transfer to toilet with Stand-by Assistance.  Increased functional strength to WFL for ADLs.                     Time Tracking:     OT Date of Treatment: 07/25/22  OT Start Time: 1445  OT Stop Time: 1515  OT Total Time (min): 30 min    Billable " Minutes:Self Care/Home Management 15  Therapeutic Activity 15    OT/KIMO: OT     KIMO Visit Number: 0    7/25/2022

## 2022-07-25 NOTE — PROGRESS NOTES
[facility] IM Resident HO-I Progress Note    Admitting Team: [facility] Hospitalist Team B  Attending Physician: Dr. Maximus Pollack  Resident: Lisa  Intern: Bennie    Subjective:      Patient seen resting in bed and in no acute distress. He reports that he had multiple episodes of diarrhea over night. He also endorsed he has been having trouble swallowing liquids and is agreeable to SLP evaluating him today.  He denied nausea, vomiting, SOB, CP.     Objective:   Last 24 Hour Vital Signs:  BP  Min: 130/63  Max: 150/70  Temp  Av.8 °F (36.6 °C)  Min: 97 °F (36.1 °C)  Max: 98.6 °F (37 °C)  Pulse  Av.9  Min: 68  Max: 90  Resp  Av.8  Min: 17  Max: 18  SpO2  Av.9 %  Min: 94 %  Max: 98 %  Weight  Av.6 kg (151 lb 3.8 oz)  Min: 68.6 kg (151 lb 3.8 oz)  Max: 68.6 kg (151 lb 3.8 oz)  I/O last 3 completed shifts:  In: 150 [IV Piggyback:150]  Out: 550 [Urine:550]    Physical Exam  Vitals and nursing note reviewed.   Constitutional:       General: He is not in acute distress.     Appearance: He is not toxic-appearing.   HENT:      Head: Normocephalic and atraumatic.      Right Ear: External ear normal.      Left Ear: External ear normal.      Nose: Nose normal. No congestion or rhinorrhea.      Mouth/Throat:      Mouth: Mucous membranes are moist.      Pharynx: Oropharynx is clear.   Eyes:      Extraocular Movements: Extraocular movements intact.      Conjunctiva/sclera: Conjunctivae normal.   Cardiovascular:      Rate and Rhythm: Normal rate and regular rhythm.   Pulmonary:      Effort: Pulmonary effort is normal.      Breath sounds: No wheezing, rhonchi or rales.   Abdominal:      General: Abdomen is flat. Bowel sounds are normal. There is no distension.      Palpations: Abdomen is soft.      Tenderness: There is no abdominal tenderness.   Musculoskeletal:      Right lower leg: No edema.      Left lower leg: No edema.   Skin:     General: Skin is warm and dry.      Capillary Refill: Capillary refill takes less than  2 seconds.   Neurological:      General: No focal deficit present.      Mental Status: He is alert and oriented to person, place, and time.       Laboratory:  Recent Labs   Lab 07/22/22  1120 07/22/22  1402 07/22/22  1825 07/23/22  0625 07/24/22  0612   WBC 20.29*  --   --  13.58* 16.63*   HGB 12.8*  --   --  12.1* 12.7*   HCT 38.8* 41  --  36.6* 39.3*   *  --   --  502* 480*   *  --  137 140 138   K 2.6*  --  3.2* 2.8* 3.3*   CL 88*  --  93* 96 95   CREATININE 1.5*  --  1.2 1.0 0.9   BUN 27*  --  21 15 11   CO2 27  --  26 31* 30*   ALT 13  --   --  13 16   AST 18  --   --  22 26       Microbiology:  Blood clx showed no growth, pending cdif and resp clx     Other Results:  EKG (my interpretation):NSR improved QTc 540 (654 on initial EKG)    Radiology:  CT Abdomen Pelvis With Contrast   Final Result      Posterior basal areas of pulmonary infiltrate versus atelectasis.      Thickening of the sigmoid colon could be related to chronic muscle hypertrophy from diverticular disease.  Colitis cannot be entirely excluded.      No evidence of trauma.      Coronary artery calcifications.         Electronically signed by: Merlin Elliott MD   Date:    07/22/2022   Time:    14:39      X-Ray Chest AP Portable   Final Result      1. No acute cardiopulmonary process appreciated.         Electronically signed by: Sacha Singh   Date:    07/22/2022   Time:    13:10      CT Head Without Contrast   Final Result      Remote infarct left frontal anterior parietal medial hemisphere and cingulate gyrus.  This involves thinning of the corpus callosum.  No acute process seen.      Sinusitis change and evidence prior functional endoscopic sinus surgery.      No acute process seen.         Electronically signed by: Merlin Elliott MD   Date:    07/22/2022   Time:    11:48        Current Medications:     Infusions:   sodium chloride 0.9% 5 mL/hr at 07/23/22 0612        Scheduled:   atorvastatin  20 mg Oral Daily    enoxaparin   40 mg Subcutaneous Daily    insulin detemir U-100  10 Units Subcutaneous Daily    piperacillin-tazobactam (ZOSYN) IVPB  4.5 g Intravenous Q8H    scopolamine  1 patch Transdermal Q3 Days    vancomycin  125 mg Oral Q6H        PRN:  albuterol sulfate, dextrose 10%, dextrose 10%, glucagon (human recombinant), insulin aspart U-100, melatonin, sars-cov-2 (covid-19), sodium chloride 0.9%, sodium chloride 3%    Assessment:     Mr. Mateo Foreman is a 71 y.o. 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 Patient has a 1 week history of decreased PO 2/2 multiple episodes of diarrhea. Patient admitted for leukocytosis, acute diarrhea concerning for colitis and electrolyte defiency. Treating with zosyn and PO vanc. C diff EIA pending.    Plan:     Suspected colitis/ acute diarrhea x1wk/leuokcytosis   - multiple episodes of diarrhea x1wk, acute weight loss, nausea and decreased PO  - Initial WBC 20  - given 1 dose of vanc and zosyn in the ED, 1L IVF   - blood clx has no growth to date, c diff EIA pending    - treating with zosyn  And PO vanc      Hypokalemia- improving   - K 2.9 on admission and given 25 mEq of K in the ER   - EKG with prolonged qtc   - Replete PRN     Prolonged qtc  - ECG significant for qtc of 654; improved and repeat ekg 07/23 540  - avoid qtc prolonging meds      dysphagia    - reported difficulty swallowing liquids the last few days PTA  - bedside swallow assessment ordered; patinet tolerated well   -07/24 patient started having difficulty swallowing again and choking on water  -SLP consulted; follow up recs  - diabetic diet      Chronic bilateal LE lymphedema   - follows with wound care and Dr. Silveira  - consult wound care for treatment while inpatient      T2DM   -  on admission; A1c 14 (a1c: 11  5/25/22)  - hold at home metformin; did not previously take insulin at home   - SSI, 10 units of basal insulin daily     Anemia of chronic disease  - H/H on admission 12.8/38.8 (11.7/38 on 4/20)  - Iron/TIBC 40/198, ferritin 334, B12 352, folate 5     HLD   -continue at home Lipitor 20 mg   -am lipid panel ordered; chol 71, ,  HDL 22, LDL 25        Diet: diabetic   Code: FULL  PPx: Heparin   Dispo: pending clinical improvement       Alicia Alvarado MD  [facility] Internal Medicine HO-I [facility] Hospitalist Team B    [facility] Medicine Hospitalist Pager numbers:   [facility] Hospitalist Medicine Team A (Jamie/Michelle): [phone]  [facility] Hospitalist Medicine Team B (Elvin/Ranjeet):  [phone]

## 2022-07-25 NOTE — PROGRESS NOTES
Mateo Foreman #[id number] (CSN: [id number]) (72 y.o. M) (Adm: 07/22/22)  [facility] [id number]    PCP    TONY ROMERO    Date of Birth    1950       Demographics    Address:   [address] [place] [address] [place] [address]    Home Phone:   [phone]    Work Phone:       Mobile Phone:   [phone]              SSN:       Insurance:   [employer] MA[employer]ED MEDICARE    Marital Status:   Single    Buddhist:   No Buddhist                  Admission Dx    E87.6 Hypokalemia   K52.9 Colitis   D64.9 Normocytic anemia   R94.31 Prolonged Q-T interval on ECG   W19.XXXA Fall   D72.829 Leukocytosis, unspecified type   E11.65, Z79.4 Type 2 diabetes mellitus with hyperglycemia, with long-term current use of insulin   J18.9 Pneumonia due to infectious organism, unspecified laterality, unspecified part of lung       Chief Complaint    Complaint Comment   Fall Neighbors called EMS when they found patient on ground after a fall. Found patient to be awake, alert with c/o left sided back pain. Found patient to have . States compliant with meds.        Documents Filed to Patient    Power of  Living Will Clinical Unknown Study Attachment Consent Form ABN Waiver After Visit Summary Lab Result Scan Code Status Patient Portal Status    Not on File  Not on File  Not on File  Not on File  Filed  Not on File  Filed  Not on File  FULL [Updated on 07/22/22 1725]  Active       Admission Information      Current Information    Attending Provider Admitting Provider Admission Type Admission Status   MD Maximus Mock MD Emergency Confirmed Admission          Admission Date/Time Discharge Date Hospital Service Auth/Cert Status   07/22/22  10:51 AM  Internal Medicine Incomplete          Hospital Area Unit Room/Bed    [facility] MEDICAL SURGICAL UNIT ACUTE K522/K522 A                Hospital Account    Name Acct ID Class Status Primary Coverage   Mateo Foreman [id number] IP- Inpatient  [facility] MANAGED MEDICARE - [facility] [employer] 65            Guarantor Account (for Hospital Account #[id number])    Name Relation to Pt Service Area Active? Acct Type   Mateo Foreman Self OHSSA Yes Personal/Family   Address Phone     [address] [place] [address] [phone](H)              Coverage Information (for Hospital Account #[id number])    F/O Payor/Plan Precert    [employer] MANAGED MEDICARE/[employer] 65    Subscriber Subscriber #   Mateo Foreman [id number]   Address Phone   [address]   [place] [address] [phone]            Emergency Contact Information    Name: Norma Ro Relationship: Sister   Address:     City:  State:  Zip:  Phone:     Business phone:

## 2022-07-25 NOTE — PT/OT/SLP EVAL
"Physical Therapy Evaluation and treatment    Patient Name:  Mateo Foreman   MRN:  [id number]    Recommendations:     Discharge Recommendations:  nursing facility, skilled   Discharge Equipment Recommendations:  (defer to SNF)   Barriers to discharge: Decreased caregiver support and current medical and functional status    Assessment:     Mateo Foreman is a 72 y.o. male admitted with a medical diagnosis of C. difficile colitis.  He presents with the following impairments/functional limitations:  weakness, impaired endurance, impaired self care skills, impaired functional mobility, gait instability, impaired balance, pain, decreased safety awareness, decreased lower extremity function, decreased upper extremity function, impaired cognition, decreased ROM, impaired coordination, impaired fine motor, impaired skin, edema, impaired cardiopulmonary response to activity .  Able to get to EOB and stand with RW. Pt with continuous diarrhea so back to bed to get pt cleaned up    Rehab Prognosis: Good; patient would benefit from acute skilled PT services to address these deficits and reach maximum level of function.    Recent Surgery: * No surgery found *      Plan:     During this hospitalization, patient to be seen 3 x/week to address the identified rehab impairments via gait training, therapeutic activities, therapeutic exercises and progress toward the following goals:    · Plan of Care Expires:  08/24/22    Subjective     Chief Complaint: "My ass is burning"  "the room is spinning"  Patient/Family Comments/goals: wants to get OOB and get into chair  Pain/Comfort:  · Pain Rating 1:  (did not rate-)  · Location - Orientation 1: generalized  · Location 1:  ("hurts all over")  · Pain Addressed 1: Reposition  · Pain Rating Post-Intervention 1: 0/10    Patients cultural, spiritual, Presybeterian conflicts given the current situation: other (see comments) (none stated)    Living Environment:  Pt lives alone in 1 story house with 5 " "ERICKA and BHR.  Elevated toilet   Prior to admission, patients level of function was mod I for amb with RW .  Equipment used at home: walker, rolling.   Upon discharge, patient will have limited and intermittent assistance from sister and neighbor only .    Objective:     Communicated with nurse prior to session.  Patient found HOB elevated with telemetry, peripheral IV  upon PT entry to room.    General Precautions: Standard, fall, special contact   Orthopedic Precautions:N/A   Braces: N/A  Respiratory Status: Room air    Exams:  · Cognitive Exam:  Patient is oriented to Person, Place-hospital and Time  · Gross Motor Coordination:  WFL  · Postural Exam:  Patient presented with the following abnormalities:    · -       Rounded shoulders  · -       Forward head  · Sensation:    · -       Intact  light/touch BLE  · Skin Integrity/Edema:      · -       Skin integrity: bandage on post mid back.  non blanchable erytema to B buttocks, small lesion L buttocks-wound care nurse notified and present to see.  apply cream to area  · BLE ROM: Deficits: tight hamstring, ankles to neutral otherwise WFL  · BLE Strength: Deficits: grossly 3+  ·     Functional Mobility:  · Bed Mobility:     · Rolling :  Minimal assist  · Supine to Sit: moderate assistance of 2 persons  · Sit to Supine: moderate assistance  · Transfers:     · Sit to Stand:  minimum assistance and of 2 persons with rolling walker  · Gait: sidesteps toward HOB with RW and min A x 2.    · Balance: sit- F, standing- P    Therapeutic Activities and Exercises:   PT eval  Instruct in PT and POC  Review in functional mobility   Pt to EOB with c/o "room spinning" in sitting and standing .  BP taken and pt NOT orthostatic hypotension.    Stand with RW and sidesteps as above.  Pt began to have diarrhea and unable to get into BS chair.  Return to sit and supine.  Rolling in bed to get pt cleaned-pt able to barely clear buttocks from bed   assist of 2 to get pt positioned up in bed "       AM-PAC 6 CLICK MOBILITY  Total Score:11     Patient left HOB elevated with all lines intact, call button in reach, bed alarm on and nurse notified.    GOALS:   Multidisciplinary Problems     Physical Therapy Goals        Problem: Physical Therapy    Goal Priority Disciplines Outcome Goal Variances Interventions   Physical Therapy Goal     PT, PT/OT Ongoing, Progressing     Description: Goals to be met by: 8/10/22     Patient will increase functional independence with mobility by performin. Supine to sit with Contact Guard Assistance  2. Sit to supine with Contact Guard Assistance  3. Sit to stand transfer with Stand-by Assistance  4. Bed to chair transfer with Contact Guard Assistance using Rolling Walker  5. Gait  x 10 feet with Contact Guard Assistance using Rolling Walker.                      History:     Past Medical History:   Diagnosis Date    Anemia     Cataract     Chronic cough     Diabetes mellitus type II     GERD (gastroesophageal reflux disease)     Glaucoma     Hyperlipidemia        No past surgical history on file.    Time Tracking:     PT Received On: 22  PT Start Time: 1445     PT Stop Time: 1516  PT Total Time (min): 31 min     Billable Minutes: Evaluation 15 and Therapeutic Activity 16      2022

## 2022-07-25 NOTE — PLAN OF CARE
PT eval.  Able to get to EOB and stand with RW. Pt with continuous diarrhea so back to bed to get pt cleaned up.    REC:  SNF  DME:  refer to SNF    Problem: Physical Therapy  Goal: Physical Therapy Goal  Description: Goals to be met by: 8/10/22     Patient will increase functional independence with mobility by performin. Supine to sit with Contact Guard Assistance  2. Sit to supine with Contact Guard Assistance  3. Sit to stand transfer with Stand-by Assistance  4. Bed to chair transfer with Contact Guard Assistance using Rolling Walker  5. Gait  x 10 feet with Contact Guard Assistance using Rolling Walker.     Outcome: Ongoing, Progressing

## 2022-07-25 NOTE — PROGRESS NOTES
series    Dose 1  03/17/2021 (COVID-19, MRNA, LN-S, PF (MODERNA FULL 0.5 ML DOSE))      Dose 2  04/14/2021 (COVID-19, MRNA, LN-S, PF (MODERNA FULL 0.5 ML DOSE))      Booster dose  Recommended: 09/14/2021   Earliest valid: 09/14/2021         Booster dose  Recommended: 01/14/2022   Earliest valid: 01/14/2022

## 2022-07-25 NOTE — CONSULTS
"  [facility] - Med Surg  Adult Nutrition  Consult Note    SUMMARY     Recommendations    Recommendation:   1. Add ADA restrictions to diet.   2. Add Boost pudding and Boost Glucose Control BID.   3. Encourage intake at meals as tolerated.   4. RD to follow to monitor po intake    Goals:  Pt will tolerate diet with at least 50-75% intake at meals by RD follow up  Nutrition Goal Status: new  Communication of RD Recs: reviewed with RN    Assessment and Plan  Nutrition Problem  Inadequate energy intake    Related to (etiology):   Dysphagia, recent nausea, diarrhea    Signs and Symptoms (as evidenced by):   Pureed diet, 22lb weight loss x 4 months    Interventions:  Collaboration with other providers  Deehubs    Nutrition Diagnosis Status:   New     Malnutrition Assessment  Weight Loss (Malnutrition):  (12% x 4 months)   Reason for Assessment  Reason For Assessment: consult (poor oral intake)  Diagnosis:  (s/p fall, pneumonia)  Relevant Medical History: DM, GERD, HLD, anemia  General Information Comments: Admitted s/p fall. Noted pt with recent hx of diarrhea, nausea, weight loss and decreased po intake. Pt just started on dysphagia pureed diet per SLP recs. Master 15-skin intact. Noted 22lb weight loss x 4 months. Pt asleep at visit. Unable to assess NFPE 2/2 pt on contact isolation for C-diff.  Nutrition Discharge Planning: d/c diet per SLP    Nutrition Risk Screen  Nutrition Risk Screen: dysphagia or difficulty swallowing    Nutrition/Diet History  Food Preferences: no Temple or cultural food prefs identified  Spiritual, Cultural Beliefs, Scientologist Practices, Values that Affect Care: no  Factors Affecting Nutritional Intake: difficulty/impaired swallowing    Anthropometrics  Temp: 97.7 °F (36.5 °C)  Height Method: Stated  Height: 5' 7" (170.2 cm)  Height (inches): 67 in  Weight Method: Bed Scale  Weight: 68.6 kg (151 lb 3.8 oz)  Weight (lb): 151.24 lb  Ideal Body Weight (IBW), Male: 148 lb  % Ideal Body " Weight, Male (lb): 102.19 %  BMI (Calculated): 23.7  BMI Grade: 18.5-24.9 - normal  Usual Body Weight (UBW), k.5 kg (3/25)  % Usual Body Weight: 87.57  % Weight Change From Usual Weight: -12.61 %     Lab/Procedures/Meds  Pertinent Labs Reviewed: reviewed  Pertinent Labs Comments: K 3.3L, Ca 8.1L, Alb 2.1L  Pertinent Medications Reviewed: reviewed  Pertinent Medications Comments: insulin, enoxaparin, vancomycin    Estimated/Assessed Needs  Weight Used For Calorie Calculations: 68.6 kg (151 lb 3.8 oz)  Energy Calorie Requirements (kcal):  (30 kcal/kg)  Energy Need Method: Kcal/kg  Protein Requirements: 68-82g (1.0-1.2g/kg)  Weight Used For Protein Calculations: 68.6 kg (151 lb 3.8 oz)  Estimated Fluid Requirement Method: RDA Method  RDA Method (mL):   CHO Requirement: 225g    Nutrition Prescription Ordered  Current Diet Order: dysphagia pureed    Evaluation of Received Nutrient/Fluid Intake  I/O: 150/550  Energy Calories Required: not meeting needs  Protein Required: not meeting needs  Fluid Required: not meeting needs  Comments: LBM 7/25  % Intake of Estimated Energy Needs: Other: diet just started  % Meal Intake: Other: diet just started    Nutrition Risk  Level of Risk/Frequency of Follow-up:  (2xweekly)     Monitor and Evaluation  Food and Nutrient Intake: food and beverage intake  Food and Nutrient Adminstration: diet order  Physical Activity and Function: nutrition-related ADLs and IADLs  Anthropometric Measurements: weight  Biochemical Data, Medical Tests and Procedures: electrolyte and renal panel  Nutrition-Focused Physical Findings: extremities, muscles and bones, overall appearance     Nutrition Follow-Up  RD Follow-up?: Yes
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
## 2022-07-25 NOTE — CONSULTS
[facility] Surg  Wound Care    Patient Name:  Mateo Foreman   MRN:  [id number]  Date: 7/25/2022  Diagnosis: Fall    History:     Past Medical History:   Diagnosis Date    Anemia     Cataract     Chronic cough     Diabetes mellitus type II     GERD (gastroesophageal reflux disease)     Glaucoma     Hyperlipidemia        Social History     Socioeconomic History    Marital status: Single   Occupational History     Employer: [employer]   Tobacco Use    Smoking status: Former Smoker     Packs/day: 0.20     Years: 45.00     Pack years: 9.00     Types: Cigarettes    Smokeless tobacco: Never Used   Substance and Sexual Activity    Alcohol use: Not Currently    Drug use: No    Sexual activity: Not Currently     Social Determinants of Health     Financial Resource Strain: Low Risk     Difficulty of Paying Living Expenses: Not very hard   Food Insecurity: No Food Insecurity    Worried About Running Out of Food in the Last Year: Never true    Ran Out of Food in the Last Year: Never true   Transportation Needs: No Transportation Needs    Lack of Transportation (Medical): No    Lack of Transportation (Non-Medical): No   Physical Activity: Inactive    Days of Exercise per Week: 0 days    Minutes of Exercise per Session: 0 min   Stress: No Stress Concern Present    Feeling of Stress : Not at all   Social Connections: Unknown    Frequency of Communication with Friends and Family: Once a week    Frequency of Social Gatherings with Friends and Family: Never    Attends Jainism Services: Never    Active Member of Clubs or Organizations: No    Attends Club or Organization Meetings: Never   Housing Stability: Low Risk     Unable to Pay for Housing in the Last Year: No    Number of Places Lived in the Last Year: 1    Unstable Housing in the Last Year: No       Precautions:     Allergies as of 07/22/2022    (No Known Allergies)       WOC Assessment Details/Treatment     BLE- no open wounds noted after  removing tubigrip- applied pt home compressions stockings 25mm/hg        Bilateral heels intact- no redness        Back- brown/red blanching area 2x1cm related to friction- no open wound- reapplied mepilex dressing  Sacrum intact  Inner bilateral buttocks with scattered partial thickness skin breakdown related to moisture/friction/incontinence- pt with frequent incontinent stooling      Recommendations discussed with Dr. Alvarado and Nurse Alayna-  - Nursing to continue pressure injury prevention interventions to include mepilex border dressing to back to reduce friction/shear  - Pt to continue with compression therapy and f/u with Dr. Silveira    07/25/2022

## 2022-07-25 NOTE — PLAN OF CARE
Recommendation:   1. Add ADA restrictions to diet.   2. Add Boost pudding and Boost Glucose Control BID.   3. Encourage intake at meals as tolerated.   4. RD to follow to monitor po intake    Goals:  Pt will tolerate diet with at least 50-75% intake at meals by RD follow up  Nutrition Goal Status: new

## 2022-07-25 NOTE — PLAN OF CARE
Problem: Occupational Therapy  Goal: Occupational Therapy Goal  Description: Goals to be met by: 08/23     Patient will increase functional independence with ADLs by performing:    LE Dressing with Stand-by Assistance and Assistive Devices as needed.  Grooming while standing at sink with Stand-by Assistance.  Toileting from toilet with Supervision for hygiene and clothing management.   Toilet transfer to toilet with Stand-by Assistance.  Increased functional strength to WFL for ADLs.    Outcome: Ongoing, Progressing   Pt progressing towards OT goals. Cont OT POC

## 2022-07-25 NOTE — PLAN OF CARE
CM met with pt --pt lives alone - was found down on the floor - (est 3 hrs down)   Pt agrees with SNF plcmt post discharge  - ok with either [facility] or [facility]      CM called and spoke with pt's sister Norma Ro  [phone]    Ms. Agudelo also agrees with SNF plcmt post d/c - wants pt placed with [facility].     Referral sent per [facility] --- CM spoke with Christie  - she will evaluate pt   Nelly with [facility] informed -- she will process SNF request - routed to her today.     LOCET called in - 142 faxed to [facility], pt has a     pharmacy -- [facility] - able to afford meds.        sister Norma transports pt to all apts.      Future Appointments   Date Time Provider Department Center   7/27/2022 10:00 AM Se Silveira MD [facility] WOUND [facility]   9/27/2022  2:15 PM Tremaine Finch MD [facility] HEATHER [facility]        07/25/22 1138   Discharge Planning   Assessment Type Discharge Planning Brief Assessment   Resource/Environmental Concerns other (see comments)  (lives alone; limited support - found down at home)   Support Systems Family members  (Sister Norma Ro  [phone])   Equipment Currently Used at Home walker, rolling   Current Living Arrangements home/apartment/condo   Patient/Family Anticipates Transition to other (see comments)  (SNF)   Patient/Family Anticipated Services at Transition skilled nursing   DME Needed Upon Discharge  none   Discharge Plan A Skilled Nursing Facility

## 2022-07-26 LAB
ALBUMIN SERPL BCP-MCNC: 2.1 G/DL (ref 3.5–5.2)
ALP SERPL-CCNC: 84 U/L (ref 55–135)
ALT SERPL W/O P-5'-P-CCNC: 17 U/L (ref 10–44)
ANION GAP SERPL CALC-SCNC: 10 MMOL/L (ref 8–16)
ANION GAP SERPL CALC-SCNC: 12 MMOL/L (ref 8–16)
AST SERPL-CCNC: 31 U/L (ref 10–40)
BASOPHILS # BLD AUTO: 0.07 K/UL (ref 0–0.2)
BASOPHILS NFR BLD: 0.7 % (ref 0–1.9)
BILIRUB SERPL-MCNC: 0.3 MG/DL (ref 0.1–1)
BUN SERPL-MCNC: 7 MG/DL (ref 8–23)
BUN SERPL-MCNC: 9 MG/DL (ref 8–23)
CALCIUM SERPL-MCNC: 8.1 MG/DL (ref 8.7–10.5)
CALCIUM SERPL-MCNC: 8.1 MG/DL (ref 8.7–10.5)
CHLORIDE SERPL-SCNC: 94 MMOL/L (ref 95–110)
CHLORIDE SERPL-SCNC: 96 MMOL/L (ref 95–110)
CO2 SERPL-SCNC: 30 MMOL/L (ref 23–29)
CO2 SERPL-SCNC: 32 MMOL/L (ref 23–29)
CREAT SERPL-MCNC: 0.9 MG/DL (ref 0.5–1.4)
CREAT SERPL-MCNC: 0.9 MG/DL (ref 0.5–1.4)
DIFFERENTIAL METHOD: ABNORMAL
EOSINOPHIL # BLD AUTO: 0.1 K/UL (ref 0–0.5)
EOSINOPHIL NFR BLD: 1.1 % (ref 0–8)
ERYTHROCYTE [DISTWIDTH] IN BLOOD BY AUTOMATED COUNT: 13 % (ref 11.5–14.5)
EST. GFR  (AFRICAN AMERICAN): >60 ML/MIN/1.73 M^2
EST. GFR  (AFRICAN AMERICAN): >60 ML/MIN/1.73 M^2
EST. GFR  (NON AFRICAN AMERICAN): >60 ML/MIN/1.73 M^2
EST. GFR  (NON AFRICAN AMERICAN): >60 ML/MIN/1.73 M^2
GLUCOSE SERPL-MCNC: 272 MG/DL (ref 70–110)
GLUCOSE SERPL-MCNC: 89 MG/DL (ref 70–110)
HCT VFR BLD AUTO: 41.2 % (ref 40–54)
HGB BLD-MCNC: 13 G/DL (ref 14–18)
IMM GRANULOCYTES # BLD AUTO: 0.16 K/UL (ref 0–0.04)
IMM GRANULOCYTES NFR BLD AUTO: 1.5 % (ref 0–0.5)
LYMPHOCYTES # BLD AUTO: 1.1 K/UL (ref 1–4.8)
LYMPHOCYTES NFR BLD: 10.4 % (ref 18–48)
MCH RBC QN AUTO: 28 PG (ref 27–31)
MCHC RBC AUTO-ENTMCNC: 31.6 G/DL (ref 32–36)
MCV RBC AUTO: 89 FL (ref 82–98)
MONOCYTES # BLD AUTO: 0.7 K/UL (ref 0.3–1)
MONOCYTES NFR BLD: 6.9 % (ref 4–15)
NEUTROPHILS # BLD AUTO: 8.5 K/UL (ref 1.8–7.7)
NEUTROPHILS NFR BLD: 79.4 % (ref 38–73)
NRBC BLD-RTO: 0 /100 WBC
PLATELET # BLD AUTO: 531 K/UL (ref 150–450)
PMV BLD AUTO: 9.2 FL (ref 9.2–12.9)
POCT GLUCOSE: 218 MG/DL (ref 70–110)
POCT GLUCOSE: 237 MG/DL (ref 70–110)
POCT GLUCOSE: 280 MG/DL (ref 70–110)
POCT GLUCOSE: 304 MG/DL (ref 70–110)
POCT GLUCOSE: 81 MG/DL (ref 70–110)
POTASSIUM SERPL-SCNC: 2.7 MMOL/L (ref 3.5–5.1)
POTASSIUM SERPL-SCNC: 3.8 MMOL/L (ref 3.5–5.1)
PROT SERPL-MCNC: 5.7 G/DL (ref 6–8.4)
RBC # BLD AUTO: 4.64 M/UL (ref 4.6–6.2)
SODIUM SERPL-SCNC: 134 MMOL/L (ref 136–145)
SODIUM SERPL-SCNC: 140 MMOL/L (ref 136–145)
WBC # BLD AUTO: 10.74 K/UL (ref 3.9–12.7)

## 2022-07-26 PROCEDURE — 92526 ORAL FUNCTION THERAPY: CPT

## 2022-07-26 PROCEDURE — 25000003 PHARM REV CODE 250

## 2022-07-26 PROCEDURE — 99900035 HC TECH TIME PER 15 MIN (STAT)

## 2022-07-26 PROCEDURE — 80053 COMPREHEN METABOLIC PANEL: CPT | Performed by: STUDENT IN AN ORGANIZED HEALTH CARE EDUCATION/TRAINING PROGRAM

## 2022-07-26 PROCEDURE — 97535 SELF CARE MNGMENT TRAINING: CPT

## 2022-07-26 PROCEDURE — 27000207 HC ISOLATION

## 2022-07-26 PROCEDURE — 97110 THERAPEUTIC EXERCISES: CPT | Mod: CQ

## 2022-07-26 PROCEDURE — 63600175 PHARM REV CODE 636 W HCPCS: Performed by: STUDENT IN AN ORGANIZED HEALTH CARE EDUCATION/TRAINING PROGRAM

## 2022-07-26 PROCEDURE — 25000003 PHARM REV CODE 250: Performed by: STUDENT IN AN ORGANIZED HEALTH CARE EDUCATION/TRAINING PROGRAM

## 2022-07-26 PROCEDURE — 51798 US URINE CAPACITY MEASURE: CPT

## 2022-07-26 PROCEDURE — 85025 COMPLETE CBC W/AUTO DIFF WBC: CPT | Performed by: STUDENT IN AN ORGANIZED HEALTH CARE EDUCATION/TRAINING PROGRAM

## 2022-07-26 PROCEDURE — 51701 INSERT BLADDER CATHETER: CPT

## 2022-07-26 PROCEDURE — 11000001 HC ACUTE MED/SURG PRIVATE ROOM

## 2022-07-26 PROCEDURE — 36415 COLL VENOUS BLD VENIPUNCTURE: CPT | Performed by: STUDENT IN AN ORGANIZED HEALTH CARE EDUCATION/TRAINING PROGRAM

## 2022-07-26 PROCEDURE — 80048 BASIC METABOLIC PNL TOTAL CA: CPT | Mod: XB | Performed by: STUDENT IN AN ORGANIZED HEALTH CARE EDUCATION/TRAINING PROGRAM

## 2022-07-26 PROCEDURE — 94761 N-INVAS EAR/PLS OXIMETRY MLT: CPT

## 2022-07-26 PROCEDURE — 97530 THERAPEUTIC ACTIVITIES: CPT | Mod: CQ

## 2022-07-26 PROCEDURE — 97530 THERAPEUTIC ACTIVITIES: CPT

## 2022-07-26 RX ORDER — POTASSIUM CHLORIDE 20 MEQ/1
40 TABLET, EXTENDED RELEASE ORAL ONCE
Status: DISCONTINUED | OUTPATIENT
Start: 2022-07-26 | End: 2022-07-26

## 2022-07-26 RX ORDER — POTASSIUM CHLORIDE 7.45 MG/ML
10 INJECTION INTRAVENOUS
Status: COMPLETED | OUTPATIENT
Start: 2022-07-26 | End: 2022-07-26

## 2022-07-26 RX ORDER — TAMSULOSIN HYDROCHLORIDE 0.4 MG/1
0.4 CAPSULE ORAL DAILY
Status: DISCONTINUED | OUTPATIENT
Start: 2022-07-26 | End: 2022-07-28 | Stop reason: HOSPADM

## 2022-07-26 RX ADMIN — ATORVASTATIN CALCIUM 20 MG: 20 TABLET, FILM COATED ORAL at 08:07

## 2022-07-26 RX ADMIN — Medication 125 MG: at 05:07

## 2022-07-26 RX ADMIN — POTASSIUM CHLORIDE 10 MEQ: 7.46 INJECTION, SOLUTION INTRAVENOUS at 01:07

## 2022-07-26 RX ADMIN — Medication 125 MG: at 11:07

## 2022-07-26 RX ADMIN — POTASSIUM BICARBONATE 25 MEQ: 978 TABLET, EFFERVESCENT ORAL at 09:07

## 2022-07-26 RX ADMIN — INSULIN ASPART 6 UNITS: 100 INJECTION, SOLUTION INTRAVENOUS; SUBCUTANEOUS at 05:07

## 2022-07-26 RX ADMIN — POTASSIUM CHLORIDE 10 MEQ: 7.46 INJECTION, SOLUTION INTRAVENOUS at 09:07

## 2022-07-26 RX ADMIN — Medication 6 MG: at 11:07

## 2022-07-26 RX ADMIN — Medication 125 MG: at 12:07

## 2022-07-26 RX ADMIN — TAMSULOSIN HYDROCHLORIDE 0.4 MG: 0.4 CAPSULE ORAL at 08:07

## 2022-07-26 RX ADMIN — POTASSIUM CHLORIDE 10 MEQ: 7.46 INJECTION, SOLUTION INTRAVENOUS at 11:07

## 2022-07-26 RX ADMIN — INSULIN DETEMIR 10 UNITS: 100 INJECTION, SOLUTION SUBCUTANEOUS at 08:07

## 2022-07-26 RX ADMIN — ENOXAPARIN SODIUM 40 MG: 100 INJECTION SUBCUTANEOUS at 05:07

## 2022-07-26 RX ADMIN — INSULIN ASPART 4 UNITS: 100 INJECTION, SOLUTION INTRAVENOUS; SUBCUTANEOUS at 12:07

## 2022-07-26 NOTE — PLAN OF CARE
snf placement efforts continue   referral sent per Leela to [facility] - --   142/Passr/Locet sent per Leela     CM spoke with Christie  in Intake - [facility]  [phone] -- she is getting ready to review pt's information.          07/26/22 1154   Post-Acute Status   Post-Acute Authorization Placement   Post-Acute Placement Status Referrals Sent   Discharge Plan   Discharge Plan A Skilled Nursing Facility

## 2022-07-26 NOTE — PLAN OF CARE
Patient is awake and alert.  Angry and screams at times.  Up in chair at this time.  Received 3 K+riders.  Bladder scan bladder at 1200 with 217ml in bladder with no voiding since 0600, Dr. Servin notified.  Order to do bladder scan at 1800.  Has three loose bowel movements this shift.  Penile glans cleansed as per MD's orders.  Barrier cream applied to sacral as per MD's orders.  Pure wick applied.  Safety is maintained.   Will continue to monitor.

## 2022-07-26 NOTE — PT/OT/SLP PROGRESS
Physical Therapy Treatment    Patient Name:  Mateo Foreman   MRN:  [id number]    Recommendations:     Discharge Recommendations:  nursing facility, skilled   Discharge Equipment Recommendations:  (defer to next level of care)   Barriers to discharge: decreased caregiver support/decreased functional mobility    Assessment:     Mateo Foreman is a 72 y.o. male admitted with a medical diagnosis of C. difficile colitis.  He presents with the following impairments/functional limitations:  weakness, impaired endurance, impaired self care skills, impaired functional mobility, gait instability, impaired balance, decreased coordination, decreased upper extremity function, decreased lower extremity function, decreased safety awareness, pain, decreased ROM, impaired coordination, edema, impaired cardiopulmonary response to activity.  Pt would continue to benefit from P.T. To address impairments listed above.  .    Rehab Prognosis: Fair; patient would benefit from acute skilled PT services to address these deficits and reach maximum level of function.    Recent Surgery: * No surgery found *      Plan:     During this hospitalization, patient to be seen 3 x/week to address the identified rehab impairments via gait training, therapeutic activities, therapeutic exercises and progress toward the following goals:    · Plan of Care Expires:  08/24/22    Subjective       Patient/Family Comments/goals: Pt agreed to tx.  Pain/Comfort:  · Pain Rating 1:  (c/o right foot great toe pain;  did not rate)  · Pain Addressed 1: Reposition, Nurse notified  · Pain Rating Post-Intervention 1:  (as above)      Objective:     Communicated with RN(Melody) prior to session.  Patient found supine with peripheral IV, telemetry, PureWick upon PT entry to room.     General Precautions: Standard, fall, special contact   Orthopedic Precautions:N/A   Braces:    Respiratory Status: Room air     Functional Mobility:  · Bed Mobility:     · Rolling Left:  stand by  assistance, contact guard assistance and with b/r for assist.  x 2  · Rolling Right: stand by assistance, contact guard assistance and with b/r for assist.  x 2  · Scooting: total assistance, of 2 persons and up to HOB using drawsheet      AM-PAC 6 CLICK MOBILITY  Turning over in bed (including adjusting bedclothes, sheets and blankets)?: 3  Sitting down on and standing up from a chair with arms (e.g., wheelchair, bedside commode, etc.): 2  Moving from lying on back to sitting on the side of the bed?: 2  Moving to and from a bed to a chair (including a wheelchair)?: 2  Need to walk in hospital room?: 1  Climbing 3-5 steps with a railing?: 1  Basic Mobility Total Score: 11       Therapeutic Activities and Exercises:  Bed mobility as above to assist to with hygiene needs secondary to BM with linen, diaper, and bedpad change.  Pt encouraged to sit EOB, but declined secondary to fatigue, but agreed to BLE therex in bed: AP, SAQ, hip ABD/ADD, heelslides, and glut sets 10 x 2 reps with Farhan/CGA for proper technique and increased ROM.  Pt positioned in bed with HOB elevated at end of tx and lunch tray in front.      Patient left supine with HOB elevated with all lines intact, call button in reach, bed alarm on and RN notified..    GOALS:   Multidisciplinary Problems     Physical Therapy Goals        Problem: Physical Therapy    Goal Priority Disciplines Outcome Goal Variances Interventions   Physical Therapy Goal     PT, PT/OT Ongoing, Progressing     Description: Goals to be met by: 8/10/22     Patient will increase functional independence with mobility by performin. Supine to sit with Contact Guard Assistance  2. Sit to supine with Contact Guard Assistance  3. Sit to stand transfer with Stand-by Assistance  4. Bed to chair transfer with Contact Guard Assistance using Rolling Walker  5. Gait  x 10 feet with Contact Guard Assistance using Rolling Walker.                      Time Tracking:     PT Received On:  07/26/22  PT Start Time: 1201     PT Stop Time: 1225  PT Total Time (min): 24 min     Billable Minutes: Therapeutic Activity 13 and Therapeutic Exercise 12    Treatment Type: Treatment  PT/PTA: PTA     PTA Visit Number: 1     07/26/2022

## 2022-07-26 NOTE — PT/OT/SLP PROGRESS
Occupational Therapy   Treatment    Name: Mateo Foreman  MRN: [id number]  Admitting Diagnosis:  C. difficile colitis       Recommendations:     Discharge Recommendations: nursing facility, skilled  Discharge Equipment Recommendations:  shower chair  Barriers to discharge:  Decreased caregiver support    Assessment:     Mateo Foreman is a 72 y.o. male with a medical diagnosis of C. difficile colitis.  He presents with continued dizziness with postural changes but improved tolerance for seated and standing activities this session. Performance deficits affecting function are weakness, impaired endurance, impaired self care skills, impaired functional mobility, decreased coordination, impaired balance, gait instability, decreased upper extremity function, decreased lower extremity function, decreased safety awareness, impaired skin, impaired coordination, decreased ROM, impaired cardiopulmonary response to activity.     Rehab Prognosis:  Good; patient would benefit from acute skilled OT services to address these deficits and reach maximum level of function.       Plan:     Patient to be seen 3 x/week to address the above listed problems via therapeutic activities, therapeutic exercises  · Plan of Care Expires: 08/23/22  · Plan of Care Reviewed with: patient    Subjective     Pain/Comfort:  Pain Rating 1: 0/10    Objective:     Communicated with: nsg prior to session.  Patient found HOB elevated with bed alarm, telemetry, peripheral IV, PureWick upon OT entry to room.    General Precautions: Standard, fall, pureed diet   Orthopedic Precautions:N/A   Braces: N/A  Respiratory Status: Room air     Occupational Performance:     Bed Mobility:    · Patient completed Rolling/Turning to Left with  contact guard assistance  · Patient completed Rolling/Turning to Right with contact guard assistance  · Patient completed Scooting/Bridging with contact guard assistance  · Patient completed Supine to Sit with minimum  assistance    Functional Mobility/Transfers:  · Patient completed Sit <> Stand Transfer with minimum assistance  with  rolling walker   · Patient completed Bed <> Chair Transfer using Stand Pivot technique with contact guard assistance and minimum assistance with rolling walker  Functional Mobility: Pt with fair to fair- dynamic seated and standing balance.    Activities of Daily Living:  · Lower Body Dressing: maximal assistance to don/doff B socks supine with HOB elevated  · Toileting: maximal assistance for pericare via rolling technique      [facility] 6 Click ADL: 15    Treatment & Education:  Pt educated on role of OT and POC.   Pt performing skills as listed above.      Patient left up in chair with all lines intact, call button in reach, chair alarm on and nsg notifiedEducation:      GOALS:   Multidisciplinary Problems     Occupational Therapy Goals        Problem: Occupational Therapy    Goal Priority Disciplines Outcome Interventions   Occupational Therapy Goal     OT, PT/OT Ongoing, Progressing    Description: Goals to be met by: 08/23     Patient will increase functional independence with ADLs by performing:    LE Dressing with Stand-by Assistance and Assistive Devices as needed.  Grooming while standing at sink with Stand-by Assistance.  Toileting from toilet with Supervision for hygiene and clothing management.   Toilet transfer to toilet with Stand-by Assistance.  Increased functional strength to WFL for ADLs.                     Time Tracking:     OT Date of Treatment: 07/26/22  OT Start Time: 1502  OT Stop Time: 1536  OT Total Time (min): 34 min    Billable Minutes:Self Care/Home Management 17  Therapeutic Activity 17    OT/KIMO: OT     KIMO Visit Number: 0    7/26/2022

## 2022-07-26 NOTE — PLAN OF CARE
Problem: Physical Therapy  Goal: Physical Therapy Goal  Description: Goals to be met by: 8/10/22     Patient will increase functional independence with mobility by performin. Supine to sit with Contact Guard Assistance  2. Sit to supine with Contact Guard Assistance  3. Sit to stand transfer with Stand-by Assistance  4. Bed to chair transfer with Contact Guard Assistance using Rolling Walker  5. Gait  x 10 feet with Contact Guard Assistance using Rolling Walker.     Outcome: Ongoing, Progressing   Continue working toward goals.

## 2022-07-26 NOTE — NURSING
"0800, Received phone call from Lab stating that patient's Potassium Level is critical at 2.7.  Dr. Green on floor and notified of critical level.  Patient is given Kdur as ordered per MD.  Patient states, " the pills are too big and I am going to choke" and refused to take the pills. Dr. Servin on floor and notified.  Will order Disintegrating Potassium.  Will continue to monitor.  "

## 2022-07-26 NOTE — NURSING
Patient unable to void. Bladder scan showed over 450. In and out 500cc. A moderate mal odorous drainage came out of shaft. MD notified.

## 2022-07-26 NOTE — PLAN OF CARE
Problem: Occupational Therapy  Goal: Occupational Therapy Goal  Description: Goals to be met by: 08/23     Patient will increase functional independence with ADLs by performing:    LE Dressing with Stand-by Assistance and Assistive Devices as needed.  Grooming while standing at sink with Stand-by Assistance.  Toileting from toilet with Supervision for hygiene and clothing management.   Toilet transfer to toilet with Stand-by Assistance.  Increased functional strength to WFL for ADLs.    Outcome: Ongoing, Progressing   Pt progressing well towards OT goals. Pt reported dizziness while rolling resulting in fear of falling while rolling B'ly but no dizziness noted when sitting EOB, slight dizziness noted with standing with some fear of falling but overall pt tolerated t/f bed>bedside chair well.

## 2022-07-26 NOTE — PROGRESS NOTES
"[facility] IM Resident HO-I Progress Note    Admitting Team: [facility] Hospitalist Team B  Attending Physician: Dr. Maximus Pollack  Resident: Lisa  Intern: Bennie    Subjective:    Per chart review, patient unable to void and bladder scan showed over 450 cc. A mal odorous drainage from the shaft was also noted.     At bedside, patient resting and did not appear in any distress.  He reports difficulty urinating and a burning sensation around the glans of his penis. He stated this is new and usually is able to void normally. He denied abdominal pain, pelvic pain, scrotum pain, nausea, vomiting, SOB, CP.     Objective:   Last 24 Hour Vital Signs:  BP  Min: 133/63  Max: 163/67  Temp  Av °F (36.7 °C)  Min: 97.5 °F (36.4 °C)  Max: 98.4 °F (36.9 °C)  Pulse  Av.3  Min: 60  Max: 77  Resp  Av.2  Min: 17  Max: 20  SpO2  Av.9 %  Min: 95 %  Max: 99 %  Height  Av' 7" (170.2 cm)  Min: 5' 7" (170.2 cm)  Max: 5' 7" (170.2 cm)  Weight  Av.2 kg (150 lb 3.9 oz)  Min: 67.7 kg (149 lb 4 oz)  Max: 68.6 kg (151 lb 3.8 oz)  I/O last 3 completed shifts:  In: 1696.6 [P.O.:860; I.V.:179.2; IV Piggyback:657.5]  Out: 500 [Urine:500]    Physical Exam  Vitals and nursing note reviewed. Exam conducted with a chaperone present.   Constitutional:       General: He is not in acute distress.     Appearance: He is not toxic-appearing.   HENT:      Head: Normocephalic and atraumatic.      Right Ear: External ear normal.      Left Ear: External ear normal.      Nose: Nose normal. No congestion or rhinorrhea.      Mouth/Throat:      Mouth: Mucous membranes are moist.      Pharynx: Oropharynx is clear.   Eyes:      Extraocular Movements: Extraocular movements intact.      Conjunctiva/sclera: Conjunctivae normal.   Cardiovascular:      Rate and Rhythm: Normal rate and regular rhythm.   Pulmonary:      Effort: Pulmonary effort is normal.      Breath sounds: No wheezing, rhonchi or rales.   Abdominal:      General: Abdomen is flat. Bowel " sounds are normal. There is no distension.      Palpations: Abdomen is soft.      Tenderness: There is no abdominal tenderness.   Genitourinary:     Penis: Uncircumcised. Erythema (diffusely around glans ) and discharge (white, thick ) present. No swelling or lesions.       Testes:         Right: Tenderness or swelling not present.         Left: Tenderness or swelling not present.   Musculoskeletal:      Right lower leg: No edema.      Left lower leg: No edema.   Skin:     General: Skin is warm and dry.      Capillary Refill: Capillary refill takes less than 2 seconds.   Neurological:      General: No focal deficit present.      Mental Status: He is alert and oriented to person, place, and time.       Laboratory:  Recent Labs   Lab 07/24/22  0612 07/25/22  0807 07/26/22  0658   WBC 16.63* 14.79* 10.74   HGB 12.7* 13.0* 13.0*   HCT 39.3* 40.8 41.2   * 548* 531*    140 140   K 3.3* 2.8* 2.7*   CL 95 95 96   CREATININE 0.9 1.0 0.9   BUN 11 9 7*   CO2 30* 32* 32*   ALT 16 12 17   AST 26 30 31       Microbiology:  Blood clx showed no growth, pending cdif and resp clx     Other Results:  EKG (my interpretation):NSR improved QTc 540 (654 on initial EKG)    Radiology:  CT Abdomen Pelvis With Contrast   Final Result      Posterior basal areas of pulmonary infiltrate versus atelectasis.      Thickening of the sigmoid colon could be related to chronic muscle hypertrophy from diverticular disease.  Colitis cannot be entirely excluded.      No evidence of trauma.      Coronary artery calcifications.         Electronically signed by: Merlin Elliott MD   Date:    07/22/2022   Time:    14:39      X-Ray Chest AP Portable   Final Result      1. No acute cardiopulmonary process appreciated.         Electronically signed by: Sacha Singh   Date:    07/22/2022   Time:    13:10      CT Head Without Contrast   Final Result      Remote infarct left frontal anterior parietal medial hemisphere and cingulate gyrus.  This  involves thinning of the corpus callosum.  No acute process seen.      Sinusitis change and evidence prior functional endoscopic sinus surgery.      No acute process seen.         Electronically signed by: Merlin Elliott MD   Date:    07/22/2022   Time:    11:48        Current Medications:     Infusions:   sodium chloride 0.9% Stopped (07/25/22 1436)        Scheduled:   atorvastatin  20 mg Oral Daily    enoxaparin  40 mg Subcutaneous Daily    insulin detemir U-100  10 Units Subcutaneous Daily    scopolamine  1 patch Transdermal Q3 Days    tamsulosin  0.4 mg Oral Daily    vancomycin  125 mg Oral Q6H        PRN:  albuterol sulfate, dextrose 10%, dextrose 10%, glucagon (human recombinant), insulin aspart U-100, melatonin, sars-cov-2 (covid-19), sodium chloride 0.9%, sodium chloride 3%    Assessment:     Mr. Mateo Foreman is a 71 y.o. 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 Patient has a 1 week history of decreased PO 2/2 multiple episodes of diarrhea. Patient admitted for leukocytosis, acute diarrhea concerning for colitis and electrolyte defiency. C diff EIA positive and being treated with PO vanc. Over the last 24 hours, patient was noted to have urinary retention and required an in and out cath; upon further exanimation the glans penis had erythema and discharge present.     Plan:     Suspected colitis/ acute diarrhea x1wk/leuokcytosis   - multiple episodes of diarrhea x1wk, acute weight loss, nausea and decreased PO  - Initial WBC 20; down trending   - given 1 dose of vanc and zosyn in the ED, 1L IVF   - blood clx has no growth to date  - c diff EIA postitive; treating with PO vanc   - stopped  Zosyn 07/25      Hypokalemia   - K 2.9 on admission and given 25 mEq of K in the ER   - EKG with prolonged qtc    - Replete PRN;patient has needed daily     Urinary retention  -patient unable to void; bladder scan showed over 450cc  -required in and out cath  -malodorus and thick drainage present around the glans penis  -UA ordered  -flomax started   -daily cleaning of the area to promote good hygiene; add clotrimazole if symptoms    continue  -if patient continues to retain will consider urology consult      Prolonged qtc  - ECG significant for qtc of 654; improved and repeat ekg 07/23 540  - avoid qtc prolonging meds      dysphagia    - reported difficulty swallowing liquids the last few days PTA  - bedside swallow assessment ordered; patinet tolerated well   -07/24 nurses concerned for worsening dysphagia   -SLP consulted; started on PUREED diet      Chronic bilateal LE lymphedema   - follows with wound care and Dr. Silveira  - wound care saw patient 07/26; recs nursing to continue pressure injury prevention   interventions to include mepilex border dressing to back to reduce friction/shear  -Pt to continue with compression therapy and f/u with Dr. Silveira     T2DM   -  on admission; A1c 14 (a1c: 11 5/25/22)  - hold at home metformin; did not previously take insulin at home   - SSI, 10 units of basal insulin daily     Anemia of chronic disease  - H/H on admission 12.8/38.8 (11.7/38 on 4/20)  - Iron/TIBC 40/198, ferritin 334, B12 352, folate 5     HLD   -continue at home Lipitor 20 mg   -am lipid panel ordered; chol 71, ,  HDL 22, LDL 25        Diet: PUREED  Code: FULL  PPx: Heparin   Dispo: pending clinical improvement       Alicia Alvarado MD  [facility] HO-I [facility] Hospitalist Team B    [facility] Medicine Hospitalist Pager numbers:   [facility] Hospitalist Medicine Team A (Jamie/Michelle): [phone]  [facility] Hospitalist Medicine Team B (Elvin/Ranjeet):  [phone]

## 2022-07-26 NOTE — PT/OT/SLP PROGRESS
"Speech Language Pathology Treatment    Patient Name:  Mateo Foreman   MRN:  [id number]  Admitting Diagnosis: C. difficile colitis    Recommendations:                 Diet recommendations:  Puree, Thin, boost in between meals   Aspiration Precautions: upright for all meals, 1 bite/sip at a time, Alternating bites/sips, Avoid talking while eating, Eliminate distractions, Feed only when awake/alert, Frequent oral care, Needs feeding assist for meals,  Monitor for s/s of aspiration, Remain upright 30 minutes post meal and Small bites/sips   General Precautions: Standard, pureed diet  Communication strategies:  none       Subjective     Pt seen in room for session. No family present. He is complaining of the IV in his arm.   Patient goals: "I need to stay on the pureed."    Pain/Comfort:  · Pain Rating 1: 0/10    Respiratory Status: room air     Objective:     Has the patient been evaluated by SLP for swallowing?   Yes  Keep patient NPO? No   Current Respiratory Status:    Room air     Swallowing: Pt seen at bedside, he is awake and alert. He is complaining of IV in arm and stating everything here does not taste good.   Pt inquired about SLP purpose and writer explained. Pt agreed to limited trials- stating he is not hungry at the moment. He did consumed approx 40% of breakfast meal and all of ensure was drunk.   Pt tolerated sips of water with no issues and timely oral phase as well as good oral motor control with applesauce.   No coughing or choking across trials. Soft solids were deferred per patient request- states his dentures are not fitting well and he cannot chew without them.   Pt requested to stay on pureed for now.     Assessment:     Mateo Foreman is a 72 y.o. male admitted with C. difficile colitis who presents  with an SLP diagnosis of no instances of airway penetration or aspiration per bedside assessment on pureed and thin liquid trials.     Goals:   Multidisciplinary Problems     SLP Goals     Not on file "                Plan:       · Plan of Care expires:  08/23/22  · Plan of Care reviewed with:  patient   · SLP Follow-Up:  No       Discharge recommendations:  nursing facility, skilled   Barriers to Discharge:  none    Time Tracking:     SLP Treatment Date:   07/26/22  Speech Start Time:  0940  Speech Stop Time:  0952     Speech Total Time (min):  12 min    Billable Minutes: Treatment Swallowing Dysfunction 12    07/26/2022

## 2022-07-26 NOTE — MEDICAL/APP STUDENT
"[facility] Medicine Progress Note    Primary Team: \[facility]\"" Hospitalist Team B  Attending Physician: Maximus Pollack MD  Resident: Gareth  Intern: Christiano    Subjective:      Per chart review, pt was not voiding. Once checked, he had a moderate malodorous drainage coming out from the shaft.      Pt seen at bedside. Endorses feeling better than yesterday, but with some burning sensation on tip of his shaft when touched.       Objective:     Last 24 Hour Vital Signs:  BP  Min: 130/63  Max: 145/65  Temp  Av °F (36.7 °C)  Min: 97.5 °F (36.4 °C)  Max: 98.4 °F (36.9 °C)  Pulse  Av.3  Min: 62  Max: 77  Resp  Av.8  Min: 17  Max: 18  SpO2  Av.7 %  Min: 95 %  Max: 98 %  Height  Av' 7" (170.2 cm)  Min: 5' 7" (170.2 cm)  Max: 5' 7" (170.2 cm)  Weight  Av.2 kg (150 lb 3.9 oz)  Min: 67.7 kg (149 lb 4 oz)  Max: 68.6 kg (151 lb 3.8 oz)  I/O last 3 completed shifts:  In: 1696.6 [P.O.:860; I.V.:179.2; IV Piggyback:657.5]  Out: 500 [Urine:500]    Physical Examination:  Physical Exam  Exam conducted with a chaperone present.   Constitutional:       General: He is awake.      Appearance: Normal appearance. He is well-developed and normal weight.   HENT:      Head: Normocephalic and atraumatic.   Eyes:      Extraocular Movements: Extraocular movements intact.      Conjunctiva/sclera: Conjunctivae normal.   Cardiovascular:      Rate and Rhythm: Normal rate and regular rhythm.      Heart sounds: Normal heart sounds.   Pulmonary:      Effort: Pulmonary effort is normal.      Breath sounds: Normal breath sounds.   Genitourinary:     Pubic Area: No rash.       Penis: Uncircumcised. Erythema present.       Testes: Normal.      Comments: Erythema with white spots found on the glans penis.   Neurological:      General: No focal deficit present.      Mental Status: He is alert, oriented to person, place, and time and easily aroused.   Psychiatric:         Attention and Perception: Attention and perception normal.         " Mood and Affect: Mood and affect normal.         Speech: Speech normal.         Behavior: Behavior normal. Behavior is cooperative.         Thought Content: Thought content normal.         Cognition and Memory: Cognition and memory normal.         Judgment: Judgment normal.         Laboratory:  Laboratory Data Reviewed: yes  Pertinent Findings:  Recent Labs   Lab 07/24/22  0612 07/25/22  0807 07/26/22  0658   WBC 16.63* 14.79* 10.74   HGB 12.7* 13.0* 13.0*   HCT 39.3* 40.8 41.2   * 548* 531*   MCV 88 88 89   RDW 12.9 12.9 13.0    140 140   K 3.3* 2.8* 2.7*   CL 95 95 96   CO2 30* 32* 32*   BUN 11 9 7*   CREATININE 0.9 1.0 0.9    90 89   PROT 5.7* 5.5* 5.7*   ALBUMIN 2.1* 2.0* 2.1*   BILITOT 0.3 0.4 0.3   AST 26 30 31   ALKPHOS 98 95 84   ALT 16 12 17       Microbiology Data Reviewed: yes  Pertinent Findings:  Microbiology Results (last 7 days)     Procedure Component Value Units Date/Time    Blood culture [[id number]] Collected: 07/22/22 1826    Order Status: Completed Specimen: Blood Updated: 07/26/22 0612     Blood Culture, Routine No Growth to date      No Growth to date      No Growth to date      No Growth to date    Blood culture [[id number]] Collected: 07/22/22 1826    Order Status: Completed Specimen: Blood Updated: 07/26/22 0612     Blood Culture, Routine No Growth to date      No Growth to date      No Growth to date      No Growth to date    Culture, Respiratory with Gram Stain [[id number]] Collected: 07/25/22 2000    Order Status: Completed Specimen: Sputum Updated: 07/26/22 0541     Gram Stain (Respiratory) <10 epithelial cells per low power field.     Gram Stain (Respiratory) Rare WBC's     Gram Stain (Respiratory) Moderate budding yeast    Culture, Respiratory with Gram Stain [[id number]] Collected: 07/25/22 1825    Order Status: Sent Specimen: Respiratory from Sputum Updated: 07/26/22 0140    Clostridium difficile EIA [[id number]]  (Abnormal) Collected: 07/23/22 1324    Order Status:  Completed Specimen: Stool Updated: 07/25/22 1242     C. diff Antigen Positive     C difficile Toxins A+B, EIA Positive     Comment: Testing not recommended for children <24 months old.             Other Results:  EKG (my interpretation):   Results for orders placed or performed during the hospital encounter of 07/22/22   EKG 12-lead    Collection Time: 07/23/22  5:01 AM    Narrative    Test Reason : R94.31,    Vent. Rate : 056 BPM     Atrial Rate : 056 BPM     P-R Int : 138 ms          QRS Dur : 092 ms      QT Int : 560 ms       P-R-T Axes : 019 028 057 degrees     QTc Int : 540 ms    Sinus bradycardia  Prolonged QT  Abnormal ECG  When compared with ECG of 22-JUL-2022 23:31,  QT has shortened  Confirmed by Bandar Del Toro MD ([id number]) on 7/25/2022 10:21:28 PM    Referred By: AYDEN   SELF           Confirmed By:Bandar Del Toro MD       Radiology Data Reviewed: yes  Pertinent Findings:  CT Abdomen Pelvis With Contrast   Final Result      Posterior basal areas of pulmonary infiltrate versus atelectasis.      Thickening of the sigmoid colon could be related to chronic muscle hypertrophy from diverticular disease.  Colitis cannot be entirely excluded.      No evidence of trauma.      Coronary artery calcifications.         Electronically signed by: Merlin Elliott MD   Date:    07/22/2022   Time:    14:39      X-Ray Chest AP Portable   Final Result      1. No acute cardiopulmonary process appreciated.         Electronically signed by: Sacha Singh   Date:    07/22/2022   Time:    13:10      CT Head Without Contrast   Final Result      Remote infarct left frontal anterior parietal medial hemisphere and cingulate gyrus.  This involves thinning of the corpus callosum.  No acute process seen.      Sinusitis change and evidence prior functional endoscopic sinus surgery.      No acute process seen.         Electronically signed by: Merlin Elliott MD   Date:    07/22/2022   Time:    11:48          Current Medications:      Infusions:   sodium chloride 0.9% Stopped (07/25/22 6416)        Scheduled:   atorvastatin  20 mg Oral Daily    enoxaparin  40 mg Subcutaneous Daily    insulin detemir U-100  10 Units Subcutaneous Daily    scopolamine  1 patch Transdermal Q3 Days    vancomycin  125 mg Oral Q6H        PRN:  albuterol sulfate, dextrose 10%, dextrose 10%, glucagon (human recombinant), insulin aspart U-100, melatonin, sars-cov-2 (covid-19), sodium chloride 0.9%, sodium chloride 3%    Assessment:     Mateo Foreman is a 72 y.o.male with  Patient Active Problem List    Diagnosis Date Noted    C. difficile colitis 07/25/2022    Leukocytosis 07/22/2022    Hypokalemia 07/22/2022    Fall 07/22/2022    Prolonged Q-T interval on ECG 07/22/2022    Hypertension associated with diabetes     H/O: CVA (cerebrovascular accident)     Oxygen desaturation 09/20/2021    Pain 09/16/2021    Closed fracture of neck of right femur 09/16/2021    Encounter for long-term (current) use of insulin 08/20/2021    Cerebrovascular accident 02/19/2021    Hemiplegia of dominant side as late effect of cerebrovascular disease 02/19/2021    Peripheral vascular disorder due to diabetes mellitus 02/19/2021    Systolic dysfunction 10/06/2020    Lymphedema of both lower extremities 10/06/2020    PVD (peripheral vascular disease) 08/26/2019    Hemiplegia and hemiparesis following cerebral infarction affecting right dominant side 07/26/2019    Cerebral infarction due to embolism of left middle cerebral artery 07/25/2018    Gastro-esophageal reflux disease without esophagitis 04/23/2018    Male erectile dysfunction 01/30/2018    COPD (chronic obstructive pulmonary disease) 07/14/2017    Type 2 diabetes mellitus with hyperglycemia 09/06/2016    Type 2 diabetes mellitus without complication, with long-term current use of insulin 09/21/2015    HTN, goal below 130/80 09/21/2015    Open angle with borderline findings, low risk 04/12/2013    Nuclear  sclerosis 04/12/2013    Dyslipidemia associated with type 2 diabetes mellitus 09/18/2012    Other and unspecified hyperlipidemia 09/18/2012    Insomnia 09/18/2012        Plan:     Suspected colitis/ acute diarrhea x1wk/leuokcytosis   - multiple episodes of diarrhea x1wk, acute weight loss, nausea and decreased PO  - Initial WBC 20  - given 1 dose of vanc and zosyn in the ED, 1L IVF   - blood clx has no growth to date, c diff EIA pending    - treating with zosyn  And PO vanc      Hypokalemia- improving   - K 2.9 on admission and given 25 mEq of K in the ER   - EKG with prolonged qtc   - Replete PRN     Urinary retention/balinitis   -  Bladder scan showed over 450cc. In and out 500cc.    - Order a second bladder scan.    - local hygiene for balanitis      Prolonged qtc  - ECG significant for qtc of 654; improved and repeat ekg 07/23 540  - avoid qtc prolonging meds      dysphagia    - reported difficulty swallowing liquids the last few days PTA  - bedside swallow assessment ordered; patinet tolerated well   -07/24 patient started having difficulty swallowing again and choking on water  -SLP consulted; follow up recs  - diabetic diet      Chronic bilateal LE lymphedema   - follows with wound care and Dr. Silveira  - consult wound care for treatment while inpatient      T2DM   -  on admission; A1c 14 (a1c: 11 5/25/22)  - hold at home metformin; did not previously take insulin at home   - SSI, 10 units of basal insulin daily     Anemia of chronic disease  - H/H on admission 12.8/38.8 (11.7/38 on 4/20)  - Iron/TIBC 40/198, ferritin 334, B12 352, folate 5     HLD   -continue at home Lipitor 20 mg   -am lipid panel ordered; chol 71, ,  HDL 22, LDL 25    Roberto West  [facility] Medical [facility], L3     [facility] Medicine Hospitalist Pager numbers:   [facility] Hospitalist Medicine Team A (Jamie/Michelle): [phone]  [facility] Hospitalist Medicine Team B (Elvin/Ranjeet):  [phone]

## 2022-07-27 LAB
ALBUMIN SERPL BCP-MCNC: 2.2 G/DL (ref 3.5–5.2)
ALP SERPL-CCNC: 87 U/L (ref 55–135)
ALT SERPL W/O P-5'-P-CCNC: 22 U/L (ref 10–44)
ANION GAP SERPL CALC-SCNC: 12 MMOL/L (ref 8–16)
AST SERPL-CCNC: 40 U/L (ref 10–40)
BASOPHILS # BLD AUTO: 0.06 K/UL (ref 0–0.2)
BASOPHILS NFR BLD: 0.5 % (ref 0–1.9)
BILIRUB SERPL-MCNC: 0.3 MG/DL (ref 0.1–1)
BUN SERPL-MCNC: 6 MG/DL (ref 8–23)
CALCIUM SERPL-MCNC: 8 MG/DL (ref 8.7–10.5)
CHLORIDE SERPL-SCNC: 95 MMOL/L (ref 95–110)
CO2 SERPL-SCNC: 29 MMOL/L (ref 23–29)
CREAT SERPL-MCNC: 0.8 MG/DL (ref 0.5–1.4)
DIFFERENTIAL METHOD: ABNORMAL
EOSINOPHIL # BLD AUTO: 0.3 K/UL (ref 0–0.5)
EOSINOPHIL NFR BLD: 2.4 % (ref 0–8)
ERYTHROCYTE [DISTWIDTH] IN BLOOD BY AUTOMATED COUNT: 12.7 % (ref 11.5–14.5)
EST. GFR  (AFRICAN AMERICAN): >60 ML/MIN/1.73 M^2
EST. GFR  (NON AFRICAN AMERICAN): >60 ML/MIN/1.73 M^2
GLUCOSE SERPL-MCNC: 184 MG/DL (ref 70–110)
HCT VFR BLD AUTO: 40 % (ref 40–54)
HGB BLD-MCNC: 12.4 G/DL (ref 14–18)
IMM GRANULOCYTES # BLD AUTO: 0.12 K/UL (ref 0–0.04)
IMM GRANULOCYTES NFR BLD AUTO: 1 % (ref 0–0.5)
LYMPHOCYTES # BLD AUTO: 1.3 K/UL (ref 1–4.8)
LYMPHOCYTES NFR BLD: 11.4 % (ref 18–48)
MCH RBC QN AUTO: 27.9 PG (ref 27–31)
MCHC RBC AUTO-ENTMCNC: 31 G/DL (ref 32–36)
MCV RBC AUTO: 90 FL (ref 82–98)
MONOCYTES # BLD AUTO: 1 K/UL (ref 0.3–1)
MONOCYTES NFR BLD: 9.1 % (ref 4–15)
NEUTROPHILS # BLD AUTO: 8.7 K/UL (ref 1.8–7.7)
NEUTROPHILS NFR BLD: 75.6 % (ref 38–73)
NRBC BLD-RTO: 0 /100 WBC
PLATELET # BLD AUTO: 506 K/UL (ref 150–450)
PMV BLD AUTO: 9.8 FL (ref 9.2–12.9)
POCT GLUCOSE: 190 MG/DL (ref 70–110)
POCT GLUCOSE: 243 MG/DL (ref 70–110)
POCT GLUCOSE: 263 MG/DL (ref 70–110)
POCT GLUCOSE: 311 MG/DL (ref 70–110)
POTASSIUM SERPL-SCNC: 4.1 MMOL/L (ref 3.5–5.1)
PROT SERPL-MCNC: 5.4 G/DL (ref 6–8.4)
RBC # BLD AUTO: 4.44 M/UL (ref 4.6–6.2)
SARS-COV-2 RDRP RESP QL NAA+PROBE: NEGATIVE
SODIUM SERPL-SCNC: 136 MMOL/L (ref 136–145)
WBC # BLD AUTO: 11.46 K/UL (ref 3.9–12.7)

## 2022-07-27 PROCEDURE — 25000003 PHARM REV CODE 250: Performed by: STUDENT IN AN ORGANIZED HEALTH CARE EDUCATION/TRAINING PROGRAM

## 2022-07-27 PROCEDURE — 85025 COMPLETE CBC W/AUTO DIFF WBC: CPT | Performed by: STUDENT IN AN ORGANIZED HEALTH CARE EDUCATION/TRAINING PROGRAM

## 2022-07-27 PROCEDURE — 25000003 PHARM REV CODE 250

## 2022-07-27 PROCEDURE — 99900035 HC TECH TIME PER 15 MIN (STAT)

## 2022-07-27 PROCEDURE — U0002 COVID-19 LAB TEST NON-CDC: HCPCS

## 2022-07-27 PROCEDURE — 27000207 HC ISOLATION

## 2022-07-27 PROCEDURE — 94761 N-INVAS EAR/PLS OXIMETRY MLT: CPT

## 2022-07-27 PROCEDURE — 63600175 PHARM REV CODE 636 W HCPCS: Performed by: STUDENT IN AN ORGANIZED HEALTH CARE EDUCATION/TRAINING PROGRAM

## 2022-07-27 PROCEDURE — 36415 COLL VENOUS BLD VENIPUNCTURE: CPT | Performed by: STUDENT IN AN ORGANIZED HEALTH CARE EDUCATION/TRAINING PROGRAM

## 2022-07-27 PROCEDURE — 80053 COMPREHEN METABOLIC PANEL: CPT | Performed by: STUDENT IN AN ORGANIZED HEALTH CARE EDUCATION/TRAINING PROGRAM

## 2022-07-27 PROCEDURE — 11000001 HC ACUTE MED/SURG PRIVATE ROOM

## 2022-07-27 RX ORDER — TAMSULOSIN HYDROCHLORIDE 0.4 MG/1
0.4 CAPSULE ORAL DAILY
Qty: 30 CAPSULE | Refills: 11 | Status: SHIPPED | OUTPATIENT
Start: 2022-07-28 | End: 2023-07-28

## 2022-07-27 RX ADMIN — Medication 125 MG: at 11:07

## 2022-07-27 RX ADMIN — INSULIN ASPART 4 UNITS: 100 INJECTION, SOLUTION INTRAVENOUS; SUBCUTANEOUS at 05:07

## 2022-07-27 RX ADMIN — SCOPALAMINE 1 PATCH: 1 PATCH, EXTENDED RELEASE TRANSDERMAL at 09:07

## 2022-07-27 RX ADMIN — INSULIN ASPART 3 UNITS: 100 INJECTION, SOLUTION INTRAVENOUS; SUBCUTANEOUS at 09:07

## 2022-07-27 RX ADMIN — Medication 125 MG: at 05:07

## 2022-07-27 RX ADMIN — ATORVASTATIN CALCIUM 20 MG: 20 TABLET, FILM COATED ORAL at 08:07

## 2022-07-27 RX ADMIN — TAMSULOSIN HYDROCHLORIDE 0.4 MG: 0.4 CAPSULE ORAL at 08:07

## 2022-07-27 RX ADMIN — ENOXAPARIN SODIUM 40 MG: 100 INJECTION SUBCUTANEOUS at 04:07

## 2022-07-27 RX ADMIN — INSULIN ASPART 2 UNITS: 100 INJECTION, SOLUTION INTRAVENOUS; SUBCUTANEOUS at 05:07

## 2022-07-27 RX ADMIN — INSULIN DETEMIR 10 UNITS: 100 INJECTION, SOLUTION SUBCUTANEOUS at 08:07

## 2022-07-27 RX ADMIN — Medication 6 MG: at 09:07

## 2022-07-27 RX ADMIN — INSULIN ASPART 8 UNITS: 100 INJECTION, SOLUTION INTRAVENOUS; SUBCUTANEOUS at 12:07

## 2022-07-27 NOTE — PT/OT/SLP PROGRESS
Physical Therapy      Patient Name:  Mateo Foreman   MRN:  [id number]    Patient not seen today secondary to  (pt awaiting discharge). Will follow-up n/a.

## 2022-07-27 NOTE — DISCHARGE SUMMARY
[facility] Hospital Medicine Discharge Summary    Primary Team: [facility] Hospitalist Team B    Attending Physician: Maximus Pollack MD  Resident: Gareth  Intern: Christiano    Date of Admit: 7/22/2022  Date of Discharge: 7/28/2022  Discharge to: SNF  Condition: Stable     Discharge Diagnoses     Patient Active Problem List   Diagnosis    Dyslipidemia associated with type 2 diabetes mellitus    Other and unspecified hyperlipidemia    Insomnia    Open angle with borderline findings, low risk    Nuclear sclerosis    Type 2 diabetes mellitus without complication, with long-term current use of insulin    HTN, goal below 130/80    Cerebral infarction due to embolism of left middle cerebral artery    PVD (peripheral vascular disease)    COPD (chronic obstructive pulmonary disease)    Gastro-esophageal reflux disease without esophagitis    Male erectile dysfunction    Type 2 diabetes mellitus with hyperglycemia    Hemiplegia and hemiparesis following cerebral infarction affecting right dominant side    Systolic dysfunction    Lymphedema of both lower extremities    Cerebrovascular accident    Hemiplegia of dominant side as late effect of cerebrovascular disease    Peripheral vascular disorder due to diabetes mellitus    Encounter for long-term (current) use of insulin    Pain    Closed fracture of neck of right femur    Oxygen desaturation    Hypertension associated with diabetes    H/O: CVA (cerebrovascular accident)    Leukocytosis    Hypokalemia    Fall    Prolonged Q-T interval on ECG    C. difficile colitis    Colitis       Consultants and Procedures     Consultants:  None    Procedures:   None    Imaging:  Imaging Results          CT Abdomen Pelvis With Contrast (Final result)  Result time 07/22/22 14:39:50    Final result by Merlin Elliott III, MD (07/22/22 14:39:50)                 Impression:      Posterior basal areas of pulmonary infiltrate versus atelectasis.    Thickening of the sigmoid colon  could be related to chronic muscle hypertrophy from diverticular disease.  Colitis cannot be entirely excluded.    No evidence of trauma.    Coronary artery calcifications.      Electronically signed by: Merlin Elliott MD  Date:    07/22/2022  Time:    14:39             Narrative:    EXAMINATION:  CT ABDOMEN PELVIS WITH CONTRAST    CLINICAL HISTORY:  abdominal pain after falling, L sided;    FINDINGS:  Patient was administered 75 cc of Omnipaque 350 intravenously.    There are posterior basal areas of pulmonary infiltrate.  The liver, gallbladder, biliary tree, spleen, stomach, pancreas, and duodenum show nothing unusual.  The adrenal glands are not enlarged.  The kidneys enhance normally.  No hydronephrosis is seen.  The ureters are normal in course and caliber.  No para-aortic or retroperitoneal adenopathy is seen.  The pelvic organs are unremarkable.  There may be some mild thickening of the sigmoid colon.  The appendix is normal.  No obstruction, ileus, or perforation seen.  The bowel loops are unremarkable.  No ascites is seen.  No para-aortic or retroperitoneal adenopathy is seen.  There is plaque of the aorta and all of its branches.  There is tortuosity of the colon.  There is postoperative change of the right femur.  The bones demonstrate degenerative change.  No active contrast extravasation is seen.  There are coronary artery calcifications.  The kidneys enhance and excrete normally.  No organ trauma seen.  There are few small lymph  nodes in the mesentery and retroperitoneum.  There is fatty sparing near the falciform ligament.                               X-Ray Chest AP Portable (Final result)  Result time 07/22/22 13:10:57    Final result by Sacha Singh MD (07/22/22 13:10:57)                 Impression:      1. No acute cardiopulmonary process appreciated.      Electronically signed by: Sacha Singh  Date:    07/22/2022  Time:    13:10             Narrative:    EXAMINATION:  XR CHEST AP  PORTABLE    CLINICAL HISTORY:  Unspecified fall, initial encounter    TECHNIQUE:  Single frontal portable view of the chest was performed.    COMPARISON:  Chest radiograph 04/20/2022    FINDINGS:  Cardiomediastinal silhouette is within normal limits.    No focal consolidation, overt interstitial edema, sizable pleural effusion or pneumothorax.    Multilevel degenerative changes of the imaged spine.                               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  This involves thinning of the corpus callosum.  No acute process seen.    Sinusitis change and evidence prior functional endoscopic sinus surgery.    No acute process seen.      Electronically signed by: Merlin Elliott MD  Date:    07/22/2022  Time:    11:48             Narrative:    EXAMINATION:  CT HEAD WITHOUT CONTRAST    CLINICAL HISTORY:  sp fall ?headstrike;    FINDINGS:  There is a remote infarct of the left frontal lobe anterior parietal cingulate gyrus distribution.  This was seen on the prior study dated 09/16/2021.    There is mild ex vacuo dilatation of the left lateral ventricle.  No acute bleed, mass, or mass effect is seen.  The posterior fossa contents are unremarkable.  No acute process seen.  There is prior endoscopic sinus surgery.  There is sinusitis change.  No skull lesion or skull fracture seen.  There is thinning of the corpus callosum.                                  Subjective: At bedside, patient resting and did not appear in any distress.  He denies difficulty urinating or burning sensation around the glans penis today. Patient have 2 BM yesterday that were more formed than previous days. He denied abdominal pain, pelvic pain, scrotum pain, nausea, vomiting, SOB, CP        Vitals:    07/28/22 1226   BP: 139/63    Pulse: 89   Resp: 18   Temp: 97.5 °F (36.4 °C)     Physical Exam  Vitals and nursing note reviewed. Exam conducted with a chaperone present.   Constitutional:       General: He is not in acute distress.     Appearance: He is not toxic-appearing.   HENT:      Head: Normocephalic and atraumatic.      Right Ear: External ear normal.      Left Ear: External ear normal.      Nose: Nose normal. No congestion or rhinorrhea.      Mouth/Throat:      Mouth: Mucous membranes are moist.      Pharynx: Oropharynx is clear.   Eyes:      Extraocular Movements: Extraocular movements intact.      Conjunctiva/sclera: Conjunctivae normal.   Cardiovascular:      Rate and Rhythm: Normal rate and regular rhythm.   Pulmonary:      Effort: Pulmonary effort is normal.      Breath sounds: No wheezing, rhonchi or rales.   Abdominal:      General: Abdomen is flat. Bowel sounds are normal. There is no distension.      Palpations: Abdomen is soft.      Tenderness: There is no abdominal tenderness.   Genitourinary:     Penis: Uncircumcised. No erythema, discharge, swelling or lesions.       Testes:         Right: Tenderness or swelling not present.         Left: Tenderness or swelling not present.   Musculoskeletal:      Right lower leg: No edema.      Left lower leg: No edema.   Skin:     General: Skin is warm and dry.      Capillary Refill: Capillary refill takes less than 2 seconds.   Neurological:      General: No focal deficit present.      Mental Status: He is alert and oriented to person, place, and time.       Brief History of Present Illness      HPI: Mr. Mateo Foreman is a 71 y.o. 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 Patient was brought in by EMS after falling in the bathroom and unsure how long he was down for but believes it  was around 3 hours. He then was able to call his neighbors to come help him. He also endorsed multiple epsides of diarreha daily for the last week, trouble swallowing liquids and feels like the room is spinning. He denied previous falls, weakness, LOC, CP, SOB, vomiting, fever, and chills. Patient has been on antibiotics (Cipro and clinda) for 2 months for chronic LE wounds that were recently discontinued  two weeks ago because wounds have healed well.     Hospital Medicine: In ED patient afebrile, VSS and spO2 99% on RA. Initial significant lab findings include K+ 2.6,  corrected to 140 with a , CL 88, AG 20 (PH 7.393, pCO2 57.6),  BUN/Cr 27/1.5, Started on Vanc/Zoysn by ED. Ordered C.diff panel which returned positive. Started PO vanc on HD 1 with improvement of symptoms. Patient stable for discharge on 10 day course of PO vanc (end date 8/1) Patient deconditioned and PT/OT recommends SNF placement for continued therapy. Medically Stable for discharge to SNF.     For the full HPI please refer to the History & Physical from this admission.    Hospital Course By Problem with Pertinent Findings     C. Diff Colitis    - multiple episodes of diarrhea x1wk, acute weight loss,   nausea and decreased PO in setting of recent antibiotic use   - Initial WBC 20; down trending   - given 1 dose of vanc and zosyn in the ED, 1L IVF   - blood clx has no growth to date  - c diff EIA postitive; treating with PO vanc   - stopped  Zosyn 07/25   - Diarrhea improved through hospital course   - Continue PO Vanc for 10 day course ( end date 8/1/22)  - Patient to maintain contact precautions while completing treatment. Once treatment course complete he does not need to be under contact precautions.      Hypokalemia   - K 2.9 on admission and given 25 mEq of K in the ER   - EKG with prolonged qtc   - Replete PRN  - 07/27 stable at 4.1     Urinary retention-resolved   - 07/26 patient unable to void; bladder scan showed over  450cc  -required in and out cath  -malodorus and thick drainage present around the glans penis  -UA pending  -flomax started   -daily cleaning of the area to promote good hygiene; add clotrimazole if symptoms continue  -patient no longer retaining urine and able to void by himself x2      Prolonged qtc  - ECG significant for qtc of 654; improved and repeat ekg 07/23 540  - avoid qtc prolonging meds      dysphagia    - reported difficulty swallowing liquids the last few days PTA  - bedside swallow assessment ordered; donell tolerated well   -07/24 nurses concerned for worsening dysphagia   -SLP consulted; started on PUREED diet      Chronic bilateal LE lymphedema   - follows with wound care and Dr. Silveira  - wound care saw patient 07/26; recs nursing to continue pressure injury prevention   interventions to include mepilex border dressing to back to reduce friction/shear  -Pt to continue with compression therapy and f/u with Dr. Silveira     T2DM   -  on admission; A1c 14 (a1c: 11 5/25/22)  - hold at home metformin; did not previously take insulin at home   - SSI, 10 units of basal insulin daily  - Discharged on basal insulin 10 units and resume home metformin  - Follow up with PCP for further titration of insulin therapy and BG control      Anemia of chronic disease  - H/H on admission 12.8/38.8 (11.7/38 on 4/20)  - Iron/TIBC 40/198, ferritin 334, B12 352, folate 5     HLD   -continue at home Lipitor 20 mg   -lipid panel on admit: chol 71, ,  HDL 22, LDL 25    Discharge Medications        Medication List      START taking these medications    insulin detemir U-100 100 unit/mL (3 mL) Inpn pen  Commonly known as: Levemir FLEXTOUCH  Inject 10 Units into the skin once daily.  Start taking on: July 28, 2022     tamsulosin 0.4 mg Cap  Commonly known as: FLOMAX  Take 1 capsule (0.4 mg total) by mouth once daily.  Start taking on: July 28, 2022     vancomycin 125 mg/5 mL Soln  Take 5 mLs (125 mg total) by mouth  every 6 (six) hours. for 5 days        CHANGE how you take these medications    gentamicin 0.1 % ointment  Commonly known as: GARAMYCIN  Apply locally with xeroform  What changed: when to take this        CONTINUE taking these medications    atorvastatin 20 MG tablet  Commonly known as: LIPITOR  Take 1 tablet by mouth once daily for 90 days     blood sugar diagnostic Strp  Commonly known as: ONE TOUCH TEST  1 strip by Misc.(Non-Drug; Combo Route) route Daily.     ferrous sulfate 325 mg (65 mg iron) Tab tablet  Commonly known as: FEOSOL     furosemide 20 MG tablet  Commonly known as: LASIX     metFORMIN 1000 MG tablet  Commonly known as: GLUCOPHAGE  TAKE 1 TABLET BY MOUTH TWICE DAILY WITH MEALS           Where to Get Your Medications      These medications were sent to [facility]  [address], [place] [address]    Hours: Mon-Fri, 8a-5:30p Phone: [phone]   · insulin detemir U-100 100 unit/mL (3 mL) Inpn pen  · tamsulosin 0.4 mg Cap     Information about where to get these medications is not yet available    Ask your nurse or doctor about these medications  · vancomycin 125 mg/5 mL Soln         Discharge Information:   Diet:  Diabetic     Physical Activity:  As tolerated              Instructions:  1. Take all medications as prescribed  2. Keep all follow-up appointments  3. Return to the hospital or call your primary care physicians if any worsening symptoms such as fever, chest pain, shortness of breath, return of symptoms, or any other concerns.    Follow-Up Appointments:  PCP    Lalitha Servin MD  [facility] Internal Medicine, -II

## 2022-07-27 NOTE — PLAN OF CARE
Pt vitals were maintained, pt denied any pain. Pt needs 2x asst to get up to chair since pt has R/ sided weakness from previous stroke. Pt tolerated diet well. BG was a little elevated insulin given. Pt is Aaox4. Bladder scan was done at MIDnight with the amount 300ml but pt voided 2x large incidents which saturated pad and bed. Pt might need to see speech again pt looks like he have trouble with liquids.  wctm   Problem: Adult Inpatient Plan of Care  Goal: Optimal Comfort and Wellbeing  Outcome: Ongoing, Progressing     Problem: Diabetes Comorbidity  Goal: Blood Glucose Level Within Targeted Range  Outcome: Ongoing, Progressing     Problem: Infection  Goal: Absence of Infection Signs and Symptoms  Outcome: Ongoing, Progressing     Problem: Impaired Wound Healing  Goal: Optimal Wound Healing  Outcome: Ongoing, Progressing     Problem: Fall Injury Risk  Goal: Absence of Fall and Fall-Related Injury  Outcome: Ongoing, Progressing     Problem: Diarrhea  Goal: Fluid and Electrolyte Balance  Outcome: Ongoing, Progressing     Problem: Coordination Impairment (Functional Deficit)  Goal: Optimal Coordination  Outcome: Ongoing, Progressing     Problem: Swallowing Impairment  Goal: Optimal Eating and Swallowing Without Aspiration  Outcome: Ongoing, Progressing

## 2022-07-27 NOTE — PLAN OF CARE
[facility]    FACILITY TRANSFER ORDERS      Patient Name: Mateo Foreman  YOB: 1950    PCP: Tremaine Finch MD   PCP Address: [address] / [place] [address]  PCP Phone Number: [phone]  PCP Fax: [phone]    Encounter Date: 07/27/2022    Admit to: [facility]     Vital Signs:  Routine    Diagnoses:   Active Hospital Problems    Diagnosis  POA    *C. difficile colitis [A04.72]  Yes    Colitis [K52.9]  Yes    Leukocytosis [D72.829]  Yes    Hypokalemia [E87.6]  Yes    Fall [W19.XXXA]  Yes    Prolonged Q-T interval on ECG [R94.31]  Yes    Cerebrovascular accident [I63.9]  Yes    Lymphedema of both lower extremities [I89.0]  Yes    Gastro-esophageal reflux disease without esophagitis [K21.9]  Yes    Type 2 diabetes mellitus without complication, with long-term current use of insulin [E11.9, Z79.4]  Not Applicable    Dyslipidemia associated with type 2 diabetes mellitus [E11.69, E78.5]  Yes      Resolved Hospital Problems   No resolved problems to display.       Allergies:Review of patient's allergies indicates:  No Known Allergies    Diet: pureed diet thin    Activities: Activity as tolerated; PT/OT needed daily     Goals of Care Treatment Preferences:  Code Status: Full Code; consider further code status discussion per patient's request    Nursing: Patient will need help with basically ADLs; will need to be under contact precautions for the duration of treatment but can lifted once antibiotic treatment is completed. Antibiotics end date august 1     Labs: per [facility] provider     CONSULTS:    wound care f/u with Dr. Silveira    MISCELLANEOUS CARE:  Diabetes Care:   SN to perform and educate Diabetic management with blood glucose monitoring:; accu check daily with meals     WOUND CARE ORDERS  Yes: continue pressure injury prevention interventions to include mepilex border dressing to back to reduce friction/shear    Medications: Review discharge  medications with patient and family and provide education.      Current Discharge Medication List      START taking these medications    Details   insulin detemir U-100 (LEVEMIR FLEXTOUCH) 100 unit/mL (3 mL) SubQ InPn pen Inject 10 Units into the skin once daily.  Qty: 3 mL, Refills: 11      tamsulosin (FLOMAX) 0.4 mg Cap Take 1 capsule (0.4 mg total) by mouth once daily.  Qty: 30 capsule, Refills: 11      vancomycin 125 mg/5 mL Soln Take 5 mLs (125 mg total) by mouth every 6 (six) hours. for 5 days         CONTINUE these medications which have NOT CHANGED    Details   atorvastatin (LIPITOR) 20 MG tablet Take 1 tablet by mouth once daily for 90 days  Qty: 90 tablet, Refills: 3    Associated Diagnoses: Dyslipidemia associated with type 2 diabetes mellitus      blood sugar diagnostic (ONE TOUCH TEST) Strp 1 strip by Misc.(Non-Drug; Combo Route) route Daily.  Qty: 1 Bottle, Refills: 11      ferrous sulfate 325 mg (65 mg iron) Tab Take 325 mg by mouth daily with breakfast.      furosemide (LASIX) 20 MG tablet Take 20 mg by mouth once daily.      metFORMIN (GLUCOPHAGE) 1000 MG tablet TAKE 1 TABLET BY MOUTH TWICE DAILY WITH MEALS  Qty: 180 tablet, Refills: 3    Associated Diagnoses: Type 2 diabetes mellitus without complication      gentamicin (GARAMYCIN) 0.1 % ointment Apply locally with xeroform  Qty: 30 g, Refills: 0                Immunizations Administered as of 7/27/2022     Name Date Dose VIS Date Route Exp Date    COVID-19, MRNA, LN-S, PF (Moderna)  Incomplete 0.25 mL 8/27/2021 Intramuscular --    : Modern[employer]     COVID-19, MRNA, LN-S, PF (Moderna) 4/14/2021 -- -- Intramuscular --    Site: Left arm     Lot: [id number]     COVID-19, MRNA, LN-S, PF (Moderna) 3/17/2021 -- -- Intramuscular --    Site: Left arm     Lot: [id number]           This patient has had both covid vaccinations    Some patients may experience side effects after vaccination.  These may include fever, headache, muscle or joint aches.   Most symptoms resolve with 24-48 hours and do not require urgent medical evaluation unless they persist for more than 72 hours or symptoms are concerning for an unrelated medical condition.          _________________________________  Alicia Alvarado MD  07/27/2022

## 2022-07-27 NOTE — PLAN OF CARE
Patient is awake and alert.  Denies pain.  Voids incontinently and without difficulty.  Diapers changed several times.  Safety maintained.  Will continue to monitor.

## 2022-07-27 NOTE — PLAN OF CARE
MATTHEW spoke with Christie   -- --intake person at [facility]  --- she is still reviewing this pt and will contact MATTHEW this am.    MATTHEW spoke with pt - Pt ok'd for acceptance -- CM directed to contact Delma or Cathleen at [facility]   MATTHEW called [facility] at  -- spoke with Delma - she just rec'd this referral and has reached out to pt's sister.  Delma advised that pt is ready for d/c today.   Delma will contact MATTHEW with additional needed items.    CM will reach out to PHN to expedite auth   CM will reach out to MD team for orders.     MATTHEW spoke with Fatuma - MATTHEW with [facility] - [phone] -- she is working on auth.    -------------------------  7149 - MATTHEW spoke with Delma  at [facility] - auth has been rec'd -- advised her that orders have been sent - she is awaiting pt's sister sending back paperwork  - MATTHEW called her cell to let her know that NH is awaiting paperwork.  Home # is out of service.   Norma Ro  [phone]    ----------------------------  1607  -- MATTHEW spoke with Delma at [facility] --- paperwork has not been rec'd yet - they are unable to take to pt without paperwork --- MDs and bedside nurses infomed -- Pt also updated.     --------------------------------  1640 -- MATTHEW rec'd a call from Norma Ro  [phone]  -- she did not receive Delma's email with paperwork.  She will go to the NH at about 10 am to complete needed paperwork.  Message left for Delma at [facility] with Annabel -- MATTHEW also left VM -- Cathleen informed c #  [phone]       07/27/22 8658   Post-Acute Status   Post-Acute Authorization Placement   Post-Acute Placement Status Referrals Sent   Discharge Plan   Discharge Plan A Skilled Nursing Facility

## 2022-07-27 NOTE — PT/OT/SLP PROGRESS
Occupational Therapy      Patient Name:  Mateo Foreman   MRN:  [id number]    356 PM Patient not seen today secondary to Nursing care, soiled. Will follow-up as able.    7/27/2022

## 2022-07-27 NOTE — PROGRESS NOTES
[facility] Resident [facility] Progress Note    Admitting Team: [facility] Hospitalist Team B  Attending Physician: Dr. Maximus Pollack  Resident: Lisa  Intern: Bennie    Subjective:    Per chart review, Bladder scan was done at MIDnight with the amount 300ml but pt voided 2x large incidents which saturated pad and bed.     At bedside, patient resting and did not appear in any distress.  He denies difficulty urinating or burning sensation around the glans penis today. He endorsed bilateral leg pain and wants to get out of bed. He denied abdominal pain, pelvic pain, scrotum pain, nausea, vomiting, SOB, CP.     Objective:   Last 24 Hour Vital Signs:  BP  Min: 117/57  Max: 172/77  Temp  Av.9 °F (36.6 °C)  Min: 97.2 °F (36.2 °C)  Max: 98.2 °F (36.8 °C)  Pulse  Av.5  Min: 71  Max: 86  Resp  Av.6  Min: 16  Max: 20  SpO2  Av.2 %  Min: 93 %  Max: 98 %  I/O last 3 completed shifts:  In: 1446 [P.O.:860; I.V.:282.1; IV Piggyback:303.9]  Out: 525 [Urine:525]    Physical Exam  Vitals and nursing note reviewed. Exam conducted with a chaperone present.   Constitutional:       General: He is not in acute distress.     Appearance: He is not toxic-appearing.   HENT:      Head: Normocephalic and atraumatic.      Right Ear: External ear normal.      Left Ear: External ear normal.      Nose: Nose normal. No congestion or rhinorrhea.      Mouth/Throat:      Mouth: Mucous membranes are moist.      Pharynx: Oropharynx is clear.   Eyes:      Extraocular Movements: Extraocular movements intact.      Conjunctiva/sclera: Conjunctivae normal.   Cardiovascular:      Rate and Rhythm: Normal rate and regular rhythm.   Pulmonary:      Effort: Pulmonary effort is normal.      Breath sounds: No wheezing, rhonchi or rales.   Abdominal:      General: Abdomen is flat. Bowel sounds are normal. There is no distension.      Palpations: Abdomen is soft.      Tenderness: There is no abdominal tenderness.   Genitourinary:     Penis: Uncircumcised. No  erythema, discharge, swelling or lesions.       Testes:         Right: Tenderness or swelling not present.         Left: Tenderness or swelling not present.   Musculoskeletal:      Right lower leg: No edema.      Left lower leg: No edema.   Skin:     General: Skin is warm and dry.      Capillary Refill: Capillary refill takes less than 2 seconds.   Neurological:      General: No focal deficit present.      Mental Status: He is alert and oriented to person, place, and time.       Laboratory:  Recent Labs   Lab 07/25/22  0807 07/26/22  0658 07/26/22  1726 07/27/22  0551   WBC 14.79* 10.74  --  11.46   HGB 13.0* 13.0*  --  12.4*   HCT 40.8 41.2  --  40.0   * 531*  --  506*    140 134* 136   K 2.8* 2.7* 3.8 4.1   CL 95 96 94* 95   CREATININE 1.0 0.9 0.9 0.8   BUN 9 7* 9 6*   CO2 32* 32* 30* 29   ALT 12 17  --  22   AST 30 31  --  40       Microbiology:  Blood clx showed no growth, pending cdif and resp clx     Other Results:  EKG (my interpretation):NSR improved QTc 540 (654 on initial EKG)    Radiology:  CT Abdomen Pelvis With Contrast   Final Result      Posterior basal areas of pulmonary infiltrate versus atelectasis.      Thickening of the sigmoid colon could be related to chronic muscle hypertrophy from diverticular disease.  Colitis cannot be entirely excluded.      No evidence of trauma.      Coronary artery calcifications.         Electronically signed by: Merlin Elliott MD   Date:    07/22/2022   Time:    14:39      X-Ray Chest AP Portable   Final Result      1. No acute cardiopulmonary process appreciated.         Electronically signed by: Sacha Singh   Date:    07/22/2022   Time:    13:10      CT Head Without Contrast   Final Result      Remote infarct left frontal anterior parietal medial hemisphere and cingulate gyrus.  This involves thinning of the corpus callosum.  No acute process seen.      Sinusitis change and evidence prior functional endoscopic sinus surgery.      No acute process  seen.         Electronically signed by: Merlin Elliott MD   Date:    07/22/2022   Time:    11:48        Current Medications:     Infusions:   sodium chloride 0.9% Stopped (07/25/22 1436)        Scheduled:   atorvastatin  20 mg Oral Daily    enoxaparin  40 mg Subcutaneous Daily    insulin detemir U-100  10 Units Subcutaneous Daily    scopolamine  1 patch Transdermal Q3 Days    tamsulosin  0.4 mg Oral Daily    vancomycin  125 mg Oral Q6H        PRN:  albuterol sulfate, dextrose 10%, dextrose 10%, glucagon (human recombinant), insulin aspart U-100, melatonin, sars-cov-2 (covid-19), sodium chloride 0.9%, sodium chloride 3%    Assessment:     Mr. Mateo Foreman is a 71 y.o. 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 Patient has a 1 week history of decreased PO 2/2 multiple episodes of diarrhea. Patient admitted for leukocytosis, acute diarrhea concerning for colitis and electrolyte defiency. C diff EIA positive and being treated with PO vanc. Patient is able to urinate on his own. Pending SNF placement.     Plan:     Cdif  colitis/ acute diarrhea x1wk/leuokcytosis   - multiple episodes of diarrhea x1wk, acute weight loss,   nausea and decreased PO  - Initial WBC 20; down trending   - given 1 dose of vanc and zosyn in the ED, 1L IVF   - blood clx has no growth to date  - c diff EIA postitive; treating with PO vanc   - stopped  Zosyn 07/25      Hypokalemia   - K 2.9 on admission and given 25 mEq of K in the ER   - EKG with prolonged qtc   - Replete PRN  - 07/27 stable at 4.1    Urinary retention-resolved   - 07/26 patient unable to void; bladder scan showed over 450cc  -required in and out cath  -malodorus and thick drainage present around the glans penis  -UA pending  -flomax started   -daily cleaning of the area  to promote good hygiene; add clotrimazole if symptoms continue  -patient no longer retaining urine and able to void by himself x2      Prolonged qtc  - ECG significant for qtc of 654; improved and repeat ekg 07/23 540  - avoid qtc prolonging meds      dysphagia    - reported difficulty swallowing liquids the last few days PTA  - bedside swallow assessment ordered; donell tolerated well   -07/24 nurses concerned for worsening dysphagia   -SLP consulted; started on PUREED diet      Chronic bilateal LE lymphedema   - follows with wound care and Dr. Silveira  - wound care saw patient 07/26; recs nursing to continue pressure injury prevention   interventions to include mepilex border dressing to back to reduce friction/shear  -Pt to continue with compression therapy and f/u with Dr. Silveira     T2DM   -  on admission; A1c 14 (a1c: 11 5/25/22)  - hold at home metformin; did not previously take insulin at home   - SSI, 10 units of basal insulin daily     Anemia of chronic disease  - H/H on admission 12.8/38.8 (11.7/38 on 4/20)  - Iron/TIBC 40/198, ferritin 334, B12 352, folate 5     HLD   -continue at home Lipitor 20 mg   -am lipid panel ordered; chol 71, ,  HDL 22, LDL 25        Diet: PUREED  Code: FULL  PPx: Heparin   Dispo: medically stable pending SNF placement       Alicia Alvarado MD  [facility] Hospitalist Team B    [facility] Medicine Hospitalist Pager numbers:   [facility] Hospitalist Medicine Team A (Jamie/Michelle): [phone]  [facility] Hospitalist Medicine Team B (Elvin/Ranjeet):  [phone]

## 2022-07-28 VITALS
SYSTOLIC BLOOD PRESSURE: 139 MMHG | BODY MASS INDEX: 23.43 KG/M2 | DIASTOLIC BLOOD PRESSURE: 63 MMHG | HEIGHT: 67 IN | HEART RATE: 89 BPM | RESPIRATION RATE: 18 BRPM | TEMPERATURE: 98 F | WEIGHT: 149.25 LBS | OXYGEN SATURATION: 94 %

## 2022-07-28 LAB
ALBUMIN SERPL BCP-MCNC: 2.2 G/DL (ref 3.5–5.2)
ALP SERPL-CCNC: 81 U/L (ref 55–135)
ALT SERPL W/O P-5'-P-CCNC: 37 U/L (ref 10–44)
ANION GAP SERPL CALC-SCNC: 11 MMOL/L (ref 8–16)
AST SERPL-CCNC: 61 U/L (ref 10–40)
BACTERIA BLD CULT: NORMAL
BACTERIA BLD CULT: NORMAL
BACTERIA SPEC AEROBE CULT: ABNORMAL
BACTERIA SPEC AEROBE CULT: ABNORMAL
BASOPHILS NFR BLD: 2 % (ref 0–1.9)
BILIRUB SERPL-MCNC: 0.2 MG/DL (ref 0.1–1)
BUN SERPL-MCNC: 8 MG/DL (ref 8–23)
CALCIUM SERPL-MCNC: 8 MG/DL (ref 8.7–10.5)
CHLORIDE SERPL-SCNC: 96 MMOL/L (ref 95–110)
CO2 SERPL-SCNC: 29 MMOL/L (ref 23–29)
CREAT SERPL-MCNC: 0.8 MG/DL (ref 0.5–1.4)
DIFFERENTIAL METHOD: ABNORMAL
EOSINOPHIL NFR BLD: 3 % (ref 0–8)
ERYTHROCYTE [DISTWIDTH] IN BLOOD BY AUTOMATED COUNT: 12.7 % (ref 11.5–14.5)
EST. GFR  (AFRICAN AMERICAN): >60 ML/MIN/1.73 M^2
EST. GFR  (NON AFRICAN AMERICAN): >60 ML/MIN/1.73 M^2
GLUCOSE SERPL-MCNC: 187 MG/DL (ref 70–110)
GRAM STN SPEC: ABNORMAL
HCT VFR BLD AUTO: 35.8 % (ref 40–54)
HGB BLD-MCNC: 11.2 G/DL (ref 14–18)
IMM GRANULOCYTES # BLD AUTO: ABNORMAL K/UL
IMM GRANULOCYTES NFR BLD AUTO: ABNORMAL %
LYMPHOCYTES NFR BLD: 11 % (ref 18–48)
MCH RBC QN AUTO: 27.8 PG (ref 27–31)
MCHC RBC AUTO-ENTMCNC: 31.3 G/DL (ref 32–36)
MCV RBC AUTO: 89 FL (ref 82–98)
MONOCYTES NFR BLD: 4 % (ref 4–15)
NEUTROPHILS NFR BLD: 80 % (ref 38–73)
NRBC BLD-RTO: 0 /100 WBC
PLATELET # BLD AUTO: 467 K/UL (ref 150–450)
PLATELET BLD QL SMEAR: ABNORMAL
PMV BLD AUTO: 9.5 FL (ref 9.2–12.9)
POCT GLUCOSE: 209 MG/DL (ref 70–110)
POTASSIUM SERPL-SCNC: 4.1 MMOL/L (ref 3.5–5.1)
PROT SERPL-MCNC: 5.6 G/DL (ref 6–8.4)
RBC # BLD AUTO: 4.03 M/UL (ref 4.6–6.2)
SODIUM SERPL-SCNC: 136 MMOL/L (ref 136–145)
WBC # BLD AUTO: 8.84 K/UL (ref 3.9–12.7)

## 2022-07-28 PROCEDURE — 94761 N-INVAS EAR/PLS OXIMETRY MLT: CPT

## 2022-07-28 PROCEDURE — 80053 COMPREHEN METABOLIC PANEL: CPT | Performed by: STUDENT IN AN ORGANIZED HEALTH CARE EDUCATION/TRAINING PROGRAM

## 2022-07-28 PROCEDURE — 36415 COLL VENOUS BLD VENIPUNCTURE: CPT | Performed by: STUDENT IN AN ORGANIZED HEALTH CARE EDUCATION/TRAINING PROGRAM

## 2022-07-28 PROCEDURE — 99900035 HC TECH TIME PER 15 MIN (STAT)

## 2022-07-28 PROCEDURE — 25000003 PHARM REV CODE 250

## 2022-07-28 PROCEDURE — 25000003 PHARM REV CODE 250: Performed by: STUDENT IN AN ORGANIZED HEALTH CARE EDUCATION/TRAINING PROGRAM

## 2022-07-28 RX ADMIN — INSULIN DETEMIR 10 UNITS: 100 INJECTION, SOLUTION SUBCUTANEOUS at 08:07

## 2022-07-28 RX ADMIN — Medication 125 MG: at 05:07

## 2022-07-28 RX ADMIN — Medication 125 MG: at 01:07

## 2022-07-28 RX ADMIN — INSULIN ASPART 10 UNITS: 100 INJECTION, SOLUTION INTRAVENOUS; SUBCUTANEOUS at 12:07

## 2022-07-28 RX ADMIN — Medication 125 MG: at 11:07

## 2022-07-28 RX ADMIN — TAMSULOSIN HYDROCHLORIDE 0.4 MG: 0.4 CAPSULE ORAL at 08:07

## 2022-07-28 RX ADMIN — INSULIN ASPART 4 UNITS: 100 INJECTION, SOLUTION INTRAVENOUS; SUBCUTANEOUS at 05:07

## 2022-07-28 RX ADMIN — ATORVASTATIN CALCIUM 20 MG: 20 TABLET, FILM COATED ORAL at 08:07

## 2022-07-28 NOTE — NURSING
Asked patient if I could check diaper to change and he stated that he is wet but does not want to be changed now. I asked him to please call me when he was ready but definitely I explained that we would at 4 am no matter what

## 2022-07-28 NOTE — NURSING
Patient resting in bed, room air, AAOX4, slurred speech from previous stroke but you can understand, BS was 209 early morning - 4 units coverage, no c/o pain or discomfort at this time, all safety precautions are in place, call bell and tray table are within the reach of the patient and will continue to monitor

## 2022-07-28 NOTE — NURSING
Patient is discharged to [facility].  Report given to Cy. Voiced no other concerns.  Will leave per wheelchair Van.

## 2022-07-28 NOTE — PLAN OF CARE
Problem: Adult Inpatient Plan of Care  Goal: Plan of Care Review  Outcome: Ongoing, Progressing  Goal: Patient-Specific Goal (Individualized)  Outcome: Ongoing, Progressing  Goal: Absence of Hospital-Acquired Illness or Injury  Outcome: Ongoing, Progressing  Goal: Optimal Comfort and Wellbeing  Outcome: Ongoing, Progressing  Goal: Readiness for Transition of Care  Outcome: Ongoing, Progressing     Problem: Diabetes Comorbidity  Goal: Blood Glucose Level Within Targeted Range  Outcome: Ongoing, Progressing     Problem: Infection  Goal: Absence of Infection Signs and Symptoms  Outcome: Ongoing, Progressing     Problem: Impaired Wound Healing  Goal: Optimal Wound Healing  Outcome: Ongoing, Progressing     Problem: Fall Injury Risk  Goal: Absence of Fall and Fall-Related Injury  Outcome: Ongoing, Progressing     Problem: Skin Injury Risk Increased  Goal: Skin Health and Integrity  Outcome: Ongoing, Progressing     Problem: Diarrhea  Goal: Fluid and Electrolyte Balance  Outcome: Ongoing, Progressing     Problem: Cognitive Impairment (Functional Deficit)  Goal: Optimal Functional [place]ette  Outcome: Ongoing, Progressing     Problem: Coordination Impairment (Functional Deficit)  Goal: Optimal Coordination  Outcome: Ongoing, Progressing     Problem: Muscle Strength Impairment (Functional Deficit)  Goal: Improved Muscle Strength  Outcome: Ongoing, Progressing     Problem: Muscle Tone Impairment (Functional Deficit)  Goal: Improved Muscle Tone  Outcome: Ongoing, Progressing     Problem: Swallowing Impairment  Goal: Optimal Eating and Swallowing Without Aspiration  Outcome: Ongoing, Progressing

## 2022-07-28 NOTE — PROGRESS NOTES
[facility] Resident [facility] Progress Note    Admitting Team: [facility] Hospitalist Team B  Attending Physician: Dr. Maximus Pollack  Resident: Lisa  Intern: Bennie    Subjective:   At bedside, patient resting and did not appear in any distress.  Explained to patient that waiting for final paper work from his sister to go to SNF today.  denied abdominal pain, pelvic pain, scrotum pain, nausea, vomiting, SOB, CP.     Objective:   Last 24 Hour Vital Signs:  BP  Min: 133/60  Max: 172/77  Temp  Av.2 °F (36.8 °C)  Min: 97.2 °F (36.2 °C)  Max: 99 °F (37.2 °C)  Pulse  Av.2  Min: 75  Max: 108  Resp  Av.5  Min: 16  Max: 18  SpO2  Av %  Min: 92 %  Max: 96 %  I/O last 3 completed shifts:  In: 50 [P.O.:50]  Out: 0     Physical Exam  Vitals and nursing note reviewed. Exam conducted with a chaperone present.   Constitutional:       General: He is not in acute distress.     Appearance: He is not toxic-appearing.   HENT:      Head: Normocephalic and atraumatic.      Right Ear: External ear normal.      Left Ear: External ear normal.      Nose: Nose normal. No congestion or rhinorrhea.      Mouth/Throat:      Mouth: Mucous membranes are moist.      Pharynx: Oropharynx is clear.   Eyes:      Extraocular Movements: Extraocular movements intact.      Conjunctiva/sclera: Conjunctivae normal.   Cardiovascular:      Rate and Rhythm: Normal rate and regular rhythm.   Pulmonary:      Effort: Pulmonary effort is normal.      Breath sounds: No wheezing, rhonchi or rales.   Abdominal:      General: Abdomen is flat. Bowel sounds are normal. There is no distension.      Palpations: Abdomen is soft.      Tenderness: There is no abdominal tenderness.   Genitourinary:     Penis: Uncircumcised. No erythema, discharge, swelling or lesions.       Testes:         Right: Tenderness or swelling not present.         Left: Tenderness or swelling not present.   Musculoskeletal:      Right lower leg: No edema.      Left lower leg: No edema.   Skin:      General: Skin is warm and dry.      Capillary Refill: Capillary refill takes less than 2 seconds.   Neurological:      General: No focal deficit present.      Mental Status: He is alert and oriented to person, place, and time.       Laboratory:  Recent Labs   Lab 07/25/22  0807 07/26/22  0658 07/26/22  1726 07/27/22  0551   WBC 14.79* 10.74  --  11.46   HGB 13.0* 13.0*  --  12.4*   HCT 40.8 41.2  --  40.0   * 531*  --  506*    140 134* 136   K 2.8* 2.7* 3.8 4.1   CL 95 96 94* 95   CREATININE 1.0 0.9 0.9 0.8   BUN 9 7* 9 6*   CO2 32* 32* 30* 29   ALT 12 17  --  22   AST 30 31  --  40       Microbiology:  Blood clx showed no growth, pending cdif and resp clx     Other Results:  EKG (my interpretation):NSR improved QTc 540 (654 on initial EKG)    Radiology:  CT Abdomen Pelvis With Contrast   Final Result      Posterior basal areas of pulmonary infiltrate versus atelectasis.      Thickening of the sigmoid colon could be related to chronic muscle hypertrophy from diverticular disease.  Colitis cannot be entirely excluded.      No evidence of trauma.      Coronary artery calcifications.         Electronically signed by: Merlin Elliott MD   Date:    07/22/2022   Time:    14:39      X-Ray Chest AP Portable   Final Result      1. No acute cardiopulmonary process appreciated.         Electronically signed by: Sacha Singh   Date:    07/22/2022   Time:    13:10      CT Head Without Contrast   Final Result      Remote infarct left frontal anterior parietal medial hemisphere and cingulate gyrus.  This involves thinning of the corpus callosum.  No acute process seen.      Sinusitis change and evidence prior functional endoscopic sinus surgery.      No acute process seen.         Electronically signed by: Merlin Elliott MD   Date:    07/22/2022   Time:    11:48        Current Medications:     Infusions:   sodium chloride 0.9% Stopped (07/25/22 1436)        Scheduled:   atorvastatin  20 mg Oral Daily     enoxaparin  40 mg Subcutaneous Daily    insulin detemir U-100  10 Units Subcutaneous Daily    scopolamine  1 patch Transdermal Q3 Days    tamsulosin  0.4 mg Oral Daily    vancomycin  125 mg Oral Q6H        PRN:  albuterol sulfate, dextrose 10%, dextrose 10%, glucagon (human recombinant), insulin aspart U-100, melatonin, sars-cov-2 (covid-19), sodium chloride 0.9%, sodium chloride 3%    Assessment:     Mr. Mateo Foreman is a 71 y.o. 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 Patient has a 1 week history of decreased PO 2/2 multiple episodes of diarrhea. Patient admitted for leukocytosis, acute diarrhea concerning for colitis and electrolyte defiency. C diff EIA positive and being treated with PO vanc. Patient is able to urinate on his own. Pending SNF placement.     Plan:   C. Diff Colitis    - multiple episodes of diarrhea x1wk, acute weight loss,   nausea and decreased PO in setting of recent antibiotic use   - Initial WBC 20; down trending   - given 1 dose of vanc and zosyn in the ED, 1L IVF   - blood clx has no growth to date  - c diff EIA postitive; treating with PO vanc   - stopped  Zosyn 07/25   - Diarrhea improved through hospital course   - Continue PO Vanc for 10 day course ( end date 8/1/22)     Hypokalemia   - K 2.9 on admission and given 25 mEq of K in the ER   - EKG with prolonged qtc   - Replete PRN  - 07/27 stable at 4.1     Urinary retention-resolved   - 07/26 patient unable to void; bladder scan showed over 450cc  -required in and out cath  -malodorus and thick drainage present around the glans penis  -UA pending  -flomax started   -daily cleaning of the area to promote good hygiene; add clotrimazole if symptoms continue  -patient no longer retaining urine and able to void by himself  x2      Prolonged qtc  - ECG significant for qtc of 654; improved and repeat ekg 07/23 540  - avoid qtc prolonging meds      dysphagia    - reported difficulty swallowing liquids the last few days PTA  - bedside swallow assessment ordered; donell tolerated well   -07/24 nurses concerned for worsening dysphagia   -SLP consulted; started on PUREED diet      Chronic bilateal LE lymphedema   - follows with wound care and Dr. Silveira  - wound care saw patient 07/26; recs nursing to continue pressure injury prevention   interventions to include mepilex border dressing to back to reduce friction/shear  -Pt to continue with compression therapy and f/u with Dr. Silveira     T2DM   -  on admission; A1c 14 (a1c: 11 5/25/22)  - hold at home metformin; did not previously take insulin at home   - SSI, 10 units of basal insulin daily  - Discharged on basal insulin 10 units and resume home metformin  - Follow up with PCP for further titration of insulin therapy and BG control      Anemia of chronic disease  - H/H on admission 12.8/38.8 (11.7/38 on 4/20)  - Iron/TIBC 40/198, ferritin 334, B12 352, folate 5     HLD   -continue at home Lipitor 20 mg   -lipid panel on admit: chol 71, ,  HDL 22, LDL 25        Diet: PUREED  Code: FULL  PPx: Heparin   Dispo: medically stable pending SNF placement       Alicia Alvarado MD  [facility] Hospitalist Team B    [facility] Medicine Hospitalist Pager numbers:   [facility] Hospitalist Medicine Team A (Jamie/Michelle): [phone]  [facility] Hospitalist Medicine Team B (Elvin/Ranjeet):  [phone]

## 2022-07-28 NOTE — PLAN OF CARE
"CM met pt prior to d/c -- he agrees with d/c to [facility].       for d/c to [facility] SNF today - sister completed paperwork there this am.      per Cathleen -- OK for pt's nurse to call  -- room 802A  - [facility] transport set  up for "now.    pt choice form signed.          07/28/22 1157   Final Note   Assessment Type Final Discharge Note   Anticipated Discharge Disposition SNF  ([facility])   What phone number can be called within the next 1-3 days to see how you are doing after discharge? [phone]   Post-Acute Status   Post-Acute Placement Status Set-up Complete/Auth obtained   Patient choice form signed by patient/caregiver   (pt choice form signed)   Discharge Delays (!) Other  (sister completed paperwork this am)     "

## 2022-07-28 NOTE — NURSING
Asumed care of patient from TIANNA Oliver, found patient resting in bed watching television, patient very upset because said he had been calling for 3 hours looking for his food, we found on counter outside room and heated for him and gave to him immediately, patient on room air, AAOX4, slurred speech from previous stroke but you can understand, no c/o pain or discomfort at this time, all safety precautions are in place, call bell and tray table are within the reach of the patient and will continue to monitor.

## 2022-10-13 ENCOUNTER — HOSPITAL ENCOUNTER (EMERGENCY)
Facility: HOSPITAL | Age: 72
Discharge: HOME | End: 2022-10-13
Attending: EMERGENCY MEDICINE
Payer: MEDICARE

## 2022-10-13 VITALS
BODY MASS INDEX: 23.34 KG/M2 | DIASTOLIC BLOOD PRESSURE: 75 MMHG | RESPIRATION RATE: 16 BRPM | TEMPERATURE: 98 F | OXYGEN SATURATION: 96 % | HEART RATE: 99 BPM | SYSTOLIC BLOOD PRESSURE: 151 MMHG | WEIGHT: 149 LBS

## 2022-10-13 DIAGNOSIS — R33.9 URINARY RETENTION: Primary | ICD-10-CM

## 2022-10-13 DIAGNOSIS — N30.00 ACUTE CYSTITIS WITHOUT HEMATURIA: ICD-10-CM

## 2022-10-13 DIAGNOSIS — R33.8 ACUTE URINARY RETENTION: ICD-10-CM

## 2022-10-13 LAB
ALBUMIN SERPL BCP-MCNC: 2.7 G/DL (ref 3.5–5.2)
ALP SERPL-CCNC: 102 U/L (ref 55–135)
ALT SERPL W/O P-5'-P-CCNC: 17 U/L (ref 10–44)
ANION GAP SERPL CALC-SCNC: 10 MMOL/L (ref 8–16)
AST SERPL-CCNC: 29 U/L (ref 10–40)
BACTERIA #/AREA URNS AUTO: ABNORMAL /HPF
BASOPHILS # BLD AUTO: 0.04 K/UL (ref 0–0.2)
BASOPHILS NFR BLD: 0.3 % (ref 0–1.9)
BILIRUB SERPL-MCNC: 0.3 MG/DL (ref 0.1–1)
BILIRUB UR QL STRIP: NEGATIVE
BUN SERPL-MCNC: 15 MG/DL (ref 8–23)
CALCIUM SERPL-MCNC: 9.5 MG/DL (ref 8.7–10.5)
CHLORIDE SERPL-SCNC: 90 MMOL/L (ref 95–110)
CLARITY UR REFRACT.AUTO: CLEAR
CO2 SERPL-SCNC: 31 MMOL/L (ref 23–29)
COLOR UR AUTO: COLORLESS
CREAT SERPL-MCNC: 0.9 MG/DL (ref 0.5–1.4)
DIFFERENTIAL METHOD: ABNORMAL
EOSINOPHIL # BLD AUTO: 0 K/UL (ref 0–0.5)
EOSINOPHIL NFR BLD: 0.3 % (ref 0–8)
ERYTHROCYTE [DISTWIDTH] IN BLOOD BY AUTOMATED COUNT: 13.5 % (ref 11.5–14.5)
EST. GFR  (NO RACE VARIABLE): >60 ML/MIN/1.73 M^2
GLUCOSE SERPL-MCNC: 147 MG/DL (ref 70–110)
GLUCOSE UR QL STRIP: NEGATIVE
HCT VFR BLD AUTO: 33 % (ref 40–54)
HGB BLD-MCNC: 10.1 G/DL (ref 14–18)
HGB UR QL STRIP: ABNORMAL
IMM GRANULOCYTES # BLD AUTO: 0.05 K/UL (ref 0–0.04)
IMM GRANULOCYTES NFR BLD AUTO: 0.4 % (ref 0–0.5)
KETONES UR QL STRIP: NEGATIVE
LEUKOCYTE ESTERASE UR QL STRIP: ABNORMAL
LYMPHOCYTES # BLD AUTO: 1.4 K/UL (ref 1–4.8)
LYMPHOCYTES NFR BLD: 11 % (ref 18–48)
MCH RBC QN AUTO: 26.4 PG (ref 27–31)
MCHC RBC AUTO-ENTMCNC: 30.6 G/DL (ref 32–36)
MCV RBC AUTO: 86 FL (ref 82–98)
MICROSCOPIC COMMENT: ABNORMAL
MONOCYTES # BLD AUTO: 1.4 K/UL (ref 0.3–1)
MONOCYTES NFR BLD: 11.2 % (ref 4–15)
NEUTROPHILS # BLD AUTO: 9.7 K/UL (ref 1.8–7.7)
NEUTROPHILS NFR BLD: 76.8 % (ref 38–73)
NITRITE UR QL STRIP: POSITIVE
NRBC BLD-RTO: 0 /100 WBC
PH UR STRIP: 5 [PH] (ref 5–8)
PLATELET # BLD AUTO: 570 K/UL (ref 150–450)
PMV BLD AUTO: 9.6 FL (ref 9.2–12.9)
POTASSIUM SERPL-SCNC: 3.9 MMOL/L (ref 3.5–5.1)
PROT SERPL-MCNC: 9.2 G/DL (ref 6–8.4)
PROT UR QL STRIP: NEGATIVE
RBC # BLD AUTO: 3.83 M/UL (ref 4.6–6.2)
RBC #/AREA URNS AUTO: 4 /HPF (ref 0–4)
SODIUM SERPL-SCNC: 131 MMOL/L (ref 136–145)
SP GR UR STRIP: 1.01 (ref 1–1.03)
SQUAMOUS #/AREA URNS AUTO: 0 /HPF
URN SPEC COLLECT METH UR: ABNORMAL
WBC # BLD AUTO: 12.66 K/UL (ref 3.9–12.7)
WBC #/AREA URNS AUTO: 6 /HPF (ref 0–5)

## 2022-10-13 PROCEDURE — 99284 EMERGENCY DEPT VISIT MOD MDM: CPT | Mod: 25

## 2022-10-13 PROCEDURE — 51702 INSERT TEMP BLADDER CATH: CPT

## 2022-10-13 PROCEDURE — 99284 PR EMERGENCY DEPT VISIT,LEVEL IV: ICD-10-PCS | Mod: ,,, | Performed by: EMERGENCY MEDICINE

## 2022-10-13 PROCEDURE — 81001 URINALYSIS AUTO W/SCOPE: CPT | Performed by: EMERGENCY MEDICINE

## 2022-10-13 PROCEDURE — 80053 COMPREHEN METABOLIC PANEL: CPT | Performed by: EMERGENCY MEDICINE

## 2022-10-13 PROCEDURE — 63600175 PHARM REV CODE 636 W HCPCS: Performed by: EMERGENCY MEDICINE

## 2022-10-13 PROCEDURE — 99284 EMERGENCY DEPT VISIT MOD MDM: CPT | Mod: ,,, | Performed by: EMERGENCY MEDICINE

## 2022-10-13 PROCEDURE — 51798 US URINE CAPACITY MEASURE: CPT

## 2022-10-13 PROCEDURE — 96365 THER/PROPH/DIAG IV INF INIT: CPT

## 2022-10-13 PROCEDURE — 85025 COMPLETE CBC W/AUTO DIFF WBC: CPT | Performed by: EMERGENCY MEDICINE

## 2022-10-13 RX ORDER — CEFPODOXIME PROXETIL 200 MG/1
200 TABLET, FILM COATED ORAL 2 TIMES DAILY
Qty: 14 TABLET | Refills: 0 | Status: SHIPPED | OUTPATIENT
Start: 2022-10-13 | End: 2022-10-20

## 2022-10-13 RX ADMIN — CEFTRIAXONE 1 G: 1 INJECTION, SOLUTION INTRAVENOUS at 04:10

## 2022-10-13 NOTE — DISCHARGE INSTRUCTIONS
Today, your evaluation for inability to urinate for several days shows that you have urinary retention.  The Knight catheter placed.  Your workup shows an infection of your urine and bladder.  We will place you on antibiotics for 1 week.  Please follow-up with urology in 1 week for catheter removal and trial.    Our goal in the emergency department is to always give you outstanding care and exceptional service. You may receive a survey by mail or e-mail in the next week regarding your experience in our ED. We would greatly appreciate your completing and returning the survey. Your feedback provides us with a way to recognize our staff who give very good care and it helps us learn how to improve when your experience was below our aspiration of excellence.

## 2022-10-13 NOTE — ED PROVIDER NOTES
Encounter Date: 10/13/2022       History     Chief Complaint   Patient presents with    Urinary Retention     From [facility] for urinary retention. Per [facility], patient has has had decreased urinary output and was catheterized in & out 3 times with no success but patient was wet with urine upon arrival     The history is provided by medical records, the EMS personnel and the nursing home. The history is limited by the condition of the patient.   Mateo Foreman is a 72-year-old male with a history of hyperlipidemia, glaucoma, GERD, type 2 diabetes and anemia presenting from [facility] for urinary retention.  Per EMS, there was an attempt to catheterize the patient 3 times at the nursing home without success.  However when patient was being transported via EMS, he urinated all over himself.  Patient denies any fevers, chills or any other complaints.  He denies any abdominal pain, back pain, leg pain or chest pain.    Review of patient's allergies indicates:  No Known Allergies  Past Medical History:   Diagnosis Date    Anemia     Cataract     Chronic cough     Diabetes mellitus type II     GERD (gastroesophageal reflux disease)     Glaucoma     Hyperlipidemia      No past surgical history on file.  Family History   Problem Relation Age of Onset    Cancer Mother     Diabetes Father     Amblyopia Neg Hx     Blindness Neg Hx     Cataracts Neg Hx     Glaucoma Neg Hx     Hypertension Neg Hx     Macular degeneration Neg Hx     Retinal detachment Neg Hx     Strabismus Neg Hx     Stroke Neg Hx     Thyroid disease Neg Hx      Social History     Tobacco Use    Smoking status: Former     Packs/day: 0.20     Years: 45.00     Pack years: 9.00     Types: Cigarettes    Smokeless tobacco: Never   Substance Use Topics    Alcohol use: Not Currently    Drug use: No     Review of Systems   Constitutional:  Negative for chills and fever.   HENT:  Negative for congestion and sore throat.    Eyes:  Negative for  photophobia and visual disturbance.   Respiratory:  Negative for chest tightness and shortness of breath.    Gastrointestinal:  Negative for abdominal pain, nausea and vomiting.   Genitourinary:  Positive for decreased urine volume and difficulty urinating. Negative for flank pain and frequency.   Skin:  Negative for color change and wound.   Neurological:  Negative for light-headedness and headaches.   Psychiatric/Behavioral:  Negative for confusion. The patient is not nervous/anxious.      Physical Exam     Initial Vitals [10/13/22 1358]   BP Pulse Resp Temp SpO2   (!) 142/79 95 18 98.1 °F (36.7 °C) 98 %      MAP       --         Physical Exam    Nursing note and vitals reviewed.    Gen/Constitutional: Interactive. No acute distress  Head: Normocephalic, Atraumatic  Neck: supple, no masses or LAD, no JVD  Eyes: PERRLA, conjunctiva clear  Ears, Nose and Throat: No rhinorrhea or stridor.  Cardiac:  Regular rate, Reg Rhythm, No murmur  Pulmonary: CTA Bilat, no wheezes, rhonchi, rales.  No increased work of breathing.  GI: Abdomen soft, non-tender, non-distended; no rebound or guarding  : No CVA tenderness.  Musculoskeletal: Extremities warm, well perfused, no erythema, no edema  Skin: No rashes, cyanosis or jaundice.  Neuro: Alert and Oriented x 1 to baseline he has residual cognitive and motor dysfunction from CVA in 2019; No new focal motor or sensory deficits.    Psych: Normal affect      ED Course   Procedures  Labs Reviewed   CBC W/ AUTO DIFFERENTIAL - Abnormal; Notable for the following components:       Result Value    RBC 3.83 (*)     Hemoglobin 10.1 (*)     Hematocrit 33.0 (*)     MCH 26.4 (*)     MCHC 30.6 (*)     Platelets 570 (*)     Gran # (ANC) 9.7 (*)     Immature Grans (Abs) 0.05 (*)     Mono # 1.4 (*)     Gran % 76.8 (*)     Lymph % 11.0 (*)     All other components within normal limits   COMPREHENSIVE METABOLIC PANEL - Abnormal; Notable for the following components:    Sodium 131 (*)     Chloride  90 (*)     CO2 31 (*)     Glucose 147 (*)     Total Protein 9.2 (*)     Albumin 2.7 (*)     All other components within normal limits   URINALYSIS, REFLEX TO URINE CULTURE - Abnormal; Notable for the following components:    Color, UA Colorless (*)     Occult Blood UA Trace (*)     Nitrite, UA Positive (*)     Leukocytes, UA 2+ (*)     All other components within normal limits    Narrative:     Specimen Source->Urine   URINALYSIS MICROSCOPIC - Abnormal; Notable for the following components:    WBC, UA 6 (*)     Bacteria Few (*)     All other components within normal limits    Narrative:     Specimen Source->Urine          Imaging Results    None          Medications   cefTRIAXone (ROCEPHIN) 1 g/50 mL D5W IVPB (0 g Intravenous Stopped 10/13/22 1632)     Medical Decision Making:   History:   I obtained history from: EMS provider.       <> Summary of History: Brought in via EMS for evaluation for urinary retention, unable to catheterize at skilled nursing facility were sent to the ED for evaluation  Old Medical Records: I decided to obtain old medical records.  Old Records Summarized: records from previous admission(s).       <> Summary of Records: Previous admission for diarrhea, weakness evaluated and treated - he was found to have C diff colitis, and urinary tension discharged with treatment.  Initial Assessment:   Mateo Foreman is a 72-year-old male with a history of hyperlipidemia, glaucoma, GERD, type 2 diabetes and anemia presenting from [facility] for urinary retention.  Differential Diagnosis:   Urinary retention, prostatic hypertrophy, UTI, cystitis, pyelonephritis  Clinical Tests:   Lab Tests: Ordered and Reviewed                 Currently afebrile vital signs are stable.  Physical exam findings unremarkable with no focal neurologic deficits that are new, patient at baseline cognition and motor function secondary to previous CVA in 2019.  He urinated on himself with small amount but bedside  ultrasound/bladder scan shows greater than 200 cc.  Patient required a Knight catheter on this visit given 2-3 days of significant urinary retention.  UA shows evidence of UTI with nitrite positive, 2+ leukocytes, bacteria and wbc's in his urine.  Will treat with ceftriaxone in the emergency department and prescribed cefpodoxime as an outpatient for 1 week.  Plan for outpatient follow-up with Urology in 1 week.  No secondary signs of infection externally, nontender on abdominal exam.  No active diarrhea or systemic signs of infection.  Patient discharge back to his nursing facility with antibiotic treatment for UTI, urinary catheter with Knight bag and catheter care instructions.  Outpatient follow-up in 1 week. Patient agreeable to discharge plan. Strict ED precautions and return instructions discussed at length and patient verbalized understanding. All questions were answered and ample time was given for questions.      Complexity:  High risk       Clinical Impression:   Final diagnoses:  [R33.9] Urinary retention (Primary)  [R33.8] Acute urinary retention  [N30.00] Acute cystitis without hematuria      ED Disposition Condition    Discharge Stable          ED Prescriptions       Medication Sig Dispense Start Date End Date Auth. Provider    cefpodoxime (VANTIN) 200 MG tablet Take 1 tablet (200 mg total) by mouth 2 (two) times daily. for 7 days 14 tablet 10/13/2022 10/20/2022 Blayne Sosa, DO          Follow-up Information       Follow up With Specialties Details Why Contact Info Additional Information    Tremaine Finch MD Internal Medicine Schedule an appointment as soon as possible for a visit in 1 week As needed, If symptoms worsen [address]  [address]  [facility] [id number] [phone]       [facility] - Urology [address] Urology Schedule an appointment as soon as possible for a visit in 1 week For voiding trial and Knight catheter removed [address] [facility]  [address]  [phone] Main Building, 4th Floor Please park in [facility] and take Atrium elevator            Blayne Sosa DO, FAAEM  Emergency Staff Physician   Dept of Emergency Medicine   [facility]  Spectralink: [id number]        Disclaimer: This note has been generated using voice-recognition software. There may be typographical errors that have been missed during proof-reading.       Blayne Sosa DO  10/13/22 3788

## 2022-11-08 ENCOUNTER — HOSPITAL ENCOUNTER (EMERGENCY)
Facility: HOSPITAL | Age: 72
Discharge: HOME | End: 2022-11-08
Attending: EMERGENCY MEDICINE
Payer: MEDICARE

## 2022-11-08 VITALS
WEIGHT: 140 LBS | BODY MASS INDEX: 21.93 KG/M2 | DIASTOLIC BLOOD PRESSURE: 64 MMHG | SYSTOLIC BLOOD PRESSURE: 110 MMHG | RESPIRATION RATE: 16 BRPM | OXYGEN SATURATION: 99 % | HEART RATE: 87 BPM | TEMPERATURE: 98 F

## 2022-11-08 DIAGNOSIS — Z00.8 ENCOUNTER FOR MEDICAL ASSESSMENT: Primary | ICD-10-CM

## 2022-11-08 PROCEDURE — 99283 EMERGENCY DEPT VISIT LOW MDM: CPT

## 2022-11-08 NOTE — ED NOTES
Pt placed on continuous cardiac monitor with bed in lowest, locked position with call light within reach. Pt denies any chest pain and dyspnea. Normal respiratory drive noted. Pt is alert, oriented x4 and in no distress at this time. Will continue to monitor.

## 2022-11-08 NOTE — DISCHARGE INSTRUCTIONS
Your exam today was reassuring. Please be careful in future. Come back if you have bruising, new pains, weakness, or other concerning symptoms.

## 2022-11-08 NOTE — ED PROVIDER NOTES
Encounter Date: 11/8/2022       History     Chief Complaint   Patient presents with    Fall     Fall from seated position.  Patient slept in wheelchair throughout the night and fell to floor this morning when asleep.  Denies pain.       HPI  72-year-old male not on anticoagulation presenting after falling out of his wheelchair after reportedly sleeping in it last night  Patient states that he landed on the left side of his face, denies any pain, eye pain, and states he would like to go home and sleep in his own bed    Review of patient's allergies indicates:  No Known Allergies  Past Medical History:   Diagnosis Date    Anemia     Cataract     Chronic cough     Diabetes mellitus type II     GERD (gastroesophageal reflux disease)     Glaucoma     Hyperlipidemia      No past surgical history on file.  Family History   Problem Relation Age of Onset    Cancer Mother     Diabetes Father     Amblyopia Neg Hx     Blindness Neg Hx     Cataracts Neg Hx     Glaucoma Neg Hx     Hypertension Neg Hx     Macular degeneration Neg Hx     Retinal detachment Neg Hx     Strabismus Neg Hx     Stroke Neg Hx     Thyroid disease Neg Hx      Social History     Tobacco Use    Smoking status: Former     Packs/day: 0.20     Years: 45.00     Pack years: 9.00     Types: Cigarettes    Smokeless tobacco: Never   Substance Use Topics    Alcohol use: Not Currently    Drug use: No     Review of Systems   Constitutional:  Negative for fever.   HENT:  Negative for sore throat.    Respiratory:  Negative for shortness of breath.    Cardiovascular:  Negative for chest pain.   Gastrointestinal:  Negative for nausea.   Genitourinary:  Negative for dysuria.   Musculoskeletal:  Negative for back pain.   Skin:  Negative for rash.   Neurological:  Negative for weakness.   Hematological:  Does not bruise/bleed easily.     Physical Exam     Initial Vitals [11/08/22 0527]   BP Pulse Resp Temp SpO2   131/68 90 16 98.3 °F (36.8 °C) 99 %      MAP       --          Physical Exam    Nursing note and vitals reviewed.  Constitutional:   EXAM  General: Sleeping, wakes to light touch. No acute distress.     Head: normocephalic and atraumatic. No pain with palpation over L face, no bruising or signs of trauma.     Eyes:  EOM are intact. Eyelids are normal in appearance without swelling or lesions.     Ears: The external ear and ear canal are non-tender and without swelling. The canal is clear without discharge. Hearing intact.     Nose: Nares are patent bilaterally.     Neck: The neck is supple. Trachea is midline. Full ROM.     Cardiac: Regular rate.     Respiratory: No signs of respiratory distress. No audible wheezes.     Abdominal: Slightly distended, nonpainful with palpation. Knight draining clear yellow urine.     Extremities: No pain with palpation of bilateral upper extremities. No signs of trauma.     Skin: Appropriate color for ethnicity.     Neurological: The patient is awake, alert and oriented to person, place, and time with difficult to understand speech (top denture out).     Psychiatric: Appropriate mood and affect.     In light of current/ongoing global covid-19 pandemic, all my encounters w pt were with full ppe including but not limited to gown, gloves, n95, eye protection OR from >6 ft away.           ED Course   Procedures  Labs Reviewed - No data to display       Imaging Results    None          Medications - No data to display  Medical Decision Making:   ED Management:  Pt without signs of trauma. No complaints, able to tell me he fell on his L face. Wants to go home. Not on anticoagulation. No indication for imaging or labs today as this seems to be mechanical. Plan to dc back to facility.                         Clinical Impression:   Final diagnoses:  [Z00.8] Encounter for medical assessment (Primary)      ED Disposition Condition    Discharge Stable          ED Prescriptions    None       Follow-up Information       Follow up With Specialties Details  Why Contact Info    Tremaine Finch MD Internal Medicine   [address] [facility] [address]  [address]  [facility] [address]  [phone]               Stefanie Mayen MD  11/08/22 0722

## 2023-04-26 ENCOUNTER — HOSPITAL ENCOUNTER (EMERGENCY)
Facility: HOSPITAL | Age: 73
Discharge: HOME | End: 2023-04-26
Attending: EMERGENCY MEDICINE | Admitting: INTERNAL MEDICINE
Payer: MEDICARE

## 2023-04-26 VITALS
DIASTOLIC BLOOD PRESSURE: 68 MMHG | BODY MASS INDEX: 25.11 KG/M2 | HEIGHT: 67 IN | TEMPERATURE: 98 F | OXYGEN SATURATION: 99 % | HEART RATE: 101 BPM | RESPIRATION RATE: 18 BRPM | SYSTOLIC BLOOD PRESSURE: 119 MMHG | WEIGHT: 160 LBS

## 2023-04-26 DIAGNOSIS — Z97.8 FOLEY CATHETER IN PLACE ON ADMISSION: ICD-10-CM

## 2023-04-26 DIAGNOSIS — R60.0 BILATERAL LEG EDEMA: ICD-10-CM

## 2023-04-26 DIAGNOSIS — I83.008 VENOUS STASIS ULCER OF OTHER PART OF LOWER LEG LIMITED TO BREAKDOWN OF SKIN, UNSPECIFIED LATERALITY, UNSPECIFIED WHETHER VARICOSE VEINS PRESENT: ICD-10-CM

## 2023-04-26 DIAGNOSIS — N39.0 COMPLICATED UTI (URINARY TRACT INFECTION): Primary | ICD-10-CM

## 2023-04-26 DIAGNOSIS — R00.0 TACHYCARDIA: ICD-10-CM

## 2023-04-26 DIAGNOSIS — R09.89 RALES: ICD-10-CM

## 2023-04-26 DIAGNOSIS — L97.801 VENOUS STASIS ULCER OF OTHER PART OF LOWER LEG LIMITED TO BREAKDOWN OF SKIN, UNSPECIFIED LATERALITY, UNSPECIFIED WHETHER VARICOSE VEINS PRESENT: ICD-10-CM

## 2023-04-26 LAB
ALBUMIN SERPL BCP-MCNC: 3.1 G/DL (ref 3.5–5.2)
ALP SERPL-CCNC: 113 U/L (ref 55–135)
ALT SERPL W/O P-5'-P-CCNC: 14 U/L (ref 10–44)
ANION GAP SERPL CALC-SCNC: 10 MMOL/L (ref 8–16)
AST SERPL-CCNC: 17 U/L (ref 10–40)
BACTERIA #/AREA URNS HPF: ABNORMAL /HPF
BASOPHILS # BLD AUTO: 0.04 K/UL (ref 0–0.2)
BASOPHILS NFR BLD: 0.4 % (ref 0–1.9)
BILIRUB SERPL-MCNC: 0.3 MG/DL (ref 0.1–1)
BILIRUB UR QL STRIP: NEGATIVE
BNP SERPL-MCNC: <10 PG/ML (ref 0–99)
BUN SERPL-MCNC: 13 MG/DL (ref 8–23)
CALCIUM SERPL-MCNC: 9.4 MG/DL (ref 8.7–10.5)
CHLORIDE SERPL-SCNC: 91 MMOL/L (ref 95–110)
CLARITY UR: ABNORMAL
CO2 SERPL-SCNC: 31 MMOL/L (ref 23–29)
COLOR UR: YELLOW
CREAT SERPL-MCNC: 1.2 MG/DL (ref 0.5–1.4)
DIFFERENTIAL METHOD: ABNORMAL
EOSINOPHIL # BLD AUTO: 0.3 K/UL (ref 0–0.5)
EOSINOPHIL NFR BLD: 3 % (ref 0–8)
ERYTHROCYTE [DISTWIDTH] IN BLOOD BY AUTOMATED COUNT: 13.2 % (ref 11.5–14.5)
EST. GFR  (NO RACE VARIABLE): >60 ML/MIN/1.73 M^2
GLUCOSE SERPL-MCNC: 267 MG/DL (ref 70–110)
GLUCOSE UR QL STRIP: ABNORMAL
HCT VFR BLD AUTO: 32.6 % (ref 40–54)
HGB BLD-MCNC: 10.2 G/DL (ref 14–18)
HGB UR QL STRIP: ABNORMAL
HYALINE CASTS #/AREA URNS LPF: 0 /LPF
IMM GRANULOCYTES # BLD AUTO: 0.06 K/UL (ref 0–0.04)
IMM GRANULOCYTES NFR BLD AUTO: 0.6 % (ref 0–0.5)
KETONES UR QL STRIP: NEGATIVE
LEUKOCYTE ESTERASE UR QL STRIP: ABNORMAL
LYMPHOCYTES # BLD AUTO: 1.6 K/UL (ref 1–4.8)
LYMPHOCYTES NFR BLD: 16.2 % (ref 18–48)
MCH RBC QN AUTO: 26.6 PG (ref 27–31)
MCHC RBC AUTO-ENTMCNC: 31.3 G/DL (ref 32–36)
MCV RBC AUTO: 85 FL (ref 82–98)
MICROSCOPIC COMMENT: ABNORMAL
MONOCYTES # BLD AUTO: 1 K/UL (ref 0.3–1)
MONOCYTES NFR BLD: 10 % (ref 4–15)
NEUTROPHILS # BLD AUTO: 6.8 K/UL (ref 1.8–7.7)
NEUTROPHILS NFR BLD: 69.8 % (ref 38–73)
NITRITE UR QL STRIP: POSITIVE
NON-SQ EPI CELLS #/AREA URNS HPF: 1 /HPF
NRBC BLD-RTO: 0 /100 WBC
PH UR STRIP: 6 [PH] (ref 5–8)
PLATELET # BLD AUTO: 498 K/UL (ref 150–450)
PMV BLD AUTO: 9.3 FL (ref 9.2–12.9)
POCT GLUCOSE: 340 MG/DL (ref 70–110)
POTASSIUM SERPL-SCNC: 3.6 MMOL/L (ref 3.5–5.1)
PROT SERPL-MCNC: 7.6 G/DL (ref 6–8.4)
PROT UR QL STRIP: ABNORMAL
RBC # BLD AUTO: 3.83 M/UL (ref 4.6–6.2)
RBC #/AREA URNS HPF: 83 /HPF (ref 0–4)
SODIUM SERPL-SCNC: 132 MMOL/L (ref 136–145)
SP GR UR STRIP: 1.03 (ref 1–1.03)
URN SPEC COLLECT METH UR: ABNORMAL
UROBILINOGEN UR STRIP-ACNC: ABNORMAL EU/DL
WBC # BLD AUTO: 9.73 K/UL (ref 3.9–12.7)
WBC #/AREA URNS HPF: >100 /HPF (ref 0–5)
YEAST URNS QL MICRO: ABNORMAL

## 2023-04-26 PROCEDURE — 87077 CULTURE AEROBIC IDENTIFY: CPT | Performed by: EMERGENCY MEDICINE

## 2023-04-26 PROCEDURE — 87086 URINE CULTURE/COLONY COUNT: CPT | Performed by: EMERGENCY MEDICINE

## 2023-04-26 PROCEDURE — 82962 GLUCOSE BLOOD TEST: CPT

## 2023-04-26 PROCEDURE — 96365 THER/PROPH/DIAG IV INF INIT: CPT

## 2023-04-26 PROCEDURE — 51702 INSERT TEMP BLADDER CATH: CPT

## 2023-04-26 PROCEDURE — 87088 URINE BACTERIA CULTURE: CPT | Performed by: EMERGENCY MEDICINE

## 2023-04-26 PROCEDURE — 93005 ELECTROCARDIOGRAM TRACING: CPT

## 2023-04-26 PROCEDURE — 96375 TX/PRO/DX INJ NEW DRUG ADDON: CPT

## 2023-04-26 PROCEDURE — 81000 URINALYSIS NONAUTO W/SCOPE: CPT | Performed by: EMERGENCY MEDICINE

## 2023-04-26 PROCEDURE — 85025 COMPLETE CBC W/AUTO DIFF WBC: CPT | Performed by: EMERGENCY MEDICINE

## 2023-04-26 PROCEDURE — 83880 ASSAY OF NATRIURETIC PEPTIDE: CPT | Performed by: EMERGENCY MEDICINE

## 2023-04-26 PROCEDURE — 63600175 PHARM REV CODE 636 W HCPCS: Performed by: EMERGENCY MEDICINE

## 2023-04-26 PROCEDURE — 87186 SC STD MICRODIL/AGAR DIL: CPT | Performed by: EMERGENCY MEDICINE

## 2023-04-26 PROCEDURE — 93010 ELECTROCARDIOGRAM REPORT: CPT | Mod: ,,, | Performed by: INTERNAL MEDICINE

## 2023-04-26 PROCEDURE — 25000003 PHARM REV CODE 250: Performed by: EMERGENCY MEDICINE

## 2023-04-26 PROCEDURE — 96361 HYDRATE IV INFUSION ADD-ON: CPT

## 2023-04-26 PROCEDURE — 93010 EKG 12-LEAD: ICD-10-PCS | Mod: ,,, | Performed by: INTERNAL MEDICINE

## 2023-04-26 PROCEDURE — 80053 COMPREHEN METABOLIC PANEL: CPT | Performed by: EMERGENCY MEDICINE

## 2023-04-26 PROCEDURE — 99285 EMERGENCY DEPT VISIT HI MDM: CPT | Mod: 25

## 2023-04-26 RX ORDER — CEPHALEXIN 500 MG/1
500 CAPSULE ORAL EVERY 12 HOURS
Qty: 14 CAPSULE | Refills: 0 | Status: SHIPPED | OUTPATIENT
Start: 2023-04-26 | End: 2023-05-03

## 2023-04-26 RX ORDER — ASCORBIC ACID 500 MG
500 TABLET ORAL 2 TIMES DAILY
Status: ON HOLD | COMMUNITY
End: 2023-06-20 | Stop reason: HOSPADM

## 2023-04-26 RX ORDER — LATANOPROST 50 UG/ML
1 SOLUTION/ DROPS OPHTHALMIC NIGHTLY
COMMUNITY
Start: 2023-03-29

## 2023-04-26 RX ORDER — MULTIVITAMIN
1 TABLET ORAL DAILY
COMMUNITY

## 2023-04-26 RX ORDER — PIOGLITAZONEHYDROCHLORIDE 30 MG/1
30 TABLET ORAL DAILY
Status: ON HOLD | COMMUNITY
Start: 2023-04-10 | End: 2023-06-20 | Stop reason: HOSPADM

## 2023-04-26 RX ORDER — COLLAGENASE SANTYL 250 [ARB'U]/G
OINTMENT TOPICAL
COMMUNITY
Start: 2022-11-07 | End: 2023-04-26

## 2023-04-26 RX ADMIN — CEFTRIAXONE 1 G: 1 INJECTION, POWDER, FOR SOLUTION INTRAMUSCULAR; INTRAVENOUS at 11:04

## 2023-04-26 RX ADMIN — INSULIN HUMAN 10 UNITS: 100 INJECTION, SOLUTION PARENTERAL at 10:04

## 2023-04-26 RX ADMIN — SODIUM CHLORIDE, POTASSIUM CHLORIDE, SODIUM LACTATE AND CALCIUM CHLORIDE 500 ML: 600; 310; 30; 20 INJECTION, SOLUTION INTRAVENOUS at 10:04

## 2023-04-26 NOTE — ED PROVIDER NOTES
"Encounter Date: 4/26/2023    SCRIBE #1 NOTE: I, Hernandez Celestin, am scribing for, and in the presence of,  Singh Arredondo MD. I have scribed the following portions of the note - Other sections scribed: HPI, ROS, Physical Exam, MDM.     History     Chief Complaint   Patient presents with    Edema     Worsening pain, edema to bilateral lower extremities for a couple of months. Pt. Requested transport to ED today.      This is a 73 y.o. male who has a past medical history of Anemia, Cataract, Chronic cough, Diabetes mellitus type II, GERD (gastroesophageal reflux disease), Glaucoma, and Hyperlipidemia.     The patient presents to the Emergency Department via EMS from [facility] due to bilateral leg swelling.   Pt presents worsening bilateral leg pain and swelling with weeping for "a long time."  He reports he has not been bathed or cleaned in months.   Pt ambulates using wheelchair.  Additional history obtained by independent historian--EMS personnel--who report the patient is on 20 mg Lasix and refuses catheter changes per nursing home staff.  Patient denies chest pain, shortness of breath, or any other complaints at this time.    The history is provided by the patient and the EMS personnel.   Review of patient's allergies indicates:  No Known Allergies  Past Medical History:   Diagnosis Date    Anemia     Cataract     Chronic cough     Diabetes mellitus type II     GERD (gastroesophageal reflux disease)     Glaucoma     Hyperlipidemia      No past surgical history on file.  Family History   Problem Relation Age of Onset    Cancer Mother     Diabetes Father     Amblyopia Neg Hx     Blindness Neg Hx     Cataracts Neg Hx     Glaucoma Neg Hx     Hypertension Neg Hx     Macular degeneration Neg Hx     Retinal detachment Neg Hx     Strabismus Neg Hx     Stroke Neg Hx     Thyroid disease Neg Hx      Social History     Tobacco Use    Smoking status: Former     Packs/day: 0.20     Years: 45.00     Pack years: 9.00    "  Types: Cigarettes    Smokeless tobacco: Never   Substance Use Topics    Alcohol use: Not Currently    Drug use: No     Review of Systems   Respiratory:  Negative for shortness of breath.    Cardiovascular:  Positive for leg swelling. Negative for chest pain.   Musculoskeletal:         +bilteral LE pain   All other systems reviewed and are negative.    Physical Exam     Initial Vitals [04/26/23 0907]   BP Pulse Resp Temp SpO2   128/66 106 15 98.1 °F (36.7 °C) 98 %      MAP       --         Physical Exam    Constitutional:   Pt relatively unkempt with poor hygiene. Pt wearing foul smelling, urine soaked socks.   HENT:   Head: Normocephalic and atraumatic.   Mouth/Throat: Oropharynx is clear and moist. Mucous membranes are dry.   Eyes: Conjunctivae and EOM are normal. Pupils are equal, round, and reactive to light.   +cataracts   Neck: Neck supple.   Normal range of motion.  Cardiovascular:  Normal rate and regular rhythm.           Pulmonary/Chest: He has rales (bibasilar, right worse than left).   Genitourinary:    Genitourinary Comments: Knight catheter in place to right lateral side of penis. Atypical formation of urethral meatus. Dried skin to the scrotum/inguinal area     Musculoskeletal:      Cervical back: Normal range of motion and neck supple.      Right lower leg: 3+ Edema present.      Left lower leg: 3+ Edema present.      Comments: Bilateral lower extremities with severely dried skin with venous stasis changes. Ulcerations noted to anterior aspect. Large defect noted on right LE and moderate defect noted on left LE.      Lymphadenopathy:     He has no cervical adenopathy.   Neurological: He is alert and oriented to person, place, and time.   Skin:   Anterior legs and feet chronically wet with maceration.   Psychiatric: He has a normal mood and affect. Thought content normal.       ED Course   Procedures  Labs Reviewed   CBC W/ AUTO DIFFERENTIAL - Abnormal; Notable for the following components:        Result Value    RBC 3.83 (*)     Hemoglobin 10.2 (*)     Hematocrit 32.6 (*)     MCH 26.6 (*)     MCHC 31.3 (*)     Platelets 498 (*)     Immature Granulocytes 0.6 (*)     Immature Grans (Abs) 0.06 (*)     Lymph % 16.2 (*)     All other components within normal limits   COMPREHENSIVE METABOLIC PANEL - Abnormal; Notable for the following components:    Sodium 132 (*)     Chloride 91 (*)     CO2 31 (*)     Glucose 267 (*)     Albumin 3.1 (*)     All other components within normal limits   URINALYSIS, REFLEX TO URINE CULTURE - Abnormal; Notable for the following components:    Appearance, UA Cloudy (*)     Protein, UA 2+ (*)     Glucose, UA 1+ (*)     Occult Blood UA 1+ (*)     Nitrite, UA Positive (*)     Urobilinogen, UA 2.0-3.0 (*)     Leukocytes, UA 3+ (*)     All other components within normal limits    Narrative:     Specimen Source->Urine   URINALYSIS MICROSCOPIC - Abnormal; Notable for the following components:    RBC, UA 83 (*)     WBC, UA >100 (*)     Bacteria Moderate (*)     Yeast, UA Many (*)     Non-Squam Epith 1 (*)     All other components within normal limits    Narrative:     Specimen Source->Urine   POCT GLUCOSE - Abnormal; Notable for the following components:    POCT Glucose 340 (*)     All other components within normal limits   CULTURE, URINE   B-TYPE NATRIURETIC PEPTIDE          Imaging Results              X-Ray Chest AP Portable (Final result)  Result time 04/26/23 10:04:27      Final result by Jay Greenberg MD (04/26/23 10:04:27)                   Impression:      No evidence of acute chest disease since July 2022 exam.      Electronically signed by: Jay Greenberg  Date:    04/26/2023  Time:    10:04               Narrative:    EXAMINATION:  XR CHEST AP PORTABLE    CLINICAL HISTORY:  Other specified symptoms and signs involving the circulatory and respiratory systems    TECHNIQUE:  Single frontal view of the chest was performed.    COMPARISON:  07/22/2022    FINDINGS:  The heart  mediastinal contours appear stable with the trachea midline.  The lungs and pleural spaces are clear.  The left hemidiaphragm remains elevated now with gas distended splenic flexure and stomach beneath it.  The bony structures are intact with degenerative spine and shoulder changes.                                       Medications   cefTRIAXone (ROCEPHIN) 1 g in dextrose 5 % in water (D5W) 5 % 50 mL IVPB (MB+) (0 g Intravenous Stopped 4/26/23 1130)   insulin regular injection 10 Units 0.1 mL (10 Units Intravenous Given 4/26/23 1055)   lactated ringers bolus 500 mL (0 mLs Intravenous Stopped 4/26/23 1155)     Medical Decision Making:   History:   I obtained history from: someone other than patient and EMS provider.       <> Summary of History: EMS personnel report they were called to [facility] due to pt's worsening leg swelling. They report pt is on 20 mg lasix per day and refuses catheter changes.  Additional history also obtained from pt's nurse at the nursing home. She reports the pt is compliant with his medications but refuse catheter changes and other care including bathing. They state the patient refuses to sleep in his bed and instead sleeps in his wheelchair every night.  Old Medical Records: I decided to obtain old medical records.  Old Records Summarized: other records.       <> Summary of Records: External record review:  Per nursing home records, patient is DNR with a past medical history of diabetes. Pt has a chronic indwelling catheter. No other significant history per records.        Scribe Attestation:   Scribe #1: I performed the above scribed service and the documentation accurately describes the services I performed. I attest to the accuracy of the note.      ED Course as of 04/26/23 1302   Wed Apr 26, 2023   0932 Spoke with pt's nurse at [facility] for additional history [GS]   0901 I, Dr. Singh Arredondo, personally performed the services described in this documentation. All medical  record entries made by the scribe were at my direction and in my presence. I have reviewed the chart and agree that the record is accurate and complete.   Singh Arredondo MD.   [NP]   8726 This is an emergent evaluation of a 73 y.o.male patient swelling and leaking of fluid from his lower extremities.  Patient had have chronic venous stasis changes, however legs were soaked with serous fluid and likely urine as well with some maceration and ulceration noted.  There was no evidence of cellulitic or infectious change.  Urinary catheter noted to have thick urine and sediment.  Patient has rales on exam, lower lung fields at the bases, sat 98% on room air.  Additional history nursing home nurse, who states that patient refusing care, but at of his clothes and Felipe catheter.    Initial differentials include but are not limited to:  Chronic venous insufficiency with ulcer, complication of urinary catheter, complicated UTI, fluid overload, pulmonary edema, congestive heart failure, dehydration, less likely consider pneumonia, pleural effusion.  Patient unlikely to have sepsis, cellulitis.    Plan:  CBC, CMP, BNP, UA, felipe change, EKG, CXR. Pt needs cleaning as well due to poor personal hygeine.    [NP]   1141 EKG: NSR at 100 bpm, nl axis, nl intervals, no hypertrophy, no ST-T changes as read by me (Dr. Arredondo). Impression: normal [NP]      ED Course User Index  [GS] Hernandez Celestin  [NP] Singh Arredondo MD            Labs reviewed:  CBC stable, CMP unremarkable.  UA shows +UTI, given rocephin IV here. Will continue keflex as outpatient.    Felipe was already changed by RN here.    Wound care RN consulted who dressed patient's wounds with iodoform gauze and roll gauze. No unna boot.  Feet cleansed and applied hydrating lotion and socks.    Pt stable for dc back to nursing home.        Clinical Impression:   Final diagnoses:  [R00.0] Tachycardia  [R09.89] Rales  [N39.0] Complicated UTI (urinary tract infection) (Primary)  [Z97.8]  Knight catheter in place on admission  [R60.0] Bilateral leg edema  [I83.008, L97.801] Venous stasis ulcer of other part of lower leg limited to breakdown of skin, unspecified laterality, unspecified whether varicose veins present        ED Disposition Condition    Discharge Stable          ED Prescriptions       Medication Sig Dispense Start Date End Date Auth. Provider    cephALEXin (KEFLEX) 500 MG capsule Take 1 capsule (500 mg total) by mouth every 12 (twelve) hours. for 7 days 14 capsule 4/26/2023 5/3/2023 Singh Arredondo MD          Follow-up Information       Follow up With Specialties Details Why Contact Info    Tremaine Finch MD Internal Medicine Schedule an appointment as soon as possible for a visit   [address] [facility]  [address]  [facility] [address] [phone]               Singh Arredondo MD  04/26/23 8164

## 2023-04-26 NOTE — CONSULTS
[facility] - Emergency Dept  Wound Care    Patient Name:  Mateo Foreman   MRN:  [id number]  Date: 4/26/2023  Diagnosis: <principal problem not specified>    History:     Past Medical History:   Diagnosis Date    Anemia     Cataract     Chronic cough     Diabetes mellitus type II     GERD (gastroesophageal reflux disease)     Glaucoma     Hyperlipidemia        Social History     Socioeconomic History    Marital status: Single   Occupational History     Employer: [employer]   Tobacco Use    Smoking status: Former     Packs/day: 0.20     Years: 45.00     Pack years: 9.00     Types: Cigarettes    Smokeless tobacco: Never   Substance and Sexual Activity    Alcohol use: Not Currently    Drug use: No    Sexual activity: Not Currently       Precautions:     Allergies as of 04/26/2023    (No Known Allergies)       WOC Assessment Details/Treatment     Pt in ER with plans to transfer back to facility today.    BLE- venous stasis dermatitis with ulceration          Plantar feet- dried dirty skin- heels intact      L buttocks- partial thickness ulceration related to moisture and pressure    Recommendations discussed with pt, nurse and DrCarin Pores:  - Xeroform dressing to BLE daily  - Triad ointment to buttocks BID      04/26/2023

## 2023-04-26 NOTE — PHARMACY MED REC
"  Admission Medication History     The home medication history was taken by Theresa Vazquez CPhT.    Medication history obtained from, [facility] List Verified    You may go to "Admission" then "Reconcile Home Medications" tabs to review and/or act upon these items.     The home medication list has been updated by the Pharmacy department.   Please read ALL comments highlighted in yellow.   Please address this information as you see fit.    Feel free to contact us if you have any questions or require assistance.      The medications listed below were removed from the home medication list.  Please reorder if appropriate:  Patient reports no longer taking the following medication(s):  Atorvastatin 20 mg  Santyl ointment  Gentamicin 0.1% ointment      Theresa Vazquez CPhT.  Ext [phone]             .        "

## 2023-04-26 NOTE — DISCHARGE INSTRUCTIONS
Thank you for coming in to see us at [facility]! It was nice to meet you, and I hope you feel better soon. Please feel free to return to the ER at any time should your symptoms get worse, or if you have different emergent concerns.    Our goal in the emergency department is to always give you outstanding care and exceptional service. You may receive a survey by mail or e-mail in the next week regarding your experience in our ED. We would greatly appreciate your completing and returning the survey. Your feedback provides us with a way to recognize our staff who give very good care and it helps us learn how to improve when your experience was below our aspiration of excellence.       Sincerely,    Singh Arredondo MD  Medical Director  Emergency Department  [facility]

## 2023-04-28 LAB — BACTERIA UR CULT: ABNORMAL

## 2023-06-11 ENCOUNTER — HOSPITAL ENCOUNTER (INPATIENT)
Facility: HOSPITAL | Age: 73
LOS: 8 days | Discharge: HOSPICE | DRG: 673 | End: 2023-06-20
Attending: EMERGENCY MEDICINE | Admitting: HOSPITALIST
Payer: MEDICARE

## 2023-06-11 DIAGNOSIS — R31.0 GROSS HEMATURIA: ICD-10-CM

## 2023-06-11 DIAGNOSIS — L89.614 DECUBITUS ULCER OF RIGHT HEEL, STAGE 4: ICD-10-CM

## 2023-06-11 DIAGNOSIS — L89.324 PRESSURE INJURY OF LEFT BUTTOCK, STAGE 4: ICD-10-CM

## 2023-06-11 DIAGNOSIS — A41.9 SEPSIS, DUE TO UNSPECIFIED ORGANISM, UNSPECIFIED WHETHER ACUTE ORGAN DYSFUNCTION PRESENT: ICD-10-CM

## 2023-06-11 DIAGNOSIS — R00.0 TACHYCARDIA: ICD-10-CM

## 2023-06-11 DIAGNOSIS — L89.620 DECUBITUS ULCER, HEEL, LEFT, UNSTAGEABLE: ICD-10-CM

## 2023-06-11 DIAGNOSIS — E11.649 TYPE 2 DIABETES MELLITUS WITH HYPOGLYCEMIA WITHOUT COMA, WITH LONG-TERM CURRENT USE OF INSULIN: ICD-10-CM

## 2023-06-11 DIAGNOSIS — L89.624 DECUBITUS ULCER OF LEFT HEEL, STAGE 4: Primary | ICD-10-CM

## 2023-06-11 DIAGNOSIS — R07.9 CHEST PAIN: ICD-10-CM

## 2023-06-11 DIAGNOSIS — Z79.4 TYPE 2 DIABETES MELLITUS WITH HYPOGLYCEMIA WITHOUT COMA, WITH LONG-TERM CURRENT USE OF INSULIN: ICD-10-CM

## 2023-06-11 LAB
ALBUMIN SERPL BCP-MCNC: 2.2 G/DL (ref 3.5–5.2)
ALP SERPL-CCNC: 159 U/L (ref 55–135)
ALT SERPL W/O P-5'-P-CCNC: 20 U/L (ref 10–44)
ANION GAP SERPL CALC-SCNC: 15 MMOL/L (ref 8–16)
AST SERPL-CCNC: 25 U/L (ref 10–40)
BASOPHILS # BLD AUTO: 0.1 K/UL (ref 0–0.2)
BASOPHILS NFR BLD: 0.4 % (ref 0–1.9)
BILIRUB SERPL-MCNC: 0.3 MG/DL (ref 0.1–1)
BUN SERPL-MCNC: 20 MG/DL (ref 8–23)
CALCIUM SERPL-MCNC: 8.8 MG/DL (ref 8.7–10.5)
CHLORIDE SERPL-SCNC: 99 MMOL/L (ref 95–110)
CO2 SERPL-SCNC: 27 MMOL/L (ref 23–29)
CREAT SERPL-MCNC: 0.8 MG/DL (ref 0.5–1.4)
DIFFERENTIAL METHOD: ABNORMAL
EOSINOPHIL # BLD AUTO: 0.3 K/UL (ref 0–0.5)
EOSINOPHIL NFR BLD: 1.3 % (ref 0–8)
ERYTHROCYTE [DISTWIDTH] IN BLOOD BY AUTOMATED COUNT: 14 % (ref 11.5–14.5)
EST. GFR  (NO RACE VARIABLE): >60 ML/MIN/1.73 M^2
GLUCOSE SERPL-MCNC: 54 MG/DL (ref 70–110)
HCT VFR BLD AUTO: 30 % (ref 40–54)
HGB BLD-MCNC: 9.6 G/DL (ref 14–18)
IMM GRANULOCYTES # BLD AUTO: 0.26 K/UL (ref 0–0.04)
IMM GRANULOCYTES NFR BLD AUTO: 1.1 % (ref 0–0.5)
LYMPHOCYTES # BLD AUTO: 1.3 K/UL (ref 1–4.8)
LYMPHOCYTES NFR BLD: 5.5 % (ref 18–48)
MCH RBC QN AUTO: 26.5 PG (ref 27–31)
MCHC RBC AUTO-ENTMCNC: 32 G/DL (ref 32–36)
MCV RBC AUTO: 83 FL (ref 82–98)
MONOCYTES # BLD AUTO: 1.4 K/UL (ref 0.3–1)
MONOCYTES NFR BLD: 6 % (ref 4–15)
NEUTROPHILS # BLD AUTO: 20 K/UL (ref 1.8–7.7)
NEUTROPHILS NFR BLD: 85.7 % (ref 38–73)
NRBC BLD-RTO: 0 /100 WBC
PLATELET # BLD AUTO: 754 K/UL (ref 150–450)
PMV BLD AUTO: 8.8 FL (ref 9.2–12.9)
POCT GLUCOSE: 61 MG/DL (ref 70–110)
POTASSIUM SERPL-SCNC: 3.3 MMOL/L (ref 3.5–5.1)
PROT SERPL-MCNC: 7.3 G/DL (ref 6–8.4)
RBC # BLD AUTO: 3.62 M/UL (ref 4.6–6.2)
SODIUM SERPL-SCNC: 141 MMOL/L (ref 136–145)
WBC # BLD AUTO: 23.3 K/UL (ref 3.9–12.7)

## 2023-06-11 PROCEDURE — 87150 DNA/RNA AMPLIFIED PROBE: CPT | Mod: 59 | Performed by: EMERGENCY MEDICINE

## 2023-06-11 PROCEDURE — 96365 THER/PROPH/DIAG IV INF INIT: CPT

## 2023-06-11 PROCEDURE — 87086 URINE CULTURE/COLONY COUNT: CPT | Performed by: EMERGENCY MEDICINE

## 2023-06-11 PROCEDURE — 84484 ASSAY OF TROPONIN QUANT: CPT | Performed by: EMERGENCY MEDICINE

## 2023-06-11 PROCEDURE — 99285 EMERGENCY DEPT VISIT HI MDM: CPT

## 2023-06-11 PROCEDURE — 25000003 PHARM REV CODE 250: Performed by: EMERGENCY MEDICINE

## 2023-06-11 PROCEDURE — 93010 EKG 12-LEAD: ICD-10-PCS | Mod: ,,, | Performed by: INTERNAL MEDICINE

## 2023-06-11 PROCEDURE — 93010 ELECTROCARDIOGRAM REPORT: CPT | Mod: ,,, | Performed by: INTERNAL MEDICINE

## 2023-06-11 PROCEDURE — 82962 GLUCOSE BLOOD TEST: CPT

## 2023-06-11 PROCEDURE — 80053 COMPREHEN METABOLIC PANEL: CPT | Performed by: EMERGENCY MEDICINE

## 2023-06-11 PROCEDURE — 96368 THER/DIAG CONCURRENT INF: CPT

## 2023-06-11 PROCEDURE — 96366 THER/PROPH/DIAG IV INF ADDON: CPT

## 2023-06-11 PROCEDURE — 93005 ELECTROCARDIOGRAM TRACING: CPT

## 2023-06-11 PROCEDURE — 87040 BLOOD CULTURE FOR BACTERIA: CPT | Mod: 59 | Performed by: EMERGENCY MEDICINE

## 2023-06-11 PROCEDURE — 83605 ASSAY OF LACTIC ACID: CPT | Performed by: EMERGENCY MEDICINE

## 2023-06-11 PROCEDURE — 85025 COMPLETE CBC W/AUTO DIFF WBC: CPT | Performed by: EMERGENCY MEDICINE

## 2023-06-11 PROCEDURE — 81000 URINALYSIS NONAUTO W/SCOPE: CPT | Performed by: EMERGENCY MEDICINE

## 2023-06-11 RX ORDER — OXYCODONE AND ACETAMINOPHEN 5; 325 MG/1; MG/1
1 TABLET ORAL
Status: COMPLETED | OUTPATIENT
Start: 2023-06-11 | End: 2023-06-11

## 2023-06-11 RX ORDER — SODIUM CHLORIDE 9 MG/ML
1000 INJECTION, SOLUTION INTRAVENOUS
Status: COMPLETED | OUTPATIENT
Start: 2023-06-11 | End: 2023-06-12

## 2023-06-11 RX ADMIN — OXYCODONE HYDROCHLORIDE AND ACETAMINOPHEN 1 TABLET: 5; 325 TABLET ORAL at 11:06

## 2023-06-11 RX ADMIN — SODIUM CHLORIDE 1000 ML: 9 INJECTION, SOLUTION INTRAVENOUS at 11:06

## 2023-06-12 PROBLEM — N39.0 URINARY TRACT INFECTION ASSOCIATED WITH CYSTOSTOMY CATHETER: Status: ACTIVE | Noted: 2023-06-12

## 2023-06-12 PROBLEM — A41.9 SEVERE SEPSIS: Status: ACTIVE | Noted: 2023-06-12

## 2023-06-12 PROBLEM — T83.510A URINARY TRACT INFECTION ASSOCIATED WITH CYSTOSTOMY CATHETER: Status: ACTIVE | Noted: 2023-06-12

## 2023-06-12 PROBLEM — L89.610 DECUBITUS ULCER OF HEEL, BILATERAL, UNSTAGEABLE: Status: ACTIVE | Noted: 2023-06-12

## 2023-06-12 PROBLEM — L89.620 DECUBITUS ULCER OF HEEL, BILATERAL, UNSTAGEABLE: Status: ACTIVE | Noted: 2023-06-12

## 2023-06-12 PROBLEM — E83.42 HYPOMAGNESEMIA: Status: ACTIVE | Noted: 2023-06-12

## 2023-06-12 PROBLEM — R65.20 SEVERE SEPSIS: Status: ACTIVE | Noted: 2023-06-12

## 2023-06-12 LAB
ANION GAP SERPL CALC-SCNC: 10 MMOL/L (ref 8–16)
BACTERIA #/AREA URNS HPF: ABNORMAL /HPF
BASOPHILS # BLD AUTO: 0.1 K/UL (ref 0–0.2)
BASOPHILS NFR BLD: 0.4 % (ref 0–1.9)
BILIRUB UR QL STRIP: NEGATIVE
BUN SERPL-MCNC: 15 MG/DL (ref 8–23)
CALCIUM SERPL-MCNC: 8.4 MG/DL (ref 8.7–10.5)
CHLORIDE SERPL-SCNC: 98 MMOL/L (ref 95–110)
CLARITY UR: ABNORMAL
CO2 SERPL-SCNC: 27 MMOL/L (ref 23–29)
COLOR UR: YELLOW
CREAT SERPL-MCNC: 0.7 MG/DL (ref 0.5–1.4)
DIFFERENTIAL METHOD: ABNORMAL
EOSINOPHIL # BLD AUTO: 0.4 K/UL (ref 0–0.5)
EOSINOPHIL NFR BLD: 1.4 % (ref 0–8)
ERYTHROCYTE [DISTWIDTH] IN BLOOD BY AUTOMATED COUNT: 14.2 % (ref 11.5–14.5)
EST. GFR  (NO RACE VARIABLE): >60 ML/MIN/1.73 M^2
GLUCOSE SERPL-MCNC: 55 MG/DL (ref 70–110)
GLUCOSE UR QL STRIP: NEGATIVE
HCT VFR BLD AUTO: 30.1 % (ref 40–54)
HGB BLD-MCNC: 9.6 G/DL (ref 14–18)
HGB UR QL STRIP: ABNORMAL
HYALINE CASTS #/AREA URNS LPF: 0 /LPF
IMM GRANULOCYTES # BLD AUTO: 0.32 K/UL (ref 0–0.04)
IMM GRANULOCYTES NFR BLD AUTO: 1.3 % (ref 0–0.5)
KETONES UR QL STRIP: NEGATIVE
LACTATE SERPL-SCNC: 1.9 MMOL/L (ref 0.5–2.2)
LACTATE SERPL-SCNC: 2.4 MMOL/L (ref 0.5–2.2)
LEUKOCYTE ESTERASE UR QL STRIP: ABNORMAL
LYMPHOCYTES # BLD AUTO: 1 K/UL (ref 1–4.8)
LYMPHOCYTES NFR BLD: 4 % (ref 18–48)
MAGNESIUM SERPL-MCNC: 1.1 MG/DL (ref 1.6–2.6)
MCH RBC QN AUTO: 26.7 PG (ref 27–31)
MCHC RBC AUTO-ENTMCNC: 31.9 G/DL (ref 32–36)
MCV RBC AUTO: 84 FL (ref 82–98)
MICROSCOPIC COMMENT: ABNORMAL
MONOCYTES # BLD AUTO: 1.4 K/UL (ref 0.3–1)
MONOCYTES NFR BLD: 5.5 % (ref 4–15)
NEUTROPHILS # BLD AUTO: 22.1 K/UL (ref 1.8–7.7)
NEUTROPHILS NFR BLD: 87.4 % (ref 38–73)
NITRITE UR QL STRIP: NEGATIVE
NRBC BLD-RTO: 0 /100 WBC
PH UR STRIP: 6 [PH] (ref 5–8)
PLATELET # BLD AUTO: 741 K/UL (ref 150–450)
PMV BLD AUTO: 8.5 FL (ref 9.2–12.9)
POCT GLUCOSE: 101 MG/DL (ref 70–110)
POCT GLUCOSE: 194 MG/DL (ref 70–110)
POCT GLUCOSE: 203 MG/DL (ref 70–110)
POCT GLUCOSE: 40 MG/DL (ref 70–110)
POCT GLUCOSE: 59 MG/DL (ref 70–110)
POCT GLUCOSE: 63 MG/DL (ref 70–110)
POCT GLUCOSE: 67 MG/DL (ref 70–110)
POCT GLUCOSE: 84 MG/DL (ref 70–110)
POCT GLUCOSE: 87 MG/DL (ref 70–110)
POTASSIUM SERPL-SCNC: 2.9 MMOL/L (ref 3.5–5.1)
PROT UR QL STRIP: ABNORMAL
RBC # BLD AUTO: 3.6 M/UL (ref 4.6–6.2)
RBC #/AREA URNS HPF: >100 /HPF (ref 0–4)
SODIUM SERPL-SCNC: 135 MMOL/L (ref 136–145)
SP GR UR STRIP: 1.02 (ref 1–1.03)
SQUAMOUS #/AREA URNS HPF: 2 /HPF
TROPONIN I SERPL DL<=0.01 NG/ML-MCNC: 0.02 NG/ML (ref 0–0.03)
URN SPEC COLLECT METH UR: ABNORMAL
UROBILINOGEN UR STRIP-ACNC: NEGATIVE EU/DL
WBC # BLD AUTO: 25.25 K/UL (ref 3.9–12.7)
WBC #/AREA URNS HPF: >100 /HPF (ref 0–5)
WBC CLUMPS URNS QL MICRO: ABNORMAL
YEAST URNS QL MICRO: ABNORMAL

## 2023-06-12 PROCEDURE — 25000003 PHARM REV CODE 250: Performed by: INTERNAL MEDICINE

## 2023-06-12 PROCEDURE — 25000003 PHARM REV CODE 250: Performed by: EMERGENCY MEDICINE

## 2023-06-12 PROCEDURE — 82962 GLUCOSE BLOOD TEST: CPT | Mod: 91

## 2023-06-12 PROCEDURE — 63600175 PHARM REV CODE 636 W HCPCS: Performed by: EMERGENCY MEDICINE

## 2023-06-12 PROCEDURE — 99222 PR INITIAL HOSPITAL CARE,LEVL II: ICD-10-PCS | Mod: GC,,, | Performed by: INTERNAL MEDICINE

## 2023-06-12 PROCEDURE — 94761 N-INVAS EAR/PLS OXIMETRY MLT: CPT

## 2023-06-12 PROCEDURE — 63600175 PHARM REV CODE 636 W HCPCS: Performed by: HOSPITALIST

## 2023-06-12 PROCEDURE — 99900035 HC TECH TIME PER 15 MIN (STAT)

## 2023-06-12 PROCEDURE — 85025 COMPLETE CBC W/AUTO DIFF WBC: CPT | Performed by: INTERNAL MEDICINE

## 2023-06-12 PROCEDURE — 25500020 PHARM REV CODE 255: Performed by: HOSPITALIST

## 2023-06-12 PROCEDURE — 99222 1ST HOSP IP/OBS MODERATE 55: CPT | Mod: GC,,, | Performed by: INTERNAL MEDICINE

## 2023-06-12 PROCEDURE — 99223 1ST HOSP IP/OBS HIGH 75: CPT | Mod: ,,, | Performed by: PODIATRIST

## 2023-06-12 PROCEDURE — 25000003 PHARM REV CODE 250: Performed by: HOSPITALIST

## 2023-06-12 PROCEDURE — 11000001 HC ACUTE MED/SURG PRIVATE ROOM

## 2023-06-12 PROCEDURE — 63600175 PHARM REV CODE 636 W HCPCS: Performed by: INTERNAL MEDICINE

## 2023-06-12 PROCEDURE — 80048 BASIC METABOLIC PNL TOTAL CA: CPT | Performed by: INTERNAL MEDICINE

## 2023-06-12 PROCEDURE — 83735 ASSAY OF MAGNESIUM: CPT | Performed by: INTERNAL MEDICINE

## 2023-06-12 PROCEDURE — 83605 ASSAY OF LACTIC ACID: CPT | Performed by: EMERGENCY MEDICINE

## 2023-06-12 PROCEDURE — 99223 PR INITIAL HOSPITAL CARE,LEVL III: ICD-10-PCS | Mod: ,,, | Performed by: PODIATRIST

## 2023-06-12 RX ORDER — ONDANSETRON 2 MG/ML
4 INJECTION INTRAMUSCULAR; INTRAVENOUS EVERY 8 HOURS PRN
Status: DISCONTINUED | OUTPATIENT
Start: 2023-06-12 | End: 2023-06-20 | Stop reason: HOSPADM

## 2023-06-12 RX ORDER — SODIUM CHLORIDE 0.9 % (FLUSH) 0.9 %
10 SYRINGE (ML) INJECTION EVERY 12 HOURS PRN
Status: DISCONTINUED | OUTPATIENT
Start: 2023-06-12 | End: 2023-06-20 | Stop reason: HOSPADM

## 2023-06-12 RX ORDER — AMMONIUM LACTATE 12 G/100G
LOTION TOPICAL
COMMUNITY
Start: 2023-05-29 | End: 2023-06-12 | Stop reason: SDUPTHER

## 2023-06-12 RX ORDER — DEXTROSE 40 %
30 GEL (GRAM) ORAL
Status: DISCONTINUED | OUTPATIENT
Start: 2023-06-12 | End: 2023-06-20 | Stop reason: HOSPADM

## 2023-06-12 RX ORDER — TAMSULOSIN HYDROCHLORIDE 0.4 MG/1
0.4 CAPSULE ORAL NIGHTLY
Status: DISCONTINUED | OUTPATIENT
Start: 2023-06-12 | End: 2023-06-20 | Stop reason: HOSPADM

## 2023-06-12 RX ORDER — MENTHOL/ZINC OXIDE 0.44-20.6%
OINTMENT (GRAM) TOPICAL DAILY PRN
Status: ON HOLD | COMMUNITY
Start: 2023-06-01 | End: 2023-06-20 | Stop reason: HOSPADM

## 2023-06-12 RX ORDER — DEXTROSE 40 %
15 GEL (GRAM) ORAL
Status: DISCONTINUED | OUTPATIENT
Start: 2023-06-12 | End: 2023-06-20 | Stop reason: HOSPADM

## 2023-06-12 RX ORDER — ASPIRIN 325 MG
325 TABLET ORAL DAILY
Status: ON HOLD | COMMUNITY
End: 2023-06-20 | Stop reason: HOSPADM

## 2023-06-12 RX ORDER — INSULIN ASPART 100 [IU]/ML
0-5 INJECTION, SOLUTION INTRAVENOUS; SUBCUTANEOUS
Status: DISCONTINUED | OUTPATIENT
Start: 2023-06-12 | End: 2023-06-20 | Stop reason: HOSPADM

## 2023-06-12 RX ORDER — AMMONIUM LACTATE 12 G/100G
1 CREAM TOPICAL 2 TIMES DAILY
Status: ON HOLD | COMMUNITY
End: 2023-06-20 | Stop reason: HOSPADM

## 2023-06-12 RX ORDER — DEXTROSE 50 % IN WATER (D50W) INTRAVENOUS SYRINGE
25
Status: DISCONTINUED | OUTPATIENT
Start: 2023-06-12 | End: 2023-06-12

## 2023-06-12 RX ORDER — HYDROCODONE BITARTRATE AND ACETAMINOPHEN 5; 325 MG/1; MG/1
1 TABLET ORAL EVERY 6 HOURS PRN
Status: DISCONTINUED | OUTPATIENT
Start: 2023-06-12 | End: 2023-06-16

## 2023-06-12 RX ORDER — DEXTROSE MONOHYDRATE 50 MG/ML
INJECTION, SOLUTION INTRAVENOUS CONTINUOUS
Status: DISCONTINUED | OUTPATIENT
Start: 2023-06-12 | End: 2023-06-12

## 2023-06-12 RX ORDER — ENOXAPARIN SODIUM 100 MG/ML
40 INJECTION SUBCUTANEOUS EVERY 24 HOURS
Status: DISCONTINUED | OUTPATIENT
Start: 2023-06-12 | End: 2023-06-14

## 2023-06-12 RX ORDER — MAGNESIUM SULFATE HEPTAHYDRATE 40 MG/ML
2 INJECTION, SOLUTION INTRAVENOUS ONCE
Status: COMPLETED | OUTPATIENT
Start: 2023-06-12 | End: 2023-06-12

## 2023-06-12 RX ORDER — DEXTROMETHORPHAN HYDROBROMIDE, GUAIFENESIN 5; 100 MG/5ML; MG/5ML
650 LIQUID ORAL EVERY 6 HOURS PRN
COMMUNITY

## 2023-06-12 RX ORDER — ACETAMINOPHEN 325 MG/1
650 TABLET ORAL EVERY 8 HOURS PRN
Status: DISCONTINUED | OUTPATIENT
Start: 2023-06-12 | End: 2023-06-16

## 2023-06-12 RX ORDER — TALC
6 POWDER (GRAM) TOPICAL NIGHTLY PRN
Status: DISCONTINUED | OUTPATIENT
Start: 2023-06-12 | End: 2023-06-20 | Stop reason: HOSPADM

## 2023-06-12 RX ORDER — NALOXONE HCL 0.4 MG/ML
0.02 VIAL (ML) INJECTION
Status: DISCONTINUED | OUTPATIENT
Start: 2023-06-12 | End: 2023-06-20 | Stop reason: HOSPADM

## 2023-06-12 RX ORDER — SIMETHICONE 80 MG
1 TABLET,CHEWABLE ORAL 4 TIMES DAILY PRN
Status: DISCONTINUED | OUTPATIENT
Start: 2023-06-12 | End: 2023-06-20 | Stop reason: HOSPADM

## 2023-06-12 RX ORDER — GLUCAGON 1 MG
1 KIT INJECTION
Status: DISCONTINUED | OUTPATIENT
Start: 2023-06-12 | End: 2023-06-20 | Stop reason: HOSPADM

## 2023-06-12 RX ADMIN — SODIUM CHLORIDE 1000 ML: 0.9 INJECTION, SOLUTION INTRAVENOUS at 12:06

## 2023-06-12 RX ADMIN — PIPERACILLIN AND TAZOBACTAM 4.5 G: 4; .5 INJECTION, POWDER, LYOPHILIZED, FOR SOLUTION INTRAVENOUS; PARENTERAL at 12:06

## 2023-06-12 RX ADMIN — IOHEXOL 100 ML: 350 INJECTION, SOLUTION INTRAVENOUS at 03:06

## 2023-06-12 RX ADMIN — VANCOMYCIN HYDROCHLORIDE 1750 MG: 10 INJECTION, POWDER, LYOPHILIZED, FOR SOLUTION INTRAVENOUS at 12:06

## 2023-06-12 RX ADMIN — INSULIN ASPART 1 UNITS: 100 INJECTION, SOLUTION INTRAVENOUS; SUBCUTANEOUS at 11:06

## 2023-06-12 RX ADMIN — MAGNESIUM SULFATE 2 G: 2 INJECTION INTRAVENOUS at 10:06

## 2023-06-12 RX ADMIN — TAMSULOSIN HYDROCHLORIDE 0.4 MG: 0.4 CAPSULE ORAL at 09:06

## 2023-06-12 RX ADMIN — DEXTROSE MONOHYDRATE 125 ML: 100 INJECTION, SOLUTION INTRAVENOUS at 06:06

## 2023-06-12 RX ADMIN — PIPERACILLIN AND TAZOBACTAM 4.5 G: 4; .5 INJECTION, POWDER, LYOPHILIZED, FOR SOLUTION INTRAVENOUS; PARENTERAL at 08:06

## 2023-06-12 RX ADMIN — PIPERACILLIN AND TAZOBACTAM 4.5 G: 4; .5 INJECTION, POWDER, LYOPHILIZED, FOR SOLUTION INTRAVENOUS; PARENTERAL at 07:06

## 2023-06-12 RX ADMIN — DEXTROSE MONOHYDRATE: 50 INJECTION, SOLUTION INTRAVENOUS at 11:06

## 2023-06-12 RX ADMIN — HYDROCODONE BITARTRATE AND ACETAMINOPHEN 1 TABLET: 5; 325 TABLET ORAL at 01:06

## 2023-06-12 RX ADMIN — POTASSIUM BICARBONATE 40 MEQ: 391 TABLET, EFFERVESCENT ORAL at 08:06

## 2023-06-12 RX ADMIN — VANCOMYCIN HYDROCHLORIDE 1000 MG: 1 INJECTION, POWDER, LYOPHILIZED, FOR SOLUTION INTRAVENOUS at 01:06

## 2023-06-12 RX ADMIN — DEXTROSE 250 ML: 10 SOLUTION INTRAVENOUS at 04:06

## 2023-06-12 RX ADMIN — ENOXAPARIN SODIUM 40 MG: 40 INJECTION SUBCUTANEOUS at 07:06

## 2023-06-12 RX ADMIN — HYDROCODONE BITARTRATE AND ACETAMINOPHEN 1 TABLET: 5; 325 TABLET ORAL at 04:06

## 2023-06-12 RX ADMIN — POTASSIUM BICARBONATE 40 MEQ: 391 TABLET, EFFERVESCENT ORAL at 01:06

## 2023-06-12 NOTE — H&P
[facility] Emergency Dept  [facility] Medicine  History & Physical    Patient Name: Mateo Foreman  MRN: [id number]  Patient Class: IP- Inpatient  Admission Date: 6/11/2023  Attending Physician: Indio Kaminski, *   Primary Care Provider: Tremaine Finch MD         Patient information was obtained from patient and ER records.     Subjective:     Principal Problem:Severe sepsis    Chief Complaint:   Chief Complaint   Patient presents with    Leg Swelling     Patient bib [facility] from [facility] for increased weeping edema to bilateral feet and ankles. Ems also reports recent hematuria per nursing home staff. Patient is non compliant with his care. No treatment given by ems.     Hematuria        HPI: 73-year-old past medical history of type 2 diabetes, GERD, hyperlipidemia, anemia, cataracts presenting from his nursing home with increased bilateral lower extremity swelling weeping and foul smelling. Patient is known to be unkempt.   As per patient had chronic wounds of feet were wrapped weeks ago have not been rewrapped by weeks. Patient is not compliant with care. Patient mentions having abnormal drainage from his heels.  Denies any chest pain, cough, nausea vomiting diarrhea or abdominal pain fevers chills.Not hypotensive on arrival. Tachycardic , WBC 23 K - admitted for sepsis due to SSTI/UTI      Past Medical History:   Diagnosis Date    Anemia     Cataract     Chronic cough     Diabetes mellitus type II     GERD (gastroesophageal reflux disease)     Glaucoma     Hyperlipidemia        No past surgical history on file.    Review of patient's allergies indicates:  No Known Allergies    No current facility-administered medications on file prior to encounter.     Current Outpatient Medications on File Prior to Encounter   Medication Sig    ascorbic acid, vitamin C, (VITAMIN C) 500 MG tablet Take 500 mg by mouth 2 (two) times daily.    blood sugar diagnostic (ONE TOUCH TEST) Strp 1 strip by  "Misc.(Non-Drug; Combo Route) route Daily.    ferrous sulfate 325 mg (65 mg iron) Tab Take 325 mg by mouth daily with breakfast.    furosemide (LASIX) 20 MG tablet Take 20 mg by mouth once daily.    insulin detemir U-100 (LEVEMIR FLEXTOUCH) 100 unit/mL (3 mL) SubQ InPn pen Inject 10 Units into the skin once daily. (Patient taking differently: Inject 34 Units into the skin every morning.)    insulin detemir U-100 (LEVEMIR) 100 unit/mL injection Inject 22 Units into the skin every evening.    latanoprost 0.005 % ophthalmic solution Place 1 drop into both eyes every evening.    metFORMIN (GLUCOPHAGE) 1000 MG tablet TAKE 1 TABLET BY MOUTH TWICE DAILY WITH MEALS    multivitamin (ONE DAILY MULTIVITAMIN) per tablet Take 1 tablet by mouth once daily.    pen needle, diabetic 32 gauge x 5/32" Ndle use as directed with insulin    pioglitazone (ACTOS) 30 MG tablet Take 30 mg by mouth once daily.    tamsulosin (FLOMAX) 0.4 mg Cap Take 1 capsule (0.4 mg total) by mouth once daily. (Patient taking differently: Take 0.4 mg by mouth every evening.)     Family History       Problem Relation (Age of Onset)    Cancer Mother    Diabetes Father          Tobacco Use    Smoking status: Former     Packs/day: 0.20     Years: 45.00     Pack years: 9.00     Types: Cigarettes    Smokeless tobacco: Never   Substance and Sexual Activity    Alcohol use: Not Currently    Drug use: No    Sexual activity: Not Currently     Review of Systems   Constitutional:  Positive for activity change, appetite change, chills and fatigue.   HENT: Negative.     Eyes: Negative.    Respiratory: Negative.     Cardiovascular:  Positive for palpitations.   Gastrointestinal: Negative.    Endocrine: Positive for cold intolerance.   Genitourinary:  Positive for dysuria.   Musculoskeletal:  Positive for arthralgias and joint swelling.   Skin:  Positive for color change, rash and wound.   Allergic/Immunologic: Negative.    Neurological: Negative.  "   Hematological: Negative.    Psychiatric/Behavioral:  Positive for agitation and behavioral problems.    Objective:     Vital Signs (Most Recent):  Temp: 98.8 °F (37.1 °C) (06/11/23 2247)  Pulse: 101 (06/12/23 0118)  Resp: 18 (06/12/23 0118)  BP: (!) 148/67 (06/12/23 0118)  SpO2: 98 % (06/12/23 0118) Vital Signs (24h Range):  Temp:  [98.8 °F (37.1 °C)] 98.8 °F (37.1 °C)  Pulse:  [101-120] 101  Resp:  [18-20] 18  SpO2:  [98 %-99 %] 98 %  BP: (148-184)/(64-67) 148/67     Weight: 72.6 kg (160 lb)  Body mass index is 25.06 kg/m².     Physical Exam  Constitutional:       General: He is in acute distress.      Appearance: Normal appearance. He is normal weight. He is ill-appearing.   HENT:      Head: Normocephalic.      Mouth/Throat:      Mouth: Mucous membranes are moist.   Eyes:      Pupils: Pupils are equal, round, and reactive to light.   Cardiovascular:      Rate and Rhythm: Tachycardia present.   Pulmonary:      Effort: Pulmonary effort is normal.   Musculoskeletal:         General: Swelling and tenderness present. Normal range of motion.      Cervical back: Normal range of motion.      Right lower leg: Edema present.      Left lower leg: Edema present.   Skin:     General: Skin is warm.      Capillary Refill: Capillary refill takes less than 2 seconds.      Findings: Erythema, lesion and rash present.   Neurological:      General: No focal deficit present.      Mental Status: He is alert and oriented to person, place, and time.            CRANIAL NERVES     CN III, IV, VI   Pupils are equal, round, and reactive to light.     Significant Labs: All pertinent labs within the past 24 hours have been reviewed.    Significant Imaging: I have reviewed all pertinent imaging results/findings within the past 24 hours.    Assessment/Plan:     * Severe sepsis  This patient does have evidence of infective focus  My overall impression is sepsis.  Source: Urinary Tract and Skin and Soft Tissue (location Bilateral feet open  diabetic wounds)  Antibiotics given-   Antibiotics (72h ago, onward)    Start     Stop Route Frequency Ordered    06/12/23 0000  piperacillin-tazobactam (ZOSYN) 4.5 g in dextrose 5 % in water (D5W) 5 % 100 mL IVPB (MB+)  (ED Adult Sepsis Treatment)         06/12 2359 IV Every 8 hours (non-standard times) 06/11/23 2349        Latest lactate reviewed-  Recent Labs   Lab 06/11/23  2355 06/12/23  0513   LACTATE 2.4* 1.9     Organ dysfunction indicated by Acute kidney injury    Fluid challenge Not needed - patient is not hypotensive      Post- resuscitation assessment Yes Perfusion exam was performed within 6 hours of septic shock presentation after bolus shows Adequate tissue perfusion assessed by non-invasive monitoring       Will Not start Pressors- Levophed for MAP of 65  Source control achieved by: Started on Zosyn and Vancomycin .   CT ABD /P Chest. Added CT LE eval diabetic wounds  Blood and urine culture by ER  Lactic acid x 2   AM Labs    Hypertension associated with diabetes  Patient's FSGs are controlled on current medication regimen.  Last A1c reviewed-   Lab Results   Component Value Date    HGBA1C >14.0 (H) 07/22/2022     Most recent fingerstick glucose reviewed-   Recent Labs   Lab 06/11/23  2315 06/12/23  0103 06/12/23  0406 06/12/23  0444   POCTGLUCOSE 61* 63* 40* 87     Current correctional scale  Medium  Increase anti-hyperglycemic dose as follows-   Antihyperglycemics (From admission, onward)    Start     Stop Route Frequency Ordered    06/12/23 0305  insulin aspart U-100 pen 0-5 Units         -- SubQ Before meals & nightly PRN 06/12/23 0209        Hold Oral hypoglycemics while patient is in the hospital.    Lymphedema of both lower extremities    Clean wounds. Compression wrap     Type 2 diabetes mellitus without complication, with long-term current use of insulin  Patient's FSGs are controlled on current medication regimen.  Last A1c reviewed-   Lab Results   Component Value Date    HGBA1C >14.0 (H)  07/22/2022     Most recent fingerstick glucose reviewed-   Recent Labs   Lab 06/11/23  2315 06/12/23  0103 06/12/23  0406 06/12/23  0444   POCTGLUCOSE 61* 63* 40* 87     Current correctional scale  Medium  Maintain anti-hyperglycemic dose as follows-   Antihyperglycemics (From admission, onward)    Start     Stop Route Frequency Ordered    06/12/23 0305  insulin aspart U-100 pen 0-5 Units         -- SubQ Before meals & nightly PRN 06/12/23 0209        Hold Oral hypoglycemics while patient is in the hospital.      VTE Risk Mitigation (From admission, onward)         Ordered     enoxaparin injection 40 mg  Daily         06/12/23 0209     IP VTE HIGH RISK PATIENT  Once         06/12/23 0209     Place sequential compression device  Until discontinued         06/12/23 0209                           Florentin Agrawal MD  Department of Hospital Medicine  [facility] - Emergency Dept

## 2023-06-12 NOTE — ED NOTES
Report received from TIANNA Caldera   Assumed care of pt  Pt c/o pain, medication administered at 0445 discussed pain management plan as per order with pt   Pt verbalizes understanding  Side rails up x 2 and call light within reach

## 2023-06-12 NOTE — ED NOTES
Patient changed . Dressings on buttocks reinforced . Cath leaking near pubic area.  Position changed. Patient resting .

## 2023-06-12 NOTE — ED PROVIDER NOTES
Encounter Date: 6/11/2023       History     Chief Complaint   Patient presents with    Leg Swelling     Patient bib laura ems from [facility] for increased weeping edema to bilateral feet and ankles. Ems also reports recent hematuria per nursing home staff. Patient is non compliant with his care. No treatment given by ems.     Hematuria     73-year-old past medical history of type 2 diabetes, GERD, hyperlipidemia, anemia, cataracts presenting from [facility] with increased being from bilateral lower extremity swelling smelling urine.  As per patient had chronic wounds of feet were wrapped weeks ago have not been rewrapped by weeks.  Patient mentions having abnormal drainage from his heels.  Denies any chest pain, cough, nausea vomiting diarrhea or abdominal pain fevers chills.    Review of patient's allergies indicates:  No Known Allergies  Past Medical History:   Diagnosis Date    Anemia     Cataract     Chronic cough     Diabetes mellitus type II     GERD (gastroesophageal reflux disease)     Glaucoma     Hyperlipidemia      No past surgical history on file.  Family History   Problem Relation Age of Onset    Cancer Mother     Diabetes Father     Amblyopia Neg Hx     Blindness Neg Hx     Cataracts Neg Hx     Glaucoma Neg Hx     Hypertension Neg Hx     Macular degeneration Neg Hx     Retinal detachment Neg Hx     Strabismus Neg Hx     Stroke Neg Hx     Thyroid disease Neg Hx      Social History     Tobacco Use    Smoking status: Former     Packs/day: 0.20     Years: 45.00     Pack years: 9.00     Types: Cigarettes    Smokeless tobacco: Never   Substance Use Topics    Alcohol use: Not Currently    Drug use: No     Review of Systems    Physical Exam     Initial Vitals [06/11/23 2247]   BP Pulse Resp Temp SpO2   (!) 184/64 (!) 120 18 98.8 °F (37.1 °C) 99 %      MAP       --         Physical Exam    Nursing note and vitals reviewed.  Constitutional: He appears well-developed and well-nourished. He is  not diaphoretic. No distress.   Frail-appearing   HENT:   Head: Normocephalic and atraumatic.   Nose: Nose normal.   Eyes: Conjunctivae and EOM are normal. Pupils are equal, round, and reactive to light. No scleral icterus.   Neck: Neck supple.   Normal range of motion.  Cardiovascular:  Normal rate and regular rhythm.     Exam reveals no gallop and no friction rub.       No murmur heard.  Pulmonary/Chest: Breath sounds normal. No respiratory distress. He has no wheezes. He has no rhonchi. He has no rales.   Abdominal: Abdomen is soft. Bowel sounds are normal. There is no abdominal tenderness. There is no rebound and no guarding.   Genitourinary:    Genitourinary Comments: Knight in place with sediment in Knight bag.  Foul-smelling.     Musculoskeletal:         General: Tenderness present. No edema. Normal range of motion.      Cervical back: Normal range of motion and neck supple.      Comments: Bilateral 3+ pitting edema lower extremity.   wound ulcers bilateral heels foul-smelling.     Neurological: He is alert and oriented to person, place, and time. He has normal strength. No sensory deficit. GCS score is 15. GCS eye subscore is 4. GCS verbal subscore is 5. GCS motor subscore is 6.   Skin: Skin is warm and dry. No rash noted. No erythema. No pallor.   Psychiatric: He has a normal mood and affect. His behavior is normal. Judgment and thought content normal.       ED Course   Procedures  Labs Reviewed   CBC W/ AUTO DIFFERENTIAL - Abnormal; Notable for the following components:       Result Value    WBC 23.30 (*)     RBC 3.62 (*)     Hemoglobin 9.6 (*)     Hematocrit 30.0 (*)     MCH 26.5 (*)     Platelets 754 (*)     MPV 8.8 (*)     Immature Granulocytes 1.1 (*)     Gran # (ANC) 20.0 (*)     Immature Grans (Abs) 0.26 (*)     Mono # 1.4 (*)     Gran % 85.7 (*)     Lymph % 5.5 (*)     All other components within normal limits   COMPREHENSIVE METABOLIC PANEL - Abnormal; Notable for the following components:     Potassium 3.3 (*)     Glucose 54 (*)     Albumin 2.2 (*)     Alkaline Phosphatase 159 (*)     All other components within normal limits   URINALYSIS, REFLEX TO URINE CULTURE - Abnormal; Notable for the following components:    Appearance, UA Cloudy (*)     Protein, UA 1+ (*)     Occult Blood UA 3+ (*)     Leukocytes, UA 3+ (*)     All other components within normal limits    Narrative:     Specimen Source->Urine   LACTIC ACID, PLASMA - Abnormal; Notable for the following components:    Lactate (Lactic Acid) 2.4 (*)     All other components within normal limits   URINALYSIS MICROSCOPIC - Abnormal; Notable for the following components:    RBC, UA >100 (*)     WBC, UA >100 (*)     WBC Clumps, UA Many (*)     Bacteria Many (*)     Yeast, UA Moderate (*)     All other components within normal limits    Narrative:     Specimen Source->Urine   BASIC METABOLIC PANEL - Abnormal; Notable for the following components:    Sodium 135 (*)     Potassium 2.9 (*)     Glucose 55 (*)     Calcium 8.4 (*)     All other components within normal limits    Narrative:     Collection has been rescheduled by DB2 at 06/12/2023 04:01 Reason:   Patient in ct   MAGNESIUM - Abnormal; Notable for the following components:    Magnesium 1.1 (*)     All other components within normal limits    Narrative:     Collection has been rescheduled by DB2 at 06/12/2023 04:01 Reason:   Patient in ct   CBC W/ AUTO DIFFERENTIAL - Abnormal; Notable for the following components:    WBC 25.25 (*)     RBC 3.60 (*)     Hemoglobin 9.6 (*)     Hematocrit 30.1 (*)     MCH 26.7 (*)     MCHC 31.9 (*)     Platelets 741 (*)     MPV 8.5 (*)     Immature Granulocytes 1.3 (*)     Gran # (ANC) 22.1 (*)     Immature Grans (Abs) 0.32 (*)     Mono # 1.4 (*)     Gran % 87.4 (*)     Lymph % 4.0 (*)     All other components within normal limits    Narrative:     Collection has been rescheduled by DB2 at 06/12/2023 04:01 Reason:   Patient in ct   POCT GLUCOSE - Abnormal; Notable for  the following components:    POCT Glucose 61 (*)     All other components within normal limits   POCT GLUCOSE - Abnormal; Notable for the following components:    POCT Glucose 63 (*)     All other components within normal limits   POCT GLUCOSE - Abnormal; Notable for the following components:    POCT Glucose 40 (*)     All other components within normal limits   POCT GLUCOSE - Abnormal; Notable for the following components:    POCT Glucose 59 (*)     All other components within normal limits   POCT GLUCOSE - Abnormal; Notable for the following components:    POCT Glucose 67 (*)     All other components within normal limits   CULTURE, BLOOD    Narrative:     Aerobic and anaerobic   CULTURE, BLOOD    Narrative:     Aerobic and anaerobic   CULTURE, URINE   TROPONIN I   LACTIC ACID, PLASMA    Narrative:     Collection has been rescheduled by DB2 at 06/12/2023 04:01 Reason:   Patient in ct   URINALYSIS, REFLEX TO URINE CULTURE   POCT GLUCOSE, HAND-HELD DEVICE   POCT GLUCOSE, HAND-HELD DEVICE   POCT GLUCOSE   POCT GLUCOSE   POCT GLUCOSE   POCT GLUCOSE MONITORING CONTINUOUS   POCT GLUCOSE MONITORING CONTINUOUS        ECG Results              EKG 12-lead (In process)  Result time 06/12/23 10:40:04      In process by Interface, Lab In [facility] (06/12/23 10:40:04)                   Narrative:    Test Reason : R00.0,    Vent. Rate : 106 BPM     Atrial Rate : 106 BPM     P-R Int : 130 ms          QRS Dur : 068 ms      QT Int : 334 ms       P-R-T Axes : 015 -07 029 degrees     QTc Int : 443 ms    Sinus tachycardia  LVH with repolarization abnormality  Inferior infarct ,age undetermined  Abnormal ECG  When compared with ECG of 26-APR-2023 09:39,  Inferior infarct is now Present    Referred By: [email or web address]   SELF           Confirmed By:                       In process by Interface, Lab In [facility] (06/12/23 10:29:23)                   Narrative:    Test Reason : R00.0,    Vent. Rate : 106 BPM     Atrial Rate : 106 BPM     P-R Int :  130 ms          QRS Dur : 068 ms      QT Int : 334 ms       P-R-T Axes : 015 -07 029 degrees     QTc Int : 443 ms    Sinus tachycardia  LVH with repolarization abnormality  Inferior infarct ,age undetermined  Abnormal ECG  When compared with ECG of 26-APR-2023 09:39,  Inferior infarct is now Present    Referred By: AYDEN   SELF           Confirmed By:                                   Imaging Results               CT Foot with Contrast Bilateral (Final result)  Result time 06/12/23 05:09:47      Final result by Luis Alberto Velasco MD (06/12/23 05:09:47)                   Impression:      Significant circumferential subcutaneous edema and skin thickening involving the bilateral distal forelegs and feet, right greater than left.  Soft tissue irregularity of the distal foreleg and hindfeet posteriorly may relate to superimposed soft tissue wound or ulceration.  Correlate with physical examination advised.    Few scattered foci of subcutaneous emphysema within the posterior plantar soft tissues of the right hindfoot.  This may relate to underlying wound versus soft tissue infection.  No definite aggressive cortical destruction or periosteal reaction of the adjacent calcaneus identified, although if there is concern for osteomyelitis, MRI could be performed for more sensitive assessment if there are no patient contraindications.    This report was flagged in Epic as abnormal.      Electronically signed by: Luis Alberto Velasco MD  Date:    06/12/2023  Time:    05:09               Narrative:    EXAMINATION:  CT FOOT WITH CONTRAST BILATERAL    CLINICAL HISTORY:  Soft tissue mass, foot, US/xray nondiagnostic;    TECHNIQUE:  0.625 mm axial images were acquired of the bilateral feet following the administration of 100 cc Omnipaque 350 IV contrast.  Sagittal and coronal reformatted images were reviewed.    COMPARISON:  None    FINDINGS:  Please note image quality is degraded by patient motion artifact.    There is diffuse  osteopenia of the visualized distal forelegs and feet.  No definite acute displaced fracture identified.    There is circumferential subcutaneous edema and skin thickening involving the visualized distal forelegs and feet, right more so than left.  There is associated irregularity of the skin surface, most pronounced posteriorly at the level of the hindfoot which may relate to underlying soft tissue wound or ulcer.  Within the posterior plantar soft tissues of the right foot at the level of the posterior calcaneus, there are a few scattered foci of subcutaneous emphysema.  No definite aggressive cortical destruction or periosteal reaction of the adjacent right calcaneus identified.  No definite soft tissue gas identified within the left plantar soft tissues.    There are scattered vascular calcifications throughout the soft tissues.                                       X-Ray Chest AP Portable (Final result)  Result time 06/12/23 01:33:17      Final result by Jay Greenberg MD (06/12/23 01:33:17)                   Impression:      Stable chest with no acute findings.      Electronically signed by: Jay Greenberg  Date:    06/12/2023  Time:    01:33               Narrative:    EXAMINATION:  XR CHEST AP PORTABLE    CLINICAL HISTORY:  Sepsis;    TECHNIQUE:  Single frontal view of the chest was performed.    COMPARISON:  Of 04/26/2023    FINDINGS:  The heart and mediastinal contours appear stable.  The patient is neck and chin project over the lung apices.  Visualized lungs otherwise appear clear with continued elevation of the left hemidiaphragm with air filled splenic flexure and gastric air bubble.                                       Medications   piperacillin-tazobactam (ZOSYN) 4.5 g in dextrose 5 % in water (D5W) 5 % 100 mL IVPB (MB+) (0 g Intravenous Stopped 6/12/23 0908)   tamsulosin 24 hr capsule 0.4 mg (0.4 mg Oral Not Given 6/12/23 0200)   sodium chloride 0.9% flush 10 mL (has no administration in time  range)   naloxone 0.4 mg/mL injection 0.02 mg (has no administration in time range)   glucagon (human recombinant) injection 1 mg (has no administration in time range)   dextrose 10% bolus 125 mL 125 mL (0 mLs Intravenous Stopped 6/12/23 0705)   dextrose 10% bolus 250 mL 250 mL (0 mLs Intravenous Stopped 6/12/23 0429)   dextrose 40 % gel 15,000 mg (has no administration in time range)   dextrose 40 % gel 30,000 mg (has no administration in time range)   enoxaparin injection 40 mg (has no administration in time range)   HYDROcodone-acetaminophen 5-325 mg per tablet 1 tablet (1 tablet Oral Given 6/12/23 1326)   ondansetron injection 4 mg (has no administration in time range)   insulin aspart U-100 pen 0-5 Units (has no administration in time range)   dextrose 40 % gel 15,000 mg (has no administration in time range)   dextrose 40 % gel 30,000 mg (has no administration in time range)   acetaminophen tablet 650 mg (has no administration in time range)   melatonin tablet 6 mg (has no administration in time range)   simethicone chewable tablet 80 mg (has no administration in time range)   potassium bicarbonate disintegrating tablet 50 mEq (has no administration in time range)   dextrose 10% bolus 250 mL 250 mL (250 mLs Intravenous Back Association 6/12/23 0415)   vancomycin - pharmacy to dose (has no administration in time range)   dextrose 5 % (D5W) infusion ( Intravenous New Bag 6/12/23 1124)   vancomycin (VANCOCIN) 1,000 mg in dextrose 5 % (D5W) 250 mL IVPB (Vial-Mate) (1,000 mg Intravenous New Bag 6/12/23 1315)   oxyCODONE-acetaminophen 5-325 mg per tablet 1 tablet (1 tablet Oral Given 6/11/23 2348)   0.9%  NaCl infusion (0 mLs Intravenous Stopped 6/12/23 0413)   vancomycin (VANCOCIN) 1,750 mg in dextrose 5 % (D5W) 500 mL IVPB (0 mg Intravenous Stopped 6/12/23 0200)   sodium chloride 0.9% bolus 1,000 mL 1,000 mL (0 mLs Intravenous Stopped 6/12/23 0107)   iohexoL (OMNIPAQUE 350) injection 100 mL (100 mLs Intravenous  Given 6/12/23 0358)   magnesium sulfate 2g in water 50mL IVPB (premix) (0 g Intravenous Stopped 6/12/23 1237)   potassium bicarbonate disintegrating tablet 40 mEq (40 mEq Oral Given 6/12/23 1316)     Medical Decision Making:   History:   Old Medical Records: I decided to obtain old medical records.  Initial Assessment:   73-year-old presenting with foul smelling urine bilateral lower extremity swelling in ulcers of feet.  Differential Diagnosis:   DX includes UTI, electrolyte derangement, dehydration, [place], pressure ulcers, cellulitis, heart failure, hepatic failure, dependent edema,  Clinical Tests:   Lab Tests: Ordered and Reviewed  Radiological Study: Ordered and Reviewed  Medical Tests: Reviewed and Ordered  ED Management:  And: Obtain CBC, CMP, BNP, UA and reassess.    Patient noted elevated leukocytosis of 25 considering presentation will perform sepsis workup broad-spectrum antibiotics reassess likely Hospital Medicine admission.               This patient does have evidence of infective focus  My overall impression is sepsis.  Source: Urinary Tract  Antibiotics given-   Antibiotics (72h ago, onward)      Start     Stop Route Frequency Ordered    06/12/23 1200  vancomycin (VANCOCIN) 1,000 mg in dextrose 5 % (D5W) 250 mL IVPB (Vial-Mate)         -- IV Every 12 hours (non-standard times) 06/12/23 0753    06/12/23 0802  vancomycin - pharmacy to dose  (vancomycin IVPB)        See Hyperspace for full Linked Orders Report.    -- IV pharmacy to manage frequency 06/12/23 0702    06/12/23 0000  piperacillin-tazobactam (ZOSYN) 4.5 g in dextrose 5 % in water (D5W) 5 % 100 mL IVPB (MB+)  (ED Adult Sepsis Treatment)         06/12 2359 IV Every 8 hours (non-standard times) 06/11/23 2349          Latest lactate reviewed-  Recent Labs   Lab 06/11/23  2355 06/12/23  0513   LACTATE 2.4* 1.9         Fluid challenge Ideal Body Weight- The patient's ideal body weight is Ideal body weight: 66.1 kg (145 lb 11.6 oz) which will be  used to calculate fluid bolus of 30 ml/kg for treatment of septic shock.      Post- resuscitation assessment No - Post resuscitation assessment not needed       Will Not start Pressors- Levophed for MAP of 65  Source control achieved by: vanc and zosyn          Critical Care   Date: 06/12/2023  Performed by: Tian Deluca   Authorized by: Tian Deluca   Total critical care time (exclusive of procedural time) : 40 minutes  Critical care was necessary to treat or prevent imminent or life-threatening deterioration of the following conditions:  sepsis     Clinical Impression:   Final diagnoses:  [R00.0] Tachycardia  [A41.9] Sepsis, due to unspecified organism, unspecified whether acute organ dysfunction present (Primary)        ED Disposition Condition    Admit Stable                Tian Deluca Jr., MD  06/12/23 140       Tian Deluca Jr., MD  06/12/23 0156

## 2023-06-12 NOTE — PROGRESS NOTES
Pharmacokinetic Initial Assessment: IV Vancomycin    Assessment/Plan:    Initiate intravenous vancomycin with loading dose of 1750 mg once followed by a maintenance dose of vancomycin 1000 mg IV every 12 hours  Desired empiric serum trough concentration is 15 to 20 mcg/mL  Draw vancomycin trough level 60 min prior to fourth dose on 06/13/23 at approximately 1100  Pharmacy will continue to follow and monitor vancomycin.      Please contact pharmacy at extension [phone] with any questions regarding this assessment.     Thank you for the consult,   Fabiola Vega       Patient brief summary:  Mateo Foreman is a 73 y.o. male initiated on antimicrobial therapy with IV Vancomycin for treatment of suspected bacteremia    Drug Allergies:   Review of patient's allergies indicates:  No Known Allergies    Actual Body Weight:   72.6 kg    Renal Function:   Estimated Creatinine Clearance: 89.2 mL/min (based on SCr of 0.7 mg/dL).,     Dialysis Method (if applicable):  N/A    CBC (last 72 hours):  Recent Labs   Lab Result Units 06/11/23  2334 06/12/23  0513   WBC K/uL 23.30* 25.25*   Hemoglobin g/dL 9.6* 9.6*   Hematocrit % 30.0* 30.1*   Platelets K/uL 754* 741*   Gran % % 85.7* 87.4*   Lymph % % 5.5* 4.0*   Mono % % 6.0 5.5   Eosinophil % % 1.3 1.4   Basophil % % 0.4 0.4   Differential Method  Automated Automated       Metabolic Panel (last 72 hours):  Recent Labs   Lab Result Units 06/11/23  2334 06/11/23  2353 06/12/23  0513   Sodium mmol/L 141  --  135*   Potassium mmol/L 3.3*  --  2.9*   Chloride mmol/L 99  --  98   CO2 mmol/L 27  --  27   Glucose mg/dL 54*  --  55*   Glucose, UA   --  Negative  --    BUN mg/dL 20  --  15   Creatinine mg/dL 0.8  --  0.7   Albumin g/dL 2.2*  --   --    Total Bilirubin mg/dL 0.3  --   --    Alkaline Phosphatase U/L 159*  --   --    AST U/L 25  --   --    ALT U/L 20  --   --    Magnesium mg/dL  --   --  1.1*       Drug levels (last 3 results):  No results for input(s): VANCOMYCINRA, VANCORANDOM,  VANCOMYCINPE, VANCOPEAK, VANCOMYCINTR, VANCOTROUGH in the last 72 hours.    Microbiologic Results:  Microbiology Results (last 7 days)       Procedure Component Value Units Date/Time    Blood culture x two cultures. Draw prior to antibiotics. [[id number]] Collected: 06/11/23 2355    Order Status: Sent Specimen: Blood from Peripheral, Antecubital, Left Updated: 06/12/23 0142    Blood culture x two cultures. Draw prior to antibiotics. [[id number]] Collected: 06/11/23 2355    Order Status: Sent Specimen: Blood from Peripheral, Antecubital, Left Updated: 06/12/23 0142    Urine culture [[id number]] Collected: 06/11/23 2353    Order Status: No result Specimen: Urine Updated: 06/12/23 0018

## 2023-06-12 NOTE — TREATMENT PLAN
Mr. Foreman presents with sepsis; multiple possible sources of infection including catheter associated UTI due to suprapubic catheter (present on admission) versus lower extremity ulcer infection.  Initiated on broad-spectrum antibiotics and given IV fluids per sepsis protocol in the ED (given 2 L total normal saline; will defer further given lower extremity swelling ).  He has been noninvasively reassessed for sepsis with improvement in lactic acidosis following resuscitation.  Will continue on broad-spectrum antibiotics for now and consult Podiatry for possible foot wound debridement as well as Urology for suprapubic catheter exchange.  Course complicated by hypoglycemia, on D5 drip with improvement in advancing diet.  Can hopefully discontinue soon. Infectious Disease following, appreciate recs.  He is a halfway nursing home patient and will return there upon discharge.    Indio Kaminski MD  [facility]

## 2023-06-12 NOTE — SUBJECTIVE & OBJECTIVE
Scheduled Meds:   dextrose 10%  25 g Intravenous ED 1 Time    enoxparin  40 mg Subcutaneous Daily    piperacillin-tazobactam (Zosyn) IV (PEDS and ADULTS) (extended infusion is not appropriate)  4.5 g Intravenous Q8H    potassium bicarbonate  40 mEq Oral Q3H    tamsulosin  0.4 mg Oral QHS    vancomycin (VANCOCIN) IVPB  1,000 mg Intravenous Q12H     Continuous Infusions:   dextrose 5 % (D5W) 50 mL/hr at 06/12/23 1124     PRN Meds:acetaminophen, dextrose 10%, dextrose 10%, dextrose, dextrose, dextrose, dextrose, glucagon (human recombinant), HYDROcodone-acetaminophen, insulin aspart U-100, melatonin, naloxone, ondansetron, potassium bicarbonate, simethicone, sodium chloride 0.9%, Pharmacy to dose Vancomycin consult **AND** vancomycin - pharmacy to dose    Review of patient's allergies indicates:  No Known Allergies     Past Medical History:   Diagnosis Date    Anemia     Cataract     Chronic cough     Diabetes mellitus type II     GERD (gastroesophageal reflux disease)     Glaucoma     Hyperlipidemia      No past surgical history on file.    Family History       Problem Relation (Age of Onset)    Cancer Mother    Diabetes Father          Tobacco Use    Smoking status: Former     Packs/day: 0.20     Years: 45.00     Pack years: 9.00     Types: Cigarettes    Smokeless tobacco: Never   Substance and Sexual Activity    Alcohol use: Not Currently    Drug use: No    Sexual activity: Not Currently     Review of Systems   Constitutional:  Positive for chills and fever.   HENT:  Negative for hearing loss.    Respiratory:  Negative for cough and shortness of breath.    Cardiovascular:  Negative for chest pain.   Gastrointestinal:  Negative for nausea and vomiting.   Genitourinary:  Positive for hematuria.   Skin:  Positive for color change and wound.   Neurological:  Negative for numbness.   Objective:     Vital Signs (Most Recent):  Temp: 99.5 °F (37.5 °C) (06/12/23 0926)  Pulse: 85 (06/12/23 1202)  Resp: 18 (06/12/23  1157)  BP: (!) 156/74 (06/12/23 1202)  SpO2: 96 % (06/12/23 1202) Vital Signs (24h Range):  Temp:  [98.8 °F (37.1 °C)-99.5 °F (37.5 °C)] 99.5 °F (37.5 °C)  Pulse:  [] 85  Resp:  [18-28] 18  SpO2:  [91 %-99 %] 96 %  BP: (148-184)/(64-74) 156/74     Weight: 72.6 kg (160 lb)  Body mass index is 25.06 kg/m².    Foot Exam    General  Affect: appropriate       Right Foot/Ankle     Inspection and Palpation  Skin Exam: ulcer;     Neurovascular  Dorsalis pedis: absent  Posterior tibial: absent      Left Foot/Ankle      Inspection and Palpation  Skin Exam: ulcer;     Neurovascular  Dorsalis pedis: absent  Posterior tibial: absent        Laboratory:  A1C: No results for input(s): HGBA1C in the last 4320 hours.  Blood Cultures:   Recent Labs   Lab 06/11/23  2355   LABBLOO No Growth to date  No Growth to date     BMP:   Recent Labs   Lab 06/12/23  0513   GLU 55*   *   K 2.9*   CL 98   CO2 27   BUN 15   CREATININE 0.7   CALCIUM 8.4*   MG 1.1*     CBC:   Recent Labs   Lab 06/12/23 0513   WBC 25.25*   RBC 3.60*   HGB 9.6*   HCT 30.1*   *   MCV 84   MCH 26.7*   MCHC 31.9*     CMP:   Recent Labs   Lab 06/11/23  2334 06/12/23 0513   GLU 54* 55*   CALCIUM 8.8 8.4*   ALBUMIN 2.2*  --    PROT 7.3  --     135*   K 3.3* 2.9*   CO2 27 27   CL 99 98   BUN 20 15   CREATININE 0.8 0.7   ALKPHOS 159*  --    ALT 20  --    AST 25  --    BILITOT 0.3  --      CRP: No results for input(s): CRP in the last 168 hours.  ESR: No results for input(s): SEDRATE in the last 168 hours.  Wound Cultures: No results for input(s): LABAERO in the last 4320 hours.  Microbiology Results (last 7 days)       Procedure Component Value Units Date/Time    Blood culture x two cultures. Draw prior to antibiotics. [[id number]] Collected: 06/11/23 6829    Order Status: Completed Specimen: Blood from Peripheral, Antecubital, Left Updated: 06/12/23 0939     Blood Culture, Routine No Growth to date    Narrative:      Aerobic and anaerobic    Blood  culture x two cultures. Draw prior to antibiotics. [[id number]] Collected: 06/11/23 2352    Order Status: Completed Specimen: Blood from Peripheral, Antecubital, Left Updated: 06/12/23 0945     Blood Culture, Routine No Growth to date    Narrative:      Aerobic and anaerobic    Urine culture [[id number]] Collected: 06/11/23 2353    Order Status: No result Specimen: Urine Updated: 06/12/23 0018          Specimen (24h ago, onward)      None            Diagnostic Results:  I have reviewed all pertinent imaging results/findings within the past 24 hours.  EXAMINATION:  CT FOOT WITH CONTRAST BILATERAL     CLINICAL HISTORY:  Soft tissue mass, foot, US/xray nondiagnostic;     TECHNIQUE:  0.625 mm axial images were acquired of the bilateral feet following the administration of 100 cc Omnipaque 350 IV contrast.  Sagittal and coronal reformatted images were reviewed.     COMPARISON:  None     FINDINGS:  Please note image quality is degraded by patient motion artifact.     There is diffuse osteopenia of the visualized distal forelegs and feet.  No definite acute displaced fracture identified.     There is circumferential subcutaneous edema and skin thickening involving the visualized distal forelegs and feet, right more so than left.  There is associated irregularity of the skin surface, most pronounced posteriorly at the level of the hindfoot which may relate to underlying soft tissue wound or ulcer.  Within the posterior plantar soft tissues of the right foot at the level of the posterior calcaneus, there are a few scattered foci of subcutaneous emphysema.  No definite aggressive cortical destruction or periosteal reaction of the adjacent right calcaneus identified.  No definite soft tissue gas identified within the left plantar soft tissues.     There are scattered vascular calcifications throughout the soft tissues.     Impression:     Significant circumferential subcutaneous edema and skin thickening involving the bilateral  distal forelegs and feet, right greater than left.  Soft tissue irregularity of the distal foreleg and hindfeet posteriorly may relate to superimposed soft tissue wound or ulceration.  Correlate with physical examination advised.     Few scattered foci of subcutaneous emphysema within the posterior plantar soft tissues of the right hindfoot.  This may relate to underlying wound versus soft tissue infection.  No definite aggressive cortical destruction or periosteal reaction of the adjacent calcaneus identified, although if there is concern for osteomyelitis, MRI could be performed for more sensitive assessment if there are no patient contraindications.     This report was flagged in Epic as abnormal.        Electronically signed by: Luis Alberto Velasco MD  Date:                                            06/12/2023  Time:                                           05:09      Clinical Findings:  There was an ulceration extending along the plantar and lateral aspect of the left heel with greater than 70% eschar tissue containing mixed granular and fibrous tissue along the proximal lateral aspect of the wound base and mild serosanguineous drainage.  No surrounding erythema.  No palpable fluctuance or crepitance.  Mild odor detected.    Another ulceration noted to the inferior and medial aspect of the right heel containing greater than 50% eschar tissue with fibrous rim that feels somewhat  boggy with palpation noting mild to moderate serous drainage.  Mild surrounding erythema is appreciated.  Mild odor detected.    Both legs have moderate brawny edema with accentuation of the normal resting skin tension lines and diffuse dryness and scaling to lower extremity bilateral.  No hair growth bilateral lower extremity.

## 2023-06-12 NOTE — CONSULTS
"  [facility] Consult Note    Primary Attending Physician: Dr. Kaminski   Consultant Attending: Dr. 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 Afebrile and tachycardic on admission. WBC 23.3 with lactic acidosis to 2.4. UA cloudy with pyuria and RBC. CT foot with bilateral (R>L) cellulitic changes with possible ulceration, also subcutaneous emphysema in posterior plantar soft tissues of right hindfoot. Now on vancomycin and pip-tazo.     RECOMMENDATIONS:  - continue empiric coverage for now  - recommend urology evaluation for catheter exchange   - recommend podiatry evaluation for LE wounds  - follow up studies     Thank you for allowing us to participate in the care of this patient. Please contact me if you have any questions regarding this consult.    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 As " per patient had chronic wounds of feet were wrapped weeks ago have not been rewrapped by weeks. Patient is not compliant with care, also does not bathe regularly. Patient mentions having abnormal drainage from his heels.  Denies any chest pain, cough, nausea vomiting diarrhea or abdominal pain fevers chills.    Afebrile and tachycardic. WBC 23.3 on admission with lactic acidosis to 2.4. UA cloudy with pyuria and RBC. CT foot with bilateral (R>L) cellulitic changes with possible ulceration, also subcutaneous emphysema in posterior plantar soft tissues of right hindfoot. Now on vancomycin and pip-tazo.     Past Medical History:  Past Medical History:   Diagnosis Date    Anemia     Cataract     Chronic cough     Diabetes mellitus type II     GERD (gastroesophageal reflux disease)     Glaucoma     Hyperlipidemia        Past Surgical History:  No past surgical history on file.    Allergies:  Review of patient's allergies indicates:  No Known Allergies    Medications:   In-Hospital Scheduled Medications:   dextrose 10%  25 g Intravenous ED 1 Time    enoxparin  40 mg Subcutaneous Daily    magnesium sulfate IVPB  2 g Intravenous Once    piperacillin-tazobactam (Zosyn) IV (PEDS and ADULTS) (extended infusion is not appropriate)  4.5 g Intravenous Q8H    potassium bicarbonate  40 mEq Oral Q3H    tamsulosin  0.4 mg Oral QHS    vancomycin (VANCOCIN) IVPB  1,000 mg Intravenous Q12H      In-Hospital PRN Medications:  acetaminophen, dextrose 10%, dextrose 10%, dextrose, dextrose, dextrose, dextrose, glucagon (human recombinant), HYDROcodone-acetaminophen, insulin aspart U-100, melatonin, naloxone, ondansetron, potassium bicarbonate, simethicone, sodium chloride 0.9%, Pharmacy to dose Vancomycin consult **AND** vancomycin - pharmacy to dose   In-Hospital IV Infusion Medications:   dextrose 5 % (D5W)        Home Medications:  Prior to Admission medications    Medication Sig Start Date End Date Taking? Authorizing Provider  "  acetaminophen (TYLENOL) 650 MG TbSR Take 650 mg by mouth every 6 (six) hours as needed (pain).   Yes Historical Provider   ammonium lactate 12 % Crea Apply 1 application topically 2 (two) times a day. Apply to bilateral lower extremities   Yes Historical Provider   ascorbic acid, vitamin C, (VITAMIN C) 500 MG tablet Take 500 mg by mouth 2 (two) times daily.   Yes Historical Provider   aspirin 325 MG tablet Take 325 mg by mouth once daily.   Yes Historical Provider   blood sugar diagnostic (ONE TOUCH TEST) Strp 1 strip by Misc.(Non-Drug; Combo Route) route Daily. 9/18/12  Yes Noemy Hampton MD   ferrous sulfate 325 mg (65 mg iron) Tab Take 325 mg by mouth daily with breakfast.   Yes Historical Provider   furosemide (LASIX) 20 MG tablet Take 20 mg by mouth once daily. 7/14/22  Yes Historical Provider   insulin detemir U-100 (LEVEMIR FLEXTOUCH) 100 unit/mL (3 mL) SubQ InPn pen Inject 10 Units into the skin once daily.  Patient taking differently: Inject 22 Units into the skin every evening. 7/28/22 7/28/23 Yes Lalitha Servin MD   insulin detemir U-100 (LEVEMIR) 100 unit/mL injection Inject 34 Units into the skin once daily.   Yes Historical Provider   latanoprost 0.005 % ophthalmic solution Place 1 drop into both eyes every evening. 3/29/23  Yes Historical Provider   metFORMIN (GLUCOPHAGE) 1000 MG tablet TAKE 1 TABLET BY MOUTH TWICE DAILY WITH MEALS 12/20/21  Yes Tremaine Finch MD   multivitamin (THERAGRAN) per tablet Take 1 tablet by mouth once daily.   Yes Historical Provider   pen needle, diabetic 32 gauge x 5/32" Ndle use as directed with insulin 7/27/22  Yes Lalitha Servin MD   pioglitazone (ACTOS) 30 MG tablet Take 30 mg by mouth once daily. 4/10/23  Yes Historical Provider   sodium chloride 1 gram tablet Take 1 g by mouth 2 (two) times a day.   Yes Historical Provider   tamsulosin (FLOMAX) 0.4 mg Cap Take 1 capsule (0.4 mg total) by mouth once daily.  Patient taking differently: Take 0.4 mg by mouth every " evening. 22 Yes Lalitha Servin MD       Family History:  Family History   Problem Relation Age of Onset    Cancer Mother     Diabetes Father     Amblyopia Neg Hx     Blindness Neg Hx     Cataracts Neg Hx     Glaucoma Neg Hx     Hypertension Neg Hx     Macular degeneration Neg Hx     Retinal detachment Neg Hx     Strabismus Neg Hx     Stroke Neg Hx     Thyroid disease Neg Hx        Social History:  Social History     Tobacco Use    Smoking status: Former     Packs/day: 0.20     Years: 45.00     Pack years: 9.00     Types: Cigarettes    Smokeless tobacco: Never   Substance Use Topics    Alcohol use: Not Currently    Drug use: No       ROS       Objective:   Last 24 Hour Vital Signs:  BP  Min: 148/67  Max: 184/64  Temp  Av.1 °F (37.3 °C)  Min: 98.8 °F (37.1 °C)  Max: 99.5 °F (37.5 °C)  Pulse  Av.3  Min: 92  Max: 120  Resp  Av.8  Min: 18  Max: 28  SpO2  Av.9 %  Min: 91 %  Max: 99 %  Weight  Av.6 kg (160 lb)  Min: 72.6 kg (160 lb)  Max: 72.6 kg (160 lb)  No intake/output data recorded.    Physical Exam  Constitutional:       General: He is not in acute distress.     Appearance: Normal appearance. He is ill-appearing. He is not toxic-appearing or diaphoretic.      Comments: Appears older than stated age, unkempt, malodorous   HENT:      Head: Normocephalic and atraumatic.      Nose: Nose normal.      Mouth/Throat:      Mouth: Mucous membranes are dry.      Pharynx: Oropharynx is clear. No oropharyngeal exudate.   Eyes:      Extraocular Movements: Extraocular movements intact.      Conjunctiva/sclera: Conjunctivae normal.      Pupils: Pupils are equal, round, and reactive to light.   Cardiovascular:      Rate and Rhythm: Normal rate and regular rhythm.      Pulses: Normal pulses.      Heart sounds: No murmur heard.  Pulmonary:      Effort: No respiratory distress.      Breath sounds: Normal breath sounds. No wheezing or rales.   Abdominal:      General: Abdomen is flat. Bowel sounds are  normal. There is no distension.      Palpations: Abdomen is soft.      Tenderness: There is no abdominal tenderness.   Genitourinary:     Comments: +felipe catheter +diaper  Musculoskeletal:         General: Swelling, tenderness and deformity present.      Cervical back: Normal range of motion and neck supple.   Skin:     General: Skin is warm.      Capillary Refill: Capillary refill takes 2 to 3 seconds.      Findings: Erythema and lesion present.      Comments: Chronic stasis changes to bilateral LE with maceration and skin breakdown throughout. 2 painful lesions to both heels - R with eschar formation, L actively oozing/bleeding. Very tender to light touch.    Neurological:      General: No focal deficit present.      Mental Status: He is alert.                 Laboratory Results:  Most Recent Data:  CBC:   Lab Results   Component Value Date    WBC 25.25 (H) 06/12/2023    HGB 9.6 (L) 06/12/2023    HCT 30.1 (L) 06/12/2023     (H) 06/12/2023    MCV 84 06/12/2023    RDW 14.2 06/12/2023     BMP:   Lab Results   Component Value Date     (L) 06/12/2023    K 2.9 (L) 06/12/2023    CL 98 06/12/2023    CO2 27 06/12/2023    BUN 15 06/12/2023    GLU 55 (L) 06/12/2023    CALCIUM 8.4 (L) 06/12/2023    MG 1.1 (L) 06/12/2023    PHOS 3.0 09/19/2021     LFTs:   Lab Results   Component Value Date    PROT 7.3 06/11/2023    ALBUMIN 2.2 (L) 06/11/2023    BILITOT 0.3 06/11/2023    AST 25 06/11/2023    ALKPHOS 159 (H) 06/11/2023    ALT 20 06/11/2023     Coags:   Lab Results   Component Value Date    INR 1.0 09/16/2021     FLP:   Lab Results   Component Value Date    CHOL 71 (L) 07/23/2022    HDL 22 (L) 07/23/2022    LDLCALC 25.0 (L) 07/23/2022    TRIG 120 07/23/2022    CHOLHDL 31.0 07/23/2022     DM:   Lab Results   Component Value Date    HGBA1C >14.0 (H) 07/22/2022    HGBA1C 11.0 (H) 04/20/2022    HGBA1C 7.6 (H) 12/03/2021    GLUF 312 (H) 12/02/2004    LDLCALC 25.0 (L) 07/23/2022    CREATININE 0.7 06/12/2023     Thyroid:    Lab Results   Component Value Date    TSH 0.496 07/23/2022     Anemia:   Lab Results   Component Value Date    IRON 40 (L) 07/23/2022    TIBC 198 (L) 07/23/2022    FERRITIN 334 (H) 07/23/2022    LINQVUQO13 352 07/23/2022    FOLATE 5.0 07/23/2022     Cardiac:   Lab Results   Component Value Date    TROPONINI 0.019 06/11/2023    BNP <10 04/26/2023     Urinalysis:   Lab Results   Component Value Date    LABURIN ESCHERICHIA COLI  >100,000 cfu/ml   (A) 04/26/2023    COLORU Yellow 06/11/2023    SPECGRAV 1.025 06/11/2023    NITRITE Negative 06/11/2023    KETONESU Negative 06/11/2023    UROBILINOGEN Negative 06/11/2023       Trended Lab Data:  Recent Labs   Lab 06/11/23  2334 06/12/23  0513   WBC 23.30* 25.25*   HGB 9.6* 9.6*   HCT 30.0* 30.1*   * 741*   MCV 83 84   RDW 14.0 14.2    135*   K 3.3* 2.9*   CL 99 98   CO2 27 27   BUN 20 15   GLU 54* 55*   PROT 7.3  --    ALBUMIN 2.2*  --    BILITOT 0.3  --    AST 25  --    ALKPHOS 159*  --    ALT 20  --          Microbiology Data:  6/11 Ucx: sent  6/11 Bcx: NGTD     Antimicrobials:  Vanc, started 6/11  Pip-tazo, started 6/11       Other Results:  Radiology:  X-Ray Chest AP Portable    Result Date: 6/12/2023  EXAMINATION: XR CHEST AP PORTABLE CLINICAL HISTORY: Sepsis; TECHNIQUE: Single frontal view of the chest was performed. COMPARISON: Of 04/26/2023 FINDINGS: The heart and mediastinal contours appear stable.  The patient is neck and chin project over the lung apices.  Visualized lungs otherwise appear clear with continued elevation of the left hemidiaphragm with air filled splenic flexure and gastric air bubble.     Stable chest with no acute findings. Electronically signed by: Jay Greenberg Date:    06/12/2023 Time:    01:33    CT Foot with Contrast Bilateral    Result Date: 6/12/2023  EXAMINATION: CT FOOT WITH CONTRAST BILATERAL CLINICAL HISTORY: Soft tissue mass, foot, US/xray nondiagnostic; TECHNIQUE: 0.625 mm axial images were acquired of the bilateral  feet following the administration of 100 cc Omnipaque 350 IV contrast.  Sagittal and coronal reformatted images were reviewed. COMPARISON: None FINDINGS: Please note image quality is degraded by patient motion artifact. There is diffuse osteopenia of the visualized distal forelegs and feet.  No definite acute displaced fracture identified. There is circumferential subcutaneous edema and skin thickening involving the visualized distal forelegs and feet, right more so than left.  There is associated irregularity of the skin surface, most pronounced posteriorly at the level of the hindfoot which may relate to underlying soft tissue wound or ulcer.  Within the posterior plantar soft tissues of the right foot at the level of the posterior calcaneus, there are a few scattered foci of subcutaneous emphysema.  No definite aggressive cortical destruction or periosteal reaction of the adjacent right calcaneus identified.  No definite soft tissue gas identified within the left plantar soft tissues. There are scattered vascular calcifications throughout the soft tissues.     Significant circumferential subcutaneous edema and skin thickening involving the bilateral distal forelegs and feet, right greater than left.  Soft tissue irregularity of the distal foreleg and hindfeet posteriorly may relate to superimposed soft tissue wound or ulceration.  Correlate with physical examination advised. Few scattered foci of subcutaneous emphysema within the posterior plantar soft tissues of the right hindfoot.  This may relate to underlying wound versus soft tissue infection.  No definite aggressive cortical destruction or periosteal reaction of the adjacent calcaneus identified, although if there is concern for osteomyelitis, MRI could be performed for more sensitive assessment if there are no patient contraindications. This report was flagged in Epic as abnormal. Electronically signed by: Luis Alberto Velasco MD Date:    06/12/2023  Time:    05:09

## 2023-06-12 NOTE — ED NOTES
Pt transferred into hospital bed for comfort  Pt incontinent of stool   Perineal care provided  Changed mepilex pads and new brief placed

## 2023-06-12 NOTE — ASSESSMENT & PLAN NOTE
Managed per hospital Medicine.  Recommend arterial ultrasound lower extremity bilateral.  Patient is not ambulatory and most likely not a revascularization candidate.

## 2023-06-12 NOTE — HPI
73-year-old past medical history of type 2 diabetes, GERD, hyperlipidemia, anemia, cataracts presenting from his nursing home with increased bilateral lower extremity swelling weeping and foul smelling. Patient is known to be unkempt.   As per patient had chronic wounds of feet were wrapped weeks ago have not been rewrapped by weeks. Patient is not compliant with care. Patient mentions having abnormal drainage from his heels.  Denies any chest pain, cough, nausea vomiting diarrhea or abdominal pain fevers chills.Not hypotensive on arrival. Tachycardic , WBC 23 K - admitted for sepsis due to SSTI/UTI

## 2023-06-12 NOTE — SUBJECTIVE & OBJECTIVE
"Past Medical History:   Diagnosis Date    Anemia     Cataract     Chronic cough     Diabetes mellitus type II     GERD (gastroesophageal reflux disease)     Glaucoma     Hyperlipidemia        No past surgical history on file.    Review of patient's allergies indicates:  No Known Allergies    No current facility-administered medications on file prior to encounter.     Current Outpatient Medications on File Prior to Encounter   Medication Sig    ascorbic acid, vitamin C, (VITAMIN C) 500 MG tablet Take 500 mg by mouth 2 (two) times daily.    blood sugar diagnostic (ONE TOUCH TEST) Strp 1 strip by Misc.(Non-Drug; Combo Route) route Daily.    ferrous sulfate 325 mg (65 mg iron) Tab Take 325 mg by mouth daily with breakfast.    furosemide (LASIX) 20 MG tablet Take 20 mg by mouth once daily.    insulin detemir U-100 (LEVEMIR FLEXTOUCH) 100 unit/mL (3 mL) SubQ InPn pen Inject 10 Units into the skin once daily. (Patient taking differently: Inject 34 Units into the skin every morning.)    insulin detemir U-100 (LEVEMIR) 100 unit/mL injection Inject 22 Units into the skin every evening.    latanoprost 0.005 % ophthalmic solution Place 1 drop into both eyes every evening.    metFORMIN (GLUCOPHAGE) 1000 MG tablet TAKE 1 TABLET BY MOUTH TWICE DAILY WITH MEALS    multivitamin (ONE DAILY MULTIVITAMIN) per tablet Take 1 tablet by mouth once daily.    pen needle, diabetic 32 gauge x 5/32" Ndle use as directed with insulin    pioglitazone (ACTOS) 30 MG tablet Take 30 mg by mouth once daily.    tamsulosin (FLOMAX) 0.4 mg Cap Take 1 capsule (0.4 mg total) by mouth once daily. (Patient taking differently: Take 0.4 mg by mouth every evening.)     Family History       Problem Relation (Age of Onset)    Cancer Mother    Diabetes Father          Tobacco Use    Smoking status: Former     Packs/day: 0.20     Years: 45.00     Pack years: 9.00     Types: Cigarettes    Smokeless tobacco: Never   Substance and Sexual Activity    Alcohol use: Not " Currently    Drug use: No    Sexual activity: Not Currently     Review of Systems   Constitutional:  Positive for activity change, appetite change, chills and fatigue.   HENT: Negative.     Eyes: Negative.    Respiratory: Negative.     Cardiovascular:  Positive for palpitations.   Gastrointestinal: Negative.    Endocrine: Positive for cold intolerance.   Genitourinary:  Positive for dysuria.   Musculoskeletal:  Positive for arthralgias and joint swelling.   Skin:  Positive for color change, rash and wound.   Allergic/Immunologic: Negative.    Neurological: Negative.    Hematological: Negative.    Psychiatric/Behavioral:  Positive for agitation and behavioral problems.    Objective:     Vital Signs (Most Recent):  Temp: 98.8 °F (37.1 °C) (06/11/23 2247)  Pulse: 101 (06/12/23 0118)  Resp: 18 (06/12/23 0118)  BP: (!) 148/67 (06/12/23 0118)  SpO2: 98 % (06/12/23 0118) Vital Signs (24h Range):  Temp:  [98.8 °F (37.1 °C)] 98.8 °F (37.1 °C)  Pulse:  [101-120] 101  Resp:  [18-20] 18  SpO2:  [98 %-99 %] 98 %  BP: (148-184)/(64-67) 148/67     Weight: 72.6 kg (160 lb)  Body mass index is 25.06 kg/m².     Physical Exam  Constitutional:       General: He is in acute distress.      Appearance: Normal appearance. He is normal weight. He is ill-appearing.   HENT:      Head: Normocephalic.      Mouth/Throat:      Mouth: Mucous membranes are moist.   Eyes:      Pupils: Pupils are equal, round, and reactive to light.   Cardiovascular:      Rate and Rhythm: Tachycardia present.   Pulmonary:      Effort: Pulmonary effort is normal.   Musculoskeletal:         General: Swelling and tenderness present. Normal range of motion.      Cervical back: Normal range of motion.      Right lower leg: Edema present.      Left lower leg: Edema present.   Skin:     General: Skin is warm.      Capillary Refill: Capillary refill takes less than 2 seconds.      Findings: Erythema, lesion and rash present.   Neurological:      General: No focal deficit  present.      Mental Status: He is alert and oriented to person, place, and time.            CRANIAL NERVES     CN III, IV, VI   Pupils are equal, round, and reactive to light.     Significant Labs: All pertinent labs within the past 24 hours have been reviewed.    Significant Imaging: I have reviewed all pertinent imaging results/findings within the past 24 hours.

## 2023-06-12 NOTE — ASSESSMENT & PLAN NOTE
Patient's FSGs are controlled on current medication regimen.  Last A1c reviewed-   Lab Results   Component Value Date    HGBA1C >14.0 (H) 07/22/2022     Most recent fingerstick glucose reviewed-   Recent Labs   Lab 06/11/23  2315 06/12/23  0103 06/12/23  0406 06/12/23  0444   POCTGLUCOSE 61* 63* 40* 87     Current correctional scale  Medium  Increase anti-hyperglycemic dose as follows-   Antihyperglycemics (From admission, onward)    Start     Stop Route Frequency Ordered    06/12/23 0305  insulin aspart U-100 pen 0-5 Units         -- SubQ Before meals & nightly PRN 06/12/23 0209        Hold Oral hypoglycemics while patient is in the hospital.

## 2023-06-12 NOTE — CONSULTS
[facility] - Emergency Dept  Podiatry  Consult Note    Patient Name: Mateo Foreman  MRN: [id number]  Admission Date: 6/11/2023  Hospital Length of Stay: 0 days  Attending Physician: Indio Kaminski, *  Primary Care Provider: Tremaine Finch MD     Inpatient consult to Podiatry  Consult performed by: Gunnar Rodney, DPM  Consult ordered by: Indio Kaminski MD        Subjective:     History of Present Illness:   73-year-old male seen in ED with PMH of type 2 diabetes, GERD, hyperlipidemia, anemia, cataracts who is a nursing home resident at [facility] admitted with decubitus ulcerations of both heels and worsening UTI.  Majority of the history obtained from chart check.    Past Medical History:   Diagnosis Date    Anemia     Cataract     Chronic cough     Diabetes mellitus type II     GERD (gastroesophageal reflux disease)     Glaucoma     Hyperlipidemia      No past surgical history on file.        Scheduled Meds:   dextrose 10%  25 g Intravenous ED 1 Time    enoxparin  40 mg Subcutaneous Daily    piperacillin-tazobactam (Zosyn) IV (PEDS and ADULTS) (extended infusion is not appropriate)  4.5 g Intravenous Q8H    potassium bicarbonate  40 mEq Oral Q3H    tamsulosin  0.4 mg Oral QHS    vancomycin (VANCOCIN) IVPB  1,000 mg Intravenous Q12H     Continuous Infusions:   dextrose 5 % (D5W) 50 mL/hr at 06/12/23 1124     PRN Meds:acetaminophen, dextrose 10%, dextrose 10%, dextrose, dextrose, dextrose, dextrose, glucagon (human recombinant), HYDROcodone-acetaminophen, insulin aspart U-100, melatonin, naloxone, ondansetron, potassium bicarbonate, simethicone, sodium chloride 0.9%, Pharmacy to dose Vancomycin consult **AND** vancomycin - pharmacy to dose    Review of patient's allergies indicates:  No Known Allergies     Past Medical History:   Diagnosis Date    Anemia     Cataract     Chronic cough     Diabetes mellitus type II     GERD (gastroesophageal reflux disease)     Glaucoma      Hyperlipidemia      No past surgical history on file.    Family History       Problem Relation (Age of Onset)    Cancer Mother    Diabetes Father          Tobacco Use    Smoking status: Former     Packs/day: 0.20     Years: 45.00     Pack years: 9.00     Types: Cigarettes    Smokeless tobacco: Never   Substance and Sexual Activity    Alcohol use: Not Currently    Drug use: No    Sexual activity: Not Currently     Review of Systems   Constitutional:  Positive for chills and fever.   HENT:  Negative for hearing loss.    Respiratory:  Negative for cough and shortness of breath.    Cardiovascular:  Negative for chest pain.   Gastrointestinal:  Negative for nausea and vomiting.   Genitourinary:  Positive for hematuria.   Skin:  Positive for color change and wound.   Neurological:  Negative for numbness.   Objective:     Vital Signs (Most Recent):  Temp: 99.5 °F (37.5 °C) (06/12/23 0926)  Pulse: 85 (06/12/23 1202)  Resp: 18 (06/12/23 1157)  BP: (!) 156/74 (06/12/23 1202)  SpO2: 96 % (06/12/23 1202) Vital Signs (24h Range):  Temp:  [98.8 °F (37.1 °C)-99.5 °F (37.5 °C)] 99.5 °F (37.5 °C)  Pulse:  [] 85  Resp:  [18-28] 18  SpO2:  [91 %-99 %] 96 %  BP: (148-184)/(64-74) 156/74     Weight: 72.6 kg (160 lb)  Body mass index is 25.06 kg/m².    Foot Exam    General  Affect: appropriate       Right Foot/Ankle     Inspection and Palpation  Skin Exam: ulcer;     Neurovascular  Dorsalis pedis: absent  Posterior tibial: absent      Left Foot/Ankle      Inspection and Palpation  Skin Exam: ulcer;     Neurovascular  Dorsalis pedis: absent  Posterior tibial: absent        Laboratory:  A1C: No results for input(s): HGBA1C in the last 4320 hours.  Blood Cultures:   Recent Labs   Lab 06/11/23  9005   LABBLOO No Growth to date  No Growth to date     BMP:   Recent Labs   Lab 06/12/23  1563   GLU 55*   *   K 2.9*   CL 98   CO2 27   BUN 15   CREATININE 0.7   CALCIUM 8.4*   MG 1.1*     CBC:   Recent Labs   Lab 06/12/23  1790    WBC 25.25*   RBC 3.60*   HGB 9.6*   HCT 30.1*   *   MCV 84   MCH 26.7*   MCHC 31.9*     CMP:   Recent Labs   Lab 06/11/23  2334 06/12/23  0513   GLU 54* 55*   CALCIUM 8.8 8.4*   ALBUMIN 2.2*  --    PROT 7.3  --     135*   K 3.3* 2.9*   CO2 27 27   CL 99 98   BUN 20 15   CREATININE 0.8 0.7   ALKPHOS 159*  --    ALT 20  --    AST 25  --    BILITOT 0.3  --      CRP: No results for input(s): CRP in the last 168 hours.  ESR: No results for input(s): SEDRATE in the last 168 hours.  Wound Cultures: No results for input(s): LABAERO in the last 4320 hours.  Microbiology Results (last 7 days)       Procedure Component Value Units Date/Time    Blood culture x two cultures. Draw prior to antibiotics. [[id number]] Collected: 06/11/23 2355    Order Status: Completed Specimen: Blood from Peripheral, Antecubital, Left Updated: 06/12/23 0945     Blood Culture, Routine No Growth to date    Narrative:      Aerobic and anaerobic    Blood culture x two cultures. Draw prior to antibiotics. [[id number]] Collected: 06/11/23 2355    Order Status: Completed Specimen: Blood from Peripheral, Antecubital, Left Updated: 06/12/23 0945     Blood Culture, Routine No Growth to date    Narrative:      Aerobic and anaerobic    Urine culture [[id number]] Collected: 06/11/23 2353    Order Status: No result Specimen: Urine Updated: 06/12/23 0018          Specimen (24h ago, onward)      None            Diagnostic Results:  I have reviewed all pertinent imaging results/findings within the past 24 hours.  EXAMINATION:  CT FOOT WITH CONTRAST BILATERAL     CLINICAL HISTORY:  Soft tissue mass, foot, US/xray nondiagnostic;     TECHNIQUE:  0.625 mm axial images were acquired of the bilateral feet following the administration of 100 cc Omnipaque 350 IV contrast.  Sagittal and coronal reformatted images were reviewed.     COMPARISON:  None     FINDINGS:  Please note image quality is degraded by patient motion artifact.     There is diffuse osteopenia  of the visualized distal forelegs and feet.  No definite acute displaced fracture identified.     There is circumferential subcutaneous edema and skin thickening involving the visualized distal forelegs and feet, right more so than left.  There is associated irregularity of the skin surface, most pronounced posteriorly at the level of the hindfoot which may relate to underlying soft tissue wound or ulcer.  Within the posterior plantar soft tissues of the right foot at the level of the posterior calcaneus, there are a few scattered foci of subcutaneous emphysema.  No definite aggressive cortical destruction or periosteal reaction of the adjacent right calcaneus identified.  No definite soft tissue gas identified within the left plantar soft tissues.     There are scattered vascular calcifications throughout the soft tissues.     Impression:     Significant circumferential subcutaneous edema and skin thickening involving the bilateral distal forelegs and feet, right greater than left.  Soft tissue irregularity of the distal foreleg and hindfeet posteriorly may relate to superimposed soft tissue wound or ulceration.  Correlate with physical examination advised.     Few scattered foci of subcutaneous emphysema within the posterior plantar soft tissues of the right hindfoot.  This may relate to underlying wound versus soft tissue infection.  No definite aggressive cortical destruction or periosteal reaction of the adjacent calcaneus identified, although if there is concern for osteomyelitis, MRI could be performed for more sensitive assessment if there are no patient contraindications.     This report was flagged in Epic as abnormal.        Electronically signed by: Luis Alberto Velasco MD  Date:                                            06/12/2023  Time:                                           05:09      Clinical Findings:  There was an ulceration extending along the plantar and lateral aspect of the left heel with  greater than 70% eschar tissue containing mixed granular and fibrous tissue along the proximal lateral aspect of the wound base and mild serosanguineous drainage.  No surrounding erythema.  No palpable fluctuance or crepitance.  Mild odor detected.    Another ulceration noted to the inferior and medial aspect of the right heel containing greater than 50% eschar tissue with fibrous rim that feels somewhat  boggy with palpation noting mild to moderate serous drainage.  Mild surrounding erythema is appreciated.  Mild odor detected.    Both legs have moderate brawny edema with accentuation of the normal resting skin tension lines and diffuse dryness and scaling to lower extremity bilateral.  No hair growth bilateral lower extremity.                      Assessment/Plan:     Endocrine  Peripheral vascular disorder due to diabetes mellitus  Managed per hospital Medicine.  Recommend arterial ultrasound lower extremity bilateral.  Patient is not ambulatory and most likely not a revascularization candidate.    Orthopedic  Decubitus ulcer of heel, bilateral, unstageable  CT scan of the lower extremity bilateral reviewed without definitive sign of osteomyelitis.  Moderate drainage noted from the right heel therefore an MRI was ordered to assess for osteomyelitis of the calcaneus.  Most likely infected secondary to UTI.  We will reassess tomorrow once initiating IV antibiotics.  Most likely will require bedside debridement.  Strict offloading with Z flex boots.  Wound care orders in for nursing staff.    Other  Lymphedema of both lower extremities  Managed with elevation at rest.        Thank you for your consult. I will follow-up with patient. Please contact us if you have any additional questions.    Gunnar Rodney DPM  Podiatry  [facility] - Emergency Dept

## 2023-06-12 NOTE — HPI
73-year-old male seen in ED with PMH of type 2 diabetes, GERD, hyperlipidemia, anemia, cataracts who is a nursing home resident at [facility] admitted with decubitus ulcerations of both heels and worsening UTI.  Majority of the history obtained from chart check.    Past Medical History:   Diagnosis Date    Anemia     Cataract     Chronic cough     Diabetes mellitus type II     GERD (gastroesophageal reflux disease)     Glaucoma     Hyperlipidemia      No past surgical history on file.

## 2023-06-12 NOTE — ASSESSMENT & PLAN NOTE
CT scan of the lower extremity bilateral reviewed without definitive sign of osteomyelitis.  Moderate drainage noted from the right heel therefore an MRI was ordered to assess for osteomyelitis of the calcaneus.  Most likely infected secondary to UTI.  We will reassess tomorrow once initiating IV antibiotics.  Most likely will require bedside debridement.  Strict offloading with Z flex boots.  Wound care orders in for nursing staff.

## 2023-06-12 NOTE — ED NOTES
Pt returned from MRI   Incontinent of stool  Linens changed perineal care provided and mepilex pads changed   New brief placed on pt

## 2023-06-12 NOTE — ASSESSMENT & PLAN NOTE
This patient does have evidence of infective focus  My overall impression is sepsis.  Source: Urinary Tract and Skin and Soft Tissue (location Bilateral feet open diabetic wounds)  Antibiotics given-   Antibiotics (72h ago, onward)    Start     Stop Route Frequency Ordered    06/12/23 0000  piperacillin-tazobactam (ZOSYN) 4.5 g in dextrose 5 % in water (D5W) 5 % 100 mL IVPB (MB+)  (ED Adult Sepsis Treatment)         06/12 2359 IV Every 8 hours (non-standard times) 06/11/23 2349        Latest lactate reviewed-  Recent Labs   Lab 06/11/23  2355 06/12/23  0513   LACTATE 2.4* 1.9     Organ dysfunction indicated by Acute kidney injury    Fluid challenge Not needed - patient is not hypotensive      Post- resuscitation assessment Yes Perfusion exam was performed within 6 hours of septic shock presentation after bolus shows Adequate tissue perfusion assessed by non-invasive monitoring       Will Not start Pressors- Levophed for MAP of 65  Source control achieved by: Started on Zosyn and Vancomycin .   CT ABD /P Chest. Added CT LE eval diabetic wounds  Blood and urine culture by ER  Lactic acid x 2   AM Labs

## 2023-06-12 NOTE — ASSESSMENT & PLAN NOTE
Patient's FSGs are controlled on current medication regimen.  Last A1c reviewed-   Lab Results   Component Value Date    HGBA1C >14.0 (H) 07/22/2022     Most recent fingerstick glucose reviewed-   Recent Labs   Lab 06/11/23  2315 06/12/23  0103 06/12/23  0406 06/12/23  0444   POCTGLUCOSE 61* 63* 40* 87     Current correctional scale  Medium  Maintain anti-hyperglycemic dose as follows-   Antihyperglycemics (From admission, onward)    Start     Stop Route Frequency Ordered    06/12/23 0305  insulin aspart U-100 pen 0-5 Units         -- SubQ Before meals & nightly PRN 06/12/23 0209        Hold Oral hypoglycemics while patient is in the hospital.

## 2023-06-12 NOTE — PROGRESS NOTES
Urology    Patient in MRI, will change SPT later today vs tomorrow as time allows.    Check DEIDRE given sepsis.    Full consult to follow.    Luis Alberto Gerber MD

## 2023-06-12 NOTE — PHARMACY MED REC
"  Admission Medication History     The home medication history was taken by Theresa Vazquez CPhT.    Medication history obtained from, [facility] and Nurse Verified    You may go to "Admission" then "Reconcile Home Medications" tabs to review and/or act upon these items.     The home medication list has been updated by the Pharmacy department.   Please read ALL comments highlighted in yellow.   Please address this information as you see fit.    Feel free to contact us if you have any questions or require assistance.            Theresa Vazquez CPhT.  Ext [phone]             .        "

## 2023-06-13 PROBLEM — M86.171 ACUTE OSTEOMYELITIS OF RIGHT FOOT: Status: ACTIVE | Noted: 2023-06-13

## 2023-06-13 PROBLEM — L89.159 SACRAL DECUBITUS ULCER: Status: ACTIVE | Noted: 2023-06-13

## 2023-06-13 PROBLEM — T83.511A URINARY TRACT INFECTION ASSOCIATED WITH INDWELLING URETHRAL CATHETER: Status: ACTIVE | Noted: 2023-06-12

## 2023-06-13 PROBLEM — N17.9 AKI (ACUTE KIDNEY INJURY): Status: ACTIVE | Noted: 2023-06-13

## 2023-06-13 LAB
ANION GAP SERPL CALC-SCNC: 11 MMOL/L (ref 8–16)
BACTERIA UR CULT: NORMAL
BACTERIA UR CULT: NORMAL
BASOPHILS # BLD AUTO: 0.07 K/UL (ref 0–0.2)
BASOPHILS NFR BLD: 0.2 % (ref 0–1.9)
BUN SERPL-MCNC: 24 MG/DL (ref 8–23)
CALCIUM SERPL-MCNC: 8.5 MG/DL (ref 8.7–10.5)
CHLORIDE SERPL-SCNC: 93 MMOL/L (ref 95–110)
CO2 SERPL-SCNC: 27 MMOL/L (ref 23–29)
CREAT SERPL-MCNC: 1.5 MG/DL (ref 0.5–1.4)
DIFFERENTIAL METHOD: ABNORMAL
EOSINOPHIL # BLD AUTO: 0.1 K/UL (ref 0–0.5)
EOSINOPHIL NFR BLD: 0.2 % (ref 0–8)
ERYTHROCYTE [DISTWIDTH] IN BLOOD BY AUTOMATED COUNT: 14 % (ref 11.5–14.5)
EST. GFR  (NO RACE VARIABLE): 49 ML/MIN/1.73 M^2
GLUCOSE SERPL-MCNC: 216 MG/DL (ref 70–110)
HCT VFR BLD AUTO: 31.8 % (ref 40–54)
HGB BLD-MCNC: 10.3 G/DL (ref 14–18)
IMM GRANULOCYTES # BLD AUTO: 0.39 K/UL (ref 0–0.04)
IMM GRANULOCYTES NFR BLD AUTO: 1.3 % (ref 0–0.5)
LYMPHOCYTES # BLD AUTO: 0.8 K/UL (ref 1–4.8)
LYMPHOCYTES NFR BLD: 2.6 % (ref 18–48)
MAGNESIUM SERPL-MCNC: 2.5 MG/DL (ref 1.6–2.6)
MCH RBC QN AUTO: 26.4 PG (ref 27–31)
MCHC RBC AUTO-ENTMCNC: 32.4 G/DL (ref 32–36)
MCV RBC AUTO: 82 FL (ref 82–98)
MONOCYTES # BLD AUTO: 1.3 K/UL (ref 0.3–1)
MONOCYTES NFR BLD: 4.2 % (ref 4–15)
MRSA ID BY PCR: NEGATIVE
NEUTROPHILS # BLD AUTO: 27.2 K/UL (ref 1.8–7.7)
NEUTROPHILS NFR BLD: 91.5 % (ref 38–73)
NRBC BLD-RTO: 0 /100 WBC
PLATELET # BLD AUTO: 892 K/UL (ref 150–450)
PMV BLD AUTO: 8.9 FL (ref 9.2–12.9)
POCT GLUCOSE: 155 MG/DL (ref 70–110)
POCT GLUCOSE: 159 MG/DL (ref 70–110)
POCT GLUCOSE: 188 MG/DL (ref 70–110)
POCT GLUCOSE: 216 MG/DL (ref 70–110)
POCT GLUCOSE: 257 MG/DL (ref 70–110)
POTASSIUM SERPL-SCNC: 4.2 MMOL/L (ref 3.5–5.1)
RBC # BLD AUTO: 3.9 M/UL (ref 4.6–6.2)
SODIUM SERPL-SCNC: 131 MMOL/L (ref 136–145)
STAPH AUREUS ID BY PCR: NEGATIVE
VANCOMYCIN TROUGH SERPL-MCNC: 36.9 UG/ML (ref 10–22)
WBC # BLD AUTO: 29.69 K/UL (ref 3.9–12.7)

## 2023-06-13 PROCEDURE — 51702 INSERT TEMP BLADDER CATH: CPT | Mod: ,,, | Performed by: UROLOGY

## 2023-06-13 PROCEDURE — 63600175 PHARM REV CODE 636 W HCPCS: Performed by: INTERNAL MEDICINE

## 2023-06-13 PROCEDURE — 85025 COMPLETE CBC W/AUTO DIFF WBC: CPT | Performed by: INTERNAL MEDICINE

## 2023-06-13 PROCEDURE — 25000003 PHARM REV CODE 250: Performed by: UROLOGY

## 2023-06-13 PROCEDURE — 99222 1ST HOSP IP/OBS MODERATE 55: CPT | Mod: 25,,, | Performed by: UROLOGY

## 2023-06-13 PROCEDURE — 99232 PR SUBSEQUENT HOSPITAL CARE,LEVL II: ICD-10-PCS | Mod: ,,, | Performed by: INTERNAL MEDICINE

## 2023-06-13 PROCEDURE — 25000003 PHARM REV CODE 250: Performed by: STUDENT IN AN ORGANIZED HEALTH CARE EDUCATION/TRAINING PROGRAM

## 2023-06-13 PROCEDURE — 63600175 PHARM REV CODE 636 W HCPCS: Performed by: STUDENT IN AN ORGANIZED HEALTH CARE EDUCATION/TRAINING PROGRAM

## 2023-06-13 PROCEDURE — 99233 PR SUBSEQUENT HOSPITAL CARE,LEVL III: ICD-10-PCS | Mod: ,,, | Performed by: PODIATRIST

## 2023-06-13 PROCEDURE — 87040 BLOOD CULTURE FOR BACTERIA: CPT | Mod: 59 | Performed by: NURSE PRACTITIONER

## 2023-06-13 PROCEDURE — 63600175 PHARM REV CODE 636 W HCPCS: Performed by: HOSPITALIST

## 2023-06-13 PROCEDURE — 51702 PR INSERTION OF TEMPORARY INDWELLING BLADDER CATHETER, SIMPLE: ICD-10-PCS | Mod: ,,, | Performed by: UROLOGY

## 2023-06-13 PROCEDURE — 36415 COLL VENOUS BLD VENIPUNCTURE: CPT | Performed by: HOSPITALIST

## 2023-06-13 PROCEDURE — 25000003 PHARM REV CODE 250: Performed by: HOSPITALIST

## 2023-06-13 PROCEDURE — 25000003 PHARM REV CODE 250: Performed by: INTERNAL MEDICINE

## 2023-06-13 PROCEDURE — 80202 ASSAY OF VANCOMYCIN: CPT | Performed by: HOSPITALIST

## 2023-06-13 PROCEDURE — 36415 COLL VENOUS BLD VENIPUNCTURE: CPT | Performed by: INTERNAL MEDICINE

## 2023-06-13 PROCEDURE — 11000001 HC ACUTE MED/SURG PRIVATE ROOM

## 2023-06-13 PROCEDURE — 80048 BASIC METABOLIC PNL TOTAL CA: CPT | Performed by: INTERNAL MEDICINE

## 2023-06-13 PROCEDURE — 99233 SBSQ HOSP IP/OBS HIGH 50: CPT | Mod: ,,, | Performed by: PODIATRIST

## 2023-06-13 PROCEDURE — 99222 PR INITIAL HOSPITAL CARE,LEVL II: ICD-10-PCS | Mod: 25,,, | Performed by: UROLOGY

## 2023-06-13 PROCEDURE — 83735 ASSAY OF MAGNESIUM: CPT | Performed by: INTERNAL MEDICINE

## 2023-06-13 PROCEDURE — 99232 SBSQ HOSP IP/OBS MODERATE 35: CPT | Mod: ,,, | Performed by: INTERNAL MEDICINE

## 2023-06-13 RX ORDER — METRONIDAZOLE 500 MG/1
500 TABLET ORAL EVERY 8 HOURS
Status: DISCONTINUED | OUTPATIENT
Start: 2023-06-13 | End: 2023-06-14

## 2023-06-13 RX ORDER — DIPHENHYDRAMINE HYDROCHLORIDE 50 MG/ML
12.5 INJECTION INTRAMUSCULAR; INTRAVENOUS ONCE
Status: COMPLETED | OUTPATIENT
Start: 2023-06-13 | End: 2023-06-13

## 2023-06-13 RX ORDER — HYDROXYZINE HYDROCHLORIDE 10 MG/1
10 TABLET, FILM COATED ORAL 3 TIMES DAILY PRN
Status: DISCONTINUED | OUTPATIENT
Start: 2023-06-13 | End: 2023-06-16

## 2023-06-13 RX ORDER — SODIUM CHLORIDE 9 MG/ML
INJECTION, SOLUTION INTRAVENOUS CONTINUOUS
Status: CANCELLED | OUTPATIENT
Start: 2023-06-13

## 2023-06-13 RX ORDER — MUPIROCIN 20 MG/G
OINTMENT TOPICAL
Status: CANCELLED | OUTPATIENT
Start: 2023-06-13

## 2023-06-13 RX ORDER — OXYBUTYNIN CHLORIDE 5 MG/1
5 TABLET ORAL 3 TIMES DAILY PRN
Status: DISCONTINUED | OUTPATIENT
Start: 2023-06-13 | End: 2023-06-20 | Stop reason: HOSPADM

## 2023-06-13 RX ADMIN — METRONIDAZOLE 500 MG: 500 TABLET ORAL at 02:06

## 2023-06-13 RX ADMIN — INSULIN ASPART 3 UNITS: 100 INJECTION, SOLUTION INTRAVENOUS; SUBCUTANEOUS at 09:06

## 2023-06-13 RX ADMIN — VANCOMYCIN HYDROCHLORIDE 1000 MG: 1 INJECTION, POWDER, LYOPHILIZED, FOR SOLUTION INTRAVENOUS at 11:06

## 2023-06-13 RX ADMIN — VANCOMYCIN HYDROCHLORIDE 1000 MG: 1 INJECTION, POWDER, LYOPHILIZED, FOR SOLUTION INTRAVENOUS at 12:06

## 2023-06-13 RX ADMIN — TAMSULOSIN HYDROCHLORIDE 0.4 MG: 0.4 CAPSULE ORAL at 09:06

## 2023-06-13 RX ADMIN — OXYBUTYNIN CHLORIDE 5 MG: 5 TABLET ORAL at 09:06

## 2023-06-13 RX ADMIN — CEFEPIME 2 G: 2 INJECTION, POWDER, FOR SOLUTION INTRAVENOUS at 09:06

## 2023-06-13 RX ADMIN — METRONIDAZOLE 500 MG: 500 TABLET ORAL at 09:06

## 2023-06-13 RX ADMIN — ENOXAPARIN SODIUM 40 MG: 40 INJECTION SUBCUTANEOUS at 04:06

## 2023-06-13 RX ADMIN — DIPHENHYDRAMINE HYDROCHLORIDE 12.5 MG: 50 INJECTION INTRAMUSCULAR; INTRAVENOUS at 09:06

## 2023-06-13 NOTE — PROGRESS NOTES
[facility] Medicine  Progress Note    Patient Name: Mateo Foreman  MRN: [id number]  Patient Class: IP- Inpatient   Admission Date: 6/11/2023  Length of Stay: 1 days  Attending Physician: So Fowler MD  Primary Care Provider: Tremaine Finch MD        Subjective:     Principal Problem:Severe sepsis      HPI:  73-year-old past medical history of type 2 diabetes, GERD, hyperlipidemia, anemia, cataracts presenting from his nursing home with increased bilateral lower extremity swelling weeping and foul smelling. Patient is known to be unkempt.   As per patient had chronic wounds of feet were wrapped weeks ago have not been rewrapped by weeks. Patient is not compliant with care. Patient mentions having abnormal drainage from his heels.  Denies any chest pain, cough, nausea vomiting diarrhea or abdominal pain fevers chills.Not hypotensive on arrival. Tachycardic , WBC 23 K - admitted for sepsis due to SSTI/UTI      Overview/Hospital Course:  No notes on file    Interval History:  Complains of bladder spasms after Knight catheter exchanged this morning.  Has diffuse itching and rash bilateral upper extremities.  Otherwise doing okay.  Plan for debridement of lower extremity tomorrow.  Noted to have necrotic wounds by wound RN for which General surgery consulted for possible debridement.    Review of Systems  Objective:     Vital Signs (Most Recent):  Temp: 97.6 °F (36.4 °C) (06/13/23 1700)  Pulse: 85 (06/13/23 1700)  Resp: 18 (06/13/23 1700)  BP: 133/60 (06/13/23 1700)  SpO2: 96 % (06/13/23 1700) Vital Signs (24h Range):  Temp:  [96.4 °F (35.8 °C)-99.1 °F (37.3 °C)] 97.6 °F (36.4 °C)  Pulse:  [] 85  Resp:  [18-20] 18  SpO2:  [92 %-98 %] 96 %  BP: (114-188)/(60-77) 133/60     Weight: 69.4 kg (153 lb)  Body mass index is 23.96 kg/m².    Intake/Output Summary (Last 24 hours) at 6/13/2023 1800  Last data filed at 6/13/2023 0536  Gross per 24 hour   Intake 275.79 ml   Output 245 ml   Net 30.79 ml          Physical Exam  Vitals and nursing note reviewed.   Constitutional:       Appearance: He is well-developed.   Neck:      Trachea: No tracheal deviation.   Cardiovascular:      Rate and Rhythm: Normal rate and regular rhythm.      Heart sounds: Normal heart sounds. No murmur heard.  Pulmonary:      Effort: Pulmonary effort is normal. No respiratory distress.      Breath sounds: Normal breath sounds. No wheezing.   Abdominal:      General: Bowel sounds are normal. There is no distension.      Palpations: Abdomen is soft.      Tenderness: There is no abdominal tenderness.      Comments: Knight catheter in place with dark red urine draining    Musculoskeletal:      Right lower leg: Edema present.      Left lower leg: Edema present.      Comments: Old & new scattered maculopapular scabs b/l UE, chest & abdomen  Chronic LE edema   Skin:     General: Skin is warm and dry.      Findings: Rash present.      Comments: Sacral wounds present on admission   Neurological:      Mental Status: He is alert.           Significant Labs: All pertinent labs within the past 24 hours have been reviewed.    Significant Imaging: I have reviewed all pertinent imaging results/findings within the past 24 hours.      Assessment/Plan:      * Severe sepsis  This patient does have evidence of infective focus  My overall impression is sepsis.  Source: Urinary Tract and Skin and Soft Tissue (location Bilateral feet open diabetic wounds), sacral decubitus wounds  Antibiotics given-   Antibiotics (72h ago, onward)      Start     Stop Route Frequency Ordered    06/13/23 1400  metroNIDAZOLE tablet 500 mg         -- Oral Every 8 hours 06/13/23 0842    06/13/23 0945  ceFEPIme (MAXIPIME) 2 g in dextrose 5 % in water (D5W) 5 % 100 mL IVPB (MB+)         -- IV Every 12 hours (non-standard times) 06/13/23 0842    06/12/23 0802  vancomycin - pharmacy to dose  (vancomycin IVPB)        See Hyperspace for full Linked Orders Report.    -- IV pharmacy to manage  frequency 06/12/23 0702          Latest lactate reviewed-  Recent Labs   Lab 06/11/23  2355 06/12/23  0513   LACTATE 2.4* 1.9     Organ dysfunction indicated by Acute kidney injury    Fluid challenge Not needed - patient is not hypotensive      Post- resuscitation assessment Yes Perfusion exam was performed within 6 hours of septic shock presentation after bolus shows Adequate tissue perfusion assessed by non-invasive monitoring       Will Not start Pressors- Levophed for MAP of 65  Source control achieved by:  S/p vanc/Zosyn in the ER.  Continue vancomycin, cefepime, Flagyl     CT lower extremity with contrast showed significant bilateral edema in distal for legs/feet, R>L.  Scattered foci of subcutaneous emphysema within plantar aspect of right hindfoot.  MRI confirmed early changes of R heel osteomyelitis.  Arterial ultrasound bilaterally showed no focal hemodynamic stenosis.    Plan for debridement by Podiatry on 06/14    Renal ultrasound showed mild bilateral hydronephrosis.  Distended urinary bladder with echogenic material which could be blood or sequelae of infection.  Chronic indwelling Felipe catheter switched by Urology on 06/13    Blood cultures 6/11 Gram stain showing Gram-positive cocci in clusters resembling staph.  Rapid ID by PCR negative for staph aureus.  Repeat cultures 6/13 pending    Urine culture 6/11 growing multiple organisms.  Concern for infection given chronic catheter    Infectious disease on board.  Continue empiric antibiotics for now      Sacral decubitus ulcer  Present on admission   Necrotic   Inpatient wound care consult    General surgery consult if debridement is necessary       GEORGIANA (acute kidney injury)  Patient with acute kidney injury likely due to pre-renal azotemia, acute tubular necrosis and post-obstructive d/t clotted felipe catheter [place] is currently worsening. Labs reviewed- Renal function/electrolytes with Estimated Creatinine Clearance: 41.6 mL/min (A) (based on SCr of  1.5 mg/dL (H)). according to latest data. Monitor urine output and serial BMP and adjust therapy as needed. Avoid nephrotoxins and renally dose meds for GFR listed above.     Unclear etiology. Knight catheter replaced 6/13.   ATN 2/2 ?Vancomycin level supratherapeutic (Received vanc/ zosyn); vs sepsis     Consider nephrology consult       Acute osteomyelitis of right foot  As above      Urinary tract infection associated with indwelling urethral catheter  As above       Decubitus ulcer of heel, bilateral, unstageable  As above      Nursing home resident  Noted       Hypomagnesemia  Mg 1.1 on admission, improved with repletion      Hypokalemia  Potassium 2.9 improved with repletion      Hypertension associated with diabetes  Controlled     Peripheral vascular disorder due to diabetes mellitus  Aspirin currently on hold in anticipation for procedure    Lymphedema of both lower extremities  Wound consult    HTN, goal below 130/80    Controlled.  Continue home medications    Type 2 diabetes mellitus with hypoglycemia, with long-term current use of insulin  Patient's FSGs are controlled on current medication regimen.  Last A1c reviewed-   Lab Results   Component Value Date    HGBA1C >14.0 (H) 07/22/2022     Most recent fingerstick glucose reviewed-   Recent Labs   Lab 06/13/23  0209 06/13/23  0613 06/13/23  1210 06/13/23  1659   POCTGLUCOSE 216* 257* 188* 159*     Current correctional scale  Medium  Maintain anti-hyperglycemic dose as follows-   Antihyperglycemics (From admission, onward)      Start     Stop Route Frequency Ordered    06/12/23 0305  insulin aspart U-100 pen 0-5 Units         -- SubQ Before meals & nightly PRN 06/12/23 0209          Hold Oral hypoglycemics while patient is in the hospital.      VTE Risk Mitigation (From admission, onward)           Ordered     enoxaparin injection 40 mg  Daily         06/12/23 0209     IP VTE HIGH RISK PATIENT  Once         06/12/23 0209     Place sequential compression  device  Until discontinued         06/12/23 0209                    Discharge Planning   CAROL:      Code Status: Full Code   Is the patient medically ready for discharge?:     Reason for patient still in hospital (select all that apply): Patient new problem, Patient trending condition, Treatment and Consult recommendations             So Fowler MD  Department of [facility] Medicine   [facility]

## 2023-06-13 NOTE — ASSESSMENT & PLAN NOTE
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 Afebrile and tachycardic on admission. WBC 23.3 with lactic acidosis to 2.4. UA cloudy with pyuria and RBC. CT foot with bilateral (R>L) cellulitic changes with possible ulceration, also subcutaneous emphysema in posterior plantar soft tissues of right hindfoot. Now on vancomycin and pip-tazo.      RECOMMENDATIONS:  - continue vanco, cefepime, and flagyl   - podiatry plan for bilaterlal heel debridement and bone biopsy of right heel  - await cultures from these procedures  - urine cx with multiple organisms, concern for infection given chronic catheter, will be treated by current antibiotics   - follow up studies      Thank you for allowing us to participate in the care of this patient. Please contact me if you have any questions regarding this consult.

## 2023-06-13 NOTE — HPI
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 As per patient had chronic wounds of feet were wrapped weeks ago have not been rewrapped by weeks. Patient is not compliant with care, also does not bathe regularly. Patient mentions having abnormal drainage from his heels.  Denies any chest pain, cough, nausea vomiting diarrhea or abdominal pain fevers chills.     Afebrile and tachycardic. WBC 23.3 on admission with lactic acidosis to 2.4. UA cloudy with pyuria and RBC. CT foot with bilateral (R>L) cellulitic changes with possible ulceration, also subcutaneous emphysema in posterior plantar soft tissues of right hindfoot. Now on vancomycin and pip-tazo.

## 2023-06-13 NOTE — ASSESSMENT & PLAN NOTE
Managed per hospital Medicine. No significant stenosis per arterial ultrasound bilateral lower extremity.

## 2023-06-13 NOTE — ASSESSMENT & PLAN NOTE
Patient with acute kidney injury likely due to pre-renal azotemia, acute tubular necrosis and post-obstructive d/t clotted felipe catheter [place] is currently worsening. Labs reviewed- Renal function/electrolytes with Estimated Creatinine Clearance: 41.6 mL/min (A) (based on SCr of 1.5 mg/dL (H)). according to latest data. Monitor urine output and serial BMP and adjust therapy as needed. Avoid nephrotoxins and renally dose meds for GFR listed above.     Unclear etiology. Felipe catheter replaced 6/13.   ATN 2/2 ?Vancomycin level supratherapeutic (Received vanc/ zosyn); vs sepsis     Consider nephrology consult

## 2023-06-13 NOTE — CONSULTS
Today`s Date: 6/13/2023     Admit Date: 6/11/2023    Admitting Physician: So Fowler MD    Patient`s Name: Mateo Foreman , 73 y.o. male    Reason for consultation  Evaluate left buttock wound   Patient Active Problem List:     Dyslipidemia associated with type 2 diabetes mellitus     Other and unspecified hyperlipidemia     Insomnia     Open angle with borderline findings, low risk     Nuclear sclerosis     Type 2 diabetes mellitus with hypoglycemia, with long-term current use of insulin     HTN, goal below 130/80     Cerebral infarction due to embolism of left middle cerebral artery     PVD (peripheral vascular disease)     COPD (chronic obstructive pulmonary disease)     Gastro-esophageal reflux disease without esophagitis     Male erectile dysfunction     Type 2 diabetes mellitus with hyperglycemia     Hemiplegia and hemiparesis following cerebral infarction affecting right dominant side     Systolic dysfunction     Lymphedema of both lower extremities     Cerebrovascular accident     Hemiplegia of dominant side as late effect of cerebrovascular disease     Peripheral vascular disorder due to diabetes mellitus     Encounter for long-term (current) use of insulin     Pain     Closed fracture of neck of right femur     Oxygen desaturation     Hypertension associated with diabetes     H/O: CVA (cerebrovascular accident)     Leukocytosis     Hypokalemia     Fall     Prolonged Q-T interval on ECG     C. difficile colitis     Colitis     Severe sepsis     Hypomagnesemia     Nursing home resident     Decubitus ulcer of heel, bilateral, unstageable     Urinary tract infection associated with indwelling urethral catheter     Acute osteomyelitis of right foot      Past Medical History:  No date: Anemia  No date: Cataract  No date: Chronic cough  No date: Diabetes mellitus type II  No date: GERD (gastroesophageal reflux disease)  No date: Glaucoma  No date: Hyperlipidemia    No past surgical history on file.    Prior  to Admission medications :  Medication acetaminophen (TYLENOL) 650 MG TbSR, Sig Take 650 mg by mouth every 6 (six) hours as needed (pain)., Start Date , End Date , Taking? Yes, Authorizing Provider Historical Provider    Medication ammonium lactate 12 % Crea, Sig Apply 1 application topically 2 (two) times a day. Apply to bilateral lower extremities, Start Date , End Date , Taking? Yes, Authorizing Provider Historical Provider    Medication ascorbic acid, vitamin C, (VITAMIN C) 500 MG tablet, Sig Take 500 mg by mouth 2 (two) times daily., Start Date , End Date , Taking? Yes, Authorizing Provider Historical Provider    Medication blood sugar diagnostic (ONE TOUCH TEST) Strp, Sig 1 strip by Misc.(Non-Drug; Combo Route) route Daily., Start Date 9/18/12, End Date , Taking? Yes, Authorizing Provider Noemy Hampton MD    Medication CALMOSEPTINE 0.44-20.6 % Oint, Sig Apply topically daily as needed., Start Date 6/1/23, End Date , Taking? Yes, Authorizing Provider Historical Provider    Medication ferrous sulfate 325 mg (65 mg iron) Tab, Sig Take 325 mg by mouth daily with breakfast., Start Date , End Date , Taking? Yes, Authorizing Provider Historical Provider    Medication furosemide (LASIX) 20 MG tablet, Sig Take 20 mg by mouth once daily., Start Date 7/14/22, End Date , Taking? Yes, Authorizing Provider Historical Provider    Medication insulin detemir U-100 (LEVEMIR) 100 unit/mL injection, Sig Inject into the skin 2 (two) times a day. Inject 34 units into the skin every morning and 22 units at bedtime, Start Date , End Date , Taking? Yes, Authorizing Provider Historical Provider    Medication latanoprost 0.005 % ophthalmic solution, Sig Place 1 drop into both eyes every evening., Start Date 3/29/23, End Date , Taking? Yes, Authorizing Provider Historical Provider    Medication metFORMIN (GLUCOPHAGE) 1000 MG tablet, Sig TAKE 1 TABLET BY MOUTH TWICE DAILY WITH MEALS, Start Date 12/20/21, End Date , Taking? Yes,  "Authorizing Provider Tremaine Finch MD    Medication multivitamin (THERAGRAN) per tablet, Sig Take 1 tablet by mouth once daily., Start Date , End Date , Taking? Yes, Authorizing Provider Historical Provider    Medication pen needle, diabetic 32 gauge x 5/32" Ndle, Sig use as directed with insulin, Start Date 7/27/22, End Date , Taking? Yes, Authorizing Provider Lalitha Servin MD    Medication pioglitazone (ACTOS) 30 MG tablet, Sig Take 30 mg by mouth once daily., Start Date 4/10/23, End Date , Taking? Yes, Authorizing Provider Historical Provider    Medication sodium chloride 1 gram tablet, Sig Take 1 g by mouth 2 (two) times a day., Start Date , End Date , Taking? Yes, Authorizing Provider Historical Provider    Medication tamsulosin (FLOMAX) 0.4 mg Cap, Sig Take 1 capsule (0.4 mg total) by mouth once daily.Patient taking differently: Take 0.4 mg by mouth every evening., Start Date 7/28/22, End Date 7/28/23, Taking? Yes, Authorizing Provider Lalitha Servin MD    Medication aspirin 325 MG tablet, Sig Take 325 mg by mouth once daily., Start Date , End Date , Taking? , Authorizing Provider Historical Provider      No current facility-administered medications on file prior to encounter.  Current Outpatient Medications on File Prior to Encounter:  acetaminophen (TYLENOL) 650 MG TbSR, Take 650 mg by mouth every 6 (six) hours as needed (pain)., Disp: , Rfl:   ammonium lactate 12 % Crea, Apply 1 application topically 2 (two) times a day. Apply to bilateral lower extremities, Disp: , Rfl:   ascorbic acid, vitamin C, (VITAMIN C) 500 MG tablet, Take 500 mg by mouth 2 (two) times daily., Disp: , Rfl:   blood sugar diagnostic (ONE TOUCH TEST) Strp, 1 strip by Misc.(Non-Drug; Combo Route) route Daily., Disp: 1 Bottle, Rfl: 11  CALMOSEPTINE 0.44-20.6 % Oint, Apply topically daily as needed., Disp: , Rfl:   ferrous sulfate 325 mg (65 mg iron) Tab, Take 325 mg by mouth daily with breakfast., Disp: , Rfl:   furosemide (LASIX) 20 MG " "tablet, Take 20 mg by mouth once daily., Disp: , Rfl:   insulin detemir U-100 (LEVEMIR) 100 unit/mL injection, Inject into the skin 2 (two) times a day. Inject 34 units into the skin every morning and 22 units at bedtime, Disp: , Rfl:   latanoprost 0.005 % ophthalmic solution, Place 1 drop into both eyes every evening., Disp: , Rfl:   metFORMIN (GLUCOPHAGE) 1000 MG tablet, TAKE 1 TABLET BY MOUTH TWICE DAILY WITH MEALS, Disp: 180 tablet, Rfl: 3  multivitamin (THERAGRAN) per tablet, Take 1 tablet by mouth once daily., Disp: , Rfl:   pen needle, diabetic 32 gauge x 5/32" Ndle, use as directed with insulin, Disp: 100 each, Rfl: 1  pioglitazone (ACTOS) 30 MG tablet, Take 30 mg by mouth once daily., Disp: , Rfl:   sodium chloride 1 gram tablet, Take 1 g by mouth 2 (two) times a day., Disp: , Rfl:   tamsulosin (FLOMAX) 0.4 mg Cap, Take 1 capsule (0.4 mg total) by mouth once daily. (Patient taking differently: Take 0.4 mg by mouth every evening.), Disp: 30 capsule, Rfl: 11  aspirin 325 MG tablet, Take 325 mg by mouth once daily., Disp: , Rfl:          Review of patient's allergies indicates:  No Known Allergies    Social History:   reports that he has quit smoking. His smoking use included cigarettes. He has a 9.00 pack-year smoking history. He has never used smokeless tobacco. He reports that he does not currently use alcohol. He reports that he does not use drugs.     Review of patient's family history indicates:      PHYSICAL EXAMINATION  Temp:  [96.4 °F (35.8 °C)-99.1 °F (37.3 °C)] 97.3 °F (36.3 °C)  Pulse:  [] 96  Resp:  [18-20] 20  SpO2:  [92 %-98 %] 96 %  BP: (114-188)/(60-77) 135/60    General Condition:   Alert x 3     Head & Neck  Anemia: None  Jaundice: None  Neck vein: Not distended  Carotid Bruits: none  Lymph nodes: none palpable  Thyroid: normal    Chest: normal    Heart: normal    Rt. Breast: not examined  Lt. Breast: not examined  Axillary lymph nodes: none    Abdomen: Soft,  None tender with no " palpable mass or organ  Hernia: none    Rectal: Defered    Extremities: normal    Vascular: normal    Specific focus Examination  Infected stage 4 left buttock wound and decubitus    Imp: sepsis , infected left buttock wound       Plan: excision and debridement in am with Dr arias.

## 2023-06-13 NOTE — ASSESSMENT & PLAN NOTE
This patient does have evidence of infective focus  My overall impression is sepsis.  Source: Urinary Tract and Skin and Soft Tissue (location Bilateral feet open diabetic wounds), sacral decubitus wounds  Antibiotics given-   Antibiotics (72h ago, onward)    Start     Stop Route Frequency Ordered    06/13/23 1400  metroNIDAZOLE tablet 500 mg         -- Oral Every 8 hours 06/13/23 0842    06/13/23 0945  ceFEPIme (MAXIPIME) 2 g in dextrose 5 % in water (D5W) 5 % 100 mL IVPB (MB+)         -- IV Every 12 hours (non-standard times) 06/13/23 0842    06/12/23 0802  vancomycin - pharmacy to dose  (vancomycin IVPB)        See Hyperspace for full Linked Orders Report.    -- IV pharmacy to manage frequency 06/12/23 0702        Latest lactate reviewed-  Recent Labs   Lab 06/11/23  2355 06/12/23  0513   LACTATE 2.4* 1.9     Organ dysfunction indicated by Acute kidney injury    Fluid challenge Not needed - patient is not hypotensive      Post- resuscitation assessment Yes Perfusion exam was performed within 6 hours of septic shock presentation after bolus shows Adequate tissue perfusion assessed by non-invasive monitoring       Will Not start Pressors- Levophed for MAP of 65  Source control achieved by:  S/p vanc/Zosyn in the ER.  Continue vancomycin, cefepime, Flagyl     CT lower extremity with contrast showed significant bilateral edema in distal for legs/feet, R>L.  Scattered foci of subcutaneous emphysema within plantar aspect of right hindfoot.  MRI confirmed early changes of R heel osteomyelitis.  Arterial ultrasound bilaterally showed no focal hemodynamic stenosis.    Plan for debridement by Podiatry on 06/14    Renal ultrasound showed mild bilateral hydronephrosis.  Distended urinary bladder with echogenic material which could be blood or sequelae of infection.  Chronic indwelling Knight catheter switched by Urology on 06/13    Blood cultures 6/11 Gram stain showing Gram-positive cocci in clusters resembling staph.  Rapid  ID by PCR negative for staph aureus.  Repeat cultures 6/13 pending    Urine culture 6/11 growing multiple organisms.  Concern for infection given chronic catheter    Infectious disease on board.  Continue empiric antibiotics for now

## 2023-06-13 NOTE — PROGRESS NOTES
[facility] - Telemetry  Wound Care    Patient Name:  Mateo Foreman   MRN:  [id number]  Date: 6/13/2023  Diagnosis: Severe sepsis    History:     Past Medical History:   Diagnosis Date    Anemia     Cataract     Chronic cough     Diabetes mellitus type II     GERD (gastroesophageal reflux disease)     Glaucoma     Hyperlipidemia        Social History     Socioeconomic History    Marital status: Single   Occupational History     Employer: [employer]   Tobacco Use    Smoking status: Former     Packs/day: 0.20     Years: 45.00     Pack years: 9.00     Types: Cigarettes    Smokeless tobacco: Never   Substance and Sexual Activity    Alcohol use: Not Currently    Drug use: No    Sexual activity: Not Currently       Precautions:     Allergies as of 06/11/2023    (No Known Allergies)       WOC Assessment Details/Treatment     Assessed pt for low Master score of 9 noting 2 wounds to L buttock/hip and healing scattered ulcerations to R buttock.  Foot wounds managed by Dr. Rodney    L hip- unstageable pressure injury- present on admit- 2.5x3.5x0.1cm- scant tan drainage- no odor  L buttock- unstageable pressure injury- present on admit- 3x4.5x0.5cm with 2 cm undermining all around- 100% necrotic tissue with small amount of purulent drainage      Recommendations discussed with pt, nurse and Dr. Fowler:  - Nursing to apply waffle overlay to bed surface and heel boots to BLE to maintain pressure injury prevention interventions.   - Vashe wet-to-dry dressings BID to L hip/buttock  - General surgery consult for debridement of L buttock wound    06/13/2023

## 2023-06-13 NOTE — ASSESSMENT & PLAN NOTE
Present on admission   Necrotic   Inpatient wound care consult    General surgery consult if debridement is necessary

## 2023-06-13 NOTE — PROGRESS NOTES
Pharmacokinetic Assessment Follow Up: IV Vancomycin    Vancomycin serum concentration assessment(s):    The trough level was drawn correctly and can be used to guide therapy at this time. The measurement is above the desired definitive target range of 15 to 20 mcg/mL.    Vancomycin Regimen Plan:    Re-dose when the random level is less than 15 mcg/mL, next level to be drawn at 1200 on 6/14    Drug levels (last 3 results):  Recent Labs   Lab Result Units 06/13/23  1101   Vancomycin-Trough ug/mL 36.9*       Pharmacy will continue to follow and monitor vancomycin.    Please contact pharmacy at extension [phone] for questions regarding this assessment.    Thank you for the consult,   D[place] Moya       Patient brief summary:  Mateo Foreman is a 73 y.o. male initiated on antimicrobial therapy with IV Vancomycin for treatment of intra-abdominal infection    The patient's current regimen is vancomycin dose by level    Drug Allergies:   Review of patient's allergies indicates:  No Known Allergies    Actual Body Weight:   1.5    Renal Function:   Estimated Creatinine Clearance: 41.6 mL/min (A) (based on SCr of 1.5 mg/dL (H)).,     Dialysis Method (if applicable):      CBC (last 72 hours):  Recent Labs   Lab Result Units 06/11/23 2334 06/12/23 0513 06/13/23  0320   WBC K/uL 23.30* 25.25* 29.69*   Hemoglobin g/dL 9.6* 9.6* 10.3*   Hematocrit % 30.0* 30.1* 31.8*   Platelets K/uL 754* 741* 892*   Gran % % 85.7* 87.4* 91.5*   Lymph % % 5.5* 4.0* 2.6*   Mono % % 6.0 5.5 4.2   Eosinophil % % 1.3 1.4 0.2   Basophil % % 0.4 0.4 0.2   Differential Method  Automated Automated Automated       Metabolic Panel (last 72 hours):  Recent Labs   Lab Result Units 06/11/23 2334 06/11/23 2353 06/12/23  0513 06/13/23  0320   Sodium mmol/L 141  --  135* 131*   Potassium mmol/L 3.3*  --  2.9* 4.2   Chloride mmol/L 99  --  98 93*   CO2 mmol/L 27  --  27 27   Glucose mg/dL 54*  --  55* 216*   Glucose, UA   --  Negative  --   --    BUN mg/dL 20  --   15 24*   Creatinine mg/dL 0.8  --  0.7 1.5*   Albumin g/dL 2.2*  --   --   --    Total Bilirubin mg/dL 0.3  --   --   --    Alkaline Phosphatase U/L 159*  --   --   --    AST U/L 25  --   --   --    ALT U/L 20  --   --   --    Magnesium mg/dL  --   --  1.1* 2.5       Vancomycin Administrations:  vancomycin given in the last 96 hours                     vancomycin (VANCOCIN) 1,000 mg in dextrose 5 % (D5W) 250 mL IVPB (Vial-Mate) (mg) 1,000 mg New Bag 06/13/23 1132     1,000 mg New Bag  0010     1,000 mg New Bag 06/12/23 1315    vancomycin (VANCOCIN) 1,750 mg in dextrose 5 % (D5W) 500 mL IVPB (mg) 1,750 mg New Bag 06/12/23 0000                    Microbiologic Results:  Microbiology Results (last 7 days)       Procedure Component Value Units Date/Time    Blood culture [[id number]] Collected: 06/13/23 0347    Order Status: Sent Specimen: Blood Updated: 06/13/23 1148    Blood culture [[id number]] Collected: 06/13/23 0346    Order Status: Sent Specimen: Blood Updated: 06/13/23 1148    Urine culture [[id number]] Collected: 06/11/23 2353    Order Status: Completed Specimen: Urine Updated: 06/13/23 1025     Urine Culture, Routine Multiple organisms isolated. None in predominance.  Repeat if      clinically necessary.    Narrative:      Specimen Source->Urine    Blood culture x two cultures. Draw prior to antibiotics. [[id number]] Collected: 06/11/23 2355    Order Status: Completed Specimen: Blood from Peripheral, Antecubital, Left Updated: 06/13/23 0613     Blood Culture, Routine No Growth to date      No Growth to date    Narrative:      Aerobic and anaerobic    MRSA/SA Rapid ID by PCR from Blood culture [[id number]] Collected: 06/11/23 2355    Order Status: Completed Updated: 06/13/23 0344     Staph aureus ID by PCR Negative     Methicillin Resistant ID by PCR Negative    Narrative:      Aerobic and anaerobic    Blood culture x two cultures. Draw prior to antibiotics. [[id number]] Collected: 06/11/23 2355    Order Status:  Completed Specimen: Blood from Peripheral, Antecubital, Left Updated: 06/13/23 0225     Blood Culture, Routine Gram stain aer bottle: Gram positive cocci in clusters resembling Staph      Results called to and read back by:Azeb Mccray LPN      06/13/2023 02:23    Narrative:      Aerobic and anaerobic

## 2023-06-13 NOTE — SUBJECTIVE & OBJECTIVE
Interval History:  Complains of bladder spasms after Knight catheter exchanged this morning.  Has diffuse itching and rash bilateral upper extremities.  Otherwise doing okay.  Plan for debridement of lower extremity tomorrow.  Noted to have necrotic wounds by wound RN for which General surgery consulted for possible debridement.    Review of Systems  Objective:     Vital Signs (Most Recent):  Temp: 97.6 °F (36.4 °C) (06/13/23 1700)  Pulse: 85 (06/13/23 1700)  Resp: 18 (06/13/23 1700)  BP: 133/60 (06/13/23 1700)  SpO2: 96 % (06/13/23 1700) Vital Signs (24h Range):  Temp:  [96.4 °F (35.8 °C)-99.1 °F (37.3 °C)] 97.6 °F (36.4 °C)  Pulse:  [] 85  Resp:  [18-20] 18  SpO2:  [92 %-98 %] 96 %  BP: (114-188)/(60-77) 133/60     Weight: 69.4 kg (153 lb)  Body mass index is 23.96 kg/m².    Intake/Output Summary (Last 24 hours) at 6/13/2023 1800  Last data filed at 6/13/2023 0538  Gross per 24 hour   Intake 275.79 ml   Output 245 ml   Net 30.79 ml         Physical Exam  Vitals and nursing note reviewed.   Constitutional:       Appearance: He is well-developed.   Neck:      Trachea: No tracheal deviation.   Cardiovascular:      Rate and Rhythm: Normal rate and regular rhythm.      Heart sounds: Normal heart sounds. No murmur heard.  Pulmonary:      Effort: Pulmonary effort is normal. No respiratory distress.      Breath sounds: Normal breath sounds. No wheezing.   Abdominal:      General: Bowel sounds are normal. There is no distension.      Palpations: Abdomen is soft.      Tenderness: There is no abdominal tenderness.      Comments: Knight catheter in place with dark red urine draining    Musculoskeletal:      Right lower leg: Edema present.      Left lower leg: Edema present.      Comments: Old & new scattered maculopapular scabs b/l UE, chest & abdomen  Chronic LE edema   Skin:     General: Skin is warm and dry.      Findings: Rash present.      Comments: Sacral wounds present on admission   Neurological:      Mental  Status: He is alert.           Significant Labs: All pertinent labs within the past 24 hours have been reviewed.    Significant Imaging: I have reviewed all pertinent imaging results/findings within the past 24 hours.

## 2023-06-13 NOTE — SUBJECTIVE & OBJECTIVE
Interval History: WBC trending up. Had urinary catheter exchanged by urology. MRI with right heel osteo    Review of Systems   Constitutional:  Negative for chills and fever.   HENT:  Negative for hearing loss.    Respiratory:  Negative for cough and shortness of breath.    Cardiovascular:  Negative for chest pain.   Gastrointestinal:  Negative for nausea and vomiting.   Genitourinary:  Positive for hematuria.   Skin:  Positive for color change and wound.   Neurological:  Negative for numbness.   Objective:     Vital Signs (Most Recent):  Temp: 97.3 °F (36.3 °C) (06/13/23 1212)  Pulse: 96 (06/13/23 1222)  Resp: 20 (06/13/23 1212)  BP: 135/60 (06/13/23 1212)  SpO2: 96 % (06/13/23 1212) Vital Signs (24h Range):  Temp:  [96.4 °F (35.8 °C)-99.1 °F (37.3 °C)] 97.3 °F (36.3 °C)  Pulse:  [] 96  Resp:  [18-20] 20  SpO2:  [92 %-98 %] 96 %  BP: (114-188)/(60-77) 135/60     Weight: 69.4 kg (153 lb)  Body mass index is 23.96 kg/m².    Estimated Creatinine Clearance: 41.6 mL/min (A) (based on SCr of 1.5 mg/dL (H)).     Physical Exam     Constitutional:       General: He is not in acute distress.     Appearance: Normal appearance. He is ill-appearing. He is not toxic-appearing or diaphoretic.      Comments: Appears older than stated age, unkempt, malodorous   HENT:      Head: Normocephalic and atraumatic.      Nose: Nose normal.      Mouth/Throat:      Mouth: Mucous membranes are dry.      Pharynx: Oropharynx is clear. No oropharyngeal exudate.   Eyes:      Extraocular Movements: Extraocular movements intact.      Conjunctiva/sclera: Conjunctivae normal.      Pupils: Pupils are equal, round, and reactive to light.   Cardiovascular:      Rate and Rhythm: Normal rate and regular rhythm.      Pulses: Normal pulses.      Heart sounds: No murmur heard.  Pulmonary:      Effort: No respiratory distress.      Breath sounds: Normal breath sounds. No wheezing or rales.   Abdominal:      General: Abdomen is flat. Bowel sounds are  normal. There is no distension.      Palpations: Abdomen is soft.      Tenderness: There is no abdominal tenderness.   Genitourinary:     Comments: +felipe catheter +diaper  Musculoskeletal:         General: Swelling, tenderness and deformity present.      Cervical back: Normal range of motion and neck supple.   Skin:     General: Skin is warm.      Capillary Refill: Capillary refill takes 2 to 3 seconds.      Findings: Erythema and lesion present.      Comments: Chronic stasis changes to bilateral LE with maceration and skin breakdown throughout. 2 painful lesions to both heels - R with eschar formation, L actively oozing/bleeding. Very tender to light touch.    Neurological:      General: No focal deficit present.      Mental Status: He is alert.   Significant Labs: All pertinent labs within the past 24 hours have been reviewed.    Significant Imaging: I have reviewed all pertinent imaging results/findings within the past 24 hours.

## 2023-06-13 NOTE — ED NOTES
Pt placed on tele and transported to floor at this time, pt stable. No acute distress noted, no active complaints.

## 2023-06-13 NOTE — PROGRESS NOTES
[place] - Telemetry  Podiatry  Progress Note    Patient Name: Mateo Foreman  MRN: [id number]  Admission Date: 6/11/2023  Hospital Length of Stay: 1 days  Attending Physician: So Fowler MD  Primary Care Provider: Tremaine Finch MD     Subjective:     Interval History: Seen at bedside. Catheter replaced per Urology. Appears more oriented and alert today.        Scheduled Meds:   enoxparin  40 mg Subcutaneous Daily    tamsulosin  0.4 mg Oral QHS    vancomycin (VANCOCIN) IVPB  1,000 mg Intravenous Q12H     Continuous Infusions:  PRN Meds:acetaminophen, dextrose 10%, dextrose 10%, dextrose, dextrose, dextrose, dextrose, glucagon (human recombinant), HYDROcodone-acetaminophen, insulin aspart U-100, melatonin, naloxone, ondansetron, potassium bicarbonate, simethicone, sodium chloride 0.9%, Pharmacy to dose Vancomycin consult **AND** vancomycin - pharmacy to dose    Review of Systems   Constitutional:  Negative for chills and fever.   HENT:  Negative for hearing loss.    Respiratory:  Negative for cough and shortness of breath.    Cardiovascular:  Negative for chest pain.   Gastrointestinal:  Negative for nausea and vomiting.   Genitourinary:  Positive for hematuria.   Skin:  Positive for color change and wound.   Neurological:  Negative for numbness.   Objective:     Vital Signs (Most Recent):  Temp: 98 °F (36.7 °C) (06/13/23 0456)  Pulse: 94 (06/13/23 0456)  Resp: 20 (06/13/23 0456)  BP: (!) 141/66 (06/13/23 0456)  SpO2: 96 % (06/13/23 0456) Vital Signs (24h Range):  Temp:  [96.4 °F (35.8 °C)-99.5 °F (37.5 °C)] 98 °F (36.7 °C)  Pulse:  [79-94] 94  Resp:  [18-20] 20  SpO2:  [95 %-98 %] 96 %  BP: (141-188)/(66-77) 141/66     Weight: 69.4 kg (153 lb)  Body mass index is 23.96 kg/m².    Foot Exam    General  Orientation: alert and oriented to person, place, and time   Affect: appropriate       Right Foot/Ankle     Inspection and Palpation  Skin Exam: ulcer;     Neurovascular  Dorsalis pedis: absent  Posterior tibial:  absent      Left Foot/Ankle      Inspection and Palpation  Skin Exam: ulcer;     Neurovascular  Dorsalis pedis: absent  Posterior tibial: absent        Laboratory:  A1C: No results for input(s): HGBA1C in the last 4320 hours.  Blood Cultures:   Recent Labs   Lab 06/11/23 2355   LABBLOO No Growth to date  No Growth to date  Gram stain aer bottle: Gram positive cocci in clusters resembling Staph  Results called to and read back by:Azeb Mccray LPN  06/13/2023 02:23     BMP:   Recent Labs   Lab 06/13/23  0320   *   *   K 4.2   CL 93*   CO2 27   BUN 24*   CREATININE 1.5*   CALCIUM 8.5*   MG 2.5     CBC:   Recent Labs   Lab 06/13/23 0320   WBC 29.69*   RBC 3.90*   HGB 10.3*   HCT 31.8*   *   MCV 82   MCH 26.4*   MCHC 32.4     CMP:   Recent Labs   Lab 06/11/23  2334 06/12/23  0513 06/13/23  0320   GLU 54*   < > 216*   CALCIUM 8.8   < > 8.5*   ALBUMIN 2.2*  --   --    PROT 7.3  --   --       < > 131*   K 3.3*   < > 4.2   CO2 27   < > 27   CL 99   < > 93*   BUN 20   < > 24*   CREATININE 0.8   < > 1.5*   ALKPHOS 159*  --   --    ALT 20  --   --    AST 25  --   --    BILITOT 0.3  --   --     < > = values in this interval not displayed.     CRP: No results for input(s): CRP in the last 168 hours.  ESR: No results for input(s): SEDRATE in the last 168 hours.  Microbiology Results (last 7 days)       Procedure Component Value Units Date/Time    Blood culture x two cultures. Draw prior to antibiotics. [[id number]] Collected: 06/11/23 2355    Order Status: Completed Specimen: Blood from Peripheral, Antecubital, Left Updated: 06/13/23 0613     Blood Culture, Routine No Growth to date      No Growth to date    Narrative:      Aerobic and anaerobic    Blood culture [[id number]] Collected: 06/13/23 0347    Order Status: Sent Specimen: Blood Updated: 06/13/23 0347    Blood culture [[id number]] Collected: 06/13/23 0346    Order Status: Sent Specimen: Blood Updated: 06/13/23 0347    MRSA/SA Rapid ID by  PCR from Blood culture [[id number]] Collected: 06/11/23 2355    Order Status: Completed Updated: 06/13/23 0344     Staph aureus ID by PCR Negative     Methicillin Resistant ID by PCR Negative    Narrative:      Aerobic and anaerobic    Blood culture x two cultures. Draw prior to antibiotics. [[id number]] Collected: 06/11/23 2355    Order Status: Completed Specimen: Blood from Peripheral, Antecubital, Left Updated: 06/13/23 0225     Blood Culture, Routine Gram stain aer bottle: Gram positive cocci in clusters resembling Staph      Results called to and read back by:Azeb Mccray LPN      06/13/2023 02:23    Narrative:      Aerobic and anaerobic    Urine culture [[id number]] Collected: 06/11/23 2353    Order Status: No result Specimen: Urine Updated: 06/12/23 0018            Diagnostic Results:  I have reviewed all pertinent imaging results/findings within the past 24 hours.    EXAMINATION:  US LOWER EXTREMITY ARTERIES BILATERAL     CLINICAL HISTORY:  PAD;     TECHNIQUE:  Bilateral lower extremity arterial duplex ultrasound examination performed. Multiple gray scale and color doppler images were obtained in addition to waveform analysis.     COMPARISON:  None     FINDINGS:  The peak systolic velocities on the right are as follows, in centimeters/second:     Common femoral artery: 109     Superficial femoral artery, proximal: 93     Superficial femoral artery, mid portion: 147     Superficial femoral artery, distal: 105     Popliteal artery: 96-65     Posterior tibial artery: 64     Anterior tibial artery: 115     The peak systolic velocities on the left are as follows, in centimeters/second:     Common femoral artery: 67     Superficial femoral artery, proximal: 92     Superficial femoral artery, mid portion: 92     Superficial femoral artery, distal: 94     Popliteal artery: 52     Posterior tibial artery: 47     Anterior tibial artery: 60     Tri and biphasic waveforms are seen throughout.  High resistance lack of  significant diastolic flow is noted in the left lower extremity.     Impression:     No focal hemodynamic stenosis identified by Doppler criteria.     High resistance waveforms primarily in the left lower extremity.  This may represent small vessel disease below the knee.        Electronically signed by: Jay Greenberg  Date:                                            06/12/2023  Time:                                           19:10    EXAMINATION:  MRI FOOT (HINDFOOT) RIGHT WITHOUT CONTRAST     CLINICAL HISTORY:  assess for osteomyelitis of the calcaneus;     TECHNIQUE:  Routine multiplanar non-contrast MR imaging of the right ankle/hindfoot was performed.     COMPARISON:  06/12/2022     FINDINGS:  There is subtle edema at the inferior calcaneus concerning for early changes of osteomyelitis.     Abnormal signal of the soft tissue heel pad suggestive of devitalization or ulceration.     The Achilles tendon appears normal.     The plantar fascia appears within normal limits.     Trace of fluid at the tibiotalar joint.     The subtalar joint appears normal.     The remainder of the visualized osseous structures are intact.     The flexor and extensor tendons appear normal.     The lateral and medial ankle ligaments appear to be intact.     Abnormal subcutaneous soft tissue edema involving the lower leg and dorsal aspect of the foot.  No definite soft tissue fluid collection.     Impression:     Abnormal soft tissue signal of the heel suggestive of ulceration  with subjacent subtle edema at the inferior calcaneus concerning for very early changes of osteomyelitis.     Circumferential subcutaneous soft tissue edema at the lower leg and dorsal aspect of the foot can be seen with cellulitis, or severe arterial or venous insufficiency.        Electronically signed by: Ana Gaston MD  Date:                                            06/12/2023  Time:                                           16:16    Clinical  Findings:  There was an ulceration inferior and lateral left heel with 50% eschar base and 50% mixed epithelial tissue with underlying ecchymosis and fibrous tissue at the posterior superior margin. No drainage observed. No palpable fluctuance or crepitance.      Left heel    Ulcer Right heel with 70% eschar tissue with posterior margin mostly fibrous with ecchymosis. Scant serous drainage, no odor. No palpable fluctuance or crepitance right heel.      Right heel    Assessment/Plan:     Renal/  Urinary tract infection associated with indwelling urethral catheter  Managed per Urology. Most likely source of sepsis.    Endocrine  Peripheral vascular disorder due to diabetes mellitus  Managed per hospital Medicine. No significant stenosis per arterial ultrasound bilateral lower extremity.    Orthopedic  Decubitus ulcer of heel, bilateral, unstageable  MRI + OM right inferior calcaneus. Betadine wet to dry gauze dressings applied bilateral foot. Nursing staff to replace z-flex boots and offload. Wounds responding to IV antibiotics. Plan for debridement bilateral heel with bone biopsy right heel tomorrow in OR. NPO past midnight.     Other  Lymphedema of both lower extremities  Managed with elevation at rest.      Gunnar Rodney DPM  Podiatry  [place] - Telemetry

## 2023-06-13 NOTE — PLAN OF CARE
ERIN met with pt at bedside to discuss dc planning. Pt reports that he resides at [facility]. Pt reports that he has been there for about a year now. Pt reports upon dc he will return to [facility]. Pt gets around NH in wheelchair. Pt gets assistance with ADLs by NH staff. SW will continue to follow pt throughout his transitions of care and assist with any dc needs. Pt will require ambulance transport upon dc.     ERIN sent updated clinicals to [facility] via [employer].     [employer] Alert: YES response from [facility] uShip re: Referral [id number] for patient in [facility] [id number] A: Yes, willing to accept patient      06/13/23 0840   Discharge Assessment   Assessment Type Discharge Planning Assessment   Confirmed/corrected address, phone number and insurance Yes   Confirmed Demographics Correct on Facesheet   Source of Information patient   Communicated CAROL with patient/caregiver Yes   Reason For Admission Severe sepsis   People in Home facility resident   Do you expect to return to your current living situation? Yes   Do you have help at home or someone to help you manage your care at home? Yes   Who are your caregiver(s) and their phone number(s)? Norma Ro (Sister)   [phone]   Prior to hospitilization cognitive status: Alert/Oriented   Current cognitive status: Alert/Oriented   Home Layout Able to live on 1st floor   OTHER   Name(s) of People in Home Pt resides at [facility]

## 2023-06-13 NOTE — PROGRESS NOTES
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 Afebrile and tachycardic on admission. WBC 23.3 with lactic acidosis to 2.4. UA cloudy with pyuria and RBC. CT foot with bilateral (R>L) cellulitic changes with possible ulceration, also subcutaneous emphysema in posterior plantar soft tissues of right hindfoot. Now on vancomycin and pip-tazo.      RECOMMENDATIONS:  - continue vanco, cefepime, and flagyl   - podiatry plan for bilaterlal heel debridement and bone biopsy of right heel  - await cultures from these procedures  - urine cx with multiple organisms, concern for infection given chronic catheter, will be treated by current antibiotics   - follow up studies      Thank you for allowing us to participate in the care of this patient. Please contact me if you have any questions regarding this consult.          Thank you for your consult. I will follow-up with patient. Please contact us if you have any additional questions.    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 As per patient had chronic wounds of feet were wrapped weeks ago have not been rewrapped by weeks. Patient is not compliant with care, also does not bathe regularly. Patient mentions having abnormal drainage from his heels.  Denies any chest pain, cough, nausea vomiting diarrhea or abdominal pain fevers chills.     Afebrile and tachycardic. WBC 23.3 on admission with lactic acidosis to 2.4. UA cloudy with pyuria and RBC. CT foot with bilateral (R>L) cellulitic changes with possible ulceration, also subcutaneous emphysema in posterior plantar soft tissues of right hindfoot. Now on vancomycin and pip-tazo.     Interval History: WBC trending up. Had urinary catheter exchanged by urology. MRI with right heel osteo    Review of Systems   Constitutional:  Negative for chills and fever.   HENT:  Negative for hearing loss.    Respiratory:  Negative for cough and shortness of breath.    Cardiovascular:  Negative for chest pain.   Gastrointestinal:  Negative for nausea and vomiting.   Genitourinary:  Positive for hematuria.   Skin:  Positive for color change and wound.   Neurological:  Negative for numbness.   Objective:     Vital Signs (Most Recent):  Temp: 97.3 °F (36.3 °C) (06/13/23 1212)  Pulse: 96 (06/13/23 1222)  Resp: 20 (06/13/23 1212)  BP: 135/60 (06/13/23 1212)  SpO2: 96 % (06/13/23 1212) Vital Signs (24h Range):  Temp:  [96.4 °F (35.8 °C)-99.1 °F (37.3 °C)] 97.3 °F (36.3 °C)  Pulse:  [] 96  Resp:  [18-20] 20  SpO2:  [92 %-98 %] 96 %  BP: (114-188)/(60-77) 135/60     Weight: 69.4 kg (153 lb)  Body mass index is 23.96 kg/m².    Estimated Creatinine Clearance: 41.6 mL/min (A)  (based on SCr of 1.5 mg/dL (H)).     Physical Exam     Constitutional:       General: He is not in acute distress.     Appearance: Normal appearance. He is ill-appearing. He is not toxic-appearing or diaphoretic.      Comments: Appears older than stated age, unkempt, malodorous   HENT:      Head: Normocephalic and atraumatic.      Nose: Nose normal.      Mouth/Throat:      Mouth: Mucous membranes are dry.      Pharynx: Oropharynx is clear. No oropharyngeal exudate.   Eyes:      Extraocular Movements: Extraocular movements intact.      Conjunctiva/sclera: Conjunctivae normal.      Pupils: Pupils are equal, round, and reactive to light.   Cardiovascular:      Rate and Rhythm: Normal rate and regular rhythm.      Pulses: Normal pulses.      Heart sounds: No murmur heard.  Pulmonary:      Effort: No respiratory distress.      Breath sounds: Normal breath sounds. No wheezing or rales.   Abdominal:      General: Abdomen is flat. Bowel sounds are normal. There is no distension.      Palpations: Abdomen is soft.      Tenderness: There is no abdominal tenderness.   Genitourinary:     Comments: +felipe catheter +diaper  Musculoskeletal:         General: Swelling, tenderness and deformity present.      Cervical back: Normal range of motion and neck supple.   Skin:     General: Skin is warm.      Capillary Refill: Capillary refill takes 2 to 3 seconds.      Findings: Erythema and lesion present.      Comments: Chronic stasis changes to bilateral LE with maceration and skin breakdown throughout. 2 painful lesions to both heels - R with eschar formation, L actively oozing/bleeding. Very tender to light touch.    Neurological:      General: No focal deficit present.      Mental Status: He is alert.   Significant Labs: All pertinent labs within the past 24 hours have been reviewed.    Significant Imaging: I have reviewed all pertinent imaging results/findings within the past 24 hours.

## 2023-06-13 NOTE — SUBJECTIVE & OBJECTIVE
Subjective:     Interval History: Seen at bedside. Catheter replaced per Urology. Appears more oriented and alert today.        Scheduled Meds:   enoxparin  40 mg Subcutaneous Daily    tamsulosin  0.4 mg Oral QHS    vancomycin (VANCOCIN) IVPB  1,000 mg Intravenous Q12H     Continuous Infusions:  PRN Meds:acetaminophen, dextrose 10%, dextrose 10%, dextrose, dextrose, dextrose, dextrose, glucagon (human recombinant), HYDROcodone-acetaminophen, insulin aspart U-100, melatonin, naloxone, ondansetron, potassium bicarbonate, simethicone, sodium chloride 0.9%, Pharmacy to dose Vancomycin consult **AND** vancomycin - pharmacy to dose    Review of Systems   Constitutional:  Negative for chills and fever.   HENT:  Negative for hearing loss.    Respiratory:  Negative for cough and shortness of breath.    Cardiovascular:  Negative for chest pain.   Gastrointestinal:  Negative for nausea and vomiting.   Genitourinary:  Positive for hematuria.   Skin:  Positive for color change and wound.   Neurological:  Negative for numbness.   Objective:     Vital Signs (Most Recent):  Temp: 98 °F (36.7 °C) (06/13/23 0456)  Pulse: 94 (06/13/23 0456)  Resp: 20 (06/13/23 0456)  BP: (!) 141/66 (06/13/23 0456)  SpO2: 96 % (06/13/23 0456) Vital Signs (24h Range):  Temp:  [96.4 °F (35.8 °C)-99.5 °F (37.5 °C)] 98 °F (36.7 °C)  Pulse:  [79-94] 94  Resp:  [18-20] 20  SpO2:  [95 %-98 %] 96 %  BP: (141-188)/(66-77) 141/66     Weight: 69.4 kg (153 lb)  Body mass index is 23.96 kg/m².    Foot Exam    General  Orientation: alert and oriented to person, place, and time   Affect: appropriate       Right Foot/Ankle     Inspection and Palpation  Skin Exam: ulcer;     Neurovascular  Dorsalis pedis: absent  Posterior tibial: absent      Left Foot/Ankle      Inspection and Palpation  Skin Exam: ulcer;     Neurovascular  Dorsalis pedis: absent  Posterior tibial: absent        Laboratory:  A1C: No results for input(s): HGBA1C in the last 4320 hours.  Blood  Cultures:   Recent Labs   Lab 06/11/23 2355   LABBLOO No Growth to date  No Growth to date  Gram stain aer bottle: Gram positive cocci in clusters resembling Staph  Results called to and read back by:Azeb Mccray LPN  06/13/2023 02:23     BMP:   Recent Labs   Lab 06/13/23  0320   *   *   K 4.2   CL 93*   CO2 27   BUN 24*   CREATININE 1.5*   CALCIUM 8.5*   MG 2.5     CBC:   Recent Labs   Lab 06/13/23 0320   WBC 29.69*   RBC 3.90*   HGB 10.3*   HCT 31.8*   *   MCV 82   MCH 26.4*   MCHC 32.4     CMP:   Recent Labs   Lab 06/11/23  2334 06/12/23  0513 06/13/23  0320   GLU 54*   < > 216*   CALCIUM 8.8   < > 8.5*   ALBUMIN 2.2*  --   --    PROT 7.3  --   --       < > 131*   K 3.3*   < > 4.2   CO2 27   < > 27   CL 99   < > 93*   BUN 20   < > 24*   CREATININE 0.8   < > 1.5*   ALKPHOS 159*  --   --    ALT 20  --   --    AST 25  --   --    BILITOT 0.3  --   --     < > = values in this interval not displayed.     CRP: No results for input(s): CRP in the last 168 hours.  ESR: No results for input(s): SEDRATE in the last 168 hours.  Microbiology Results (last 7 days)       Procedure Component Value Units Date/Time    Blood culture x two cultures. Draw prior to antibiotics. [[id number]] Collected: 06/11/23 2355    Order Status: Completed Specimen: Blood from Peripheral, Antecubital, Left Updated: 06/13/23 0613     Blood Culture, Routine No Growth to date      No Growth to date    Narrative:      Aerobic and anaerobic    Blood culture [[id number]] Collected: 06/13/23 0347    Order Status: Sent Specimen: Blood Updated: 06/13/23 0347    Blood culture [[id number]] Collected: 06/13/23 0346    Order Status: Sent Specimen: Blood Updated: 06/13/23 0347    MRSA/SA Rapid ID by PCR from Blood culture [[id number]] Collected: 06/11/23 2355    Order Status: Completed Updated: 06/13/23 1708     Staph aureus ID by PCR Negative     Methicillin Resistant ID by PCR Negative    Narrative:      Aerobic and anaerobic     Blood culture x two cultures. Draw prior to antibiotics. [[id number]] Collected: 06/11/23 2355    Order Status: Completed Specimen: Blood from Peripheral, Antecubital, Left Updated: 06/13/23 0225     Blood Culture, Routine Gram stain aer bottle: Gram positive cocci in clusters resembling Staph      Results called to and read back by:Azeb Mccray LPN      06/13/2023 02:23    Narrative:      Aerobic and anaerobic    Urine culture [[id number]] Collected: 06/11/23 2353    Order Status: No result Specimen: Urine Updated: 06/12/23 0018            Diagnostic Results:  I have reviewed all pertinent imaging results/findings within the past 24 hours.    EXAMINATION:  US LOWER EXTREMITY ARTERIES BILATERAL     CLINICAL HISTORY:  PAD;     TECHNIQUE:  Bilateral lower extremity arterial duplex ultrasound examination performed. Multiple gray scale and color doppler images were obtained in addition to waveform analysis.     COMPARISON:  None     FINDINGS:  The peak systolic velocities on the right are as follows, in centimeters/second:     Common femoral artery: 109     Superficial femoral artery, proximal: 93     Superficial femoral artery, mid portion: 147     Superficial femoral artery, distal: 105     Popliteal artery: 96-65     Posterior tibial artery: 64     Anterior tibial artery: 115     The peak systolic velocities on the left are as follows, in centimeters/second:     Common femoral artery: 67     Superficial femoral artery, proximal: 92     Superficial femoral artery, mid portion: 92     Superficial femoral artery, distal: 94     Popliteal artery: 52     Posterior tibial artery: 47     Anterior tibial artery: 60     Tri and biphasic waveforms are seen throughout.  High resistance lack of significant diastolic flow is noted in the left lower extremity.     Impression:     No focal hemodynamic stenosis identified by Doppler criteria.     High resistance waveforms primarily in the left lower extremity.  This may  represent small vessel disease below the knee.        Electronically signed by: Jay Greenberg  Date:                                            06/12/2023  Time:                                           19:10    EXAMINATION:  MRI FOOT (HINDFOOT) RIGHT WITHOUT CONTRAST     CLINICAL HISTORY:  assess for osteomyelitis of the calcaneus;     TECHNIQUE:  Routine multiplanar non-contrast MR imaging of the right ankle/hindfoot was performed.     COMPARISON:  06/12/2022     FINDINGS:  There is subtle edema at the inferior calcaneus concerning for early changes of osteomyelitis.     Abnormal signal of the soft tissue heel pad suggestive of devitalization or ulceration.     The Achilles tendon appears normal.     The plantar fascia appears within normal limits.     Trace of fluid at the tibiotalar joint.     The subtalar joint appears normal.     The remainder of the visualized osseous structures are intact.     The flexor and extensor tendons appear normal.     The lateral and medial ankle ligaments appear to be intact.     Abnormal subcutaneous soft tissue edema involving the lower leg and dorsal aspect of the foot.  No definite soft tissue fluid collection.     Impression:     Abnormal soft tissue signal of the heel suggestive of ulceration  with subjacent subtle edema at the inferior calcaneus concerning for very early changes of osteomyelitis.     Circumferential subcutaneous soft tissue edema at the lower leg and dorsal aspect of the foot can be seen with cellulitis, or severe arterial or venous insufficiency.        Electronically signed by: Ana Gaston MD  Date:                                            06/12/2023  Time:                                           16:16    Clinical Findings:  There was an ulceration inferior and lateral left heel with 50% eschar base and 50% mixed epithelial tissue with underlying ecchymosis and fibrous tissue at the posterior superior margin. No drainage observed. No  palpable fluctuance or crepitance.      Left heel    Ulcer Right heel with 70% eschar tissue with posterior margin mostly fibrous with ecchymosis. Scant serous drainage, no odor. No palpable fluctuance or crepitance right heel.      Right heel

## 2023-06-13 NOTE — ASSESSMENT & PLAN NOTE
Patient's FSGs are controlled on current medication regimen.  Last A1c reviewed-   Lab Results   Component Value Date    HGBA1C >14.0 (H) 07/22/2022     Most recent fingerstick glucose reviewed-   Recent Labs   Lab 06/13/23  0209 06/13/23  0613 06/13/23  1210 06/13/23  1659   POCTGLUCOSE 216* 257* 188* 159*     Current correctional scale  Medium  Maintain anti-hyperglycemic dose as follows-   Antihyperglycemics (From admission, onward)    Start     Stop Route Frequency Ordered    06/12/23 0305  insulin aspart U-100 pen 0-5 Units         -- SubQ Before meals & nightly PRN 06/12/23 0209        Hold Oral hypoglycemics while patient is in the hospital.

## 2023-06-13 NOTE — ASSESSMENT & PLAN NOTE
MRI + OM right inferior calcaneus. Betadine wet to dry gauze dressings applied bilateral foot. Nursing staff to replace z-flex boots and offload. Wounds responding to IV antibiotics. Plan for debridement bilateral heel with bone biopsy right heel tomorrow in OR. NPO past midnight.

## 2023-06-13 NOTE — CONSULTS
[facility] - Telemetry  Urology  Consult Note    Patient Name: Mateo Foreman  MRN: [id number]  Admission Date: 6/11/2023  Attending Provider: So Fowler MD   Consulting Provider: Luis Alberto Gerber MD  Principal Problem:Severe sepsis    Inpatient consult to Urology  Consult performed by: Luis Alberto Gerber MD  Consult ordered by: Indio Kaminski MD        Subjective:     HPI:   Mateo Foreman is a 73 y.o. male who presents with LE wounds.  Resident at [facility], chronic catheter for unknown time, reported as SPT but on exam severe urethral erosion likely cath in native urethra.    No urologic history in chart, patient unsure when last time cath changed.    DEIDRE with distended bladder with debris, mild hydro bilaterally.  Urology consulted.    Cath no draining, changed.     Past Medical History:   Diagnosis Date    Anemia     Cataract     Chronic cough     Diabetes mellitus type II     GERD (gastroesophageal reflux disease)     Glaucoma     Hyperlipidemia        No past surgical history on file.    Review of patient's allergies indicates:  No Known Allergies    Family History       Problem Relation (Age of Onset)    Cancer Mother    Diabetes Father            Tobacco Use    Smoking status: Former     Packs/day: 0.20     Years: 45.00     Pack years: 9.00     Types: Cigarettes    Smokeless tobacco: Never   Substance and Sexual Activity    Alcohol use: Not Currently    Drug use: No    Sexual activity: Not Currently       Review of Systems    Objective:     Temp:  [96.4 °F (35.8 °C)-99.5 °F (37.5 °C)] 98 °F (36.7 °C)  Pulse:  [79-94] 94  Resp:  [18-20] 20  SpO2:  [95 %-98 %] 96 %  BP: (141-188)/(66-77) 141/66       NAD  RRR  CTAB  ABD S/ND/NTTP       Penis abnormal, likely severe erosion vs atypical SPT placement, no records.  Old cath removed with large volume urine leakage.      Under sterile conditions, 16F  Knight catheter placed into bladder with return of clear/cloudy/red urine.  Balloon inflated  with 10cc sterile water.  Placed to gravity drainage.         Ext: LE wounds      Significant Labs:  BMP:  Recent Labs   Lab 06/11/23  2334 06/12/23  0513 06/13/23  0320    135* 131*   K 3.3* 2.9* 4.2   CL 99 98 93*   CO2 27 27 27   BUN 20 15 24*   CREATININE 0.8 0.7 1.5*   CALCIUM 8.8 8.4* 8.5*       CBC:  Recent Labs   Lab 06/11/23  2334 06/12/23  0513 06/13/23  0320   WBC 23.30* 25.25* 29.69*   HGB 9.6* 9.6* 10.3*   HCT 30.0* 30.1* 31.8*   * 741* 892*           Results for orders placed or performed during the hospital encounter of 06/11/23 (from the past 2160 hour(s))   CT Foot with Contrast Bilateral    Narrative    EXAMINATION:  CT FOOT WITH CONTRAST BILATERAL    CLINICAL HISTORY:  Soft tissue mass, foot, US/xray nondiagnostic;    TECHNIQUE:  0.625 mm axial images were acquired of the bilateral feet following the administration of 100 cc Omnipaque 350 IV contrast.  Sagittal and coronal reformatted images were reviewed.    COMPARISON:  None    FINDINGS:  Please note image quality is degraded by patient motion artifact.    There is diffuse osteopenia of the visualized distal forelegs and feet.  No definite acute displaced fracture identified.    There is circumferential subcutaneous edema and skin thickening involving the visualized distal forelegs and feet, right more so than left.  There is associated irregularity of the skin surface, most pronounced posteriorly at the level of the hindfoot which may relate to underlying soft tissue wound or ulcer.  Within the posterior plantar soft tissues of the right foot at the level of the posterior calcaneus, there are a few scattered foci of subcutaneous emphysema.  No definite aggressive cortical destruction or periosteal reaction of the adjacent right calcaneus identified.  No definite soft tissue gas identified within the left plantar soft tissues.    There are scattered vascular calcifications throughout the soft tissues.      Impression    Significant  circumferential subcutaneous edema and skin thickening involving the bilateral distal forelegs and feet, right greater than left.  Soft tissue irregularity of the distal foreleg and hindfeet posteriorly may relate to superimposed soft tissue wound or ulceration.  Correlate with physical examination advised.    Few scattered foci of subcutaneous emphysema within the posterior plantar soft tissues of the right hindfoot.  This may relate to underlying wound versus soft tissue infection.  No definite aggressive cortical destruction or periosteal reaction of the adjacent calcaneus identified, although if there is concern for osteomyelitis, MRI could be performed for more sensitive assessment if there are no patient contraindications.    This report was flagged in Epic as abnormal.      Electronically signed by: Luis Alberto Velasco MD  Date:    06/12/2023  Time:    05:09       Significant Imaging:  DEIDRE: bilateral mild hydro, distended bladder with debris      Assessment:     Problem Noted   Severe Sepsis 6/12/2023   Urinary Tract Infection Associated With Indwelling Urethral Catheter 6/12/2023     Plan:     Cath changed, irrigate q4h  Follow up in 3 weeks for cath change, repeat US at that time  Treat infection  Following     Thank you for your consult.     Luis Alberto Gerber MD  Urology-[facility]

## 2023-06-14 ENCOUNTER — ANESTHESIA EVENT (OUTPATIENT)
Dept: SURGERY | Facility: HOSPITAL | Age: 73
DRG: 673 | End: 2023-06-14
Payer: MEDICARE

## 2023-06-14 ENCOUNTER — ANESTHESIA (OUTPATIENT)
Dept: SURGERY | Facility: HOSPITAL | Age: 73
DRG: 673 | End: 2023-06-14
Payer: MEDICARE

## 2023-06-14 LAB
ANION GAP SERPL CALC-SCNC: 9 MMOL/L (ref 8–16)
BASOPHILS # BLD AUTO: 0.06 K/UL (ref 0–0.2)
BASOPHILS NFR BLD: 0.3 % (ref 0–1.9)
BUN SERPL-MCNC: 35 MG/DL (ref 8–23)
CALCIUM SERPL-MCNC: 8 MG/DL (ref 8.7–10.5)
CHLORIDE SERPL-SCNC: 95 MMOL/L (ref 95–110)
CO2 SERPL-SCNC: 26 MMOL/L (ref 23–29)
CREAT SERPL-MCNC: 2.5 MG/DL (ref 0.5–1.4)
DIFFERENTIAL METHOD: ABNORMAL
EOSINOPHIL # BLD AUTO: 0.5 K/UL (ref 0–0.5)
EOSINOPHIL NFR BLD: 2.1 % (ref 0–8)
ERYTHROCYTE [DISTWIDTH] IN BLOOD BY AUTOMATED COUNT: 14 % (ref 11.5–14.5)
EST. GFR  (NO RACE VARIABLE): 27 ML/MIN/1.73 M^2
GLUCOSE SERPL-MCNC: 133 MG/DL (ref 70–110)
GRAM STN SPEC: NORMAL
GRAM STN SPEC: NORMAL
HCT VFR BLD AUTO: 25.5 % (ref 40–54)
HGB BLD-MCNC: 8.4 G/DL (ref 14–18)
IMM GRANULOCYTES # BLD AUTO: 0.38 K/UL (ref 0–0.04)
IMM GRANULOCYTES NFR BLD AUTO: 1.6 % (ref 0–0.5)
LYMPHOCYTES # BLD AUTO: 1.2 K/UL (ref 1–4.8)
LYMPHOCYTES NFR BLD: 4.9 % (ref 18–48)
MAGNESIUM SERPL-MCNC: 1.7 MG/DL (ref 1.6–2.6)
MCH RBC QN AUTO: 26.5 PG (ref 27–31)
MCHC RBC AUTO-ENTMCNC: 32.9 G/DL (ref 32–36)
MCV RBC AUTO: 80 FL (ref 82–98)
MONOCYTES # BLD AUTO: 1 K/UL (ref 0.3–1)
MONOCYTES NFR BLD: 4.3 % (ref 4–15)
NEUTROPHILS # BLD AUTO: 20.3 K/UL (ref 1.8–7.7)
NEUTROPHILS NFR BLD: 86.8 % (ref 38–73)
NRBC BLD-RTO: 0 /100 WBC
PLATELET # BLD AUTO: 795 K/UL (ref 150–450)
PMV BLD AUTO: 8.9 FL (ref 9.2–12.9)
POCT GLUCOSE: 122 MG/DL (ref 70–110)
POCT GLUCOSE: 124 MG/DL (ref 70–110)
POCT GLUCOSE: 125 MG/DL (ref 70–110)
POCT GLUCOSE: 130 MG/DL (ref 70–110)
POCT GLUCOSE: 139 MG/DL (ref 70–110)
POCT GLUCOSE: 145 MG/DL (ref 70–110)
POTASSIUM SERPL-SCNC: 4.3 MMOL/L (ref 3.5–5.1)
RBC # BLD AUTO: 3.17 M/UL (ref 4.6–6.2)
SODIUM SERPL-SCNC: 130 MMOL/L (ref 136–145)
WBC # BLD AUTO: 23.33 K/UL (ref 3.9–12.7)

## 2023-06-14 PROCEDURE — 94799 UNLISTED PULMONARY SVC/PX: CPT

## 2023-06-14 PROCEDURE — 87070 CULTURE OTHR SPECIMN AEROBIC: CPT | Mod: 59 | Performed by: SURGERY

## 2023-06-14 PROCEDURE — 25000003 PHARM REV CODE 250: Performed by: NURSE ANESTHETIST, CERTIFIED REGISTERED

## 2023-06-14 PROCEDURE — 11000001 HC ACUTE MED/SURG PRIVATE ROOM

## 2023-06-14 PROCEDURE — 87075 CULTR BACTERIA EXCEPT BLOOD: CPT | Mod: 59 | Performed by: PODIATRIST

## 2023-06-14 PROCEDURE — 83735 ASSAY OF MAGNESIUM: CPT | Performed by: INTERNAL MEDICINE

## 2023-06-14 PROCEDURE — 71000039 HC RECOVERY, EACH ADD'L HOUR: Performed by: PODIATRIST

## 2023-06-14 PROCEDURE — D9220A PRA ANESTHESIA: ICD-10-PCS | Mod: CRNA,,, | Performed by: NURSE ANESTHETIST, CERTIFIED REGISTERED

## 2023-06-14 PROCEDURE — 87206 SMEAR FLUORESCENT/ACID STAI: CPT | Mod: 91 | Performed by: PODIATRIST

## 2023-06-14 PROCEDURE — 87116 MYCOBACTERIA CULTURE: CPT | Mod: 59 | Performed by: PODIATRIST

## 2023-06-14 PROCEDURE — 11043 PR DEBRIDEMENT, SKIN, SUB-Q TISSUE,MUSCLE,=<20 SQ CM: ICD-10-PCS | Mod: 59,,, | Performed by: PODIATRIST

## 2023-06-14 PROCEDURE — 63600175 PHARM REV CODE 636 W HCPCS: Performed by: STUDENT IN AN ORGANIZED HEALTH CARE EDUCATION/TRAINING PROGRAM

## 2023-06-14 PROCEDURE — 87070 CULTURE OTHR SPECIMN AEROBIC: CPT | Performed by: PODIATRIST

## 2023-06-14 PROCEDURE — 87186 SC STD MICRODIL/AGAR DIL: CPT | Mod: 59 | Performed by: PODIATRIST

## 2023-06-14 PROCEDURE — 88304 TISSUE EXAM BY PATHOLOGIST: CPT | Mod: 26,,, | Performed by: PATHOLOGY

## 2023-06-14 PROCEDURE — 88311 DECALCIFY TISSUE: CPT | Performed by: PATHOLOGY

## 2023-06-14 PROCEDURE — 88305 TISSUE EXAM BY PATHOLOGIST: ICD-10-PCS | Mod: 26,,, | Performed by: PATHOLOGY

## 2023-06-14 PROCEDURE — 11046 PR DEB MUSC/FASCIA ADD-ON: ICD-10-PCS | Mod: 59,,, | Performed by: PODIATRIST

## 2023-06-14 PROCEDURE — 63600175 PHARM REV CODE 636 W HCPCS: Performed by: NURSE ANESTHETIST, CERTIFIED REGISTERED

## 2023-06-14 PROCEDURE — 11043 DBRDMT MUSC&/FSCA 1ST 20/<: CPT | Mod: 59,,, | Performed by: PODIATRIST

## 2023-06-14 PROCEDURE — 25000003 PHARM REV CODE 250: Performed by: UROLOGY

## 2023-06-14 PROCEDURE — 88311 DECALCIFY TISSUE: CPT | Mod: 26,,, | Performed by: PATHOLOGY

## 2023-06-14 PROCEDURE — 51700 IRRIGATION OF BLADDER: CPT

## 2023-06-14 PROCEDURE — 25000003 PHARM REV CODE 250: Performed by: INTERNAL MEDICINE

## 2023-06-14 PROCEDURE — 88304 PR  SURG PATH,LEVEL III: ICD-10-PCS | Mod: 26,,, | Performed by: PATHOLOGY

## 2023-06-14 PROCEDURE — D9220A PRA ANESTHESIA: Mod: CRNA,,, | Performed by: NURSE ANESTHETIST, CERTIFIED REGISTERED

## 2023-06-14 PROCEDURE — 36000706: Performed by: PODIATRIST

## 2023-06-14 PROCEDURE — 87077 CULTURE AEROBIC IDENTIFY: CPT | Mod: 59 | Performed by: SURGERY

## 2023-06-14 PROCEDURE — 94761 N-INVAS EAR/PLS OXIMETRY MLT: CPT

## 2023-06-14 PROCEDURE — 25000003 PHARM REV CODE 250: Performed by: STUDENT IN AN ORGANIZED HEALTH CARE EDUCATION/TRAINING PROGRAM

## 2023-06-14 PROCEDURE — 28120 PART REMOVAL OF ANKLE/HEEL: CPT | Mod: ,,, | Performed by: PODIATRIST

## 2023-06-14 PROCEDURE — S0030 INJECTION, METRONIDAZOLE: HCPCS | Performed by: NURSE ANESTHETIST, CERTIFIED REGISTERED

## 2023-06-14 PROCEDURE — 99232 PR SUBSEQUENT HOSPITAL CARE,LEVL II: ICD-10-PCS | Mod: 25,,, | Performed by: UROLOGY

## 2023-06-14 PROCEDURE — 37000008 HC ANESTHESIA 1ST 15 MINUTES: Performed by: PODIATRIST

## 2023-06-14 PROCEDURE — 99232 SBSQ HOSP IP/OBS MODERATE 35: CPT | Mod: 25,,, | Performed by: UROLOGY

## 2023-06-14 PROCEDURE — 51702 PR INSERTION OF TEMPORARY INDWELLING BLADDER CATHETER, SIMPLE: ICD-10-PCS | Mod: ,,, | Performed by: UROLOGY

## 2023-06-14 PROCEDURE — 88311 PR  DECALCIFY TISSUE: ICD-10-PCS | Mod: 26,,, | Performed by: PATHOLOGY

## 2023-06-14 PROCEDURE — 51702 INSERT TEMP BLADDER CATH: CPT | Mod: ,,, | Performed by: UROLOGY

## 2023-06-14 PROCEDURE — 87077 CULTURE AEROBIC IDENTIFY: CPT | Performed by: PODIATRIST

## 2023-06-14 PROCEDURE — 36415 COLL VENOUS BLD VENIPUNCTURE: CPT | Performed by: INTERNAL MEDICINE

## 2023-06-14 PROCEDURE — D9220A PRA ANESTHESIA: Mod: ANES,,, | Performed by: STUDENT IN AN ORGANIZED HEALTH CARE EDUCATION/TRAINING PROGRAM

## 2023-06-14 PROCEDURE — 28120 PR PART REMV TALUS OR CALCANEUS: ICD-10-PCS | Mod: ,,, | Performed by: PODIATRIST

## 2023-06-14 PROCEDURE — 87186 SC STD MICRODIL/AGAR DIL: CPT | Mod: 59 | Performed by: SURGERY

## 2023-06-14 PROCEDURE — 87075 CULTR BACTERIA EXCEPT BLOOD: CPT | Mod: 59 | Performed by: SURGERY

## 2023-06-14 PROCEDURE — D9220A PRA ANESTHESIA: ICD-10-PCS | Mod: ANES,,, | Performed by: STUDENT IN AN ORGANIZED HEALTH CARE EDUCATION/TRAINING PROGRAM

## 2023-06-14 PROCEDURE — 88305 TISSUE EXAM BY PATHOLOGIST: CPT | Mod: 26,,, | Performed by: PATHOLOGY

## 2023-06-14 PROCEDURE — 11046 DBRDMT MUSC&/FSCA EA ADDL: CPT | Mod: 59,,, | Performed by: PODIATRIST

## 2023-06-14 PROCEDURE — 71000033 HC RECOVERY, INTIAL HOUR: Performed by: PODIATRIST

## 2023-06-14 PROCEDURE — 36000707: Performed by: PODIATRIST

## 2023-06-14 PROCEDURE — 88304 TISSUE EXAM BY PATHOLOGIST: CPT | Performed by: PATHOLOGY

## 2023-06-14 PROCEDURE — 37000009 HC ANESTHESIA EA ADD 15 MINS: Performed by: PODIATRIST

## 2023-06-14 PROCEDURE — 80048 BASIC METABOLIC PNL TOTAL CA: CPT | Performed by: INTERNAL MEDICINE

## 2023-06-14 PROCEDURE — 85025 COMPLETE CBC W/AUTO DIFF WBC: CPT | Performed by: INTERNAL MEDICINE

## 2023-06-14 PROCEDURE — 87205 SMEAR GRAM STAIN: CPT | Mod: 59 | Performed by: PODIATRIST

## 2023-06-14 PROCEDURE — 88305 TISSUE EXAM BY PATHOLOGIST: CPT | Performed by: PATHOLOGY

## 2023-06-14 PROCEDURE — 25000003 PHARM REV CODE 250: Performed by: PODIATRIST

## 2023-06-14 PROCEDURE — 87102 FUNGUS ISOLATION CULTURE: CPT | Mod: 59 | Performed by: PODIATRIST

## 2023-06-14 RX ORDER — SODIUM CHLORIDE 9 MG/ML
INJECTION, SOLUTION INTRAVENOUS CONTINUOUS
Status: DISCONTINUED | OUTPATIENT
Start: 2023-06-14 | End: 2023-06-15

## 2023-06-14 RX ORDER — PHENYLEPHRINE HYDROCHLORIDE 10 MG/ML
INJECTION INTRAVENOUS
Status: DISCONTINUED | OUTPATIENT
Start: 2023-06-14 | End: 2023-06-14

## 2023-06-14 RX ORDER — BUPIVACAINE HYDROCHLORIDE 2.5 MG/ML
INJECTION, SOLUTION EPIDURAL; INFILTRATION; INTRACAUDAL
Status: DISCONTINUED | OUTPATIENT
Start: 2023-06-14 | End: 2023-06-14 | Stop reason: HOSPADM

## 2023-06-14 RX ORDER — METRONIDAZOLE 500 MG/1
500 TABLET ORAL EVERY 8 HOURS
Status: DISCONTINUED | OUTPATIENT
Start: 2023-06-14 | End: 2023-06-17

## 2023-06-14 RX ORDER — DEXMEDETOMIDINE HYDROCHLORIDE 100 UG/ML
INJECTION, SOLUTION INTRAVENOUS
Status: DISCONTINUED | OUTPATIENT
Start: 2023-06-14 | End: 2023-06-14

## 2023-06-14 RX ORDER — ONDANSETRON 2 MG/ML
4 INJECTION INTRAMUSCULAR; INTRAVENOUS ONCE AS NEEDED
Status: DISCONTINUED | OUTPATIENT
Start: 2023-06-14 | End: 2023-06-20 | Stop reason: HOSPADM

## 2023-06-14 RX ORDER — SODIUM CHLORIDE 0.9 % (FLUSH) 0.9 %
3 SYRINGE (ML) INJECTION
Status: DISCONTINUED | OUTPATIENT
Start: 2023-06-14 | End: 2023-06-20 | Stop reason: HOSPADM

## 2023-06-14 RX ORDER — PROPOFOL 10 MG/ML
VIAL (ML) INTRAVENOUS CONTINUOUS PRN
Status: DISCONTINUED | OUTPATIENT
Start: 2023-06-14 | End: 2023-06-14

## 2023-06-14 RX ORDER — MEPERIDINE HYDROCHLORIDE 50 MG/ML
12.5 INJECTION INTRAMUSCULAR; INTRAVENOUS; SUBCUTANEOUS ONCE AS NEEDED
Status: ACTIVE | OUTPATIENT
Start: 2023-06-14 | End: 2023-06-15

## 2023-06-14 RX ORDER — MAGNESIUM SULFATE 1 G/100ML
1 INJECTION INTRAVENOUS ONCE
Status: COMPLETED | OUTPATIENT
Start: 2023-06-14 | End: 2023-06-14

## 2023-06-14 RX ORDER — LIDOCAINE HYDROCHLORIDE 10 MG/ML
INJECTION INFILTRATION; PERINEURAL
Status: DISCONTINUED | OUTPATIENT
Start: 2023-06-14 | End: 2023-06-14 | Stop reason: HOSPADM

## 2023-06-14 RX ORDER — LIDOCAINE HYDROCHLORIDE 20 MG/ML
INJECTION INTRAVENOUS
Status: DISCONTINUED | OUTPATIENT
Start: 2023-06-14 | End: 2023-06-14

## 2023-06-14 RX ORDER — METRONIDAZOLE 500 MG/100ML
INJECTION, SOLUTION INTRAVENOUS
Status: DISCONTINUED | OUTPATIENT
Start: 2023-06-14 | End: 2023-06-14

## 2023-06-14 RX ORDER — PROPOFOL 10 MG/ML
VIAL (ML) INTRAVENOUS
Status: DISCONTINUED | OUTPATIENT
Start: 2023-06-14 | End: 2023-06-14

## 2023-06-14 RX ORDER — FENTANYL CITRATE 50 UG/ML
25 INJECTION, SOLUTION INTRAMUSCULAR; INTRAVENOUS EVERY 5 MIN PRN
Status: DISCONTINUED | OUTPATIENT
Start: 2023-06-14 | End: 2023-06-16

## 2023-06-14 RX ORDER — ONDANSETRON 2 MG/ML
INJECTION INTRAMUSCULAR; INTRAVENOUS
Status: DISCONTINUED | OUTPATIENT
Start: 2023-06-14 | End: 2023-06-14

## 2023-06-14 RX ORDER — ENOXAPARIN SODIUM 100 MG/ML
30 INJECTION SUBCUTANEOUS EVERY 24 HOURS
Status: DISCONTINUED | OUTPATIENT
Start: 2023-06-14 | End: 2023-06-18

## 2023-06-14 RX ADMIN — DEXMEDETOMIDINE HCL 8 MCG: 100 INJECTION INTRAVENOUS at 01:06

## 2023-06-14 RX ADMIN — METRONIDAZOLE 500 MG: 500 SOLUTION INTRAVENOUS at 01:06

## 2023-06-14 RX ADMIN — ONDANSETRON 8 MG: 2 INJECTION, SOLUTION INTRAMUSCULAR; INTRAVENOUS at 01:06

## 2023-06-14 RX ADMIN — PHENYLEPHRINE HYDROCHLORIDE 100 MCG: 10 INJECTION INTRAVENOUS at 02:06

## 2023-06-14 RX ADMIN — TAMSULOSIN HYDROCHLORIDE 0.4 MG: 0.4 CAPSULE ORAL at 10:06

## 2023-06-14 RX ADMIN — SODIUM CHLORIDE: 9 INJECTION, SOLUTION INTRAVENOUS at 10:06

## 2023-06-14 RX ADMIN — SODIUM CHLORIDE: 0.9 INJECTION, SOLUTION INTRAVENOUS at 01:06

## 2023-06-14 RX ADMIN — SODIUM CHLORIDE: 9 INJECTION, SOLUTION INTRAVENOUS at 08:06

## 2023-06-14 RX ADMIN — CEFEPIME 2 G: 2 INJECTION, POWDER, FOR SOLUTION INTRAVENOUS at 08:06

## 2023-06-14 RX ADMIN — METRONIDAZOLE 500 MG: 500 TABLET ORAL at 10:06

## 2023-06-14 RX ADMIN — METRONIDAZOLE 500 MG: 500 TABLET ORAL at 02:06

## 2023-06-14 RX ADMIN — MAGNESIUM SULFATE 1 G: 1 INJECTION INTRAVENOUS at 10:06

## 2023-06-14 RX ADMIN — PROPOFOL 150 MCG/KG/MIN: 10 INJECTION, EMULSION INTRAVENOUS at 01:06

## 2023-06-14 RX ADMIN — PHENYLEPHRINE HYDROCHLORIDE 100 MCG: 10 INJECTION INTRAVENOUS at 01:06

## 2023-06-14 RX ADMIN — METRONIDAZOLE 500 MG: 500 TABLET ORAL at 06:06

## 2023-06-14 RX ADMIN — PROPOFOL 20 MG: 10 INJECTION, EMULSION INTRAVENOUS at 01:06

## 2023-06-14 RX ADMIN — LIDOCAINE HYDROCHLORIDE 100 MG: 20 INJECTION, SOLUTION INTRAVENOUS at 01:06

## 2023-06-14 NOTE — PROGRESS NOTES
[facility] - Telemetry  Urology  Progress Note    Patient Name: Mateo Foreman  MRN: [id number]  Admission Date: 6/11/2023  Hospital Length of Stay: 2 days    Subjective:     Interval History: Catheter not being irrigated, yellow tape still in place on catheter, catheter draining blood tinged urine with small clot in tubing.      Objective:     Temp:  [96.3 °F (35.7 °C)-98.6 °F (37 °C)] 96.3 °F (35.7 °C)  Pulse:  [] 80  Resp:  [18-22] 22  SpO2:  [92 %-96 %] 92 %  BP: (111-135)/(55-61) 114/56       NAD  ABD S/ND/NTTP       Penis existing felipe removed.   Under sterile conditions, 20F three way Felipe catheter placed into bladder with return of clear red urine.  Balloon inflated with 20cc sterile water.  Placed to gravity drainage.    Irrigated 1 liter with no clots, to clear slight pink.  Capped third port.              Ext: LE wounds     Significant Labs:  BMP:  Recent Labs   Lab 06/12/23  0513 06/13/23  0320 06/14/23  0251   * 131* 130*   K 2.9* 4.2 4.3   CL 98 93* 95   CO2 27 27 26   BUN 15 24* 35*   CREATININE 0.7 1.5* 2.5*   CALCIUM 8.4* 8.5* 8.0*       CBC:  Recent Labs   Lab 06/12/23  0513 06/13/23  0320 06/14/23  0251   WBC 25.25* 29.69* 23.33*   HGB 9.6* 10.3* 8.4*   HCT 30.1* 31.8* 25.5*   * 892* 795*         Urology Specific Assessment:     Problem Noted   Severe Sepsis 6/12/2023   Urinary Tract Infection Associated With Indwelling Urethral Catheter 6/12/2023     Plan:   Cr up likely from catheter not draining for some time prior to admit, was 0.7 on admit but now rising due to sepsis catheter dysfunction.  Not on fluids despite NPO.  Ordered fluids, continue until [place] improves.   Continue felipe, irrigations, will start CBI if continued hematuria, d/w nurse  NPO at MN, consider CT scan tomorrow if urine not clearing to assess hydro/bladder.     Luis Alberto Gerber MD  Urology

## 2023-06-14 NOTE — PLAN OF CARE
Problem: Adult Inpatient Plan of Care  Goal: Plan of Care Review  Outcome: Ongoing, Progressing  Goal: Patient-Specific Goal (Individualized)  Outcome: Ongoing, Progressing  Goal: Optimal Comfort and Wellbeing  Outcome: Ongoing, Progressing  Goal: Readiness for Transition of Care  Outcome: Ongoing, Progressing     Problem: Skin Injury Risk Increased  Goal: Skin Health and Integrity  Outcome: Ongoing, Progressing     Problem: Diabetes Comorbidity  Goal: Blood Glucose Level Within Targeted Range  Outcome: Ongoing, Progressing     Problem: UTI (Urinary Tract Infection)  Goal: Improved Infection Symptoms  Outcome: Ongoing, Progressing     Problem: Impaired Wound Healing  Goal: Optimal Wound Healing  Outcome: Ongoing, Progressing     Problem: Hypertension Comorbidity  Goal: Blood Pressure in Desired Range  Outcome: Ongoing, Progressing     Problem: Fall Injury Risk  Goal: Absence of Fall and Fall-Related Injury  Outcome: Ongoing, Progressing     Problem: Renal Function Impairment (Acute Kidney Injury/Impairment)  Goal: Effective Renal Function  Outcome: Ongoing, Progressing

## 2023-06-14 NOTE — PLAN OF CARE
SW met with pt at bedside during rounding with MD. No dc or cm needs at this time. SW will continue to follow pt throughout his transitions of care and assist with any dc needs. Upon dc pt will return to [facility].      06/14/23 1151   Post-Acute Status   Post-Acute Authorization Other

## 2023-06-14 NOTE — PROGRESS NOTES
[place] today. Continue antibiotics while awaiting debridement and cxs. Call with questions

## 2023-06-14 NOTE — NURSING
Patient back to unit from surgery catheter manually irrigated with 100 cc patient tolerated well continuous bladder irrigation initiated to maintain pink urine, 4 bags irrigation solution bedside.

## 2023-06-14 NOTE — PROGRESS NOTES
Pharmacist Renal Dose Adjustment Note    Mateo Foreman is a 73 y.o. male being treated with the medication cefepime    Patient Data:    Vital Signs (Most Recent):  Temp: 96.9 °F (36.1 °C) (06/14/23 0748)  Pulse: 81 (06/14/23 1022)  Resp: 18 (06/14/23 1022)  BP: (!) 123/58 (06/14/23 1022)  SpO2: 95 % (06/14/23 1022) Vital Signs (72h Range):  Temp:  [96.3 °F (35.7 °C)-99.5 °F (37.5 °C)]   Pulse:  []   Resp:  [18-28]   BP: (111-188)/(55-77)   SpO2:  [91 %-99 %]      Recent Labs   Lab 06/12/23  0513 06/13/23  0320 06/14/23  0251   CREATININE 0.7 1.5* 2.5*     Serum creatinine: 2.5 mg/dL (H) 06/14/23 0251  Estimated creatinine clearance: 25 mL/min (A)    Medication:cefepime dose: 2grams frequency q12h will be changed to medication:cefepime dose:2grams frequency:q24h    Pharmacist's Name: Dallas Moya  Pharmacist's Extension: [phone]

## 2023-06-14 NOTE — OP NOTE
[facility] - Telemetry  Brief Operative Note    SUMMARY     Surgery Date: 6/14/2023     Surgeon(s) and Role:  Panel 1:     * ISSA HoustonM - Primary     * Indio Nicole MD - Resident - Assisting     * Se Silveira MD - Co-Surgeon        Pre-op Diagnosis:  Decubitus ulcer of right heel, stage 4 [L89.614]  Decubitus ulcer, heel, left, unstageable [L89.620]    Post-op Diagnosis:  Post-Op Diagnosis Codes:     * Decubitus ulcer of right heel, stage 4 [L89.614]     * Decubitus ulcer, heel, left, unstageable [L89.620]    Procedure: excision and debridement left buttock wound 7 x 8 cm    Anesthesia: Local MAC    Operative Findings: After Dr Rodney had finished his procedure , Patient was repositioned with right side down , left buttock was prepped and draped in normal sterile fashion using betadine solution. Time out was called site was confirmed , area was infiltrated using 1 % lidocaine solution.  With help of knife and [place] and Primo scissors excision and debridement of the left buttock was done excising all infected tissue , skin , subcutaneous tissue , muscle and fascia . Wound was cauterized with Bovie , wound was thoroughly irrigated with antibiotic solution , area excised was 8 x 7 cm , hemostasis was maintained wound was openly packed with iodoform packing and redressed with ABD pad and tape. Patient tolerated well and was sent to recovery room in stable condition.    Estimated Blood Loss: 30 cc    Estimated Blood Loss has been documented.         Specimens:   Specimen (24h ago, onward)       Start     Ordered    06/14/23 1506  Specimen to Pathology, Surgery Other  Once        Comments: Pre-op Diagnosis: Decubitus ulcer of right heel, stage 4 [L89.614]Decubitus ulcer, heel, left, unstageable [L89.620]Procedure(s):DEBRIDEMENT, FOOT Number of specimens: 2Name of specimens: 1. Wound site right and left heel-perm. 2.right calcaneous bone-perm.     References:    Click here for ordering Quick Tip    Question Answer Comment   Procedure Type: Other    Specimen Class: Routine/Screening    Which provider would you like to cc? JAMAL RAMOS    Release to patient Immediate        06/14/23 1508    06/14/23 1454  Specimen to Pathology, Surgery Other  Once        Comments: Pre-op Diagnosis: Decubitus ulcer of right heel, stage 4 [L89.614]Decubitus ulcer, heel, left, unstageable [L89.620]Procedure(s):DEBRIDEMENT, FOOT Number of specimens: 2Name of specimens: 1.wound site right and left heel-perm. 2.right calcaneous bone-perm.     References:    Click here for ordering Quick Tip   Question Answer Comment   Procedure Type: Other    Specimen Class: Routine/Screening    Which provider would you like to cc? JAMAL RAMOS    Release to patient Immediate        06/14/23 1456                    [id number]

## 2023-06-14 NOTE — PROGRESS NOTES
Pharmacist Renal Dose Adjustment Note    Mateo Foreman is a 73 y.o. male being treated with the medication enoxaparin    Patient Data:    Vital Signs (Most Recent):  Temp: 96.9 °F (36.1 °C) (06/14/23 0748)  Pulse: 81 (06/14/23 1022)  Resp: 18 (06/14/23 1022)  BP: (!) 123/58 (06/14/23 1022)  SpO2: 95 % (06/14/23 1022) Vital Signs (72h Range):  Temp:  [96.3 °F (35.7 °C)-99.5 °F (37.5 °C)]   Pulse:  []   Resp:  [18-28]   BP: (111-188)/(55-77)   SpO2:  [91 %-99 %]      Recent Labs   Lab 06/12/23  0513 06/13/23  0320 06/14/23  0251   CREATININE 0.7 1.5* 2.5*     Serum creatinine: 2.5 mg/dL (H) 06/14/23 0251  Estimated creatinine clearance: 25 mL/min (A)    Medication:enoxaparin dose: 40mg frequency q24h will be changed to medication:enoxaparin dose:30mg frequency:q24h    Pharmacist's Name: Dallas Moya  Pharmacist's Extension: [phone]

## 2023-06-14 NOTE — ANESTHESIA POSTPROCEDURE EVALUATION
Anesthesia Post Evaluation    Patient: Mateo Foreman    Procedure(s) Performed: Procedure(s) (LRB):  DEBRIDEMENT, FOOT (Bilateral)    Final Anesthesia Type: general      Patient location during evaluation: PACU  Patient participation: Yes- Able to Participate  Level of consciousness: awake  Post-procedure vital signs: reviewed and stable  Pain management: adequate  Airway patency: patent    PONV status at discharge: No PONV  Anesthetic complications: no      Cardiovascular status: blood pressure returned to baseline  Respiratory status: unassisted  Hydration status: euvolemic  Follow-up not needed.          Vitals Value Taken Time   /56 06/14/23 1638   Temp 36.7 °C (98 °F) 06/14/23 1638   Pulse 80 06/14/23 1638   Resp 18 06/14/23 1638   SpO2 94 % 06/14/23 1638         Event Time   Out of Recovery 06/14/2023 16:29:00         Pain/Nichole Score: Nichole Score: 10 (6/14/2023  4:15 PM)

## 2023-06-14 NOTE — PLAN OF CARE
Patients sister Norma called to check on patient. She explained that she is currently out of town and wanted to ensure that her contact information was correct in the system. Updated sister's phone number in pt's chart.

## 2023-06-14 NOTE — ANESTHESIA PREPROCEDURE EVALUATION
06/14/2023  Mateo Foreman is a 73 y.o., male.      Pre-op Assessment    I have reviewed the Patient Summary Reports.     I have reviewed the Nursing Notes.    I have reviewed the Medications.     Review of Systems  Anesthesia Hx:  Denies Family Hx of Anesthesia complications.   Denies Personal Hx of Anesthesia complications.        Patient Active Problem List   Diagnosis    Dyslipidemia associated with type 2 diabetes mellitus    Other and unspecified hyperlipidemia    Insomnia    Open angle with borderline findings, low risk    Nuclear sclerosis    Type 2 diabetes mellitus with hypoglycemia, with long-term current use of insulin    HTN, goal below 130/80    Cerebral infarction due to embolism of left middle cerebral artery    PVD (peripheral vascular disease)    COPD (chronic obstructive pulmonary disease)    Gastro-esophageal reflux disease without esophagitis    Male erectile dysfunction    Type 2 diabetes mellitus with hyperglycemia    Hemiplegia and hemiparesis following cerebral infarction affecting right dominant side    Systolic dysfunction    Lymphedema of both lower extremities    Cerebrovascular accident    Hemiplegia of dominant side as late effect of cerebrovascular disease    Peripheral vascular disorder due to diabetes mellitus    Encounter for long-term (current) use of insulin    Pain    Closed fracture of neck of right femur    Oxygen desaturation    Hypertension associated with diabetes    H/O: CVA (cerebrovascular accident)    Leukocytosis    Hypokalemia    Fall    Prolonged Q-T interval on ECG    C. difficile colitis    Colitis    Severe sepsis    Hypomagnesemia    Nursing home resident    Decubitus ulcer of heel, bilateral, unstageable    Urinary tract infection associated with indwelling urethral catheter    Acute osteomyelitis of right foot    [place]  (acute kidney injury)    Sacral decubitus ulcer       Past Medical History:   Diagnosis Date    Anemia     Cataract     Chronic cough     Diabetes mellitus type II     GERD (gastroesophageal reflux disease)     Glaucoma     Hyperlipidemia        ECHO: Results for orders placed during the hospital encounter of 09/30/20    Echo Color Flow Doppler? Yes    Interpretation Summary  · There is left ventricular concentric hypertrophy.  · The left ventricle is normal in size with low normal systolic function. The estimated ejection fraction is 50%.  · Normal left ventricular diastolic function.  · Normal right ventricular systolic function.      Body mass index is 24.55 kg/m².    Tobacco Use: Medium Risk    Smoking Tobacco Use: Former    Smokeless Tobacco Use: Never    Passive Exposure: Not on file       Social History     Substance and Sexual Activity   Drug Use No        Alcohol Use: Not on file       Review of patient's allergies indicates:  No Known Allergies      Airway:  No value filed.     Physical Exam    Airway:  Mouth Opening: Normal  TM Distance: Normal          Anesthesia Plan  Type of Anesthesia, risks & benefits discussed:    Anesthesia Type: Gen Natural Airway  Intra-op Monitoring Plan: Standard ASA Monitors  Post Op Pain Control Plan: multimodal analgesia  Induction:  IV  Informed Consent: Informed consent signed with the Patient and all parties understand the risks and agree with anesthesia plan.  All questions answered.   ASA Score: 4  Day of Surgery Review of History & Physical: H&P Update referred to the surgeon/provider.    Ready For Surgery From Anesthesia Perspective.     .

## 2023-06-14 NOTE — BRIEF OP NOTE
[facility] - Telemetry  Brief Operative Note    SUMMARY     Surgery Date: 6/14/2023     Surgeon(s) and Role:     * Gunnar Rodney DPM - Primary     * Indio Nicole MD - Resident - Assisting     * Se Silveira MD - Co-Surgeon        Pre-op Diagnosis:  Decubitus ulcer of right heel, stage 4 [L89.614]  Decubitus ulcer, heel, left, unstageable [L89.620]    Post-op Diagnosis:  Post-Op Diagnosis Codes:     * Decubitus ulcer of right heel, stage 4 [L89.614]     * Decubitus ulcer, heel, left, unstageable [L89.620]    Procedure(s) (LRB):  DEBRIDEMENT, FOOT (Bilateral)    Anesthesia: Local MAC    Operative Findings: Excisional debridement of wound plantar right heel to include bone with post debridement measurement of 7.4 x 8.5 x 2.0 cm. Left plantar heel ulcer debrided to level of bone by excising necrotic periosteum and fascia exposing the bone with post debridement measurement of 5.0 x 9.0 x 2.0 cm. Extensive liquefactive necrosis noted below the eschar of both heels. Will need NPWT postop.     Sacral wound debridement per Dr. Silveira.     Estimated Blood Loss: 100 mL    Estimated Blood Loss has been documented.         Specimens:   Specimen (24h ago, onward)      None            [id number]

## 2023-06-14 NOTE — TRANSFER OF CARE
"Anesthesia Transfer of Care Note    Patient: Mateo Foreman    Procedure(s) Performed: Procedure(s) (LRB):  DEBRIDEMENT, FOOT (Bilateral)    Patient location: PACU    Anesthesia Type: general    Transport from OR: Transported from OR on room air with adequate spontaneous ventilation    Post pain: adequate analgesia    Post assessment: no apparent anesthetic complications and tolerated procedure well    Post vital signs: stable    Level of consciousness: responds to stimulation    Nausea/Vomiting: no nausea/vomiting    Complications: none    Transfer of care protocol was followed      Last vitals:   Visit Vitals  BP (!) 123/58 (BP Location: Left arm, Patient Position: Lying)   Pulse 84   Temp 35.7 °C (96.3 °F) (Oral)   Resp 18   Ht 5' 7" (1.702 m)   Wt 71.1 kg (156 lb 12 oz)   SpO2 95%   BMI 24.55 kg/m²     "

## 2023-06-14 NOTE — CONSULTS
Nephrology Consult  H&P      Consult Requested By: So Fowler MD  Reason for Consult: [place]      SUBJECTIVE:     History of Present Illness:  Mateo Foreman is a 73 y.o.   male who  has a past medical history of Anemia, Cataract, Chronic cough, Diabetes mellitus type II, GERD (gastroesophageal reflux disease), Glaucoma, and Hyperlipidemia.. The patient presented to the [facility] on 6/11/2023 with sever Sepsis with LE edema chronic wounds, chronic felipe now [place] nephrology consulted for evaluation   ?    Review of Systems   Constitutional:  Negative for chills and fever.   HENT:  Negative for congestion and sore throat.    Eyes:  Negative for blurred vision, double vision and photophobia.   Respiratory:  Negative for cough and shortness of breath.    Cardiovascular:  Positive for leg swelling. Negative for chest pain and palpitations.   Gastrointestinal:  Negative for abdominal pain, diarrhea, nausea and vomiting.   Genitourinary:  Negative for dysuria and urgency.   Musculoskeletal:  Negative for joint pain and myalgias.   Skin:  Negative for itching and rash.   Neurological:  Positive for weakness. Negative for dizziness, sensory change and headaches.   Endo/Heme/Allergies:  Negative for polydipsia. Does not bruise/bleed easily.   Psychiatric/Behavioral:  Negative for depression.      Past Medical History:   Diagnosis Date    Anemia     Cataract     Chronic cough     Diabetes mellitus type II     GERD (gastroesophageal reflux disease)     Glaucoma     Hyperlipidemia      No past surgical history on file.  Family History   Problem Relation Age of Onset    Cancer Mother     Diabetes Father     Amblyopia Neg Hx     Blindness Neg Hx     Cataracts Neg Hx     Glaucoma Neg Hx     Hypertension Neg Hx     Macular degeneration Neg Hx     Retinal detachment Neg Hx     Strabismus Neg Hx     Stroke Neg Hx     Thyroid disease Neg Hx      Social History     Tobacco Use    Smoking status: Former     Packs/day: 0.20      Years: 45.00     Pack years: 9.00     Types: Cigarettes    Smokeless tobacco: Never   Substance Use Topics    Alcohol use: Not Currently    Drug use: No       Review of patient's allergies indicates:  No Known Allergies         OBJECTIVE:     Vital Signs (Most Recent)  Vitals:    06/14/23 0522 06/14/23 0748 06/14/23 0955 06/14/23 1022   BP: (!) 114/56 (!) 130/59  (!) 123/58   BP Location:  Left arm  Left arm   Patient Position: Lying Lying  Lying   Pulse: 80 82  81   Resp: (!) 22 18  18   Temp: 96.3 °F (35.7 °C) 96.9 °F (36.1 °C)  96.3 °F (35.7 °C)   TempSrc: Axillary Oral  Oral   SpO2: (!) 92% 96% 96% 95%   Weight: 71.1 kg (156 lb 12 oz)      Height:             Date 06/14/23 0700 - 06/15/23 0659   Shift 9234-7290 9658-4898 9192-3157 24 Hour Total   INTAKE   Shift Total(mL/kg)       OUTPUT   Urine(mL/kg/hr) 300   300   Shift Total(mL/kg) 300(4.2)   300(4.2)   Weight (kg) 71.1 71.1 71.1 71.1           Medications:   [START ON 6/15/2023] ceFEPime (MAXIPIME) IVPB  2 g Intravenous Q24H    enoxparin  30 mg Subcutaneous Daily    metroNIDAZOLE  500 mg Oral Q8H    tamsulosin  0.4 mg Oral QHS           Physical Exam  Vitals and nursing note reviewed.   Constitutional:       General: He is not in acute distress.     Appearance: He is ill-appearing. He is not diaphoretic.   HENT:      Head: Normocephalic and atraumatic.      Mouth/Throat:      Pharynx: No oropharyngeal exudate.   Eyes:      General: No scleral icterus.     Conjunctiva/sclera: Conjunctivae normal.      Pupils: Pupils are equal, round, and reactive to light.   Cardiovascular:      Rate and Rhythm: Normal rate and regular rhythm.      Heart sounds: Normal heart sounds. No murmur heard.  Pulmonary:      Effort: Pulmonary effort is normal. No respiratory distress.      Breath sounds: Normal breath sounds.   Abdominal:      General: Bowel sounds are normal. There is no distension.      Palpations: Abdomen is soft.      Tenderness: There is no abdominal tenderness.    Genitourinary:     Comments: Knight   Musculoskeletal:         General: Normal range of motion.      Cervical back: Normal range of motion and neck supple.      Right lower leg: Edema present.      Left lower leg: Edema present.   Skin:     General: Skin is warm and dry.      Findings: No erythema.   Neurological:      Mental Status: He is alert and oriented to person, place, and time.      Cranial Nerves: No cranial nerve deficit.       Laboratory:  Recent Labs   Lab 06/12/23 0513 06/13/23 0320 06/14/23 0251   WBC 25.25* 29.69* 23.33*   HGB 9.6* 10.3* 8.4*   HCT 30.1* 31.8* 25.5*   * 892* 795*   MONO 5.5  1.4* 4.2  1.3* 4.3  1.0     Recent Labs   Lab 06/12/23 0513 06/13/23 0320 06/14/23 0251   * 131* 130*   K 2.9* 4.2 4.3   CL 98 93* 95   CO2 27 27 26   BUN 15 24* 35*   CREATININE 0.7 1.5* 2.5*   CALCIUM 8.4* 8.5* 8.0*       Diagnostic Results:  X-Ray: Reviewed  US: Reviewed  Echo: Reviewed  ASSESSMENT/PLAN:     1. [place] -   at this point  multifactorial, ATN, Sepsis, on top High Vanco levels 36 with not draining Knight - obstruction US kidney   Bilateral mild hydronephrosis.  Note, there is distention of the urinary bladder despite Knight catheter, hydronephrosis is suspected to be on the basis of reflux related to urinary bladder distension.  -- Urology consulted s/p  Knight irrigation appreciate assistance     -- Making Urine supportive therapy for now - hopefully no need for RRT as his levels drops  -- Daily Renal Function Panel  -- Avoid Hypotension.  -- Renally dose all meds  -- Please avoid nephrotoxins, including NSAIDs, aminoglycosides, IV contrast (unless absolutely necessary), gadolinium, fleets and other phosphorous-based laxatives. Caution with antibiotics.    2. HTN (I10) - controlled   3. Anemia    Recent Labs   Lab 06/12/23 0513 06/13/23 0320 06/14/23  0251   HGB 9.6* 10.3* 8.4*   HCT 30.1* 31.8* 25.5*   * 892* 795*       Iron   Lab Results   Component Value Date    IRON  40 (L) 07/23/2022    TIBC 198 (L) 07/23/2022    FERRITIN 334 (H) 07/23/2022       4. 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 Nutrition/Hypoalbuminemia   Recent Labs   Lab 06/11/23  2334   ALBUMIN 2.2*     Protein supplement Nepro for now        Thank you for allowing me to participate in care of your patient  With any question please call   Thomas Villavicencio MD     [facility]  MCarin Pedro MD,   O. MD LISA Menon MD E. V. Harmon, NP  [address]   [place], [address]  [phone]  After hours answering service: [phone]

## 2023-06-14 NOTE — OP NOTE
[facility] - Telemetry  Operative Note      Date of Procedure: 6/14/2023     Procedure: Procedure(s) (LRB):  DEBRIDEMENT, FOOT (Bilateral)     Surgeon(s) and Role:     * Gunnar Rodney DPM - Primary     * Indio Nicole DPM - Resident - Assisting        Pre-Operative Diagnosis: Decubitus ulcer of right heel, stage 4 [L89.614]  Decubitus ulcer, heel, left, unstageable [L89.620]    Post-Operative Diagnosis: Post-Op Diagnosis Codes:     * Decubitus ulcer of right heel, stage 4 [L89.614]     * Decubitus ulcer, heel, left, unstageable [L89.620]    Anesthesia: Local MAC    Operative Findings (including complications, if any): Excisional debridement of wound plantar right heel to include bone with post debridement measurement of 7.4 x 8.5 x 2.0 cm. Left plantar heel ulcer debrided to level of bone by excising necrotic periosteum and fascia exposing the bone with post debridement measurement of 5.0 x 9.0 x 2.0 cm. Extensive liquefactive necrosis noted below the eschar of both heels. Will need NPWT postop.     Description of Technical Procedures: Due to the extent of nonviable tissues this is part of a staged procedure and further debridement in the OR, at bedside, or in clinic will be required during the course of management of this wound/infection.     The patient was brought to the operating room on a stretcher and was transferred to the OR table in supine position. Anesthesia was induced.  30 mL of a 1:1 mixture of 0.25% bupivacaine plain and 1% lidocaine plain was locally infiltrated. Both lower limbs were prepped and draped in a sterile manner.     Attention directed to left plantar heel wound. Using a 15 blade an incision was made along the perimeter of the wound within viable soft tissues. The skin island including the wound was undermined down to the level of periosteum and removed. Mild foul purulent drainage was noted throughout the local soft tissues. Using a ronguer all grossly infected or necrotic  subcutaneous tissue, fascia, and periosteum was excised, revealing viable underlying bone (calcaneus). Left heel wound measured 7.4 x 8.5 x 2.0 cm down to bone s/p excisional debridement.     Attention directed to right plantar heel wound. Using a 15 blade an incision was made along the perimeter of the wound within viable soft tissues. The skin island including the wound was undermined down to the level of periosteum and removed. Moderate foul purulent drainage was noted throughout the local soft tissues. Using a ronguer and 15 blade all grossly infected or necrotic subcutaneous tissue, fascia, and periosteum was excised, revealing necrotic underlying bone (calcaneus). Using an osteotome an ostectomy of the plantar calcaneus was performed, making the bone flush with the surrounding soft tissues of the wound bed and exposing viable cancellous bone. Right heel wound measured 5.0 x 9.0 x 2.0 cm down to bone s/p excisional debridement.     Swab specimens were collected from each wound and sent for cultures. The excised wounds from both heels were combined as one soft tissue specimen and sent for pathology. The bone excised from the right heel was sent for cultures and pathology.     Surgical sites irrigated with 3 L saline via pulse lavage. 1g vancomycin was distributed throughout the bases of both wounds. Dressed with xeroform, 4x4 gauze, abd pad, cast padding, ACE wrap, cast padding, ACE wrap.     Sacral wound debridement per Dr Silveira.       Estimated Blood Loss (EBL): 100 mL           Implants: * No implants in log *    Specimens:   Specimen (24h ago, onward)       Start     Ordered    06/14/23 1506  Specimen to Pathology, Surgery Other  Once        Comments: Pre-op Diagnosis: Decubitus ulcer of right heel, stage 4 [L89.614]Decubitus ulcer, heel, left, unstageable [L89.620]Procedure(s):DEBRIDEMENT, FOOT Number of specimens: 2Name of specimens: 1. Wound site right and left heel-perm. 2.right calcaneous bone-perm.      References:    Click here for ordering Quick Tip   Question Answer Comment   Procedure Type: Other    Specimen Class: Routine/Screening    Which provider would you like to cc? GUNNAR RAMOS    Release to patient Immediate        06/14/23 1508    06/14/23 1455  Specimen to Pathology, Surgery Other  Once        Comments: Pre-op Diagnosis: Decubitus ulcer of right heel, stage 4 [L89.614]Decubitus ulcer, heel, left, unstageable [L89.620]Procedure(s):DEBRIDEMENT, FOOT Number of specimens: 2Name of specimens: 1.wound site right and left heel-perm. 2.right calcaneous bone-perm.     References:    Click here for ordering Quick Tip   Question Answer Comment   Procedure Type: Other    Specimen Class: Routine/Screening    Which provider would you like to cc? GUNNAR RAMOS    Release to patient Immediate        06/14/23 6690                            Condition: Good    Disposition: PACU - hemodynamically stable.    Attestation: I was present and scrubbed for the entire procedure.    I directly supervised the above resident and was scrubbed for the entire surgical case.  Gunnar Ramos DPZACH, FACFAS

## 2023-06-15 PROBLEM — R31.0 GROSS HEMATURIA: Status: ACTIVE | Noted: 2023-06-15

## 2023-06-15 LAB
ANION GAP SERPL CALC-SCNC: 10 MMOL/L (ref 8–16)
BACTERIA BLD CULT: ABNORMAL
BASOPHILS # BLD AUTO: 0.03 K/UL (ref 0–0.2)
BASOPHILS NFR BLD: 0.1 % (ref 0–1.9)
BUN SERPL-MCNC: 43 MG/DL (ref 8–23)
CALCIUM SERPL-MCNC: 7.7 MG/DL (ref 8.7–10.5)
CHLORIDE SERPL-SCNC: 101 MMOL/L (ref 95–110)
CO2 SERPL-SCNC: 23 MMOL/L (ref 23–29)
CREAT SERPL-MCNC: 3 MG/DL (ref 0.5–1.4)
DIFFERENTIAL METHOD: ABNORMAL
EOSINOPHIL # BLD AUTO: 1.4 K/UL (ref 0–0.5)
EOSINOPHIL NFR BLD: 6.5 % (ref 0–8)
ERYTHROCYTE [DISTWIDTH] IN BLOOD BY AUTOMATED COUNT: 14.1 % (ref 11.5–14.5)
EST. GFR  (NO RACE VARIABLE): 21 ML/MIN/1.73 M^2
GLUCOSE SERPL-MCNC: 126 MG/DL (ref 70–110)
GRAM STN SPEC: NORMAL
HCT VFR BLD AUTO: 23.6 % (ref 40–54)
HGB BLD-MCNC: 7.6 G/DL (ref 14–18)
IMM GRANULOCYTES # BLD AUTO: 0.52 K/UL (ref 0–0.04)
IMM GRANULOCYTES NFR BLD AUTO: 2.4 % (ref 0–0.5)
LYMPHOCYTES # BLD AUTO: 1 K/UL (ref 1–4.8)
LYMPHOCYTES NFR BLD: 4.7 % (ref 18–48)
MCH RBC QN AUTO: 26.5 PG (ref 27–31)
MCHC RBC AUTO-ENTMCNC: 32.2 G/DL (ref 32–36)
MCV RBC AUTO: 82 FL (ref 82–98)
MONOCYTES # BLD AUTO: 1.1 K/UL (ref 0.3–1)
MONOCYTES NFR BLD: 5.1 % (ref 4–15)
NEUTROPHILS # BLD AUTO: 17.8 K/UL (ref 1.8–7.7)
NEUTROPHILS NFR BLD: 81.2 % (ref 38–73)
NRBC BLD-RTO: 0 /100 WBC
PLATELET # BLD AUTO: 769 K/UL (ref 150–450)
PMV BLD AUTO: 8.8 FL (ref 9.2–12.9)
POCT GLUCOSE: 123 MG/DL (ref 70–110)
POCT GLUCOSE: 125 MG/DL (ref 70–110)
POCT GLUCOSE: 132 MG/DL (ref 70–110)
POCT GLUCOSE: 162 MG/DL (ref 70–110)
POTASSIUM SERPL-SCNC: 4.2 MMOL/L (ref 3.5–5.1)
RBC # BLD AUTO: 2.87 M/UL (ref 4.6–6.2)
SODIUM SERPL-SCNC: 134 MMOL/L (ref 136–145)
VANCOMYCIN SERPL-MCNC: 33.4 UG/ML
WBC # BLD AUTO: 21.89 K/UL (ref 3.9–12.7)

## 2023-06-15 PROCEDURE — 94799 UNLISTED PULMONARY SVC/PX: CPT

## 2023-06-15 PROCEDURE — 80202 ASSAY OF VANCOMYCIN: CPT | Performed by: STUDENT IN AN ORGANIZED HEALTH CARE EDUCATION/TRAINING PROGRAM

## 2023-06-15 PROCEDURE — 99231 SBSQ HOSP IP/OBS SF/LOW 25: CPT | Mod: ,,, | Performed by: PODIATRIST

## 2023-06-15 PROCEDURE — 25000003 PHARM REV CODE 250: Performed by: UROLOGY

## 2023-06-15 PROCEDURE — 36415 COLL VENOUS BLD VENIPUNCTURE: CPT | Performed by: STUDENT IN AN ORGANIZED HEALTH CARE EDUCATION/TRAINING PROGRAM

## 2023-06-15 PROCEDURE — 25000003 PHARM REV CODE 250: Performed by: INTERNAL MEDICINE

## 2023-06-15 PROCEDURE — 11000001 HC ACUTE MED/SURG PRIVATE ROOM

## 2023-06-15 PROCEDURE — 99232 PR SUBSEQUENT HOSPITAL CARE,LEVL II: ICD-10-PCS | Mod: 25,,, | Performed by: UROLOGY

## 2023-06-15 PROCEDURE — 63600175 PHARM REV CODE 636 W HCPCS: Performed by: STUDENT IN AN ORGANIZED HEALTH CARE EDUCATION/TRAINING PROGRAM

## 2023-06-15 PROCEDURE — 25000003 PHARM REV CODE 250: Performed by: STUDENT IN AN ORGANIZED HEALTH CARE EDUCATION/TRAINING PROGRAM

## 2023-06-15 PROCEDURE — 85025 COMPLETE CBC W/AUTO DIFF WBC: CPT | Performed by: STUDENT IN AN ORGANIZED HEALTH CARE EDUCATION/TRAINING PROGRAM

## 2023-06-15 PROCEDURE — 99232 SBSQ HOSP IP/OBS MODERATE 35: CPT | Mod: 25,,, | Performed by: UROLOGY

## 2023-06-15 PROCEDURE — 94761 N-INVAS EAR/PLS OXIMETRY MLT: CPT

## 2023-06-15 PROCEDURE — 51700 PR IRRIGATION, BLADDER: ICD-10-PCS | Mod: ,,, | Performed by: UROLOGY

## 2023-06-15 PROCEDURE — 99231 PR SUBSEQUENT HOSPITAL CARE,LEVL I: ICD-10-PCS | Mod: ,,, | Performed by: PODIATRIST

## 2023-06-15 PROCEDURE — 51700 IRRIGATION OF BLADDER: CPT | Mod: ,,, | Performed by: UROLOGY

## 2023-06-15 PROCEDURE — 80048 BASIC METABOLIC PNL TOTAL CA: CPT | Performed by: STUDENT IN AN ORGANIZED HEALTH CARE EDUCATION/TRAINING PROGRAM

## 2023-06-15 RX ADMIN — ENOXAPARIN SODIUM 30 MG: 30 INJECTION SUBCUTANEOUS at 05:06

## 2023-06-15 RX ADMIN — METRONIDAZOLE 500 MG: 500 TABLET ORAL at 05:06

## 2023-06-15 RX ADMIN — TAMSULOSIN HYDROCHLORIDE 0.4 MG: 0.4 CAPSULE ORAL at 08:06

## 2023-06-15 RX ADMIN — METRONIDAZOLE 500 MG: 500 TABLET ORAL at 10:06

## 2023-06-15 RX ADMIN — HYDROCODONE BITARTRATE AND ACETAMINOPHEN 1 TABLET: 5; 325 TABLET ORAL at 01:06

## 2023-06-15 RX ADMIN — HYDROCODONE BITARTRATE AND ACETAMINOPHEN 1 TABLET: 5; 325 TABLET ORAL at 08:06

## 2023-06-15 RX ADMIN — SODIUM CHLORIDE: 9 INJECTION, SOLUTION INTRAVENOUS at 01:06

## 2023-06-15 RX ADMIN — METRONIDAZOLE 500 MG: 500 TABLET ORAL at 01:06

## 2023-06-15 RX ADMIN — HYDROCODONE BITARTRATE AND ACETAMINOPHEN 1 TABLET: 5; 325 TABLET ORAL at 07:06

## 2023-06-15 RX ADMIN — CEFEPIME 2 G: 2 INJECTION, POWDER, FOR SOLUTION INTRAVENOUS at 09:06

## 2023-06-15 NOTE — ASSESSMENT & PLAN NOTE
Patient with acute kidney injury likely due to pre-renal azotemia, acute tubular necrosis and post-obstructive d/t clotted felipe catheter [place] is currently worsening. Labs reviewed- Renal function/electrolytes with Estimated Creatinine Clearance: 20.8 mL/min (A) (based on SCr of 3 mg/dL (H)). according to latest data. Monitor urine output and serial BMP and adjust therapy as needed. Avoid nephrotoxins and renally dose meds for GFR listed above.     Renal US showed mild bilateral hydronephrosis on admission  ATN- multifactorial 2/2 ?Vancomycin level supratherapeutic (Received vanc/ zosyn) +/- sepsis +/- IV contrast +/- obstructive     Felipe catheter replaced 6/13.  On continuous bladder irrigation.  CT abdomen pelvis without contrast showed no hydronephrosis or renal masses.  Given mild-to-moderate pleural effusions with bibasilar atelectasis, will hold IV fluids at this time.  Incentive spirometer.    Nephrology, urology consult

## 2023-06-15 NOTE — PLAN OF CARE
ERIN met during rounding with MD.  ERIN made admission staff aware at [facility] of no dc today. SW sent updated clinicals via careport. SW will continue to follow pt throughout his transitions of care and assist with any dc needs.     CarePort Alert: YES response from [facility] [employer] re: Referral [id number] for patient in [facility] [id number] A: Yes, willing to accept patient Thanks for the update       06/15/23 0926   Post-Acute Status   Post-Acute Authorization Other

## 2023-06-15 NOTE — SUBJECTIVE & OBJECTIVE
Subjective:     Interval History: Hypotensive. Ongoing pain of both heels. Dressings clean, dry, intact.     Follow-up For: Procedure(s) (LRB):  DEBRIDEMENT, FOOT (Bilateral)    Post-Operative Day: 1 Day Post-Op    Scheduled Meds:   ceFEPime (MAXIPIME) IVPB  2 g Intravenous Q24H    enoxparin  30 mg Subcutaneous Daily    metroNIDAZOLE  500 mg Oral Q8H    tamsulosin  0.4 mg Oral QHS     Continuous Infusions:   sodium chloride 0.9% 75 mL/hr at 06/14/23 2214     PRN Meds:acetaminophen, dextrose 10%, dextrose 10%, dextrose, dextrose, dextrose, dextrose, fentaNYL, glucagon (human recombinant), HYDROcodone-acetaminophen, hydrOXYzine HCL, insulin aspart U-100, melatonin, meperidine, naloxone, ondansetron, ondansetron, oxybutynin, potassium bicarbonate, simethicone, sodium chloride 0.9%, sodium chloride 0.9%, Pharmacy to dose Vancomycin consult **AND** vancomycin - pharmacy to dose    Review of Systems   Constitutional:  Negative for chills and fever.   HENT:  Negative for hearing loss.    Respiratory:  Negative for cough and shortness of breath.    Cardiovascular:  Negative for chest pain.   Gastrointestinal:  Negative for nausea and vomiting.   Genitourinary:  Positive for hematuria.   Skin:  Positive for color change and wound.   Neurological:  Negative for numbness.   Objective:     Vital Signs (Most Recent):  Temp: 96.4 °F (35.8 °C) (06/15/23 0733)  Pulse: 88 (06/15/23 0733)  Resp: 18 (06/15/23 0733)  BP: (!) 121/58 (06/15/23 0733)  SpO2: (!) 93 % (06/15/23 0733) Vital Signs (24h Range):  Temp:  [96.3 °F (35.7 °C)-98 °F (36.7 °C)] 96.4 °F (35.8 °C)  Pulse:  [] 88  Resp:  [12-22] 18  SpO2:  [90 %-100 %] 93 %  BP: ()/(50-63) 121/58     Weight: 71.1 kg (156 lb 12 oz)  Body mass index is 24.55 kg/m².    Foot Exam    General  Orientation: alert and oriented to person, place, and time   Affect: appropriate       Right Foot/Ankle     Inspection and Palpation  Skin Exam: ulcer;     Neurovascular  Dorsalis pedis:  absent  Posterior tibial: absent    Comments  -Post-debridement ulcer of rightheel down to bone, wound bed viable, no active bleeding. Severely tender. Signs of infection significantly improved.     Left Foot/Ankle      Inspection and Palpation  Skin Exam: ulcer;     Neurovascular  Dorsalis pedis: absent  Posterior tibial: absent    Comments  -Post-debridement ulcer of left heel down to bone, wound bed viable, mild active bleeding. Severely tender. Signs of infection significantly improved.               Laboratory:  Blood Cultures: No results for input(s): LABBLOO in the last 48 hours.  CBC:   Recent Labs   Lab 06/14/23  0251   WBC 23.33*   RBC 3.17*   HGB 8.4*   HCT 25.5*   *   MCV 80*   MCH 26.5*   MCHC 32.9     CMP:   Recent Labs   Lab 06/11/23  2334 06/12/23  0513 06/14/23  0251   GLU 54*   < > 133*   CALCIUM 8.8   < > 8.0*   ALBUMIN 2.2*  --   --    PROT 7.3  --   --       < > 130*   K 3.3*   < > 4.3   CO2 27   < > 26   CL 99   < > 95   BUN 20   < > 35*   CREATININE 0.8   < > 2.5*   ALKPHOS 159*  --   --    ALT 20  --   --    AST 25  --   --    BILITOT 0.3  --   --     < > = values in this interval not displayed.     CRP: No results for input(s): CRP in the last 168 hours.  ESR: No results for input(s): SEDRATE in the last 168 hours.  Microbiology Results (last 7 days)       Procedure Component Value Units Date/Time    Blood culture x two cultures. Draw prior to antibiotics. [[id number]] Collected: 06/11/23 5068    Order Status: Completed Specimen: Blood from Peripheral, Antecubital, Left Updated: 06/15/23 0612     Blood Culture, Routine No Growth to date      No Growth to date      No Growth to date      No Growth to date    Narrative:      Aerobic and anaerobic    Gram stain [[id number]] Collected: 06/14/23 1400    Order Status: Completed Specimen: Wound from Foot, Right Updated: 06/15/23 0155     Gram Stain Result Rare WBC's      No organisms seen    Narrative:      Right heel    Gram stain  [[id number]] Collected: 06/14/23 1400    Order Status: Completed Specimen: Wound from Foot, Left Updated: 06/15/23 0152     Gram Stain Result Rare WBC's      No organisms seen    Narrative:      Left heel    Gram stain [[id number]] Collected: 06/14/23 1500    Order Status: Completed Specimen: Wound from Foot, Right Updated: 06/14/23 2338     Gram Stain Result Rare WBC's      No organisms seen    Narrative:      Right calcaneous bone    AFB Culture & Smear [[id number]] Collected: 06/14/23 1400    Order Status: Sent Specimen: Wound from Foot, Right Updated: 06/14/23 2119    Culture, Anaerobe [[id number]] Collected: 06/14/23 1400    Order Status: Sent Specimen: Wound from Foot, Left Updated: 06/14/23 2119    Aerobic culture [[id number]] Collected: 06/14/23 1400    Order Status: Sent Specimen: Wound from Foot, Left Updated: 06/14/23 2119    Culture, Anaerobe [[id number]] Collected: 06/14/23 1400    Order Status: Sent Specimen: Wound from Foot, Right Updated: 06/14/23 2119    Culture, Anaerobe [[id number]] Collected: 06/14/23 1515    Order Status: Sent Specimen: Decubitus from Buttocks, Left Updated: 06/14/23 2119    Aerobic culture [[id number]] Collected: 06/14/23 1515    Order Status: Sent Specimen: Decubitus from Buttocks, Left Updated: 06/14/23 2119    Aerobic culture [[id number]] Collected: 06/14/23 1500    Order Status: Sent Specimen: Wound from Foot, Right Updated: 06/14/23 2119    Fungus culture [[id number]] Collected: 06/14/23 1500    Order Status: Sent Specimen: Wound from Foot, Right Updated: 06/14/23 2119    AFB Culture & Smear [[id number]] Collected: 06/14/23 1500    Order Status: Sent Specimen: Wound from Foot, Right Updated: 06/14/23 2119    Fungus culture [[id number]] Collected: 06/14/23 1400    Order Status: Sent Specimen: Wound from Foot, Left Updated: 06/14/23 2119    AFB Culture & Smear [[id number]] Collected: 06/14/23 1400    Order Status: Sent Specimen: Wound from Foot, Left Updated: 06/14/23 2111    Aerobic  culture [[id number]] Collected: 06/14/23 1400    Order Status: Sent Specimen: Wound from Foot, Right Updated: 06/14/23 2119    Fungus culture [[id number]] Collected: 06/14/23 1400    Order Status: Sent Specimen: Wound from Foot, Right Updated: 06/14/23 2119    Culture, Anaerobe [[id number]] Collected: 06/14/23 1500    Order Status: Sent Specimen: Wound from Foot, Right Updated: 06/14/23 2119    AFB Culture & Smear [[id number]] Collected: 06/14/23 1400    Order Status: Sent Specimen: Wound from Foot, Right Updated: 06/14/23 1505    Aerobic culture [[id number]] Collected: 06/14/23 1400    Order Status: Sent Specimen: Wound from Foot, Right Updated: 06/14/23 1505    AFB Culture & Smear [[id number]] Collected: 06/14/23 1400    Order Status: Sent Specimen: Wound from Foot, Right Updated: 06/14/23 1505    Gram stain [[id number]] Collected: 06/14/23 1400    Order Status: Sent Specimen: Wound from Foot, Right Updated: 06/14/23 1505    Aerobic culture [[id number]] Collected: 06/14/23 1400    Order Status: Sent Specimen: Wound from Foot, Right Updated: 06/14/23 1505    Fungus culture [[id number]] Collected: 06/14/23 1400    Order Status: Sent Specimen: Wound from Foot, Right Updated: 06/14/23 1505    Gram stain [[id number]] Collected: 06/14/23 1400    Order Status: Sent Specimen: Wound from Foot, Right Updated: 06/14/23 1505    Culture, Anaerobe [[id number]] Collected: 06/14/23 1400    Order Status: Sent Specimen: Wound from Foot, Right Updated: 06/14/23 1505    Fungus culture [[id number]] Collected: 06/14/23 1400    Order Status: Sent Specimen: Wound from Foot, Right Updated: 06/14/23 1505    Culture, Anaerobe [[id number]] Collected: 06/14/23 1400    Order Status: Sent Specimen: Wound from Foot, Right Updated: 06/14/23 1505    Blood culture [[id number]] Collected: 06/13/23 0346    Order Status: Completed Specimen: Blood Updated: 06/14/23 1412     Blood Culture, Routine No Growth to date      No Growth to date    Blood culture [[id number]]  Collected: 06/13/23 0347    Order Status: Completed Specimen: Blood Updated: 06/14/23 1412     Blood Culture, Routine No Growth to date      No Growth to date    Blood culture x two cultures. Draw prior to antibiotics. [[id number]]  (Abnormal) Collected: 06/11/23 2355    Order Status: Completed Specimen: Blood from Peripheral, Antecubital, Left Updated: 06/14/23 0935     Blood Culture, Routine Gram stain aer bottle: Gram positive cocci in clusters resembling Staph      Results called to and read back by:Azeb Mccray LPN      06/13/2023 02:23      COAGULASE-NEGATIVE STAPHYLOCOCCUS SPECIES  Organism is a probable contaminant      Narrative:      Aerobic and anaerobic    Urine culture [[id number]] Collected: 06/11/23 2353    Order Status: Completed Specimen: Urine Updated: 06/13/23 1025     Urine Culture, Routine Multiple organisms isolated. None in predominance.  Repeat if      clinically necessary.    Narrative:      Specimen Source->Urine    MRSA/SA Rapid ID by PCR from Blood culture [[id number]] Collected: 06/11/23 2355    Order Status: Completed Updated: 06/13/23 0344     Staph aureus ID by PCR Negative     Methicillin Resistant ID by PCR Negative    Narrative:      Aerobic and anaerobic          Specimen (24h ago, onward)       Start     Ordered    06/14/23 1506  Specimen to Pathology, Surgery Other  Once        Comments: Pre-op Diagnosis: Decubitus ulcer of right heel, stage 4 [L89.614]Decubitus ulcer, heel, left, unstageable [L89.620]Procedure(s):DEBRIDEMENT, FOOT Number of specimens: 2Name of specimens: 1. Wound site right and left heel-perm. 2.right calcaneous bone-perm.     References:    Click here for ordering Quick Tip   Question Answer Comment   Procedure Type: Other    Specimen Class: Routine/Screening    Which provider would you like to cc? JAMAL RAMOS    Release to patient Immediate        06/14/23 1508    06/14/23 1455  Specimen to Pathology, Surgery Other  Once        Comments: Pre-op  Diagnosis: Decubitus ulcer of right heel, stage 4 [L89.614]Decubitus ulcer, heel, left, unstageable [L89.620]Procedure(s):DEBRIDEMENT, FOOT Number of specimens: 2Name of specimens: 1.wound site right and left heel-perm. 2.right calcaneous bone-perm.     References:    Click here for ordering Quick Tip   Question Answer Comment   Procedure Type: Other    Specimen Class: Routine/Screening    Which provider would you like to cc? JAMAL RAMOS    Release to patient Immediate        06/14/23 0910

## 2023-06-15 NOTE — PROGRESS NOTES
[facility] - Telemetry  Urology  Progress Note    Patient Name: Mateo Foreman  MRN: [id number]  Admission Date: 6/11/2023  Hospital Length of Stay: 3 days      Subjective:     Interval History: DWAYNE, urine clear light pink on CBI.  Cr rising.      Objective:     Temp:  [96.4 °F (35.8 °C)-98 °F (36.7 °C)] 98 °F (36.7 °C)  Pulse:  [] 75  Resp:  [12-22] 18  SpO2:  [90 %-100 %] 95 %  BP: ()/(50-63) 129/59       NAD  RRR  CTAB  ABD S/ND/NTTP       Penis erosion with urethra to right infapubic area       Felipe clear slight pink, irrigated with return of small clot, CBI restarted       Ext: wounds    Significant Labs:  BMP:  Recent Labs   Lab 06/13/23  0320 06/14/23  0251 06/15/23  0816   * 130* 134*   K 4.2 4.3 4.2   CL 93* 95 101   CO2 27 26 23   BUN 24* 35* 43*   CREATININE 1.5* 2.5* 3.0*   CALCIUM 8.5* 8.0* 7.7*       CBC:  Recent Labs   Lab 06/13/23  0320 06/14/23  0251 06/15/23  0816   WBC 29.69* 23.33* 21.89*   HGB 10.3* 8.4* 7.6*   HCT 31.8* 25.5* 23.6*   * 795* 769*         Urology Specific Assessment:     Problem Noted   Severe Sepsis 6/12/2023   Urinary Tract Infection Associated With Indwelling Urethral Catheter 6/12/2023   Gross Hematuria 6/15/2023     Plan:   Recommend hold lovenox given renal function and hematuria  Ok for diet, urine very clear slight pink on low CBI  NPO at MN  CT A/P wo contrast assess hydro, bladder for clot  Call on call urologist Dr. Nash with questions, he will follow up with patient in AM    12:54 PM  CT no hydronephrosis, MRD/retained contrast. Bladder decompressed around felipe.  Continue CBI, no operative intervention at current.  NPO at MN in case hematuria worsens.    Luis Alberto Gerber MD  Urology

## 2023-06-15 NOTE — PROGRESS NOTES
[place] - Telemetry  Podiatry  Progress Note    Patient Name: Mateo Foreman  MRN: [id number]  Admission Date: 6/11/2023  Hospital Length of Stay: 3 days  Attending Physician: So Fowler MD  Primary Care Provider: Tremaine Finch MD     Subjective:     Interval History: Hypotensive. Ongoing pain of both heels. Dressings clean, dry, intact.     Follow-up For: Procedure(s) (LRB):  DEBRIDEMENT, FOOT (Bilateral)    Post-Operative Day: 1 Day Post-Op    Scheduled Meds:   ceFEPime (MAXIPIME) IVPB  2 g Intravenous Q24H    enoxparin  30 mg Subcutaneous Daily    metroNIDAZOLE  500 mg Oral Q8H    tamsulosin  0.4 mg Oral QHS     Continuous Infusions:   sodium chloride 0.9% 75 mL/hr at 06/14/23 2214     PRN Meds:acetaminophen, dextrose 10%, dextrose 10%, dextrose, dextrose, dextrose, dextrose, fentaNYL, glucagon (human recombinant), HYDROcodone-acetaminophen, hydrOXYzine HCL, insulin aspart U-100, melatonin, meperidine, naloxone, ondansetron, ondansetron, oxybutynin, potassium bicarbonate, simethicone, sodium chloride 0.9%, sodium chloride 0.9%, Pharmacy to dose Vancomycin consult **AND** vancomycin - pharmacy to dose    Review of Systems   Constitutional:  Negative for chills and fever.   HENT:  Negative for hearing loss.    Respiratory:  Negative for cough and shortness of breath.    Cardiovascular:  Negative for chest pain.   Gastrointestinal:  Negative for nausea and vomiting.   Genitourinary:  Positive for hematuria.   Skin:  Positive for color change and wound.   Neurological:  Negative for numbness.   Objective:     Vital Signs (Most Recent):  Temp: 96.4 °F (35.8 °C) (06/15/23 0733)  Pulse: 88 (06/15/23 0733)  Resp: 18 (06/15/23 0733)  BP: (!) 121/58 (06/15/23 0733)  SpO2: (!) 93 % (06/15/23 0733) Vital Signs (24h Range):  Temp:  [96.3 °F (35.7 °C)-98 °F (36.7 °C)] 96.4 °F (35.8 °C)  Pulse:  [] 88  Resp:  [12-22] 18  SpO2:  [90 %-100 %] 93 %  BP: ()/(50-63) 121/58     Weight: 71.1 kg (156 lb 12 oz)  Body  mass index is 24.55 kg/m².    Foot Exam    General  Orientation: alert and oriented to person, place, and time   Affect: appropriate       Right Foot/Ankle     Inspection and Palpation  Skin Exam: ulcer;     Neurovascular  Dorsalis pedis: absent  Posterior tibial: absent    Comments  -Post-debridement ulcer of rightheel down to bone, wound bed viable, no active bleeding. Severely tender. Signs of infection significantly improved.     Left Foot/Ankle      Inspection and Palpation  Skin Exam: ulcer;     Neurovascular  Dorsalis pedis: absent  Posterior tibial: absent    Comments  -Post-debridement ulcer of left heel down to bone, wound bed viable, mild active bleeding. Severely tender. Signs of infection significantly improved.               Laboratory:  Blood Cultures: No results for input(s): LABBLOO in the last 48 hours.  CBC:   Recent Labs   Lab 06/14/23  0251   WBC 23.33*   RBC 3.17*   HGB 8.4*   HCT 25.5*   *   MCV 80*   MCH 26.5*   MCHC 32.9     CMP:   Recent Labs   Lab 06/11/23  2334 06/12/23  0513 06/14/23  0251   GLU 54*   < > 133*   CALCIUM 8.8   < > 8.0*   ALBUMIN 2.2*  --   --    PROT 7.3  --   --       < > 130*   K 3.3*   < > 4.3   CO2 27   < > 26   CL 99   < > 95   BUN 20   < > 35*   CREATININE 0.8   < > 2.5*   ALKPHOS 159*  --   --    ALT 20  --   --    AST 25  --   --    BILITOT 0.3  --   --     < > = values in this interval not displayed.     CRP: No results for input(s): CRP in the last 168 hours.  ESR: No results for input(s): SEDRATE in the last 168 hours.  Microbiology Results (last 7 days)       Procedure Component Value Units Date/Time    Blood culture x two cultures. Draw prior to antibiotics. [[id number]] Collected: 06/11/23 2719    Order Status: Completed Specimen: Blood from Peripheral, Antecubital, Left Updated: 06/15/23 0612     Blood Culture, Routine No Growth to date      No Growth to date      No Growth to date      No Growth to date    Narrative:      Aerobic and anaerobic     Gram stain [[id number]] Collected: 06/14/23 1400    Order Status: Completed Specimen: Wound from Foot, Right Updated: 06/15/23 0155     Gram Stain Result Rare WBC's      No organisms seen    Narrative:      Right heel    Gram stain [[id number]] Collected: 06/14/23 1400    Order Status: Completed Specimen: Wound from Foot, Left Updated: 06/15/23 0152     Gram Stain Result Rare WBC's      No organisms seen    Narrative:      Left heel    Gram stain [[id number]] Collected: 06/14/23 1500    Order Status: Completed Specimen: Wound from Foot, Right Updated: 06/14/23 2338     Gram Stain Result Rare WBC's      No organisms seen    Narrative:      Right calcaneous bone    AFB Culture & Smear [[id number]] Collected: 06/14/23 1400    Order Status: Sent Specimen: Wound from Foot, Right Updated: 06/14/23 2119    Culture, Anaerobe [[id number]] Collected: 06/14/23 1400    Order Status: Sent Specimen: Wound from Foot, Left Updated: 06/14/23 2119    Aerobic culture [[id number]] Collected: 06/14/23 1400    Order Status: Sent Specimen: Wound from Foot, Left Updated: 06/14/23 2119    Culture, Anaerobe [[id number]] Collected: 06/14/23 1400    Order Status: Sent Specimen: Wound from Foot, Right Updated: 06/14/23 2119    Culture, Anaerobe [[id number]] Collected: 06/14/23 1515    Order Status: Sent Specimen: Decubitus from Buttocks, Left Updated: 06/14/23 2119    Aerobic culture [[id number]] Collected: 06/14/23 1515    Order Status: Sent Specimen: Decubitus from Buttocks, Left Updated: 06/14/23 2119    Aerobic culture [[id number]] Collected: 06/14/23 1500    Order Status: Sent Specimen: Wound from Foot, Right Updated: 06/14/23 2119    Fungus culture [[id number]] Collected: 06/14/23 1500    Order Status: Sent Specimen: Wound from Foot, Right Updated: 06/14/23 2119    AFB Culture & Smear [[id number]] Collected: 06/14/23 1500    Order Status: Sent Specimen: Wound from Foot, Right Updated: 06/14/23 2119    Fungus culture [[id number]] Collected: 06/14/23  1400    Order Status: Sent Specimen: Wound from Foot, Left Updated: 06/14/23 2119    AFB Culture & Smear [[id number]] Collected: 06/14/23 1400    Order Status: Sent Specimen: Wound from Foot, Left Updated: 06/14/23 2119    Aerobic culture [[id number]] Collected: 06/14/23 1400    Order Status: Sent Specimen: Wound from Foot, Right Updated: 06/14/23 2119    Fungus culture [[id number]] Collected: 06/14/23 1400    Order Status: Sent Specimen: Wound from Foot, Right Updated: 06/14/23 2119    Culture, Anaerobe [[id number]] Collected: 06/14/23 1500    Order Status: Sent Specimen: Wound from Foot, Right Updated: 06/14/23 2119    AFB Culture & Smear [[id number]] Collected: 06/14/23 1400    Order Status: Sent Specimen: Wound from Foot, Right Updated: 06/14/23 1505    Aerobic culture [[id number]] Collected: 06/14/23 1400    Order Status: Sent Specimen: Wound from Foot, Right Updated: 06/14/23 1505    AFB Culture & Smear [[id number]] Collected: 06/14/23 1400    Order Status: Sent Specimen: Wound from Foot, Right Updated: 06/14/23 1505    Gram stain [[id number]] Collected: 06/14/23 1400    Order Status: Sent Specimen: Wound from Foot, Right Updated: 06/14/23 1505    Aerobic culture [[id number]] Collected: 06/14/23 1400    Order Status: Sent Specimen: Wound from Foot, Right Updated: 06/14/23 1505    Fungus culture [[id number]] Collected: 06/14/23 1400    Order Status: Sent Specimen: Wound from Foot, Right Updated: 06/14/23 1505    Gram stain [[id number]] Collected: 06/14/23 1400    Order Status: Sent Specimen: Wound from Foot, Right Updated: 06/14/23 1505    Culture, Anaerobe [[id number]] Collected: 06/14/23 1400    Order Status: Sent Specimen: Wound from Foot, Right Updated: 06/14/23 1505    Fungus culture [[id number]] Collected: 06/14/23 1400    Order Status: Sent Specimen: Wound from Foot, Right Updated: 06/14/23 1505    Culture, Anaerobe [[id number]] Collected: 06/14/23 1400    Order Status: Sent Specimen: Wound from Foot, Right Updated:  06/14/23 1505    Blood culture [[id number]] Collected: 06/13/23 0346    Order Status: Completed Specimen: Blood Updated: 06/14/23 1412     Blood Culture, Routine No Growth to date      No Growth to date    Blood culture [[id number]] Collected: 06/13/23 0347    Order Status: Completed Specimen: Blood Updated: 06/14/23 1412     Blood Culture, Routine No Growth to date      No Growth to date    Blood culture x two cultures. Draw prior to antibiotics. [[id number]]  (Abnormal) Collected: 06/11/23 2355    Order Status: Completed Specimen: Blood from Peripheral, Antecubital, Left Updated: 06/14/23 0935     Blood Culture, Routine Gram stain aer bottle: Gram positive cocci in clusters resembling Staph      Results called to and read back by:Azeb Mccray LPN      06/13/2023 02:23      COAGULASE-NEGATIVE STAPHYLOCOCCUS SPECIES  Organism is a probable contaminant      Narrative:      Aerobic and anaerobic    Urine culture [[id number]] Collected: 06/11/23 2353    Order Status: Completed Specimen: Urine Updated: 06/13/23 1025     Urine Culture, Routine Multiple organisms isolated. None in predominance.  Repeat if      clinically necessary.    Narrative:      Specimen Source->Urine    MRSA/SA Rapid ID by PCR from Blood culture [[id number]] Collected: 06/11/23 2355    Order Status: Completed Updated: 06/13/23 0344     Staph aureus ID by PCR Negative     Methicillin Resistant ID by PCR Negative    Narrative:      Aerobic and anaerobic          Specimen (24h ago, onward)       Start     Ordered    06/14/23 1506  Specimen to Pathology, Surgery Other  Once        Comments: Pre-op Diagnosis: Decubitus ulcer of right heel, stage 4 [L89.614]Decubitus ulcer, heel, left, unstageable [L89.620]Procedure(s):DEBRIDEMENT, FOOT Number of specimens: 2Name of specimens: 1. Wound site right and left heel-perm. 2.right calcaneous bone-perm.     References:    Click here for ordering Quick Tip   Question Answer Comment   Procedure Type: Other     Specimen Class: Routine/Screening    Which provider would you like to cc? JAMAL RAMOS    Release to patient Immediate        06/14/23 1508    06/14/23 1456  Specimen to Pathology, Surgery Other  Once        Comments: Pre-op Diagnosis: Decubitus ulcer of right heel, stage 4 [L89.614]Decubitus ulcer, heel, left, unstageable [L89.620]Procedure(s):DEBRIDEMENT, FOOT Number of specimens: 2Name of specimens: 1.wound site right and left heel-perm. 2.right calcaneous bone-perm.     References:    Click here for ordering Quick Tip   Question Answer Comment   Procedure Type: Other    Specimen Class: Routine/Screening    Which provider would you like to cc? JAMAL RAMOS    Release to patient Immediate        06/14/23 6470                      Assessment/Plan:     Renal/  Urinary tract infection associated with indwelling urethral catheter  Managed per Urology. Most likely source of sepsis.    Endocrine  Peripheral vascular disorder due to diabetes mellitus  Managed per hospital Medicine. No significant stenosis per arterial ultrasound bilateral lower extremity.    Orthopedic  Decubitus ulcer of heel, bilateral, unstageable  Now s/p bilateral heel debridements down to bone. OR soft tissue and bone cultures pending. No further surgical intervention planned at this time but will need serial bedside/clinic debridements going forward. Will apply wound VAC to bilateral heels by the end of this week. BLE elevate at all times. Antibiotic plan per ID. Appreciate general surgery recs.     Other  Lymphedema of both lower extremities  Managed with elevation at rest.        [place] Nicole DPZACH PGY-3  Podiatric Medicine & Surgery  [facility]  Secure Chat Preferred  Mobile: [phone]  Pager: [phone]

## 2023-06-15 NOTE — PLAN OF CARE
Care plan reviewed with patient, Lewis, on room air. NSR per cardiac monitor, no red alarm noted. Denies any pain of discomfort, education provided on medication effect and side effect, voices understanding. Continues with bladder irrigation as ordered, light pink urine noted. Call light within his reach, no apparent distress noted, bed in low position, bed alarm on, educated on the importance of calling as needed, voices understanding, stable at this time.     Problem: Adult Inpatient Plan of Care  Goal: Plan of Care Review  Outcome: Ongoing, Progressing  Goal: Patient-Specific Goal (Individualized)  Outcome: Ongoing, Progressing  Goal: Absence of Hospital-Acquired Illness or Injury  Outcome: Ongoing, Progressing  Goal: Optimal Comfort and Wellbeing  Outcome: Ongoing, Progressing  Goal: Readiness for Transition of Care  Outcome: Ongoing, Progressing     Problem: Skin Injury Risk Increased  Goal: Skin Health and Integrity  Outcome: Ongoing, Progressing     Problem: Nutrition Impaired (Sepsis/Septic Shock)  Goal: Optimal Nutrition Intake  Outcome: Ongoing, Progressing

## 2023-06-15 NOTE — NURSING
RAPID RESPONSE NURSE PROACTIVE ROUNDING NOTE       Left AC 20g PIV placed    FOLLOW UP    Call back the Rapid Response NurseRocio at [phone] for additional questions or concerns.

## 2023-06-15 NOTE — SUBJECTIVE & OBJECTIVE
Interval History:  NPO for debridement by Podiatry and General surgery.  Denies any new complaints.  Feeling tired.  Dark urine, irrigation per urology.     Review of Systems  Objective:     Vital Signs (Most Recent):  Temp: 98 °F (36.7 °C) (06/14/23 1638)  Pulse: 80 (06/14/23 1638)  Resp: 18 (06/14/23 1638)  BP: (!) 110/56 (06/14/23 1638)  SpO2: (!) 94 % (06/14/23 1638) Vital Signs (24h Range):  Temp:  [96.3 °F (35.7 °C)-98 °F (36.7 °C)] 98 °F (36.7 °C)  Pulse:  [68-91] 80  Resp:  [12-22] 18  SpO2:  [90 %-100 %] 94 %  BP: ()/(50-59) 110/56     Weight: 71.1 kg (156 lb 12 oz)  Body mass index is 24.55 kg/m².    Intake/Output Summary (Last 24 hours) at 6/14/2023 1904  Last data filed at 6/14/2023 1834  Gross per 24 hour   Intake 93.62 ml   Output 3725 ml   Net -3631.38 ml           Physical Exam  Vitals and nursing note reviewed.   Constitutional:       Appearance: He is well-developed.   Neck:      Trachea: No tracheal deviation.   Cardiovascular:      Rate and Rhythm: Normal rate and regular rhythm.   Pulmonary:      Effort: Pulmonary effort is normal. No respiratory distress.      Breath sounds: Normal breath sounds.   Abdominal:      Palpations: Abdomen is soft.      Tenderness: There is no abdominal tenderness.      Comments: Knight catheter in place with dark red urine draining    Musculoskeletal:      Right lower leg: Edema present.      Left lower leg: Edema present.      Comments: Old & new scattered maculopapular scabs b/l UE, chest & abdomen  Chronic LE edema   Skin:     General: Skin is warm and dry.      Findings: Rash present.      Comments: Sacral wounds present on admission   Neurological:      Mental Status: He is alert.           Significant Labs: All pertinent labs within the past 24 hours have been reviewed.    Significant Imaging: I have reviewed all pertinent imaging results/findings within the past 24 hours.

## 2023-06-15 NOTE — ASSESSMENT & PLAN NOTE
This patient does have evidence of infective focus  My overall impression is sepsis.  Source: Urinary Tract and Skin and Soft Tissue (location Bilateral feet open diabetic wounds), sacral decubitus wounds  Antibiotics given-   Antibiotics (72h ago, onward)    Start     Stop Route Frequency Ordered    06/15/23 0849  ceFEPIme (MAXIPIME) 2 g in dextrose 5 % in water (D5W) 5 % 100 mL IVPB (MB+)         -- IV Every 24 hours (non-standard times) 06/14/23 1045    06/14/23 1400  metroNIDAZOLE tablet 500 mg         -- Oral Every 8 hours 06/14/23 1050    06/12/23 0802  vancomycin - pharmacy to dose  (vancomycin IVPB)        See Hyperspace for full Linked Orders Report.    -- IV pharmacy to manage frequency 06/12/23 0702        Latest lactate reviewed-  No results for input(s): LACTATE in the last 72 hours.  Organ dysfunction indicated by Acute kidney injury    Fluid challenge Not needed - patient is not hypotensive      Post- resuscitation assessment Yes Perfusion exam was performed within 6 hours of septic shock presentation after bolus shows Adequate tissue perfusion assessed by non-invasive monitoring       Will Not start Pressors- Levophed for MAP of 65  Source control achieved by:  S/p vanc/Zosyn in the ER.  Continue vancomycin, cefepime, Flagyl     CT lower extremity with contrast showed significant bilateral edema in distal for legs/feet, R>L.  Scattered foci of subcutaneous emphysema within plantar aspect of right hindfoot.  MRI confirmed early changes of R heel osteomyelitis.  Arterial ultrasound bilaterally showed no focal hemodynamic stenosis.    Renal ultrasound showed mild bilateral hydronephrosis.  Distended urinary bladder with echogenic material which could be blood or sequelae of infection.  Chronic indwelling Knight catheter switched by Urology on 06/13    Blood cultures 6/11 growing coagulase negative staph, probable contaminant.  Rapid ID by PCR negative for staph aureus.  Repeat cultures 6/13  pending    Urine culture 6/11 growing multiple organisms.  Concern for infection given chronic catheter    Infectious disease on board.  Continue empiric antibiotics for now    S/p heel & sacral wound debridement in OR 6/14, cultures pending

## 2023-06-15 NOTE — ASSESSMENT & PLAN NOTE
Present on admission   Necrotic   Inpatient wound care consult    S/p debridement by general surgery

## 2023-06-15 NOTE — PROGRESS NOTES
[facility] Medicine  Progress Note    Patient Name: Mateo Foreman  MRN: [id number]  Patient Class: IP- Inpatient   Admission Date: 6/11/2023  Length of Stay: 2 days  Attending Physician: So Fowler MD  Primary Care Provider: Tremaine Finch MD        Subjective:     Principal Problem:Severe sepsis    HPI:  73-year-old past medical history of type 2 diabetes, GERD, hyperlipidemia, anemia, cataracts presenting from his nursing home with increased bilateral lower extremity swelling weeping and foul smelling. Patient is known to be unkempt.   As per patient had chronic wounds of feet were wrapped weeks ago have not been rewrapped by weeks. Patient is not compliant with care. Patient mentions having abnormal drainage from his heels.  Denies any chest pain, cough, nausea vomiting diarrhea or abdominal pain fevers chills.Not hypotensive on arrival. Tachycardic , WBC 23 K - admitted for sepsis due to SSTI/UTI      Overview/Hospital Course:  No notes on file    Interval History:  NPO for debridement by Podiatry and General surgery.  Denies any new complaints.  Feeling tired.  Dark urine, irrigation per urology.     Review of Systems  Objective:     Vital Signs (Most Recent):  Temp: 98 °F (36.7 °C) (06/14/23 1638)  Pulse: 80 (06/14/23 1638)  Resp: 18 (06/14/23 1638)  BP: (!) 110/56 (06/14/23 1638)  SpO2: (!) 94 % (06/14/23 1638) Vital Signs (24h Range):  Temp:  [96.3 °F (35.7 °C)-98 °F (36.7 °C)] 98 °F (36.7 °C)  Pulse:  [68-91] 80  Resp:  [12-22] 18  SpO2:  [90 %-100 %] 94 %  BP: ()/(50-59) 110/56     Weight: 71.1 kg (156 lb 12 oz)  Body mass index is 24.55 kg/m².    Intake/Output Summary (Last 24 hours) at 6/14/2023 1904  Last data filed at 6/14/2023 1834  Gross per 24 hour   Intake 93.62 ml   Output 3725 ml   Net -3631.38 ml           Physical Exam  Vitals and nursing note reviewed.   Constitutional:       Appearance: He is well-developed.   Neck:      Trachea: No tracheal deviation.    Cardiovascular:      Rate and Rhythm: Normal rate and regular rhythm.   Pulmonary:      Effort: Pulmonary effort is normal. No respiratory distress.      Breath sounds: Normal breath sounds.   Abdominal:      Palpations: Abdomen is soft.      Tenderness: There is no abdominal tenderness.      Comments: Knight catheter in place with dark red urine draining    Musculoskeletal:      Right lower leg: Edema present.      Left lower leg: Edema present.      Comments: Old & new scattered maculopapular scabs b/l UE, chest & abdomen  Chronic LE edema   Skin:     General: Skin is warm and dry.      Findings: Rash present.      Comments: Sacral wounds present on admission   Neurological:      Mental Status: He is alert.           Significant Labs: All pertinent labs within the past 24 hours have been reviewed.    Significant Imaging: I have reviewed all pertinent imaging results/findings within the past 24 hours.      Assessment/Plan:      * Severe sepsis  This patient does have evidence of infective focus  My overall impression is sepsis.  Source: Urinary Tract and Skin and Soft Tissue (location Bilateral feet open diabetic wounds), sacral decubitus wounds  Antibiotics given-   Antibiotics (72h ago, onward)    Start     Stop Route Frequency Ordered    06/15/23 0849  ceFEPIme (MAXIPIME) 2 g in dextrose 5 % in water (D5W) 5 % 100 mL IVPB (MB+)         -- IV Every 24 hours (non-standard times) 06/14/23 1045    06/14/23 1400  metroNIDAZOLE tablet 500 mg         -- Oral Every 8 hours 06/14/23 1050    06/12/23 0802  vancomycin - pharmacy to dose  (vancomycin IVPB)        See Hyperspace for full Linked Orders Report.    -- IV pharmacy to manage frequency 06/12/23 0702        Latest lactate reviewed-  Recent Labs   Lab 06/11/23  2355 06/12/23  0513   LACTATE 2.4* 1.9     Organ dysfunction indicated by Acute kidney injury    Fluid challenge Not needed - patient is not hypotensive      Post- resuscitation assessment Yes Perfusion exam  was performed within 6 hours of septic shock presentation after bolus shows Adequate tissue perfusion assessed by non-invasive monitoring       Will Not start Pressors- Levophed for MAP of 65  Source control achieved by:  S/p vanc/Zosyn in the ER.  Continue vancomycin, cefepime, Flagyl     CT lower extremity with contrast showed significant bilateral edema in distal for legs/feet, R>L.  Scattered foci of subcutaneous emphysema within plantar aspect of right hindfoot.  MRI confirmed early changes of R heel osteomyelitis.  Arterial ultrasound bilaterally showed no focal hemodynamic stenosis.    Renal ultrasound showed mild bilateral hydronephrosis.  Distended urinary bladder with echogenic material which could be blood or sequelae of infection.  Chronic indwelling Felipe catheter switched by Urology on 06/13    Blood cultures 6/11 growing coagulase negative staph, probable contaminant.  Rapid ID by PCR negative for staph aureus.  Repeat cultures 6/13 pending    Urine culture 6/11 growing multiple organisms.  Concern for infection given chronic catheter    Infectious disease on board.  Continue empiric antibiotics for now    S/p heel & sacral wound debridement in OR 6/14, cultures pending      Sacral decubitus ulcer  Present on admission   Necrotic   Inpatient wound care consult    General surgery consult if debridement is necessary       [place]NA (acute kidney injury)  Patient with acute kidney injury likely due to pre-renal azotemia, acute tubular necrosis and post-obstructive d/t clotted felipe catheter [place] is currently worsening. Labs reviewed- Renal function/electrolytes with Estimated Creatinine Clearance: 25 mL/min (A) (based on SCr of 2.5 mg/dL (H)). according to latest data. Monitor urine output and serial BMP and adjust therapy as needed. Avoid nephrotoxins and renally dose meds for GFR listed above.     Renal US showed mild bilateral hydronephrosis   Unclear etiology. Felipe catheter replaced 6/13. Irrigation per  urology  ATN multifactorial 2/2 ?Vancomycin level supratherapeutic (Received vanc/ zosyn) +/- sepsis +/- IV contrast +/- obstructive     Nephrology consult       Acute osteomyelitis of right foot  As above      Urinary tract infection associated with indwelling urethral catheter  As above       Decubitus ulcer of heel, bilateral, unstageable  As above      Nursing home resident  Noted       Hypomagnesemia  Mg 1.1 on admission, improved with repletion      Hypokalemia  Potassium 2.9 improved with repletion      Hypertension associated with diabetes  Controlled     Peripheral vascular disorder due to diabetes mellitus  Aspirin currently on hold in anticipation for procedure    Lymphedema of both lower extremities  Wound consult    HTN, goal below 130/80    Controlled.  Continue home medications    Type 2 diabetes mellitus with hypoglycemia, with long-term current use of insulin  Patient's FSGs are controlled on current medication regimen.  Last A1c reviewed-   Lab Results   Component Value Date    HGBA1C >14.0 (H) 07/22/2022     Most recent fingerstick glucose reviewed-   Recent Labs   Lab 06/13/23  0209 06/13/23  0613 06/13/23  1210 06/13/23  1659   POCTGLUCOSE 216* 257* 188* 159*     Current correctional scale  Medium  Maintain anti-hyperglycemic dose as follows-   Antihyperglycemics (From admission, onward)    Start     Stop Route Frequency Ordered    06/12/23 0305  insulin aspart U-100 pen 0-5 Units         -- SubQ Before meals & nightly PRN 06/12/23 0209        Hold Oral hypoglycemics while patient is in the hospital.      VTE Risk Mitigation (From admission, onward)         Ordered     enoxaparin injection 30 mg  Daily         06/14/23 1047     IP VTE HIGH RISK PATIENT  Once         06/12/23 0209     Place sequential compression device  Until discontinued         06/12/23 0209                Discharge Planning   CAROL:      Code Status: Full Code   Is the patient medically ready for discharge?:     Reason for  patient still in hospital (select all that apply): Patient trending condition, Treatment and Consult recommendations               So Fowler MD  Department of [facility] Medicine   [facility]

## 2023-06-15 NOTE — PROGRESS NOTES
[facility] Medicine  Progress Note    Patient Name: Mateo Foreman  MRN: [id number]  Patient Class: IP- Inpatient   Admission Date: 6/11/2023  Length of Stay: 3 days  Attending Physician: So Fowler MD  Primary Care Provider: Tremaine Finch MD        Subjective:     Principal Problem:Severe sepsis      HPI:  73-year-old past medical history of type 2 diabetes, GERD, hyperlipidemia, anemia, cataracts presenting from his nursing home with increased bilateral lower extremity swelling weeping and foul smelling. Patient is known to be unkempt.   As per patient had chronic wounds of feet were wrapped weeks ago have not been rewrapped by weeks. Patient is not compliant with care. Patient mentions having abnormal drainage from his heels.  Denies any chest pain, cough, nausea vomiting diarrhea or abdominal pain fevers chills.Not hypotensive on arrival. Tachycardic , WBC 23 K - admitted for sepsis due to SSTI/UTI      Overview/Hospital Course:  No notes on file    Interval History:  No acute events overnight.  Reports doing okay.  Denies any new complaints.  Started on continuous bladder irrigation yesterday evening, now with more clear urine.  No family at bedside    Review of Systems  Objective:     Vital Signs (Most Recent):  Temp: 98 °F (36.7 °C) (06/15/23 1548)  Pulse: 105 (06/15/23 1548)  Resp: 18 (06/15/23 1548)  BP: 134/88 (06/15/23 1548)  SpO2: (!) 94 % (06/15/23 1548) Vital Signs (24h Range):  Temp:  [96.4 °F (35.8 °C)-98 °F (36.7 °C)] 98 °F (36.7 °C)  Pulse:  [] 105  Resp:  [16-18] 18  SpO2:  [93 %-99 %] 94 %  BP: (110-137)/(57-88) 134/88     Weight: 71.1 kg (156 lb 12 oz)  Body mass index is 24.55 kg/m².    Intake/Output Summary (Last 24 hours) at 6/15/2023 1737  Last data filed at 6/15/2023 1708  Gross per 24 hour   Intake 9000 ml   Output 34768 ml   Net -37572 ml           Physical Exam  Vitals and nursing note reviewed.   Constitutional:       Appearance: He is well-developed.   Neck:  Reports daily heart burn  I suspect this is also aggravating her cough   She states that takes     Plan   Patient states she will buy  over-the-counter PPI  Continue related      Trachea: No tracheal deviation.   Cardiovascular:      Rate and Rhythm: Normal rate and regular rhythm.   Pulmonary:      Effort: Pulmonary effort is normal. No respiratory distress.      Breath sounds: Normal breath sounds.   Abdominal:      Palpations: Abdomen is soft.      Tenderness: There is no abdominal tenderness.      Comments: Knight catheter in place with dark red urine draining    Musculoskeletal:      Right lower leg: Edema present.      Left lower leg: Edema present.      Comments:   Chronic LE edema  Bilateral feet dressed   Skin:     Findings: Rash present.      Comments: Sacral wounds present on admission  Old & new scattered maculopapular scabs b/l UE, chest & abdomen  Dry scaly skin bilateral lower extremities     Neurological:      Mental Status: He is alert.           Significant Labs: All pertinent labs within the past 24 hours have been reviewed.    Significant Imaging: I have reviewed all pertinent imaging results/findings within the past 24 hours.      Assessment/Plan:      * Severe sepsis  This patient does have evidence of infective focus  My overall impression is sepsis.  Source: Urinary Tract and Skin and Soft Tissue (location Bilateral feet open diabetic wounds), sacral decubitus wounds  Antibiotics given-   Antibiotics (72h ago, onward)    Start     Stop Route Frequency Ordered    06/15/23 0849  ceFEPIme (MAXIPIME) 2 g in dextrose 5 % in water (D5W) 5 % 100 mL IVPB (MB+)         -- IV Every 24 hours (non-standard times) 06/14/23 1045    06/14/23 1400  metroNIDAZOLE tablet 500 mg         -- Oral Every 8 hours 06/14/23 1050    06/12/23 0802  vancomycin - pharmacy to dose  (vancomycin IVPB)        See Hyperspace for full Linked Orders Report.    -- IV pharmacy to manage frequency 06/12/23 0702        Latest lactate reviewed-  No results for input(s): LACTATE in the last 72 hours.  Organ dysfunction indicated by Acute kidney injury    Fluid challenge Not needed - patient is not  hypotensive      Post- resuscitation assessment Yes Perfusion exam was performed within 6 hours of septic shock presentation after bolus shows Adequate tissue perfusion assessed by non-invasive monitoring       Will Not start Pressors- Levophed for MAP of 65  Source control achieved by:  S/p vanc/Zosyn in the ER.  Continue vancomycin, cefepime, Flagyl     CT lower extremity with contrast showed significant bilateral edema in distal for legs/feet, R>L.  Scattered foci of subcutaneous emphysema within plantar aspect of right hindfoot.  MRI confirmed early changes of R heel osteomyelitis.  Arterial ultrasound bilaterally showed no focal hemodynamic stenosis.    Renal ultrasound showed mild bilateral hydronephrosis.  Distended urinary bladder with echogenic material which could be blood or sequelae of infection.  Chronic indwelling Felipe catheter switched by Urology on 06/13    Blood cultures 6/11 growing coagulase negative staph, probable contaminant.  Rapid ID by PCR negative for staph aureus.  Repeat cultures 6/13 pending    Urine culture 6/11 growing multiple organisms.  Concern for infection given chronic catheter    Infectious disease on board.  Continue empiric antibiotics for now    S/p heel & sacral wound debridement in OR 6/14, cultures pending      Sacral decubitus ulcer  Present on admission   Necrotic   Inpatient wound care consult    S/p debridement by general surgery      [place] (acute kidney injury)  Patient with acute kidney injury likely due to pre-renal azotemia, acute tubular necrosis and post-obstructive d/t clotted felipe catheter [place] is currently worsening. Labs reviewed- Renal function/electrolytes with Estimated Creatinine Clearance: 20.8 mL/min (A) (based on SCr of 3 mg/dL (H)). according to latest data. Monitor urine output and serial BMP and adjust therapy as needed. Avoid nephrotoxins and renally dose meds for GFR listed above.     Renal US showed mild bilateral hydronephrosis on admission  ATN-  multifactorial 2/2 ?Vancomycin level supratherapeutic (Received vanc/ zosyn) +/- sepsis +/- IV contrast +/- obstructive     Knight catheter replaced 6/13.  On continuous bladder irrigation.  CT abdomen pelvis without contrast showed no hydronephrosis or renal masses.  Given mild-to-moderate pleural effusions with bibasilar atelectasis, will hold IV fluids at this time.  Incentive spirometer.    Nephrology, urology consult       Acute osteomyelitis of right foot  As above      Urinary tract infection associated with indwelling urethral catheter  As above       Decubitus ulcer of heel, bilateral, unstageable  As above      Nursing home resident  Noted       Hypomagnesemia  Mg 1.1 on admission, improved with repletion      Hypokalemia  Potassium 2.9 improved with repletion      Hypertension associated with diabetes  Controlled     Peripheral vascular disorder due to diabetes mellitus  Aspirin currently on hold in anticipation for procedure    Lymphedema of both lower extremities  Wound consult    HTN, goal below 130/80    Controlled.  Continue home medications    Type 2 diabetes mellitus with hypoglycemia, with long-term current use of insulin  Patient's FSGs are controlled on current medication regimen.  Last A1c reviewed-   Lab Results   Component Value Date    HGBA1C >14.0 (H) 07/22/2022     Most recent fingerstick glucose reviewed-   Recent Labs   Lab 06/13/23  0209 06/13/23  0613 06/13/23  1210 06/13/23  1659   POCTGLUCOSE 216* 257* 188* 159*     Current correctional scale  Medium  Maintain anti-hyperglycemic dose as follows-   Antihyperglycemics (From admission, onward)    Start     Stop Route Frequency Ordered    06/12/23 0305  insulin aspart U-100 pen 0-5 Units         -- SubQ Before meals & nightly PRN 06/12/23 0209        Hold Oral hypoglycemics while patient is in the hospital.    VTE Risk Mitigation (From admission, onward)         Ordered     enoxaparin injection 30 mg  Daily         06/14/23 1047     IP  VTE HIGH RISK PATIENT  Once         06/12/23 0209     Place sequential compression device  Until discontinued         06/12/23 0209                Discharge Planning   CAROL:      Code Status: Full Code   Is the patient medically ready for discharge?:     Reason for patient still in hospital (select all that apply): Patient trending condition, Treatment and Consult recommendations               So Fowler MD  Department of [facility] Medicine   [facility]

## 2023-06-15 NOTE — PROGRESS NOTES
Pharmacokinetic Assessment Follow Up: IV Vancomycin    Vancomycin serum concentration assessment(s):    The random level was drawn correctly and can be used to guide therapy at this time. The measurement is above the desired definitive target range of 15 to 20 mcg/mL.    Vancomycin Regimen Plan:    Re-dose when the random level is less than 20 mcg/mL, next level to be drawn at 1200 on 6/17    Drug levels (last 3 results):  Recent Labs   Lab Result Units 06/13/23  1101 06/15/23  0347   Vancomycin, Random ug/mL  --  33.4   Vancomycin-Trough ug/mL 36.9*  --        Pharmacy will continue to follow and monitor vancomycin.    Please contact pharmacy at extension [phone] for questions regarding this assessment.    Thank you for the consult,   Dallas Moya       Patient brief summary:  Mateo Foreman is a 73 y.o. male initiated on antimicrobial therapy with IV Vancomycin for treatment of bone/joint infection    The patient's current regimen is dose by level    Drug Allergies:   Review of patient's allergies indicates:  No Known Allergies    Actual Body Weight:   71.1kg    Renal Function:   Estimated Creatinine Clearance: 20.8 mL/min (A) (based on SCr of 3 mg/dL (H)).,     Dialysis Method (if applicable):    CBC (last 72 hours):  Recent Labs   Lab Result Units 06/13/23  0320 06/14/23  0251 06/15/23  0816   WBC K/uL 29.69* 23.33* 21.89*   Hemoglobin g/dL 10.3* 8.4* 7.6*   Hematocrit % 31.8* 25.5* 23.6*   Platelets K/uL 892* 795* 769*   Gran % % 91.5* 86.8* 81.2*   Lymph % % 2.6* 4.9* 4.7*   Mono % % 4.2 4.3 5.1   Eosinophil % % 0.2 2.1 6.5   Basophil % % 0.2 0.3 0.1   Differential Method  Automated Automated Automated       Metabolic Panel (last 72 hours):  Recent Labs   Lab Result Units 06/13/23  0320 06/14/23  0251 06/15/23  0816   Sodium mmol/L 131* 130* 134*   Potassium mmol/L 4.2 4.3 4.2   Chloride mmol/L 93* 95 101   CO2 mmol/L 27 26 23   Glucose mg/dL 216* 133* 126*   BUN mg/dL 24* 35* 43*   Creatinine mg/dL 1.5* 2.5*  3.0*   Magnesium mg/dL 2.5 1.7  --        Vancomycin Administrations:  vancomycin given in the last 96 hours                     vancomycin (VANCOCIN) 1,000 mg in dextrose 5 % (D5W) 250 mL IVPB (Vial-Mate) (mg) 1,000 mg New Bag 06/13/23 1132     1,000 mg New Bag  0010     1,000 mg New Bag 06/12/23 1315    vancomycin (VANCOCIN) 1,750 mg in dextrose 5 % (D5W) 500 mL IVPB (mg) 1,750 mg New Bag 06/12/23 0000                    Microbiologic Results:  Microbiology Results (last 7 days)       Procedure Component Value Units Date/Time    Blood culture [[id number]] Collected: 06/13/23 0346    Order Status: Completed Specimen: Blood Updated: 06/15/23 1412     Blood Culture, Routine No Growth to date      No Growth to date      No Growth to date    Blood culture [[id number]] Collected: 06/13/23 0347    Order Status: Completed Specimen: Blood Updated: 06/15/23 1412     Blood Culture, Routine No Growth to date      No Growth to date      No Growth to date    Blood culture x two cultures. Draw prior to antibiotics. [[id number]]  (Abnormal) Collected: 06/11/23 2355    Order Status: Completed Specimen: Blood from Peripheral, Antecubital, Left Updated: 06/15/23 1032     Blood Culture, Routine Gram stain aer bottle: Gram positive cocci in clusters resembling Staph      Results called to and read back by:Azeb Mccray LPN      06/13/2023 02:23      COAGULASE-NEGATIVE STAPHYLOCOCCUS SPECIES  Organism is a probable contaminant      Narrative:      Aerobic and anaerobic    Blood culture x two cultures. Draw prior to antibiotics. [[id number]] Collected: 06/11/23 2355    Order Status: Completed Specimen: Blood from Peripheral, Antecubital, Left Updated: 06/15/23 0612     Blood Culture, Routine No Growth to date      No Growth to date      No Growth to date      No Growth to date    Narrative:      Aerobic and anaerobic    Gram stain [[id number]] Collected: 06/14/23 1400    Order Status: Completed Specimen: Wound from Foot, Right Updated:  06/15/23 0155     Gram Stain Result Rare WBC's      No organisms seen    Narrative:      Right heel    Gram stain [[id number]] Collected: 06/14/23 1400    Order Status: Completed Specimen: Wound from Foot, Left Updated: 06/15/23 0152     Gram Stain Result Rare WBC's      No organisms seen    Narrative:      Left heel    Gram stain [[id number]] Collected: 06/14/23 1500    Order Status: Completed Specimen: Wound from Foot, Right Updated: 06/14/23 2338     Gram Stain Result Rare WBC's      No organisms seen    Narrative:      Right calcaneous bone    AFB Culture & Smear [[id number]] Collected: 06/14/23 1400    Order Status: Sent Specimen: Wound from Foot, Right Updated: 06/14/23 2119    Culture, Anaerobe [[id number]] Collected: 06/14/23 1400    Order Status: Sent Specimen: Wound from Foot, Left Updated: 06/14/23 2119    Aerobic culture [[id number]] Collected: 06/14/23 1400    Order Status: Sent Specimen: Wound from Foot, Left Updated: 06/14/23 2119    Culture, Anaerobe [[id number]] Collected: 06/14/23 1400    Order Status: Sent Specimen: Wound from Foot, Right Updated: 06/14/23 2119    Culture, Anaerobe [[id number]] Collected: 06/14/23 1515    Order Status: Sent Specimen: Decubitus from Buttocks, Left Updated: 06/14/23 2119    Aerobic culture [[id number]] Collected: 06/14/23 1515    Order Status: Sent Specimen: Decubitus from Buttocks, Left Updated: 06/14/23 2119    Aerobic culture [[id number]] Collected: 06/14/23 1500    Order Status: Sent Specimen: Wound from Foot, Right Updated: 06/14/23 2119    Fungus culture [[id number]] Collected: 06/14/23 1500    Order Status: Sent Specimen: Wound from Foot, Right Updated: 06/14/23 2119    AFB Culture & Smear [[id number]] Collected: 06/14/23 1500    Order Status: Sent Specimen: Wound from Foot, Right Updated: 06/14/23 2119    Fungus culture [[id number]] Collected: 06/14/23 1400    Order Status: Sent Specimen: Wound from Foot, Left Updated: 06/14/23 2110    AFB Culture & Smear [[id number]]  Collected: 06/14/23 1400    Order Status: Sent Specimen: Wound from Foot, Left Updated: 06/14/23 2119    Aerobic culture [[id number]] Collected: 06/14/23 1400    Order Status: Sent Specimen: Wound from Foot, Right Updated: 06/14/23 2119    Fungus culture [[id number]] Collected: 06/14/23 1400    Order Status: Sent Specimen: Wound from Foot, Right Updated: 06/14/23 2119    Culture, Anaerobe [[id number]] Collected: 06/14/23 1500    Order Status: Sent Specimen: Wound from Foot, Right Updated: 06/14/23 2119    AFB Culture & Smear [[id number]] Collected: 06/14/23 1400    Order Status: Sent Specimen: Wound from Foot, Right Updated: 06/14/23 1505    Aerobic culture [[id number]] Collected: 06/14/23 1400    Order Status: Sent Specimen: Wound from Foot, Right Updated: 06/14/23 1505    AFB Culture & Smear [[id number]] Collected: 06/14/23 1400    Order Status: Sent Specimen: Wound from Foot, Right Updated: 06/14/23 1505    Gram stain [[id number]] Collected: 06/14/23 1400    Order Status: Sent Specimen: Wound from Foot, Right Updated: 06/14/23 1505    Aerobic culture [[id number]] Collected: 06/14/23 1400    Order Status: Sent Specimen: Wound from Foot, Right Updated: 06/14/23 1505    Fungus culture [[id number]] Collected: 06/14/23 1400    Order Status: Sent Specimen: Wound from Foot, Right Updated: 06/14/23 1505    Gram stain [[id number]] Collected: 06/14/23 1400    Order Status: Sent Specimen: Wound from Foot, Right Updated: 06/14/23 1505    Culture, Anaerobe [[id number]] Collected: 06/14/23 1400    Order Status: Sent Specimen: Wound from Foot, Right Updated: 06/14/23 1505    Fungus culture [[id number]] Collected: 06/14/23 1400    Order Status: Sent Specimen: Wound from Foot, Right Updated: 06/14/23 1505    Culture, Anaerobe [[id number]] Collected: 06/14/23 1400    Order Status: Sent Specimen: Wound from Foot, Right Updated: 06/14/23 1505    Urine culture [[id number]] Collected: 06/11/23 4371    Order Status: Completed Specimen: Urine  Updated: 06/13/23 1025     Urine Culture, Routine Multiple organisms isolated. None in predominance.  Repeat if      clinically necessary.    Narrative:      Specimen Source->Urine    MRSA/SA Rapid ID by PCR from Blood culture [[id number]] Collected: 06/11/23 3719    Order Status: Completed Updated: 06/13/23 0344     Staph aureus ID by PCR Negative     Methicillin Resistant ID by PCR Negative    Narrative:      Aerobic and anaerobic

## 2023-06-15 NOTE — ASSESSMENT & PLAN NOTE
Now s/p bilateral heel debridements down to bone. OR soft tissue and bone cultures pending. No further surgical intervention planned at this time but will need serial bedside/clinic debridements going forward. Will apply wound VAC to bilateral heels by the end of this week. BLE elevate at all times. Antibiotic plan per ID. Appreciate general surgery recs.

## 2023-06-15 NOTE — ASSESSMENT & PLAN NOTE
Patient with acute kidney injury likely due to pre-renal azotemia, acute tubular necrosis and post-obstructive d/t clotted felipe catheter [place] is currently worsening. Labs reviewed- Renal function/electrolytes with Estimated Creatinine Clearance: 25 mL/min (A) (based on SCr of 2.5 mg/dL (H)). according to latest data. Monitor urine output and serial BMP and adjust therapy as needed. Avoid nephrotoxins and renally dose meds for GFR listed above.     Renal US showed mild bilateral hydronephrosis   Unclear etiology. Felipe catheter replaced 6/13. Irrigation per urology  ATN multifactorial 2/2 ?Vancomycin level supratherapeutic (Received vanc/ zosyn) +/- sepsis +/- IV contrast +/- obstructive     Nephrology consult

## 2023-06-15 NOTE — SUBJECTIVE & OBJECTIVE
Interval History:  No acute events overnight.  Reports doing okay.  Denies any new complaints.  Started on continuous bladder irrigation yesterday evening, now with more clear urine.  No family at bedside    Review of Systems  Objective:     Vital Signs (Most Recent):  Temp: 98 °F (36.7 °C) (06/15/23 1548)  Pulse: 105 (06/15/23 1548)  Resp: 18 (06/15/23 1548)  BP: 134/88 (06/15/23 1548)  SpO2: (!) 94 % (06/15/23 1548) Vital Signs (24h Range):  Temp:  [96.4 °F (35.8 °C)-98 °F (36.7 °C)] 98 °F (36.7 °C)  Pulse:  [] 105  Resp:  [16-18] 18  SpO2:  [93 %-99 %] 94 %  BP: (110-137)/(57-88) 134/88     Weight: 71.1 kg (156 lb 12 oz)  Body mass index is 24.55 kg/m².    Intake/Output Summary (Last 24 hours) at 6/15/2023 1737  Last data filed at 6/15/2023 1708  Gross per 24 hour   Intake 9000 ml   Output 42077 ml   Net -58766 ml           Physical Exam  Vitals and nursing note reviewed.   Constitutional:       Appearance: He is well-developed.   Neck:      Trachea: No tracheal deviation.   Cardiovascular:      Rate and Rhythm: Normal rate and regular rhythm.   Pulmonary:      Effort: Pulmonary effort is normal. No respiratory distress.      Breath sounds: Normal breath sounds.   Abdominal:      Palpations: Abdomen is soft.      Tenderness: There is no abdominal tenderness.      Comments: Knight catheter in place with dark red urine draining    Musculoskeletal:      Right lower leg: Edema present.      Left lower leg: Edema present.      Comments:   Chronic LE edema  Bilateral feet dressed   Skin:     Findings: Rash present.      Comments: Sacral wounds present on admission  Old & new scattered maculopapular scabs b/l UE, chest & abdomen  Dry scaly skin bilateral lower extremities     Neurological:      Mental Status: He is alert.           Significant Labs: All pertinent labs within the past 24 hours have been reviewed.    Significant Imaging: I have reviewed all pertinent imaging results/findings within the past 24  hours.

## 2023-06-15 NOTE — PROGRESS NOTES
Nephrology Progress Note       Consult Requested By: So Fowler MD  Reason for Consult: [place]      SUBJECTIVE:       Review of Systems   Constitutional:  Negative for chills and fever.   HENT:  Negative for congestion and sore throat.    Eyes:  Negative for blurred vision, double vision and photophobia.   Respiratory:  Negative for cough and shortness of breath.    Cardiovascular:  Positive for leg swelling. Negative for chest pain and palpitations.   Gastrointestinal:  Negative for abdominal pain, diarrhea, nausea and vomiting.   Genitourinary:  Negative for dysuria and urgency.   Musculoskeletal:  Negative for joint pain and myalgias.   Skin:  Negative for itching and rash.   Neurological:  Positive for weakness. Negative for dizziness, sensory change and headaches.   Endo/Heme/Allergies:  Negative for polydipsia. Does not bruise/bleed easily.   Psychiatric/Behavioral:  Negative for depression.      Past Medical History:   Diagnosis Date    Anemia     Cataract     Chronic cough     Diabetes mellitus type II     GERD (gastroesophageal reflux disease)     Glaucoma     Hyperlipidemia           OBJECTIVE:     Vital Signs (Most Recent)  Vitals:    06/15/23 0729 06/15/23 0733 06/15/23 1043 06/15/23 1156   BP:  (!) 121/58 (!) 129/59    BP Location:  Right arm Right arm    Patient Position:  Lying Lying    Pulse:  88 75 86   Resp:  18 18    Temp:  96.4 °F (35.8 °C) 98 °F (36.7 °C)    TempSrc:  Oral Oral    SpO2: (!) 94% (!) 93% 95%    Weight:       Height:             Date 06/15/23 0700 - 06/16/23 0659   Shift [id number] 3195-3186 24 Hour Total   INTAKE   Shift Total(mL/kg)       OUTPUT   Urine(mL/kg/hr) 8150   8150   Shift Total(mL/kg) 8150(114.6)   8150(114.6)   Weight (kg) 71.1 71.1 71.1 71.1             Medications:   ceFEPime (MAXIPIME) IVPB  2 g Intravenous Q24H    enoxparin  30 mg Subcutaneous Daily    metroNIDAZOLE  500 mg Oral Q8H    tamsulosin  0.4 mg Oral QHS           Physical Exam  Vitals and  nursing note reviewed.   Constitutional:       General: He is not in acute distress.     Appearance: He is ill-appearing. He is not diaphoretic.   HENT:      Head: Normocephalic and atraumatic.      Mouth/Throat:      Pharynx: No oropharyngeal exudate.   Eyes:      General: No scleral icterus.     Conjunctiva/sclera: Conjunctivae normal.      Pupils: Pupils are equal, round, and reactive to light.   Cardiovascular:      Rate and Rhythm: Normal rate and regular rhythm.      Heart sounds: Normal heart sounds. No murmur heard.  Pulmonary:      Effort: Pulmonary effort is normal. No respiratory distress.      Breath sounds: Normal breath sounds.   Abdominal:      General: Bowel sounds are normal. There is no distension.      Palpations: Abdomen is soft.      Tenderness: There is no abdominal tenderness.   Genitourinary:     Comments: Knight   Musculoskeletal:         General: Normal range of motion.      Cervical back: Normal range of motion and neck supple.      Right lower leg: Edema present.      Left lower leg: Edema present.   Skin:     General: Skin is warm and dry.      Findings: No erythema.   Neurological:      Mental Status: He is alert and oriented to person, place, and time.      Cranial Nerves: No cranial nerve deficit.       Laboratory:  Recent Labs   Lab 06/13/23  0320 06/14/23  0251 06/15/23  0816   WBC 29.69* 23.33* 21.89*   HGB 10.3* 8.4* 7.6*   HCT 31.8* 25.5* 23.6*   * 795* 769*   MONO 4.2  1.3* 4.3  1.0 5.1  1.1*       Recent Labs   Lab 06/13/23  0320 06/14/23  0251 06/15/23  0816   * 130* 134*   K 4.2 4.3 4.2   CL 93* 95 101   CO2 27 26 23   BUN 24* 35* 43*   CREATININE 1.5* 2.5* 3.0*   CALCIUM 8.5* 8.0* 7.7*         Diagnostic Results:  X-Ray: Reviewed  US: Reviewed  Echo: Reviewed  ASSESSMENT/PLAN:     1. [place] -   at this point  multifactorial, ATN, Sepsis, on top High Vanco levels 36.9 =>33.4  now on irrigating  Knight - obstruction US kidney   Bilateral mild hydronephrosis.  Note,  there is distention of the urinary bladder despite Knight catheter, hydronephrosis is suspected to be on the basis of reflux related to urinary bladder distension.  -- Urology consulted s/p  Knight irrigation appreciate assistance     -- Making Urine supportive therapy for now - hopefully no need for RRT as his levels drops  Cr still trending up monitor for now   -- Daily Renal Function Panel  -- Avoid Hypotension.  -- Renally dose all meds  -- Please avoid nephrotoxins, including NSAIDs, aminoglycosides, IV contrast (unless absolutely necessary), gadolinium, fleets and other phosphorous-based laxatives. Caution with antibiotics.    2. HTN (I10) - controlled   3. 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 Nutrition/Hypoalbuminemia   Recent Labs   Lab 06/11/23  2334   ALBUMIN 2.2*       Protein supplement Nepro for now        Thank you for allowing me to participate in care of your patient  With any question please call   Thomas Villavicencio MD     [facility]  WILL Pedro MD,   MD LISA Solis MD E. V. Harmon, NP  [address]   [place], [address] [phone]  After hours answering service: [phone]

## 2023-06-15 NOTE — ASSESSMENT & PLAN NOTE
This patient does have evidence of infective focus  My overall impression is sepsis.  Source: Urinary Tract and Skin and Soft Tissue (location Bilateral feet open diabetic wounds), sacral decubitus wounds  Antibiotics given-   Antibiotics (72h ago, onward)    Start     Stop Route Frequency Ordered    06/15/23 0849  ceFEPIme (MAXIPIME) 2 g in dextrose 5 % in water (D5W) 5 % 100 mL IVPB (MB+)         -- IV Every 24 hours (non-standard times) 06/14/23 1045    06/14/23 1400  metroNIDAZOLE tablet 500 mg         -- Oral Every 8 hours 06/14/23 1050    06/12/23 0802  vancomycin - pharmacy to dose  (vancomycin IVPB)        See Hyperspace for full Linked Orders Report.    -- IV pharmacy to manage frequency 06/12/23 0702        Latest lactate reviewed-  Recent Labs   Lab 06/11/23  2355 06/12/23  0513   LACTATE 2.4* 1.9     Organ dysfunction indicated by Acute kidney injury    Fluid challenge Not needed - patient is not hypotensive      Post- resuscitation assessment Yes Perfusion exam was performed within 6 hours of septic shock presentation after bolus shows Adequate tissue perfusion assessed by non-invasive monitoring       Will Not start Pressors- Levophed for MAP of 65  Source control achieved by:  S/p vanc/Zosyn in the ER.  Continue vancomycin, cefepime, Flagyl     CT lower extremity with contrast showed significant bilateral edema in distal for legs/feet, R>L.  Scattered foci of subcutaneous emphysema within plantar aspect of right hindfoot.  MRI confirmed early changes of R heel osteomyelitis.  Arterial ultrasound bilaterally showed no focal hemodynamic stenosis.    Renal ultrasound showed mild bilateral hydronephrosis.  Distended urinary bladder with echogenic material which could be blood or sequelae of infection.  Chronic indwelling Knight catheter switched by Urology on 06/13    Blood cultures 6/11 growing coagulase negative staph, probable contaminant.  Rapid ID by PCR negative for staph aureus.  Repeat cultures  6/13 pending    Urine culture 6/11 growing multiple organisms.  Concern for infection given chronic catheter    Infectious disease on board.  Continue empiric antibiotics for now    S/p heel & sacral wound debridement in OR 6/14, cultures pending

## 2023-06-16 PROBLEM — R21 RASH: Status: ACTIVE | Noted: 2023-06-16

## 2023-06-16 PROBLEM — Z71.89 GOALS OF CARE, COUNSELING/DISCUSSION: Status: ACTIVE | Noted: 2021-08-20

## 2023-06-16 PROBLEM — G93.40 ACUTE ENCEPHALOPATHY: Status: ACTIVE | Noted: 2023-06-16

## 2023-06-16 PROBLEM — Z51.5 PALLIATIVE CARE ENCOUNTER: Status: ACTIVE | Noted: 2023-06-16

## 2023-06-16 LAB
ANION GAP SERPL CALC-SCNC: 10 MMOL/L (ref 8–16)
ANISOCYTOSIS BLD QL SMEAR: SLIGHT
AORTIC ROOT ANNULUS: 3.32 CM
AV INDEX (PROSTH): 0.92
AV MEAN GRADIENT: 6 MMHG
AV PEAK GRADIENT: 12 MMHG
AV VALVE AREA: 2.64 CM2
AV VELOCITY RATIO: 0.78
BASOPHILS NFR BLD: 0 % (ref 0–1.9)
BSA FOR ECHO PROCEDURE: 1.83 M2
BUN SERPL-MCNC: 46 MG/DL (ref 8–23)
BURR CELLS BLD QL SMEAR: ABNORMAL
CALCIUM SERPL-MCNC: 8.1 MG/DL (ref 8.7–10.5)
CHLORIDE SERPL-SCNC: 103 MMOL/L (ref 95–110)
CO2 SERPL-SCNC: 23 MMOL/L (ref 23–29)
CREAT SERPL-MCNC: 3.2 MG/DL (ref 0.5–1.4)
CV ECHO LV RWT: 0.42 CM
DIFFERENTIAL METHOD: ABNORMAL
DOP CALC AO PEAK VEL: 1.74 M/S
DOP CALC AO VTI: 30.3 CM
DOP CALC LVOT AREA: 2.9 CM2
DOP CALC LVOT DIAMETER: 1.91 CM
DOP CALC LVOT PEAK VEL: 1.35 M/S
DOP CALC LVOT STROKE VOLUME: 79.9 CM3
DOP CALC MV VTI: 27.1 CM
DOP CALCLVOT PEAK VEL VTI: 27.9 CM
E WAVE DECELERATION TIME: 177.54 MSEC
E/A RATIO: 0.66
E/E' RATIO: 12.8 M/S
ECHO LV POSTERIOR WALL: 0.92 CM (ref 0.6–1.1)
EJECTION FRACTION: 65 %
EOSINOPHIL NFR BLD: 12 % (ref 0–8)
ERYTHROCYTE [DISTWIDTH] IN BLOOD BY AUTOMATED COUNT: 14.1 % (ref 11.5–14.5)
EST. GFR  (NO RACE VARIABLE): 20 ML/MIN/1.73 M^2
FRACTIONAL SHORTENING: 25 % (ref 28–44)
GLUCOSE SERPL-MCNC: 97 MG/DL (ref 70–110)
HCT VFR BLD AUTO: 24.5 % (ref 40–54)
HGB BLD-MCNC: 7.9 G/DL (ref 14–18)
HYPOCHROMIA BLD QL SMEAR: ABNORMAL
IMM GRANULOCYTES # BLD AUTO: ABNORMAL K/UL (ref 0–0.04)
IMM GRANULOCYTES NFR BLD AUTO: ABNORMAL % (ref 0–0.5)
INTERVENTRICULAR SEPTUM: 1.26 CM (ref 0.6–1.1)
IVC DIAMETER: 1.4 CM
LA MAJOR: 4.49 CM
LA MINOR: 4.3 CM
LA WIDTH: 3 CM
LEFT ATRIUM SIZE: 3.2 CM
LEFT ATRIUM VOLUME INDEX MOD: 13.5 ML/M2
LEFT ATRIUM VOLUME INDEX: 19.7 ML/M2
LEFT ATRIUM VOLUME MOD: 24.63 CM3
LEFT ATRIUM VOLUME: 35.85 CM3
LEFT INTERNAL DIMENSION IN SYSTOLE: 3.3 CM (ref 2.1–4)
LEFT VENTRICLE DIASTOLIC VOLUME INDEX: 30.47 ML/M2
LEFT VENTRICLE DIASTOLIC VOLUME: 55.46 ML
LEFT VENTRICLE MASS INDEX: 92 G/M2
LEFT VENTRICLE SYSTOLIC VOLUME INDEX: 10.8 ML/M2
LEFT VENTRICLE SYSTOLIC VOLUME: 19.61 ML
LEFT VENTRICULAR INTERNAL DIMENSION IN DIASTOLE: 4.41 CM (ref 3.5–6)
LEFT VENTRICULAR MASS: 167.35 G
LV LATERAL E/E' RATIO: 13.71 M/S
LV SEPTAL E/E' RATIO: 12 M/S
LVOT MG: 2.87 MMHG
LVOT MV: 0.79 CM/S
LYMPHOCYTES NFR BLD: 7 % (ref 18–48)
MCH RBC QN AUTO: 26.6 PG (ref 27–31)
MCHC RBC AUTO-ENTMCNC: 32.2 G/DL (ref 32–36)
MCV RBC AUTO: 83 FL (ref 82–98)
MONOCYTES NFR BLD: 3 % (ref 4–15)
MV A" WAVE DURATION": 122.74 MSEC
MV MEAN GRADIENT: 3 MMHG
MV PEAK A VEL: 1.45 M/S
MV PEAK E VEL: 0.96 M/S
MV PEAK GRADIENT: 8 MMHG
MV STENOSIS PRESSURE HALF TIME: 51.49 MS
MV VALVE AREA BY CONTINUITY EQUATION: 2.95 CM2
MV VALVE AREA P 1/2 METHOD: 4.27 CM2
NEUTROPHILS NFR BLD: 78 % (ref 38–73)
NRBC BLD-RTO: 0 /100 WBC
OHS LV EJECTION FRACTION SIMPSONS BIPLANE MOD: 6 %
PISA TR MAX VEL: 3.08 M/S
PLATELET # BLD AUTO: 878 K/UL (ref 150–450)
PLATELET BLD QL SMEAR: ABNORMAL
PMV BLD AUTO: 9.1 FL (ref 9.2–12.9)
POCT GLUCOSE: 106 MG/DL (ref 70–110)
POCT GLUCOSE: 112 MG/DL (ref 70–110)
POCT GLUCOSE: 95 MG/DL (ref 70–110)
POIKILOCYTOSIS BLD QL SMEAR: SLIGHT
POLYCHROMASIA BLD QL SMEAR: ABNORMAL
POTASSIUM SERPL-SCNC: 4.5 MMOL/L (ref 3.5–5.1)
PULM VEIN S/D RATIO: 1.83
PV MV: 0.86 M/S
PV PEAK D VEL: 0.42 M/S
PV PEAK S VEL: 0.77 M/S
PV PEAK VELOCITY: 1.15 CM/S
RA MAJOR: 3.95 CM
RA PRESSURE: 3 MMHG
RA WIDTH: 2.84 CM
RBC # BLD AUTO: 2.97 M/UL (ref 4.6–6.2)
SODIUM SERPL-SCNC: 136 MMOL/L (ref 136–145)
TDI LATERAL: 0.07 M/S
TDI SEPTAL: 0.08 M/S
TDI: 0.08 M/S
TR MAX PG: 38 MMHG
TROPONIN I SERPL DL<=0.01 NG/ML-MCNC: <0.006 NG/ML (ref 0–0.03)
TV REST PULMONARY ARTERY PRESSURE: 41 MMHG
WBC # BLD AUTO: 17.31 K/UL (ref 3.9–12.7)

## 2023-06-16 PROCEDURE — 93005 ELECTROCARDIOGRAM TRACING: CPT

## 2023-06-16 PROCEDURE — 99223 PR INITIAL HOSPITAL CARE,LEVL III: ICD-10-PCS | Mod: 25,,,

## 2023-06-16 PROCEDURE — 93010 EKG 12-LEAD: ICD-10-PCS | Mod: ,,, | Performed by: INTERNAL MEDICINE

## 2023-06-16 PROCEDURE — 25000003 PHARM REV CODE 250: Performed by: STUDENT IN AN ORGANIZED HEALTH CARE EDUCATION/TRAINING PROGRAM

## 2023-06-16 PROCEDURE — 80048 BASIC METABOLIC PNL TOTAL CA: CPT | Performed by: STUDENT IN AN ORGANIZED HEALTH CARE EDUCATION/TRAINING PROGRAM

## 2023-06-16 PROCEDURE — 36415 COLL VENOUS BLD VENIPUNCTURE: CPT | Performed by: STUDENT IN AN ORGANIZED HEALTH CARE EDUCATION/TRAINING PROGRAM

## 2023-06-16 PROCEDURE — 99232 PR SUBSEQUENT HOSPITAL CARE,LEVL II: ICD-10-PCS | Mod: ,,, | Performed by: UROLOGY

## 2023-06-16 PROCEDURE — 63600175 PHARM REV CODE 636 W HCPCS: Performed by: STUDENT IN AN ORGANIZED HEALTH CARE EDUCATION/TRAINING PROGRAM

## 2023-06-16 PROCEDURE — 99497 ADVNCD CARE PLAN 30 MIN: CPT | Mod: 25,,,

## 2023-06-16 PROCEDURE — 99497 PR ADVNCD CARE PLAN 30 MIN: ICD-10-PCS | Mod: 25,,,

## 2023-06-16 PROCEDURE — 99232 SBSQ HOSP IP/OBS MODERATE 35: CPT | Mod: ,,, | Performed by: UROLOGY

## 2023-06-16 PROCEDURE — 84484 ASSAY OF TROPONIN QUANT: CPT | Performed by: STUDENT IN AN ORGANIZED HEALTH CARE EDUCATION/TRAINING PROGRAM

## 2023-06-16 PROCEDURE — 85027 COMPLETE CBC AUTOMATED: CPT | Performed by: STUDENT IN AN ORGANIZED HEALTH CARE EDUCATION/TRAINING PROGRAM

## 2023-06-16 PROCEDURE — 99223 1ST HOSP IP/OBS HIGH 75: CPT | Mod: 25,,,

## 2023-06-16 PROCEDURE — 99900035 HC TECH TIME PER 15 MIN (STAT)

## 2023-06-16 PROCEDURE — 25000003 PHARM REV CODE 250: Performed by: INTERNAL MEDICINE

## 2023-06-16 PROCEDURE — 93010 ELECTROCARDIOGRAM REPORT: CPT | Mod: ,,, | Performed by: INTERNAL MEDICINE

## 2023-06-16 PROCEDURE — 11000001 HC ACUTE MED/SURG PRIVATE ROOM

## 2023-06-16 PROCEDURE — 85007 BL SMEAR W/DIFF WBC COUNT: CPT | Performed by: STUDENT IN AN ORGANIZED HEALTH CARE EDUCATION/TRAINING PROGRAM

## 2023-06-16 PROCEDURE — 94761 N-INVAS EAR/PLS OXIMETRY MLT: CPT

## 2023-06-16 RX ORDER — HYDROMORPHONE HYDROCHLORIDE 1 MG/ML
0.5 INJECTION, SOLUTION INTRAMUSCULAR; INTRAVENOUS; SUBCUTANEOUS ONCE
Status: COMPLETED | OUTPATIENT
Start: 2023-06-16 | End: 2023-06-16

## 2023-06-16 RX ORDER — OXYCODONE HYDROCHLORIDE 5 MG/1
5 TABLET ORAL EVERY 6 HOURS PRN
Status: DISCONTINUED | OUTPATIENT
Start: 2023-06-16 | End: 2023-06-16

## 2023-06-16 RX ORDER — ACETAMINOPHEN 500 MG
1000 TABLET ORAL 3 TIMES DAILY
Status: DISCONTINUED | OUTPATIENT
Start: 2023-06-16 | End: 2023-06-20 | Stop reason: HOSPADM

## 2023-06-16 RX ORDER — HYDROMORPHONE HYDROCHLORIDE 1 MG/ML
0.2 INJECTION, SOLUTION INTRAMUSCULAR; INTRAVENOUS; SUBCUTANEOUS EVERY 6 HOURS PRN
Status: DISCONTINUED | OUTPATIENT
Start: 2023-06-16 | End: 2023-06-16

## 2023-06-16 RX ORDER — POLYETHYLENE GLYCOL 3350 17 G/17G
17 POWDER, FOR SOLUTION ORAL DAILY PRN
Status: DISCONTINUED | OUTPATIENT
Start: 2023-06-16 | End: 2023-06-20 | Stop reason: HOSPADM

## 2023-06-16 RX ORDER — PERMETHRIN 50 MG/G
CREAM TOPICAL ONCE
Status: COMPLETED | OUTPATIENT
Start: 2023-06-16 | End: 2023-06-16

## 2023-06-16 RX ORDER — OXYCODONE HYDROCHLORIDE 5 MG/1
10 TABLET ORAL EVERY 6 HOURS PRN
Status: DISCONTINUED | OUTPATIENT
Start: 2023-06-16 | End: 2023-06-17

## 2023-06-16 RX ORDER — HYDROXYZINE HYDROCHLORIDE 25 MG/1
25 TABLET, FILM COATED ORAL 3 TIMES DAILY PRN
Status: DISCONTINUED | OUTPATIENT
Start: 2023-06-16 | End: 2023-06-16

## 2023-06-16 RX ORDER — HYDROMORPHONE HYDROCHLORIDE 1 MG/ML
0.5 INJECTION, SOLUTION INTRAMUSCULAR; INTRAVENOUS; SUBCUTANEOUS EVERY 6 HOURS PRN
Status: DISCONTINUED | OUTPATIENT
Start: 2023-06-16 | End: 2023-06-16

## 2023-06-16 RX ORDER — HYDROMORPHONE HYDROCHLORIDE 1 MG/ML
1 INJECTION, SOLUTION INTRAMUSCULAR; INTRAVENOUS; SUBCUTANEOUS EVERY 6 HOURS PRN
Status: DISCONTINUED | OUTPATIENT
Start: 2023-06-16 | End: 2023-06-17

## 2023-06-16 RX ORDER — SENNOSIDES 8.6 MG/1
8.6 TABLET ORAL DAILY
Status: DISCONTINUED | OUTPATIENT
Start: 2023-06-16 | End: 2023-06-20 | Stop reason: HOSPADM

## 2023-06-16 RX ORDER — DIPHENHYDRAMINE HYDROCHLORIDE 50 MG/ML
50 INJECTION INTRAMUSCULAR; INTRAVENOUS EVERY 6 HOURS
Status: DISCONTINUED | OUTPATIENT
Start: 2023-06-16 | End: 2023-06-17

## 2023-06-16 RX ADMIN — PERMETHRIN: 50 CREAM TOPICAL at 04:06

## 2023-06-16 RX ADMIN — HYDROCODONE BITARTRATE AND ACETAMINOPHEN 1 TABLET: 5; 325 TABLET ORAL at 02:06

## 2023-06-16 RX ADMIN — HYDROMORPHONE HYDROCHLORIDE 0.5 MG: 1 INJECTION, SOLUTION INTRAMUSCULAR; INTRAVENOUS; SUBCUTANEOUS at 09:06

## 2023-06-16 RX ADMIN — CEFEPIME 2 G: 2 INJECTION, POWDER, FOR SOLUTION INTRAVENOUS at 08:06

## 2023-06-16 RX ADMIN — CEFTRIAXONE SODIUM 2 G: 2 INJECTION, POWDER, FOR SOLUTION INTRAMUSCULAR; INTRAVENOUS at 04:06

## 2023-06-16 RX ADMIN — HYDROXYZINE HYDROCHLORIDE 10 MG: 10 TABLET ORAL at 09:06

## 2023-06-16 RX ADMIN — METRONIDAZOLE 500 MG: 500 TABLET ORAL at 09:06

## 2023-06-16 RX ADMIN — HYDROMORPHONE HYDROCHLORIDE 1 MG: 1 INJECTION, SOLUTION INTRAMUSCULAR; INTRAVENOUS; SUBCUTANEOUS at 04:06

## 2023-06-16 RX ADMIN — HYDROMORPHONE HYDROCHLORIDE 0.2 MG: 1 INJECTION, SOLUTION INTRAMUSCULAR; INTRAVENOUS; SUBCUTANEOUS at 08:06

## 2023-06-16 RX ADMIN — TAMSULOSIN HYDROCHLORIDE 0.4 MG: 0.4 CAPSULE ORAL at 09:06

## 2023-06-16 RX ADMIN — SENNOSIDES 8.6 MG: 8.6 TABLET, FILM COATED ORAL at 09:06

## 2023-06-16 RX ADMIN — HYDROXYZINE HYDROCHLORIDE 10 MG: 10 TABLET ORAL at 04:06

## 2023-06-16 RX ADMIN — DIPHENHYDRAMINE HYDROCHLORIDE 50 MG: 50 INJECTION, SOLUTION INTRAMUSCULAR; INTRAVENOUS at 06:06

## 2023-06-16 RX ADMIN — OXYCODONE HYDROCHLORIDE 5 MG: 5 TABLET ORAL at 11:06

## 2023-06-16 RX ADMIN — ACETAMINOPHEN 1000 MG: 500 TABLET ORAL at 09:06

## 2023-06-16 RX ADMIN — ENOXAPARIN SODIUM 30 MG: 30 INJECTION SUBCUTANEOUS at 04:06

## 2023-06-16 RX ADMIN — METRONIDAZOLE 500 MG: 500 TABLET ORAL at 02:06

## 2023-06-16 RX ADMIN — HYDROMORPHONE HYDROCHLORIDE 0.5 MG: 1 INJECTION, SOLUTION INTRAMUSCULAR; INTRAVENOUS; SUBCUTANEOUS at 12:06

## 2023-06-16 RX ADMIN — METRONIDAZOLE 500 MG: 500 TABLET ORAL at 06:06

## 2023-06-16 RX ADMIN — HYDROXYZINE HYDROCHLORIDE 25 MG: 25 TABLET ORAL at 02:06

## 2023-06-16 NOTE — PROGRESS NOTES
[facility] Medicine  Progress Note    Patient Name: Mateo Foreman  MRN: [id number]  Patient Class: IP- Inpatient   Admission Date: 6/11/2023  Length of Stay: 4 days  Attending Physician: So Fowler MD  Primary Care Provider: Tremaine Finch MD        Subjective:     Principal Problem:Severe sepsis      HPI:  73-year-old past medical history of type 2 diabetes, GERD, hyperlipidemia, anemia, cataracts presenting from his nursing home with increased bilateral lower extremity swelling weeping and foul smelling. Patient is known to be unkempt.   As per patient had chronic wounds of feet were wrapped weeks ago have not been rewrapped by weeks. Patient is not compliant with care. Patient mentions having abnormal drainage from his heels.  Denies any chest pain, cough, nausea vomiting diarrhea or abdominal pain fevers chills.Not hypotensive on arrival. Tachycardic , WBC 23 K - admitted for sepsis due to SSTI/UTI      Overview/Hospital Course:  No notes on file    Interval History:  Confused overnight, moaning.  Unclear if patient had chest pain as he was grabbing his chest.  Received Norco with minimal improvement.  Chest x-ray showed pleural effusion, atelectasis.  EKG this morning showed no acute ST-T changes.  Troponin negative.  Patient does not respond to verbal questions.  He appears to be moaning in pain.  Received IV Dilaudid.    On subsequent exam, when not stimulated patient appears calm but when attempting to communicate is moaning.    Review of Systems  Objective:     Vital Signs (Most Recent):  Temp: 97 °F (36.1 °C) (06/16/23 1247)  Pulse: (!) 133 (06/16/23 1247)  Resp: 16 (06/16/23 1314)  BP: (!) 157/70 (06/16/23 1247)  SpO2: 96 % (06/16/23 1247) Vital Signs (24h Range):  Temp:  [97 °F (36.1 °C)-98 °F (36.7 °C)] 97 °F (36.1 °C)  Pulse:  [] 133  Resp:  [14-20] 16  SpO2:  [94 %-97 %] 96 %  BP: (120-157)/(58-88) 157/70     Weight: 70.8 kg (156 lb)  Body mass index is 24.43  kg/m².    Intake/Output Summary (Last 24 hours) at 6/16/2023 1421  Last data filed at 6/16/2023 0632  Gross per 24 hour   Intake 5545.96 ml   Output 4425 ml   Net 1120.96 ml           Physical Exam  Vitals and nursing note reviewed.   Constitutional:       Appearance: He is well-developed.      Comments: Moaning, not talking    Cardiovascular:      Rate and Rhythm: Normal rate and regular rhythm.   Pulmonary:      Effort: Pulmonary effort is normal. No respiratory distress.      Breath sounds: Normal breath sounds.   Abdominal:      Palpations: Abdomen is soft.      Tenderness: There is no abdominal tenderness.      Comments: Knight catheter in place with clear urine   Musculoskeletal:      Right lower leg: Edema present.      Left lower leg: Edema present.      Comments:   Chronic LE edema  Bilateral feet dressed   Skin:     Findings: Rash present.      Comments: Sacral wounds present on admission  Old & new scattered maculopapular scabs b/l UE, chest & abdomen  Dry scaly skin bilateral lower extremities     Neurological:      Mental Status: He is alert.           Significant Labs: All pertinent labs within the past 24 hours have been reviewed.    Significant Imaging: I have reviewed all pertinent imaging results/findings within the past 24 hours.      Assessment/Plan:      * Severe sepsis  This patient does have evidence of infective focus  My overall impression is sepsis.  Source: Urinary Tract and Skin and Soft Tissue (location Bilateral feet open diabetic wounds), sacral decubitus wounds  Antibiotics given-   Antibiotics (72h ago, onward)    Start     Stop Route Frequency Ordered    06/15/23 0849  ceFEPIme (MAXIPIME) 2 g in dextrose 5 % in water (D5W) 5 % 100 mL IVPB (MB+)         -- IV Every 24 hours (non-standard times) 06/14/23 1045    06/14/23 1400  metroNIDAZOLE tablet 500 mg         -- Oral Every 8 hours 06/14/23 1050    06/12/23 0802  vancomycin - pharmacy to dose  (vancomycin IVPB)        See Hyperspace  for full Linked Orders Report.    -- IV pharmacy to manage frequency 06/12/23 0702        Latest lactate reviewed-  No results for input(s): LACTATE in the last 72 hours.  Organ dysfunction indicated by Acute kidney injury    Fluid challenge Not needed - patient is not hypotensive      Post- resuscitation assessment Yes Perfusion exam was performed within 6 hours of septic shock presentation after bolus shows Adequate tissue perfusion assessed by non-invasive monitoring       Will Not start Pressors- Levophed for MAP of 65  Source control achieved by:  S/p vanc/Zosyn in the ER.  Continue vancomycin, cefepime, Flagyl     CT lower extremity with contrast showed significant bilateral edema in distal for legs/feet, R>L.  Scattered foci of subcutaneous emphysema within plantar aspect of right hindfoot.  MRI confirmed early changes of R heel osteomyelitis.  Arterial ultrasound bilaterally showed no focal hemodynamic stenosis.    Renal ultrasound showed mild bilateral hydronephrosis.  Distended urinary bladder with echogenic material which could be blood or sequelae of infection.  Chronic indwelling Knight catheter switched by Urology on 06/13    Blood cultures 6/11 growing coagulase negative staph, probable contaminant.  Rapid ID by PCR negative for staph aureus.  Repeat cultures 6/13 pending    Urine culture 6/11 growing multiple organisms.  Concern for infection given chronic catheter    Infectious disease on board.  Stop vancomycin. Continue cefepime, flagyl    S/p heel & sacral wound debridement in OR 6/14    Sacral wound culture growing proteus, pending ID & susceptibility       Rash  Present on admission   Diffuse old and new maculopapular rash on chest, bilateral upper extremities  ?scabies  Trial of permethrin, antihistamines      Acute encephalopathy  Onset 6/15 overnight  Suspect delirium in setting of pain, infection/renal injury, rash/itching  CT head to rule out organic etiology  Pain control - uptitrating  narcotics for better control of pain (patient unable to verbalize location), schedule tylenol. Avoid NSAIDs given renal injury  Delirium precautions      Sacral decubitus ulcer  Present on admission   Necrotic   Inpatient wound care consult    S/p debridement by general surgery      GEORG[place]NA (acute kidney injury)  Patient with acute kidney injury likely due to pre-renal azotemia, acute tubular necrosis and post-obstructive d/t clotted felipe catheter [place]NA is currently worsening. Labs reviewed- Renal function/electrolytes with Estimated Creatinine Clearance: 20.8 mL/min (A) (based on SCr of 3 mg/dL (H)). according to latest data. Monitor urine output and serial BMP and adjust therapy as needed. Avoid nephrotoxins and renally dose meds for GFR listed above.     Renal US showed mild bilateral hydronephrosis on admission  ATN- multifactorial 2/2 ?Vancomycin level supratherapeutic (Received vanc/ zosyn) +/- sepsis +/- IV contrast +/- obstructive     Felipe catheter replaced 6/13.  On continuous bladder irrigation.  CT abdomen pelvis without contrast showed no hydronephrosis or renal masses.  Given mild-to-moderate pleural effusions with bibasilar atelectasis, will hold IV fluids at this time.  Incentive spirometer.    Nephrology, urology consult       Acute osteomyelitis of right foot  As above      Urinary tract infection associated with indwelling urethral catheter  As above       Decubitus ulcer of heel, bilateral, unstageable  As above      Nursing home resident  Noted       Hypomagnesemia  Mg 1.1 on admission, improved with repletion      Hypokalemia  Potassium 2.9 improved with repletion      Hypertension associated with diabetes  Controlled     Goals of care, counseling/discussion  Advance Care Planning     Palliative care consulted to assist with overall goals of care discussion, pain management.  Appreciate assistance.  Per discussion with patient's sister Norma Ro -patient is a DNR.  Pending improvement in  clinical condition, agreeable to hospice      Peripheral vascular disorder due to diabetes mellitus  Aspirin currently on hold in anticipation for procedure    Lymphedema of both lower extremities  Wound consult    HTN, goal below 130/80    Controlled.  Continue home medications    Type 2 diabetes mellitus with hypoglycemia, with long-term current use of insulin  Patient's FSGs are controlled on current medication regimen.  Last A1c reviewed-   Lab Results   Component Value Date    HGBA1C >14.0 (H) 07/22/2022     Most recent fingerstick glucose reviewed-   Recent Labs   Lab 06/13/23  0209 06/13/23  0613 06/13/23  1210 06/13/23  1659   POCTGLUCOSE 216* 257* 188* 159*     Current correctional scale  Medium  Maintain anti-hyperglycemic dose as follows-   Antihyperglycemics (From admission, onward)    Start     Stop Route Frequency Ordered    06/12/23 0305  insulin aspart U-100 pen 0-5 Units         -- SubQ Before meals & nightly PRN 06/12/23 0209        Hold Oral hypoglycemics while patient is in the hospital.    VTE Risk Mitigation (From admission, onward)         Ordered     enoxaparin injection 30 mg  Daily         06/14/23 1047     IP VTE HIGH RISK PATIENT  Once         06/12/23 0209     Place sequential compression device  Until discontinued         06/12/23 0209                Discharge Planning   CAROL:      Code Status: DNR   Is the patient medically ready for discharge?:     Reason for patient still in hospital (select all that apply): Patient new problem, Patient trending condition, Treatment and Consult recommendations             So Fowler MD  Department of Hospital Medicine   [facility]

## 2023-06-16 NOTE — PLAN OF CARE
ERIN met with pt during rounding with MD.  No dc today. No cm needs at this time. SW will continue to follow pt throughout his transitions of care and assist with any dc needs.Upon dc pt will return to [facility].     ERIN attempted to contact [facility] admissions and there was no answer.      06/16/23 0836   Post-Acute Status   Post-Acute Authorization Other

## 2023-06-16 NOTE — PROGRESS NOTES
Attempted to apply wound vac dressings to bilateral heels however pt not able to tolerate despite oral and iv pain meds provided by nurse. Pt was screaming outloud and moaning drawing legs inward when vac dressing applied and vac started- pt settled once wound vac removed and nodded his head that it felt better with wound vac off.  Notified Dr. Rodney and Dr. Fowler. Dr. Rodney will attempt to reapply wound vac on Monday- until then, nursing to reinforce foot dressings prn strike thru drainage.

## 2023-06-16 NOTE — PROGRESS NOTES
Nephrology Progress Note       Consult Requested By: So Fowler MD  Reason for Consult: [place]GIANA      SUBJECTIVE:       Review of Systems   Constitutional:  Negative for chills and fever.   HENT:  Negative for congestion and sore throat.    Eyes:  Negative for blurred vision, double vision and photophobia.   Respiratory:  Negative for cough and shortness of breath.    Cardiovascular:  Positive for leg swelling. Negative for chest pain and palpitations.   Gastrointestinal:  Negative for abdominal pain, diarrhea, nausea and vomiting.   Genitourinary:  Negative for dysuria and urgency.   Musculoskeletal:  Negative for joint pain and myalgias.   Skin:  Negative for itching and rash.   Neurological:  Positive for weakness. Negative for dizziness, sensory change and headaches.   Endo/Heme/Allergies:  Negative for polydipsia. Does not bruise/bleed easily.   Psychiatric/Behavioral:  Negative for depression.      Past Medical History:   Diagnosis Date    Anemia     Cataract     Chronic cough     Diabetes mellitus type II     GERD (gastroesophageal reflux disease)     Glaucoma     Hyperlipidemia           OBJECTIVE:     Vital Signs (Most Recent)  Vitals:    06/16/23 1140 06/16/23 1244 06/16/23 1247 06/16/23 1314   BP:   (!) 157/70    BP Location:   Right arm    Patient Position:   Lying    Pulse:   (!) 133    Resp: 16 16 18 16   Temp:   97 °F (36.1 °C)    TempSrc:   Axillary    SpO2:   96%    Weight:       Height:                       Medications:   acetaminophen  1,000 mg Oral TID    ceFEPime (MAXIPIME) IVPB  2 g Intravenous Q24H    enoxparin  30 mg Subcutaneous Daily    metroNIDAZOLE  500 mg Oral Q8H    senna  8.6 mg Oral Daily    tamsulosin  0.4 mg Oral QHS           Physical Exam  Vitals and nursing note reviewed.   Constitutional:       General: He is not in acute distress.     Appearance: He is ill-appearing and toxic-appearing. He is not diaphoretic.   HENT:      Head: Normocephalic and atraumatic.      Mouth/Throat:       Pharynx: No oropharyngeal exudate.   Eyes:      General: No scleral icterus.     Conjunctiva/sclera: Conjunctivae normal.      Pupils: Pupils are equal, round, and reactive to light.   Cardiovascular:      Rate and Rhythm: Normal rate and regular rhythm.      Heart sounds: Normal heart sounds. No murmur heard.  Pulmonary:      Effort: Pulmonary effort is normal. No respiratory distress.      Breath sounds: Normal breath sounds.   Abdominal:      General: Bowel sounds are normal. There is no distension.      Palpations: Abdomen is soft.      Tenderness: There is no abdominal tenderness.   Genitourinary:     Comments: Knight   Musculoskeletal:         General: Normal range of motion.      Cervical back: Normal range of motion and neck supple.      Right lower leg: Edema present.      Left lower leg: Edema present.   Skin:     General: Skin is warm and dry.      Findings: No erythema.   Neurological:      Mental Status: He is alert and oriented to person, place, and time.      Cranial Nerves: No cranial nerve deficit.       Laboratory:  Recent Labs   Lab 06/14/23  0251 06/15/23  0816 06/16/23  0407   WBC 23.33* 21.89* 17.31*   HGB 8.4* 7.6* 7.9*   HCT 25.5* 23.6* 24.5*   * 769* 878*   MONO 4.3  1.0 5.1  1.1* 3.0*       Recent Labs   Lab 06/14/23  0251 06/15/23  0816 06/16/23  0407   * 134* 136   K 4.3 4.2 4.5   CL 95 101 103   CO2 26 23 23   BUN 35* 43* 46*   CREATININE 2.5* 3.0* 3.2*   CALCIUM 8.0* 7.7* 8.1*         Diagnostic Results:  X-Ray: Reviewed  US: Reviewed  Echo: Reviewed  ASSESSMENT/PLAN:     1. [place]NA -   at this point  multifactorial, ATN, Sepsis, on top High Vanco levels 36.9 =>33.4  now on irrigating  Knight - obstruction US kidney   Bilateral mild hydronephrosis.  Note, there is distention of the urinary bladder despite Knight catheter, hydronephrosis is suspected to be on the basis of reflux related to urinary bladder distension.  -- Urology consulted s/p  Knight irrigation appreciate  assistance     -- Making Urine supportive therapy for now - hopefully no need for RRT as his levels drops  Cr seems platoued 3.2  monitor for now   Vanc Levels in AM as well   -- Daily Renal Function Panel  -- Avoid Hypotension.  -- Renally dose all meds  -- Please avoid nephrotoxins, including NSAIDs, aminoglycosides, IV contrast (unless absolutely necessary), gadolinium, fleets and other phosphorous-based laxatives. Caution with antibiotics.    2. HTN (I10) - controlled   3. Anemia    Recent Labs   Lab 06/14/23  0251 06/15/23  0816 06/16/23  0407   HGB 8.4* 7.6* 7.9*   HCT 25.5* 23.6* 24.5*   * 769* 878*         Iron   Lab Results   Component Value Date    IRON 40 (L) 07/23/2022    TIBC 198 (L) 07/23/2022    FERRITIN 334 (H) 07/23/2022       4. 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 Nutrition/Hypoalbuminemia   Recent Labs   Lab 06/11/23  2334   ALBUMIN 2.2*       Protein supplement Nepro for now        Thank you for allowing me to participate in care of your patient  With any question please call   Thomas Villavicencio MD     [facility]  WILL Pedro MD,   OMD LISA Mayo MD E. V. Harmon, NP  [address]   [place], [address] [phone]  After hours answering service: [phone]

## 2023-06-16 NOTE — ASSESSMENT & PLAN NOTE
Onset 6/15 overnight  Suspect delirium in setting of pain, infection/renal injury, rash/itching  CT head to rule out organic etiology  Pain control - uptitrating narcotics for better control of pain (patient unable to verbalize location), schedule tylenol. Avoid NSAIDs given renal injury  Delirium precautions

## 2023-06-16 NOTE — ASSESSMENT & PLAN NOTE
This patient does have evidence of infective focus  My overall impression is sepsis.  Source: Urinary Tract and Skin and Soft Tissue (location Bilateral feet open diabetic wounds), sacral decubitus wounds  Antibiotics given-   Antibiotics (72h ago, onward)    Start     Stop Route Frequency Ordered    06/15/23 0849  ceFEPIme (MAXIPIME) 2 g in dextrose 5 % in water (D5W) 5 % 100 mL IVPB (MB+)         -- IV Every 24 hours (non-standard times) 06/14/23 1045    06/14/23 1400  metroNIDAZOLE tablet 500 mg         -- Oral Every 8 hours 06/14/23 1050    06/12/23 0802  vancomycin - pharmacy to dose  (vancomycin IVPB)        See Hyperspace for full Linked Orders Report.    -- IV pharmacy to manage frequency 06/12/23 0702        Latest lactate reviewed-  No results for input(s): LACTATE in the last 72 hours.  Organ dysfunction indicated by Acute kidney injury    Fluid challenge Not needed - patient is not hypotensive      Post- resuscitation assessment Yes Perfusion exam was performed within 6 hours of septic shock presentation after bolus shows Adequate tissue perfusion assessed by non-invasive monitoring       Will Not start Pressors- Levophed for MAP of 65  Source control achieved by:  S/p vanc/Zosyn in the ER.  Continue vancomycin, cefepime, Flagyl     CT lower extremity with contrast showed significant bilateral edema in distal for legs/feet, R>L.  Scattered foci of subcutaneous emphysema within plantar aspect of right hindfoot.  MRI confirmed early changes of R heel osteomyelitis.  Arterial ultrasound bilaterally showed no focal hemodynamic stenosis.    Renal ultrasound showed mild bilateral hydronephrosis.  Distended urinary bladder with echogenic material which could be blood or sequelae of infection.  Chronic indwelling Knight catheter switched by Urology on 06/13    Blood cultures 6/11 growing coagulase negative staph, probable contaminant.  Rapid ID by PCR negative for staph aureus.  Repeat cultures 6/13  pending    Urine culture 6/11 growing multiple organisms.  Concern for infection given chronic catheter    Infectious disease on board.  Stop vancomycin. Continue cefepime, flagyl    S/p heel & sacral wound debridement in OR 6/14    Sacral wound culture growing proteus, pending ID & susceptibility

## 2023-06-16 NOTE — SUBJECTIVE & OBJECTIVE
Interval History: Pt with new onset confusion per report. CT head pending.  Pt currently followed by ID, nephrology, urology, surgery, and podiatry.     Past Medical History:   Diagnosis Date    Anemia     Cataract     Chronic cough     Diabetes mellitus type II     GERD (gastroesophageal reflux disease)     Glaucoma     Hyperlipidemia        Past Surgical History:   Procedure Laterality Date    DEBRIDEMENT OF FOOT Bilateral 6/14/2023    Procedure: DEBRIDEMENT, FOOT;  Surgeon: Gunnar Rodney DPM;  Location: [facility] OR;  Service: Podiatry;  Laterality: Bilateral;  Jamshidi needle, Vancomycin powder    PRESSURE ULCER DEBRIDEMENT Bilateral 6/14/2023    Procedure: DEBRIDEMENT, PRESSURE ULCER;  Surgeon: Gunnar Rodney DPM;  Location: [facility] OR;  Service: Podiatry;  Laterality: Bilateral;       Review of patient's allergies indicates:  No Known Allergies    Medications:  Continuous Infusions:  Scheduled Meds:   acetaminophen  1,000 mg Oral TID    cefTRIAXone (ROCEPHIN) IVPB  2 g Intravenous Q24H    diphenhydrAMINE  50 mg Intravenous Q6H    enoxparin  30 mg Subcutaneous Daily    metroNIDAZOLE  500 mg Oral Q8H    permethrin   Topical (Top) Once    senna  8.6 mg Oral Daily    tamsulosin  0.4 mg Oral QHS     PRN Meds:dextrose 10%, dextrose 10%, dextrose, dextrose, dextrose, dextrose, glucagon (human recombinant), HYDROmorphone, insulin aspart U-100, melatonin, naloxone, ondansetron, ondansetron, oxybutynin, oxyCODONE, polyethylene glycol, potassium bicarbonate, simethicone, sodium chloride 0.9%, sodium chloride 0.9%    Family History       Problem Relation (Age of Onset)    Cancer Mother    Diabetes Father          Tobacco Use    Smoking status: Former     Packs/day: 0.20     Years: 45.00     Pack years: 9.00     Types: Cigarettes    Smokeless tobacco: Never   Substance and Sexual Activity    Alcohol use: Not Currently    Drug use: No    Sexual activity: Not Currently       Review of Systems   Unable to perform ROS:  Patient nonverbal   Objective:     Vital Signs (Most Recent):  Temp: 97 °F (36.1 °C) (06/16/23 1247)  Pulse: (!) 133 (06/16/23 1247)  Resp: 12 (06/16/23 1450)  BP: (!) 157/70 (06/16/23 1247)  SpO2: (!) 94 % (06/16/23 1542) Vital Signs (24h Range):  Temp:  [97 °F (36.1 °C)-97.2 °F (36.2 °C)] 97 °F (36.1 °C)  Pulse:  [] 133  Resp:  [12-20] 12  SpO2:  [94 %-97 %] 94 %  BP: (120-157)/(58-70) 157/70     Weight: 70.8 kg (156 lb)  Body mass index is 24.43 kg/m².       Physical Exam  Vitals and nursing note reviewed.   Constitutional:       General: He is in acute distress.      Appearance: He is ill-appearing.   HENT:      Head: Normocephalic.      Nose: Nose normal.      Mouth/Throat:      Mouth: Mucous membranes are dry.   Eyes:      Pupils: Pupils are equal, round, and reactive to light.   Cardiovascular:      Rate and Rhythm: Tachycardia present.      Pulses: Normal pulses.   Pulmonary:      Effort: Pulmonary effort is normal.   Abdominal:      General: Abdomen is flat.      Palpations: Abdomen is soft.   Genitourinary:     Comments: Catheter present   Musculoskeletal:      Right lower leg: Edema present.      Left lower leg: Edema present.   Skin:     General: Skin is warm and dry.      Findings: Rash and wound present.      Comments: Sacral wound, heel wounds, wrapped at this time- see photos   Old and new, scattered maculopapular scabs b/l UE, neck, chest & abdomen   Neurological:      Mental Status: He is alert.      Comments: Does not follow commands   Psychiatric:         Attention and Perception: He is inattentive.         Speech: He is noncommunicative.      Comments: Terrance           Review of Symptoms      Symptom Assessment (ESAS 0-10 Scale)  Unable to complete assessment due to Mental status change         Pain Assessment in Advanced Demential Scale (PAINAD)   Breathing - Independent of vocalization:  0  Negative vocalization:  1  Facial expression:  1  Body language:  1  Consolability:  1  Total:   4    Performance Status:  40    Living Arrangements:  Lives in nursing home    Psychosocial/Cultural:   See Palliative Psychosocial Note: No  Social Issues Identified: Coping deficit pt/family  Bereavement Risk: No  Caregiver Needs Discussed. Caregiver Distress: Yes: Issues of guilt  Cultural: none identified   **Primary  to Follow**  Palliative Care  Consult: No     Time-Based Charting:  Yes  Chart Review: 17 minutes  Face to Face: 15 minutes  Symptom Assessment: 15 minutes  Coordination of Care: 15 minutes  Discharge Planning: 10 minutes  Advance Care Planning: 10 minutes  Goals of Care: 10 minutes    Total Time Spent: 92 minutes      Advance Care Planning   Advance Directives:   Living Will: No    LaPOST: No    Do Not Resuscitate Status: Yes      Decision Making:  Family answered questions and Patient unable to communicate due to disease severity/cognitive impairment  Goals of Care: The family endorses that what is most important right now is to focus on remaining as independent as possible, symptom/pain control, quality of life, even if it means sacrificing a little time, improvement in condition but with limits to invasive therapies, and comfort and QOL     Accordingly, we have decided that the best plan to meet the patient's goals includes continuing with treatment at this time and enrolling in hospice care with clinical decline.        Significant Labs: All pertinent labs within the past 24 hours have been reviewed.  CBC:   Recent Labs   Lab 06/16/23  0407   WBC 17.31*   HGB 7.9*   HCT 24.5*   MCV 83   *     BMP:  Recent Labs   Lab 06/16/23  0407   GLU 97      K 4.5      CO2 23   BUN 46*   CREATININE 3.2*   CALCIUM 8.1*     LFT:  Lab Results   Component Value Date    AST 25 06/11/2023    ALKPHOS 159 (H) 06/11/2023    BILITOT 0.3 06/11/2023     Albumin:   Albumin   Date Value Ref Range Status   06/11/2023 2.2 (L) 3.5 - 5.2 g/dL Final     Protein:   Total Protein    Date Value Ref Range Status   06/11/2023 7.3 6.0 - 8.4 g/dL Final     Lactic acid:   Lab Results   Component Value Date    LACTATE 1.9 06/12/2023    LACTATE 2.4 (H) 06/11/2023       Significant Imaging: I have reviewed all pertinent imaging results/findings within the past 24 hours.

## 2023-06-16 NOTE — PROGRESS NOTES
[facility] - Telemetry  Urology  Progress Note    Patient Name: Mateo Foreman  MRN: [id number]  Admission Date: 6/11/2023  Hospital Length of Stay: 4 days      Subjective:     Interval History: Urine clamped with CBI clamped     Objective:     Temp:  [97 °F (36.1 °C)-98 °F (36.7 °C)] 97 °F (36.1 °C)  Pulse:  [] 133  Resp:  [14-20] 16  SpO2:  [94 %-97 %] 96 %  BP: (120-157)/(58-88) 157/70       NAD  RRR  CTAB  ABD S/ND/NTTP       Penis erosion with urethra to right infapubic area       Knight yellow       Ext: wounds    Significant Labs:  BMP:  Recent Labs   Lab 06/14/23  0251 06/15/23  0816 06/16/23  0407   * 134* 136   K 4.3 4.2 4.5   CL 95 101 103   CO2 26 23 23   BUN 35* 43* 46*   CREATININE 2.5* 3.0* 3.2*   CALCIUM 8.0* 7.7* 8.1*       CBC:  Recent Labs   Lab 06/14/23  0251 06/15/23  0816 06/16/23  0407   WBC 23.33* 21.89* 17.31*   HGB 8.4* 7.6* 7.9*   HCT 25.5* 23.6* 24.5*   * 769* 878*         Urology Specific Assessment:     Gross Hematuria    Plan:   Recommend hold lovenox given renal function and hematuria  Okay for diet, CBI cIamped since 7a, urine clear yellow  Contact urology on call with questions, he will follow up with patient in AM    Chidi Nash MD  Urology

## 2023-06-16 NOTE — ASSESSMENT & PLAN NOTE
Present on admission   Diffuse old and new maculopapular rash on chest, bilateral upper extremities  ?scabies  Trial of permethrin, antihistamines

## 2023-06-16 NOTE — NURSING
Pt is screaming and unable to state what was bothering him. Administered  Norco and reevaluated, but was still screaming. Also, administered Hydroxyzine and reassessed pt still screaming. Unable to determine what is bothering pt will notify MD

## 2023-06-16 NOTE — CONSULTS
[facility] - Telemetry  Palliative Medicine  Consult Note    Patient Name: Mateo Foreman  MRN: [id number]  Admission Date: 6/11/2023  Hospital Length of Stay: 4 days  Code Status: DNR   Attending Provider: So Fowler MD  Consulting Provider: Viktoriya Denton NP  Primary Care Physician: Tremaine Finch MD  Principal Problem:Severe sepsis    Patient information was obtained from patient, relative(s), past medical records and primary team.      Inpatient consult to Palliative Care  Consult performed by: Viktoriya Denton NP  Consult ordered by: So Fowler MD  Reason for consult: goals of care and pain and symptom managment.         Assessment/Plan:     Palliative Care  Palliative care encounter  Mr Foreman is a 72 y/o male with a past medical history of Anemia, Cataract, Chronic cough, Diabetes mellitus type II, GERD (gastroesophageal reflux disease), Glaucoma, and Hyperlipidemia.. The patient presented to the [facility] on 6/11/2023 with sever Sepsis with LE edema, chronic bilateral heel wounds and sacral wound, chronic felipe, [place].  Pt underwent heel and sacral debridement on 6/14/2023.  Palliative consulted for goals of care discussion and pain and symptom management.     -At time of initial encounter, pt in bed, non-verbal and moaning.  Pt continuously scratching/ itching upper extremities, neck, face and trunk.  Pt does not follow commands.  Pt appears to be extremely uncomfortably.  -Reached out to pts sister, Norma Ro, for collateral information.  Pts sister is out of town until Monday night. She reports that pt is usually conversational at baseline but has been in a state of physical decline since having covid last year.  Pt has been wheelchair bound since then and often refuses care and refuses to sleep in his bed as he prefers his wheelchair.  She reports that at one point, pts wounds were healing, but is aware that more recently they have been progressing.   -Norma reports recently having a long  "conversation with her brother about his failing health and his wishes for the future.  She reports that pt expressed to her that "he does not want to suffer anymore and feels as though it is nearing his time to go." Along those lines we discussed code status. Norma reports that pt would not accept CPR if his heart were to stop beating or he was to stop breathing.  Norma in agreement with DNR status.  Code status updated in EMR.   -At this time, the topic of hospice was introduced.  We discussed what hospice could offer the pt and family.  Norma agrees that focusing on the pts comfort and dignity is what the pt would want in this phase of his life. Pending improvement over the next few days in clinical condition, agreeable to hospice at time of discharge.  Discussed that [facility] with the addition of hosp[facility] services or possible hospice house depending on pts clinical picture and modality of symptom management required to meets pts needs.   -Norma reports rash has been present for weeks, is not associated with antibiotic usage.  Noted diffuse old and new maculopapular rash on chest, bilateral upper extremities and neck.      -Discussed pain/symptom recommendations with attending MD, see MAR for full details   --Benadryl 50 mg IV Q6H standing orders at this time   --Oxy IR 5 mg PO Q6H PRN for pain  --Dilaudid 1mg IV Q6H PRN pain, severe or unable to tolerate PO meds   --Permethrin 5% cream applied topically, possible scabies?     -Will follow up with pt and family Monday.    Thank you for your consult. I will follow-up with patient. Please contact us if you have any additional questions.    Subjective:     HPI:   Per chart, "73-year-old past medical history of type 2 diabetes, GERD, hyperlipidemia, anemia, cataracts presenting from his nursing home with increased bilateral lower extremity swelling weeping and foul smelling. Patient is known to be unkempt.   As per patient had chronic " "wounds of feet were wrapped weeks ago have not been rewrapped by weeks. Patient is not compliant with care. Patient mentions having abnormal drainage from his heels.  Denies any chest pain, cough, nausea vomiting diarrhea or abdominal pain fevers chills.Not hypotensive on arrival. Tachycardic , WBC 23 K - admitted for sepsis due to SSTI/UTI"    Interval History: Pt with new onset confusion per report. CT head pending.  Pt currently followed by ID, nephrology, urology, surgery, and podiatry.     Past Medical History:   Diagnosis Date    Anemia     Cataract     Chronic cough     Diabetes mellitus type II     GERD (gastroesophageal reflux disease)     Glaucoma     Hyperlipidemia        Past Surgical History:   Procedure Laterality Date    DEBRIDEMENT OF FOOT Bilateral 6/14/2023    Procedure: DEBRIDEMENT, FOOT;  Surgeon: Gunnar Rodney DPM;  Location: [facility] OR;  Service: Podiatry;  Laterality: Bilateral;  Jamshidi needle, Vancomycin powder    PRESSURE ULCER DEBRIDEMENT Bilateral 6/14/2023    Procedure: DEBRIDEMENT, PRESSURE ULCER;  Surgeon: Gunnar Rodney DPM;  Location: [facility] OR;  Service: Podiatry;  Laterality: Bilateral;       Review of patient's allergies indicates:  No Known Allergies    Medications:  Continuous Infusions:  Scheduled Meds:   acetaminophen  1,000 mg Oral TID    cefTRIAXone (ROCEPHIN) IVPB  2 g Intravenous Q24H    diphenhydrAMINE  50 mg Intravenous Q6H    enoxparin  30 mg Subcutaneous Daily    metroNIDAZOLE  500 mg Oral Q8H    permethrin   Topical (Top) Once    senna  8.6 mg Oral Daily    tamsulosin  0.4 mg Oral QHS     PRN Meds:dextrose 10%, dextrose 10%, dextrose, dextrose, dextrose, dextrose, glucagon (human recombinant), HYDROmorphone, insulin aspart U-100, melatonin, naloxone, ondansetron, ondansetron, oxybutynin, oxyCODONE, polyethylene glycol, potassium bicarbonate, simethicone, sodium chloride 0.9%, sodium chloride 0.9%    Family History       Problem Relation (Age of " Onset)    Cancer Mother    Diabetes Father          Tobacco Use    Smoking status: Former     Packs/day: 0.20     Years: 45.00     Pack years: 9.00     Types: Cigarettes    Smokeless tobacco: Never   Substance and Sexual Activity    Alcohol use: Not Currently    Drug use: No    Sexual activity: Not Currently       Review of Systems   Unable to perform ROS: Patient nonverbal   Objective:     Vital Signs (Most Recent):  Temp: 97 °F (36.1 °C) (06/16/23 1247)  Pulse: (!) 133 (06/16/23 1247)  Resp: 12 (06/16/23 1450)  BP: (!) 157/70 (06/16/23 1247)  SpO2: (!) 94 % (06/16/23 1542) Vital Signs (24h Range):  Temp:  [97 °F (36.1 °C)-97.2 °F (36.2 °C)] 97 °F (36.1 °C)  Pulse:  [] 133  Resp:  [12-20] 12  SpO2:  [94 %-97 %] 94 %  BP: (120-157)/(58-70) 157/70     Weight: 70.8 kg (156 lb)  Body mass index is 24.43 kg/m².       Physical Exam  Vitals and nursing note reviewed.   Constitutional:       General: He is in acute distress.      Appearance: He is ill-appearing.   HENT:      Head: Normocephalic.      Nose: Nose normal.      Mouth/Throat:      Mouth: Mucous membranes are dry.   Eyes:      Pupils: Pupils are equal, round, and reactive to light.   Cardiovascular:      Rate and Rhythm: Tachycardia present.      Pulses: Normal pulses.   Pulmonary:      Effort: Pulmonary effort is normal.   Abdominal:      General: Abdomen is flat.      Palpations: Abdomen is soft.   Genitourinary:     Comments: Catheter present   Musculoskeletal:      Right lower leg: Edema present.      Left lower leg: Edema present.   Skin:     General: Skin is warm and dry.      Findings: Rash and wound present.      Comments: Sacral wound, heel wounds, wrapped at this time- see photos   Old and new, scattered maculopapular scabs b/l UE, neck, chest & abdomen   Neurological:      Mental Status: He is alert.      Comments: Does not follow commands   Psychiatric:         Attention and Perception: He is inattentive.         Speech: He is  noncommunicative.      Comments: Terrance           Review of Symptoms      Symptom Assessment (ESAS 0-10 Scale)  Unable to complete assessment due to Mental status change         Pain Assessment in Advanced Demential Scale (PAINAD)   Breathing - Independent of vocalization:  0  Negative vocalization:  1  Facial expression:  1  Body language:  1  Consolability:  1  Total:  4    Performance Status:  40    Living Arrangements:  Lives in nursing home    Psychosocial/Cultural:   See Palliative Psychosocial Note: No  Social Issues Identified: Coping deficit pt/family  Bereavement Risk: No  Caregiver Needs Discussed. Caregiver Distress: Yes: Issues of guilt  Cultural: none identified   **Primary  to Follow**  Palliative Care  Consult: No     Time-Based Charting:  Yes  Chart Review: 17 minutes  Face to Face: 15 minutes  Symptom Assessment: 15 minutes  Coordination of Care: 15 minutes  Discharge Planning: 10 minutes  Advance Care Planning: 10 minutes  Goals of Care: 10 minutes    Total Time Spent: 92 minutes      Advance Care Planning   Advance Directives:   Living Will: No    LaPOST: No    Do Not Resuscitate Status: Yes      Decision Making:  Family answered questions and Patient unable to communicate due to disease severity/cognitive impairment  Goals of Care: The family endorses that what is most important right now is to focus on remaining as independent as possible, symptom/pain control, quality of life, even if it means sacrificing a little time, improvement in condition but with limits to invasive therapies, and comfort and QOL     Accordingly, we have decided that the best plan to meet the patient's goals includes continuing with treatment at this time and enrolling in hospice care with clinical decline.        Significant Labs: All pertinent labs within the past 24 hours have been reviewed.  CBC:   Recent Labs   Lab 06/16/23  0407   WBC 17.31*   HGB 7.9*   HCT 24.5*   MCV 83   *      BMP:  Recent Labs   Lab 06/16/23  0407   GLU 97      K 4.5      CO2 23   BUN 46*   CREATININE 3.2*   CALCIUM 8.1*     LFT:  Lab Results   Component Value Date    AST 25 06/11/2023    ALKPHOS 159 (H) 06/11/2023    BILITOT 0.3 06/11/2023     Albumin:   Albumin   Date Value Ref Range Status   06/11/2023 2.2 (L) 3.5 - 5.2 g/dL Final     Protein:   Total Protein   Date Value Ref Range Status   06/11/2023 7.3 6.0 - 8.4 g/dL Final     Lactic acid:   Lab Results   Component Value Date    LACTATE 1.9 06/12/2023    LACTATE 2.4 (H) 06/11/2023       Significant Imaging: I have reviewed all pertinent imaging results/findings within the past 24 hours.      Viktoriya Denton NP  Palliative Medicine  [facility]    Advance Care Planning     Date: 06/16/2023    Code Status  In light of the patients advanced and life limiting illness,I engaged the the family in a conversation about the patient's preferences for care  at the very end of life. The patient wishes to have a natural, peaceful death.  Along those lines, the patient does not wish to have CPR or other invasive treatments performed when his heart and/or breathing stops. I communicated to the family that a DNR order would be placed in his medical record to reflect this preference.  I spent a total of 20 minutes engaging the patient in this advance care planning discussion.          Advance Care Planning     Date: 06/16/2023    [facility]  I engaged the family in a conversation about advance care planning and we specifically addressed what the goals of care would be moving forward, in light of the patient's change in clinical status, specifically severe sepsis, debility, PVD,multiple ulcers, CVA.  We did specifically address the patient's likely prognosis, which is poor.  We explored the patient's values and preferences for future care.  The family endorses that what is most important right now is to focus on remaining as independent as possible, symptom/pain  control, quality of life, even if it means sacrificing a little time, improvement in condition but with limits to invasive therapies and comfort and QOL     Accordingly, we have decided that the best plan to meet the patient's goals includes continuing with treatment and enrolling in hospice care    I did explain the role for hospice care at this stage of the patient's illness, including its ability to help the patient live with the best quality of life possible.  We will be making a hospice referral.    I spent a total of 20 minutes engaging the patient in this advance care planning discussion.

## 2023-06-16 NOTE — PROGRESS NOTES
"Surhery follow up  BP (!) 157/70 (BP Location: Right arm, Patient Position: Lying)   Pulse (!) 133   Temp 97 °F (36.1 °C) (Axillary)   Resp 12   Ht 5' 7" (1.702 m)   Wt 70.8 kg (156 lb)   SpO2 96%   BMI 24.43 kg/m²   I/O last 3 completed shifts:  In: 97090 [P.O.:60; I.V.:1000; Other:13262; IV Piggyback:86]  Out: 97013 [Urine:07194]  No intake/output data recorded.    Recent Results (from the past 336 hour(s))   CBC auto differential    Collection Time: 06/16/23  4:07 AM   Result Value Ref Range    WBC 17.31 (H) 3.90 - 12.70 K/uL    Hemoglobin 7.9 (L) 14.0 - 18.0 g/dL    Hematocrit 24.5 (L) 40.0 - 54.0 %    Platelets 878 (H) 150 - 450 K/uL   CBC auto differential    Collection Time: 06/15/23  8:16 AM   Result Value Ref Range    WBC 21.89 (H) 3.90 - 12.70 K/uL    Hemoglobin 7.6 (L) 14.0 - 18.0 g/dL    Hematocrit 23.6 (L) 40.0 - 54.0 %    Platelets 769 (H) 150 - 450 K/uL   CBC with Automated Differential    Collection Time: 06/14/23  2:51 AM   Result Value Ref Range    WBC 23.33 (H) 3.90 - 12.70 K/uL    Hemoglobin 8.4 (L) 14.0 - 18.0 g/dL    Hematocrit 25.5 (L) 40.0 - 54.0 %    Platelets 795 (H) 150 - 450 K/uL   Wbc trending down , continue present treatment , nem orders written for left buttock wound.  "

## 2023-06-16 NOTE — PLAN OF CARE
Problem: Adult Inpatient Plan of Care  Goal: Plan of Care Review  Outcome: Ongoing, Progressing  Goal: Patient-Specific Goal (Individualized)  Outcome: Ongoing, Progressing  Goal: Absence of Hospital-Acquired Illness or Injury  Outcome: Ongoing, Progressing  Goal: Optimal Comfort and Wellbeing  Outcome: Ongoing, Progressing  Goal: Readiness for Transition of Care  Outcome: Ongoing, Progressing     Problem: Skin Injury Risk Increased  Goal: Skin Health and Integrity  Outcome: Ongoing, Progressing     Problem: Diabetes Comorbidity  Goal: Blood Glucose Level Within Targeted Range  Outcome: Ongoing, Progressing     Problem: Adjustment to Illness (Sepsis/Septic Shock)  Goal: Optimal Coping  Outcome: Ongoing, Progressing     Problem: Bleeding (Sepsis/Septic Shock)  Goal: Absence of Bleeding  Outcome: Ongoing, Progressing     Problem: Glycemic Control Impaired (Sepsis/Septic Shock)  Goal: Blood Glucose Level Within Desired Range  Outcome: Ongoing, Progressing     Problem: Infection Progression (Sepsis/Septic Shock)  Goal: Absence of Infection Signs and Symptoms  Outcome: Ongoing, Progressing     Problem: Nutrition Impaired (Sepsis/Septic Shock)  Goal: Optimal Nutrition Intake  Outcome: Ongoing, Progressing     Problem: UTI (Urinary Tract Infection)  Goal: Improved Infection Symptoms  Outcome: Ongoing, Progressing     Problem: Impaired Wound Healing  Goal: Optimal Wound Healing  Outcome: Ongoing, Progressing     Problem: Hypertension Comorbidity  Goal: Blood Pressure in Desired Range  Outcome: Ongoing, Progressing     Problem: Fall Injury Risk  Goal: Absence of Fall and Fall-Related Injury  Outcome: Ongoing, Progressing     Problem: Fluid and Electrolyte Imbalance (Acute Kidney Injury/Impairment)  Goal: Fluid and Electrolyte Balance  Outcome: Ongoing, Progressing     Problem: Oral Intake Inadequate (Acute Kidney Injury/Impairment)  Goal: Optimal Nutrition Intake  Outcome: Ongoing, Progressing     Problem: Renal Function  Impairment (Acute Kidney Injury/Impairment)  Goal: Effective Renal Function  Outcome: Ongoing, Progressing     Problem: Infection  Goal: Absence of Infection Signs and Symptoms  Outcome: Ongoing, Progressing     Problem: Bleeding (Surgery Nonspecified)  Goal: Absence of Bleeding  Outcome: Ongoing, Progressing     Problem: Bowel Motility Impaired (Surgery Nonspecified)  Goal: Effective Bowel Elimination  Outcome: Ongoing, Progressing     Problem: Fluid and Electrolyte Imbalance (Surgery Nonspecified)  Goal: Fluid and Electrolyte Balance  Outcome: Ongoing, Progressing     Problem: Glycemic Control Impaired (Surgery Nonspecified)  Goal: Blood Glucose Level Within Targeted Range  Outcome: Ongoing, Progressing     Problem: Infection (Surgery Nonspecified)  Goal: Absence of Infection Signs and Symptoms  Outcome: Ongoing, Progressing     Problem: Pain (Surgery Nonspecified)  Goal: Acceptable Pain Control  Outcome: Ongoing, Progressing     Problem: Postoperative Nausea and Vomiting (Surgery Nonspecified)  Goal: Nausea and Vomiting Relief  Outcome: Ongoing, Progressing     Problem: Respiratory Compromise (Surgery Nonspecified)  Goal: Effective Oxygenation and Ventilation  Outcome: Ongoing, Progressing

## 2023-06-16 NOTE — NURSING
Notified MD that pt is oriented to self only and unsure if this is new for pt. Per MD will place orders

## 2023-06-16 NOTE — HPI
"Per chart, "73-year-old past medical history of type 2 diabetes, GERD, hyperlipidemia, anemia, cataracts presenting from his nursing home with increased bilateral lower extremity swelling weeping and foul smelling. Patient is known to be unkempt.   As per patient had chronic wounds of feet were wrapped weeks ago have not been rewrapped by weeks. Patient is not compliant with care. Patient mentions having abnormal drainage from his heels.  Denies any chest pain, cough, nausea vomiting diarrhea or abdominal pain fevers chills.Not hypotensive on arrival. Tachycardic , WBC 23 K - admitted for sepsis due to SSTI/UTI"  "

## 2023-06-16 NOTE — PROGRESS NOTES
[facility]                   Pharmacy  Pharmacy Vancomycin/AG Sign-off    Therapy with vancomycin completed and/or consult discontinued by provider.  Pharmacy will sign off, please re-consult as needed. Thank you for allowing us to participate in this patient's care.     Gabino Moya, PharmD    [phone]

## 2023-06-16 NOTE — ASSESSMENT & PLAN NOTE
Advance Care Planning     Palliative care consulted to assist with overall goals of care discussion, pain management.  Appreciate assistance.  Per discussion with patient's sister Norma Ro -patient is a DNR.  Pending improvement in clinical condition, agreeable to hospice

## 2023-06-16 NOTE — PROGRESS NOTES
ID to follow from [facility] today. Continue cefepime and flagyl while awaiting cxs. Stop vanco. Call with questions

## 2023-06-16 NOTE — ASSESSMENT & PLAN NOTE
Mr Foreman is a 72 y/o male with a past medical history of Anemia, Cataract, Chronic cough, Diabetes mellitus type II, GERD (gastroesophageal reflux disease), Glaucoma, and Hyperlipidemia.. The patient presented to the [facility] on 6/11/2023 with sever Sepsis with LE edema, chronic bilateral heel wounds and sacral wound, chronic felipe, GEORGIANA.  Pt underwent heel and sacral debridement on 6/14/2023.  Palliative consulted for goals of care discussion and pain and symptom management.     -At time of initial encounter, pt in bed, non-verbal and moaning.  Pt continuously scratching/ itching upper extremities, neck, face and trunk.  Pt does not follow commands.  Pt appears to be extremely uncomfortably.  -Reached out to pts sister, Norma Ro, for collateral information.  Pts sister is out of town until Monday night. She reports that pt is usually conversational at baseline but has been in a state of physical decline since having covid last year.  Pt has been wheelchair bound since then and often refuses care and refuses to sleep in his bed as he prefers his wheelchair.  She reports that at one point, pts wounds were healing, but is aware that more recently they have been progressing.   -Norma reports recently having a long conversation with her brother about his failing health and his wishes for the future.  She reports that pt expressed to her that he does not want to suffer anymore and feels as though it is nearing his time to go. Along those lines we discussed code status. Norma reports that pt would not accept CPR if his heart were to stop beating or he was to stop breathing.  Norma in agreement with DNR status.    -At this time the topic of hospice was introduced.  We discussed what hospice could offer the pt and family.  Norma agrees that focusing on the pts comfort and dignity is what the pt would want in this phase of his life. Pending improvement over the next few days in clinical condition, agreeable  to hospice at time of discharge.  Discussed that this could be a return to [facility] with the addition of hospice services or possible hospice house depending on pts clinical picture and modality of symptom management.   -Norma reports rash has been present for weeks, is not associated with antibiotic usage.  Noted diffuse old and new maculopapular rash on chest, bilateral upper extremities and neck.      -Discussed pain/symptom recommendations with attending MD, see MAR for full details   --Benadryl 50 mg IV Q6H standing orders at this time   --Oxy IR 5 mg PO Q6H PRN for pain  --Dilaudid 1mg IV Q6H PRN pain, severe or unable to tolerate PO meds   --Permethrin 5% cream applied topically, possible scabies?     -Will follow up with pt and family Monday.

## 2023-06-16 NOTE — SUBJECTIVE & OBJECTIVE
Interval History:  Confused overnight, moaning.  Unclear if patient had chest pain as he was grabbing his chest.  Received Norco with minimal improvement.  Chest x-ray showed pleural effusion, atelectasis.  EKG this morning showed no acute ST-T changes.  Troponin negative.  Patient does not respond to verbal questions.  He appears to be moaning in pain.  Received IV Dilaudid.    On subsequent exam, when not stimulated patient appears calm but when attempting to communicate is moaning.    Review of Systems  Objective:     Vital Signs (Most Recent):  Temp: 97 °F (36.1 °C) (06/16/23 1247)  Pulse: (!) 133 (06/16/23 1247)  Resp: 16 (06/16/23 1314)  BP: (!) 157/70 (06/16/23 1247)  SpO2: 96 % (06/16/23 1247) Vital Signs (24h Range):  Temp:  [97 °F (36.1 °C)-98 °F (36.7 °C)] 97 °F (36.1 °C)  Pulse:  [] 133  Resp:  [14-20] 16  SpO2:  [94 %-97 %] 96 %  BP: (120-157)/(58-88) 157/70     Weight: 70.8 kg (156 lb)  Body mass index is 24.43 kg/m².    Intake/Output Summary (Last 24 hours) at 6/16/2023 1421  Last data filed at 6/16/2023 0632  Gross per 24 hour   Intake 5545.96 ml   Output 4425 ml   Net 1120.96 ml           Physical Exam  Vitals and nursing note reviewed.   Constitutional:       Appearance: He is well-developed.      Comments: Moaning, not talking    Cardiovascular:      Rate and Rhythm: Normal rate and regular rhythm.   Pulmonary:      Effort: Pulmonary effort is normal. No respiratory distress.      Breath sounds: Normal breath sounds.   Abdominal:      Palpations: Abdomen is soft.      Tenderness: There is no abdominal tenderness.      Comments: Knight catheter in place with clear urine   Musculoskeletal:      Right lower leg: Edema present.      Left lower leg: Edema present.      Comments:   Chronic LE edema  Bilateral feet dressed   Skin:     Findings: Rash present.      Comments: Sacral wounds present on admission  Old & new scattered maculopapular scabs b/l UE, chest & abdomen  Dry scaly skin bilateral  lower extremities     Neurological:      Mental Status: He is alert.           Significant Labs: All pertinent labs within the past 24 hours have been reviewed.    Significant Imaging: I have reviewed all pertinent imaging results/findings within the past 24 hours.

## 2023-06-17 LAB
ANION GAP SERPL CALC-SCNC: 14 MMOL/L (ref 8–16)
BACTERIA #/AREA URNS HPF: ABNORMAL /HPF
BACTERIA BLD CULT: NORMAL
BACTERIA SPEC AEROBE CULT: ABNORMAL
BASOPHILS # BLD AUTO: 0.08 K/UL (ref 0–0.2)
BASOPHILS NFR BLD: 0.5 % (ref 0–1.9)
BILIRUB UR QL STRIP: NEGATIVE
BUN SERPL-MCNC: 45 MG/DL (ref 8–23)
CALCIUM SERPL-MCNC: 8.5 MG/DL (ref 8.7–10.5)
CHLORIDE SERPL-SCNC: 104 MMOL/L (ref 95–110)
CLARITY UR: ABNORMAL
CO2 SERPL-SCNC: 22 MMOL/L (ref 23–29)
COLOR UR: YELLOW
CREAT SERPL-MCNC: 3.3 MG/DL (ref 0.5–1.4)
DIFFERENTIAL METHOD: ABNORMAL
EOSINOPHIL # BLD AUTO: 2 K/UL (ref 0–0.5)
EOSINOPHIL NFR BLD: 11.5 % (ref 0–8)
ERYTHROCYTE [DISTWIDTH] IN BLOOD BY AUTOMATED COUNT: 14.4 % (ref 11.5–14.5)
EST. GFR  (NO RACE VARIABLE): 19 ML/MIN/1.73 M^2
GLUCOSE SERPL-MCNC: 79 MG/DL (ref 70–110)
GLUCOSE UR QL STRIP: NEGATIVE
HCT VFR BLD AUTO: 26.2 % (ref 40–54)
HGB BLD-MCNC: 8.4 G/DL (ref 14–18)
HGB UR QL STRIP: ABNORMAL
HYALINE CASTS #/AREA URNS LPF: 0 /LPF
IMM GRANULOCYTES # BLD AUTO: 0.56 K/UL (ref 0–0.04)
IMM GRANULOCYTES NFR BLD AUTO: 3.3 % (ref 0–0.5)
KETONES UR QL STRIP: ABNORMAL
LEUKOCYTE ESTERASE UR QL STRIP: ABNORMAL
LYMPHOCYTES # BLD AUTO: 1.1 K/UL (ref 1–4.8)
LYMPHOCYTES NFR BLD: 6.7 % (ref 18–48)
MCH RBC QN AUTO: 26.3 PG (ref 27–31)
MCHC RBC AUTO-ENTMCNC: 32.1 G/DL (ref 32–36)
MCV RBC AUTO: 82 FL (ref 82–98)
MICROSCOPIC COMMENT: ABNORMAL
MONOCYTES # BLD AUTO: 1.1 K/UL (ref 0.3–1)
MONOCYTES NFR BLD: 6.6 % (ref 4–15)
NEUTROPHILS # BLD AUTO: 12.2 K/UL (ref 1.8–7.7)
NEUTROPHILS NFR BLD: 71.4 % (ref 38–73)
NITRITE UR QL STRIP: NEGATIVE
NON-SQ EPI CELLS #/AREA URNS HPF: 1 /HPF
NRBC BLD-RTO: 0 /100 WBC
PH UR STRIP: 6 [PH] (ref 5–8)
PLATELET # BLD AUTO: 943 K/UL (ref 150–450)
PMV BLD AUTO: 8.8 FL (ref 9.2–12.9)
POCT GLUCOSE: 106 MG/DL (ref 70–110)
POCT GLUCOSE: 119 MG/DL (ref 70–110)
POCT GLUCOSE: 69 MG/DL (ref 70–110)
POCT GLUCOSE: 83 MG/DL (ref 70–110)
POCT GLUCOSE: 93 MG/DL (ref 70–110)
POTASSIUM SERPL-SCNC: 4.3 MMOL/L (ref 3.5–5.1)
PROT UR QL STRIP: ABNORMAL
RBC # BLD AUTO: 3.19 M/UL (ref 4.6–6.2)
RBC #/AREA URNS HPF: >100 /HPF (ref 0–4)
SODIUM SERPL-SCNC: 140 MMOL/L (ref 136–145)
SP GR UR STRIP: 1.01 (ref 1–1.03)
SQUAMOUS #/AREA URNS HPF: 0 /HPF
URN SPEC COLLECT METH UR: ABNORMAL
UROBILINOGEN UR STRIP-ACNC: NEGATIVE EU/DL
WBC # BLD AUTO: 17.07 K/UL (ref 3.9–12.7)
WBC #/AREA URNS HPF: >100 /HPF (ref 0–5)
WBC CLUMPS URNS QL MICRO: ABNORMAL
YEAST URNS QL MICRO: ABNORMAL

## 2023-06-17 PROCEDURE — 85025 COMPLETE CBC W/AUTO DIFF WBC: CPT | Performed by: STUDENT IN AN ORGANIZED HEALTH CARE EDUCATION/TRAINING PROGRAM

## 2023-06-17 PROCEDURE — 99232 PR SUBSEQUENT HOSPITAL CARE,LEVL II: ICD-10-PCS | Mod: ,,, | Performed by: UROLOGY

## 2023-06-17 PROCEDURE — 11000001 HC ACUTE MED/SURG PRIVATE ROOM

## 2023-06-17 PROCEDURE — 99232 SBSQ HOSP IP/OBS MODERATE 35: CPT | Mod: ,,, | Performed by: UROLOGY

## 2023-06-17 PROCEDURE — 63600175 PHARM REV CODE 636 W HCPCS: Performed by: STUDENT IN AN ORGANIZED HEALTH CARE EDUCATION/TRAINING PROGRAM

## 2023-06-17 PROCEDURE — S0030 INJECTION, METRONIDAZOLE: HCPCS | Performed by: STUDENT IN AN ORGANIZED HEALTH CARE EDUCATION/TRAINING PROGRAM

## 2023-06-17 PROCEDURE — 25000003 PHARM REV CODE 250: Performed by: INTERNAL MEDICINE

## 2023-06-17 PROCEDURE — 81000 URINALYSIS NONAUTO W/SCOPE: CPT | Performed by: INTERNAL MEDICINE

## 2023-06-17 PROCEDURE — 80048 BASIC METABOLIC PNL TOTAL CA: CPT | Performed by: STUDENT IN AN ORGANIZED HEALTH CARE EDUCATION/TRAINING PROGRAM

## 2023-06-17 PROCEDURE — 94761 N-INVAS EAR/PLS OXIMETRY MLT: CPT

## 2023-06-17 PROCEDURE — 25000003 PHARM REV CODE 250: Performed by: STUDENT IN AN ORGANIZED HEALTH CARE EDUCATION/TRAINING PROGRAM

## 2023-06-17 PROCEDURE — 36415 COLL VENOUS BLD VENIPUNCTURE: CPT | Performed by: STUDENT IN AN ORGANIZED HEALTH CARE EDUCATION/TRAINING PROGRAM

## 2023-06-17 PROCEDURE — 99900035 HC TECH TIME PER 15 MIN (STAT)

## 2023-06-17 RX ORDER — ERGOCALCIFEROL 1.25 MG/1
50000 CAPSULE ORAL
Status: DISCONTINUED | OUTPATIENT
Start: 2023-06-17 | End: 2023-06-20 | Stop reason: HOSPADM

## 2023-06-17 RX ORDER — METRONIDAZOLE 500 MG/100ML
500 INJECTION, SOLUTION INTRAVENOUS
Status: DISCONTINUED | OUTPATIENT
Start: 2023-06-17 | End: 2023-06-18

## 2023-06-17 RX ORDER — DIPHENHYDRAMINE HYDROCHLORIDE 50 MG/ML
12.5 INJECTION INTRAMUSCULAR; INTRAVENOUS EVERY 6 HOURS
Status: DISPENSED | OUTPATIENT
Start: 2023-06-17 | End: 2023-06-19

## 2023-06-17 RX ORDER — HYDROMORPHONE HYDROCHLORIDE 1 MG/ML
1 INJECTION, SOLUTION INTRAMUSCULAR; INTRAVENOUS; SUBCUTANEOUS EVERY 4 HOURS PRN
Status: DISCONTINUED | OUTPATIENT
Start: 2023-06-17 | End: 2023-06-20 | Stop reason: HOSPADM

## 2023-06-17 RX ORDER — OXYCODONE HYDROCHLORIDE 5 MG/1
10 TABLET ORAL EVERY 4 HOURS PRN
Status: DISCONTINUED | OUTPATIENT
Start: 2023-06-17 | End: 2023-06-20 | Stop reason: HOSPADM

## 2023-06-17 RX ORDER — OXYCODONE HYDROCHLORIDE 5 MG/1
5 TABLET ORAL EVERY 6 HOURS PRN
Status: DISCONTINUED | OUTPATIENT
Start: 2023-06-17 | End: 2023-06-17

## 2023-06-17 RX ADMIN — ENOXAPARIN SODIUM 30 MG: 30 INJECTION SUBCUTANEOUS at 04:06

## 2023-06-17 RX ADMIN — OXYCODONE HYDROCHLORIDE 10 MG: 5 TABLET ORAL at 12:06

## 2023-06-17 RX ADMIN — HYDROMORPHONE HYDROCHLORIDE 1 MG: 1 INJECTION, SOLUTION INTRAMUSCULAR; INTRAVENOUS; SUBCUTANEOUS at 05:06

## 2023-06-17 RX ADMIN — HYDROMORPHONE HYDROCHLORIDE 1 MG: 1 INJECTION, SOLUTION INTRAMUSCULAR; INTRAVENOUS; SUBCUTANEOUS at 10:06

## 2023-06-17 RX ADMIN — MEROPENEM 500 MG: 500 INJECTION, POWDER, FOR SOLUTION INTRAVENOUS at 04:06

## 2023-06-17 RX ADMIN — METRONIDAZOLE 500 MG: 500 TABLET ORAL at 05:06

## 2023-06-17 RX ADMIN — DIPHENHYDRAMINE HYDROCHLORIDE 50 MG: 50 INJECTION, SOLUTION INTRAMUSCULAR; INTRAVENOUS at 12:06

## 2023-06-17 RX ADMIN — DIPHENHYDRAMINE HYDROCHLORIDE 50 MG: 50 INJECTION, SOLUTION INTRAMUSCULAR; INTRAVENOUS at 05:06

## 2023-06-17 RX ADMIN — HYDROMORPHONE HYDROCHLORIDE 1 MG: 1 INJECTION, SOLUTION INTRAMUSCULAR; INTRAVENOUS; SUBCUTANEOUS at 09:06

## 2023-06-17 RX ADMIN — HYDROMORPHONE HYDROCHLORIDE 1 MG: 1 INJECTION, SOLUTION INTRAMUSCULAR; INTRAVENOUS; SUBCUTANEOUS at 04:06

## 2023-06-17 RX ADMIN — DEXTROSE MONOHYDRATE 125 ML: 100 INJECTION, SOLUTION INTRAVENOUS at 12:06

## 2023-06-17 RX ADMIN — METRONIDAZOLE 500 MG: 5 INJECTION, SOLUTION INTRAVENOUS at 03:06

## 2023-06-17 NOTE — ASSESSMENT & PLAN NOTE
Present on admission   Diffuse old and new maculopapular rash on chest, bilateral upper extremities  ?scabies  Trial of permethrin, antihistamines (benadryl dose decreased as patient lethargic to take anything po)

## 2023-06-17 NOTE — PT/OT/SLP PROGRESS
Speech Language Pathology      Mateo Foreman  MRN: [id number]    Patient not seen today secondary to Nursing hold (Comment) (patient lethargic 2/2 medications, not appropriate for CSE at this time). Per RN Jan at 12:25. Will follow-up 6/18/23.

## 2023-06-17 NOTE — PROGRESS NOTES
[facility] Medicine  Progress Note    Patient Name: Mateo Foreman  MRN: [id number]  Patient Class: IP- Inpatient   Admission Date: 6/11/2023  Length of Stay: 5 days  Attending Physician: So Fowler MD  Primary Care Provider: Tremaine Finch MD        Subjective:     Principal Problem:Severe sepsis      HPI:  73-year-old past medical history of type 2 diabetes, GERD, hyperlipidemia, anemia, cataracts presenting from his nursing home with increased bilateral lower extremity swelling weeping and foul smelling. Patient is known to be unkempt.   As per patient had chronic wounds of feet were wrapped weeks ago have not been rewrapped by weeks. Patient is not compliant with care. Patient mentions having abnormal drainage from his heels.  Denies any chest pain, cough, nausea vomiting diarrhea or abdominal pain fevers chills.Not hypotensive on arrival. Tachycardic , WBC 23 K - admitted for sepsis due to SSTI/UTI      Overview/Hospital Course:  No notes on file    Interval History:  Alert, moaning.  Does not answer questions or communicate.  Appears more comfortable than yesterday.  No family at bedside     Updated patient's sister Norma Ro via telephone.  Explained patient is more comfortable but not alert enough to communicate or eat.  Sister understands.  She will be returning from out of town on Monday and considering hospice.  Discussed at present we will continue antibiotics and wound care.    Review of Systems  Objective:     Vital Signs (Most Recent):  Temp: 98 °F (36.7 °C) (06/17/23 1126)  Pulse: 84 (06/17/23 1157)  Resp: 18 (06/17/23 1126)  BP: 137/64 (06/17/23 1126)  SpO2: 97 % (06/17/23 1126) Vital Signs (24h Range):  Temp:  [96.1 °F (35.6 °C)-98.2 °F (36.8 °C)] 98 °F (36.7 °C)  Pulse:  [84-98] 84  Resp:  [12-20] 18  SpO2:  [93 %-98 %] 97 %  BP: (118-142)/(55-64) 137/64     Weight: 70.8 kg (156 lb)  Body mass index is 24.43 kg/m².    Intake/Output Summary (Last 24 hours) at 6/17/2023  1443  Last data filed at 6/17/2023 1225  Gross per 24 hour   Intake --   Output 1425 ml   Net -1425 ml           Physical Exam  Vitals and nursing note reviewed.   Constitutional:       Appearance: He is well-developed.      Comments: Moaning, not talking    Cardiovascular:      Rate and Rhythm: Normal rate and regular rhythm.   Pulmonary:      Effort: Pulmonary effort is normal. No respiratory distress.      Breath sounds: Normal breath sounds.   Abdominal:      Palpations: Abdomen is soft.      Tenderness: There is no abdominal tenderness.      Comments: Knight catheter in place with clear urine   Musculoskeletal:      Right lower leg: Edema present.      Left lower leg: Edema present.      Comments:   Chronic LE edema  Bilateral feet dressed   Skin:     Findings: Rash present.      Comments: Sacral wounds present on admission  Old & new scattered maculopapular scabs b/l UE, chest & abdomen  Dry scaly skin bilateral lower extremities     Neurological:      Mental Status: He is alert.           Significant Labs: All pertinent labs within the past 24 hours have been reviewed.    Significant Imaging: I have reviewed all pertinent imaging results/findings within the past 24 hours.      Assessment/Plan:      * Severe sepsis  This patient does have evidence of infective focus  My overall impression is sepsis.  Source: Urinary Tract and Skin and Soft Tissue (location Bilateral feet open diabetic wounds), sacral decubitus wounds  Antibiotics given-   Antibiotics (72h ago, onward)    Start     Stop Route Frequency Ordered    06/17/23 1545  metronidazole IVPB 500 mg         -- IV Every 8 hours (non-standard times) 06/17/23 1434    06/16/23 1600  cefTRIAXone (ROCEPHIN) 2 g in dextrose 5 % in water (D5W) 5 % 100 mL IVPB (MB+)         -- IV Every 24 hours (non-standard times) 06/16/23 1514        Latest lactate reviewed-  No results for input(s): LACTATE in the last 72 hours.  Organ dysfunction indicated by Acute kidney  injury    Fluid challenge Not needed - patient is not hypotensive      Post- resuscitation assessment Yes Perfusion exam was performed within 6 hours of septic shock presentation after bolus shows Adequate tissue perfusion assessed by non-invasive monitoring       Will Not start Pressors- Levophed for MAP of 65  Source control achieved by:  S/p vanc/Zosyn in the ER.  Continue vancomycin, cefepime, Flagyl     CT lower extremity with contrast showed significant bilateral edema in distal for legs/feet, R>L.  Scattered foci of subcutaneous emphysema within plantar aspect of right hindfoot.  MRI confirmed early changes of R heel osteomyelitis.  Arterial ultrasound bilaterally showed no focal hemodynamic stenosis.    Renal ultrasound showed mild bilateral hydronephrosis.  Distended urinary bladder with echogenic material which could be blood or sequelae of infection.  Chronic indwelling Knight catheter switched by Urology on 06/13    Blood cultures 6/11 growing coagulase negative staph, probable contaminant.  Rapid ID by PCR negative for staph aureus.  Repeat cultures 6/13 pending    Urine culture 6/11 growing multiple organisms.  Concern for infection given chronic catheter    Infectious disease on board.  Stop vancomycin. On ceftriaxone, flagyl. Will need 6 weeks IV antibiotics    S/p heel & sacral wound debridement in OR 6/14    Sacral wound culture growing ESBL proteus, R foot wound cultures growing proteus sensitive to cephalosporins    Will reach out to ID for switch to carbapenems.       Rash  Present on admission   Diffuse old and new maculopapular rash on chest, bilateral upper extremities  ?scabies  Trial of permethrin, antihistamines (benadryl dose decreased as patient lethargic to take anything po)      Acute encephalopathy  Onset 6/15 overnight  Suspect delirium in setting of pain, infection/renal injury, rash/itching  CT head to rule out organic etiology - negative for acute abn  Pain control - uptitrating  narcotics for better control of pain (patient unable to verbalize location), schedule tylenol. Avoid NSAIDs given renal injury  Delirium precautions      Sacral decubitus ulcer  Present on admission   Necrotic   Inpatient wound care consult    S/p debridement by general surgery      GEORGIANA (acute kidney injury)  Patient with acute kidney injury likely due to pre-renal azotemia, acute tubular necrosis and post-obstructive d/t clotted felipe catheter GEORGIANA is currently worsening. Labs reviewed- Renal function/electrolytes with Estimated Creatinine Clearance: 20.8 mL/min (A) (based on SCr of 3 mg/dL (H)). according to latest data. Monitor urine output and serial BMP and adjust therapy as needed. Avoid nephrotoxins and renally dose meds for GFR listed above.     Renal US showed mild bilateral hydronephrosis on admission  ATN- multifactorial 2/2 ?Vancomycin level supratherapeutic (Received vanc/ zosyn) +/- sepsis +/- IV contrast +/- obstructive     Felipe catheter replaced 6/13.  On continuous bladder irrigation.  CT abdomen pelvis without contrast showed no hydronephrosis or renal masses.  Given mild-to-moderate pleural effusions with bibasilar atelectasis, will hold IV fluids at this time.  Incentive spirometer.    Nephrology, urology consult       Acute osteomyelitis of right foot  As above      Urinary tract infection associated with indwelling urethral catheter  As above       Decubitus ulcer of heel, bilateral, unstageable  As above      Nursing home resident  Noted       Hypomagnesemia  Mg 1.1 on admission, improved with repletion      Hypokalemia  Potassium 2.9 improved with repletion      Hypertension associated with diabetes  Controlled     Goals of care, counseling/discussion  Advance Care Planning     Palliative care consulted to assist with overall goals of care discussion, pain management.  Appreciate assistance.  Per discussion with patient's sister Norma Ro -patient is a DNR.  Pending improvement in  clinical condition, agreeable to hospice      Peripheral vascular disorder due to diabetes mellitus  Aspirin currently on hold in anticipation for procedure    Lymphedema of both lower extremities  Wound consult    HTN, goal below 130/80    Controlled.  Continue home medications    Type 2 diabetes mellitus with hypoglycemia, with long-term current use of insulin  Patient's FSGs are controlled on current medication regimen.  Last A1c reviewed-   Lab Results   Component Value Date    HGBA1C >14.0 (H) 07/22/2022     Most recent fingerstick glucose reviewed-   Recent Labs   Lab 06/13/23  0209 06/13/23  0613 06/13/23  1210 06/13/23  1659   POCTGLUCOSE 216* 257* 188* 159*     Current correctional scale  Medium  Maintain anti-hyperglycemic dose as follows-   Antihyperglycemics (From admission, onward)    Start     Stop Route Frequency Ordered    06/12/23 0305  insulin aspart U-100 pen 0-5 Units         -- SubQ Before meals & nightly PRN 06/12/23 0209        Hold Oral hypoglycemics while patient is in the hospital.      VTE Risk Mitigation (From admission, onward)         Ordered     enoxaparin injection 30 mg  Daily         06/14/23 1047     IP VTE HIGH RISK PATIENT  Once         06/12/23 0209     Place sequential compression device  Until discontinued         06/12/23 0209                Discharge Planning   CAROL:      Code Status: DNR   Is the patient medically ready for discharge?:     Reason for patient still in hospital (select all that apply): Patient trending condition, Treatment and Consult recommendations                 So Fowler MD  Department of Hospital Medicine   [facility]

## 2023-06-17 NOTE — PROGRESS NOTES
[facility] - Telemetry  Urology  Progress Note    Patient Name: Mateo Foreman  MRN: [id number]  Admission Date: 6/11/2023  Hospital Length of Stay: 5 days      Subjective:     Interval History: Urine clamped with CBI clamped     Objective:     Temp:  [96.1 °F (35.6 °C)-98.2 °F (36.8 °C)] 98 °F (36.7 °C)  Pulse:  [84-98] 84  Resp:  [12-20] 18  SpO2:  [93 %-98 %] 97 %  BP: (118-142)/(55-64) 137/64       NAD  RRR  CTAB  ABD S/ND/NTTP       Penis erosion with urethra to right infapubic area       Knight yellow       Ext: wounds    Significant Labs:  BMP:  Recent Labs   Lab 06/15/23  0816 06/16/23  0407 06/17/23  0402   * 136 140   K 4.2 4.5 4.3    103 104   CO2 23 23 22*   BUN 43* 46* 45*   CREATININE 3.0* 3.2* 3.3*   CALCIUM 7.7* 8.1* 8.5*       CBC:  Recent Labs   Lab 06/15/23  0816 06/16/23  0407 06/17/23  0402   WBC 21.89* 17.31* 17.07*   HGB 7.6* 7.9* 8.4*   HCT 23.6* 24.5* 26.2*   * 878* 943*         Urology Specific Assessment:     Gross Hematuria    Plan:   Recommend hold lovenox given renal function and hematuria  Okay for diet, CBI cIamped, urine clear yellow  Contact urology on call with questions    Chidi Nash MD  Urology

## 2023-06-17 NOTE — PROGRESS NOTES
ID to follow from [facility] today. Continue ceftriaxone and flagyl while awaiting final cxs. Will need 6 weeks of iv antibiotics

## 2023-06-17 NOTE — SUBJECTIVE & OBJECTIVE
Interval History:  Alert, moaning.  Does not answer questions or communicate.  Appears more comfortable than yesterday.  No family at bedside     Updated patient's sister Norma Ro via telephone.  Explained patient is more comfortable but not alert enough to communicate or eat.  Sister understands.  She will be returning from out of town on Monday and considering hospice.  Discussed at present we will continue antibiotics and wound care.    Review of Systems  Objective:     Vital Signs (Most Recent):  Temp: 98 °F (36.7 °C) (06/17/23 1126)  Pulse: 84 (06/17/23 1157)  Resp: 18 (06/17/23 1126)  BP: 137/64 (06/17/23 1126)  SpO2: 97 % (06/17/23 1126) Vital Signs (24h Range):  Temp:  [96.1 °F (35.6 °C)-98.2 °F (36.8 °C)] 98 °F (36.7 °C)  Pulse:  [84-98] 84  Resp:  [12-20] 18  SpO2:  [93 %-98 %] 97 %  BP: (118-142)/(55-64) 137/64     Weight: 70.8 kg (156 lb)  Body mass index is 24.43 kg/m².    Intake/Output Summary (Last 24 hours) at 6/17/2023 1443  Last data filed at 6/17/2023 1225  Gross per 24 hour   Intake --   Output 1425 ml   Net -1425 ml           Physical Exam  Vitals and nursing note reviewed.   Constitutional:       Appearance: He is well-developed.      Comments: Moaning, not talking    Cardiovascular:      Rate and Rhythm: Normal rate and regular rhythm.   Pulmonary:      Effort: Pulmonary effort is normal. No respiratory distress.      Breath sounds: Normal breath sounds.   Abdominal:      Palpations: Abdomen is soft.      Tenderness: There is no abdominal tenderness.      Comments: Knight catheter in place with clear urine   Musculoskeletal:      Right lower leg: Edema present.      Left lower leg: Edema present.      Comments:   Chronic LE edema  Bilateral feet dressed   Skin:     Findings: Rash present.      Comments: Sacral wounds present on admission  Old & new scattered maculopapular scabs b/l UE, chest & abdomen  Dry scaly skin bilateral lower extremities     Neurological:      Mental Status: He is  alert.           Significant Labs: All pertinent labs within the past 24 hours have been reviewed.    Significant Imaging: I have reviewed all pertinent imaging results/findings within the past 24 hours.

## 2023-06-17 NOTE — NURSING
NSR on monitor. Pain medication administered per MAR. Wound care performed per mar. Incontinence pads and adult brief changed along with sheets.  Side rails x3, bed in lowest position. Maintained bed alarm for pt safety.

## 2023-06-17 NOTE — ASSESSMENT & PLAN NOTE
Onset 6/15 overnight  Suspect delirium in setting of pain, infection/renal injury, rash/itching  CT head to rule out organic etiology - negative for acute abn  Pain control - uptitrating narcotics for better control of pain (patient unable to verbalize location), schedule tylenol. Avoid NSAIDs given renal injury  Delirium precautions

## 2023-06-17 NOTE — ASSESSMENT & PLAN NOTE
This patient does have evidence of infective focus  My overall impression is sepsis.  Source: Urinary Tract and Skin and Soft Tissue (location Bilateral feet open diabetic wounds), sacral decubitus wounds  Antibiotics given-   Antibiotics (72h ago, onward)    Start     Stop Route Frequency Ordered    06/17/23 1545  metronidazole IVPB 500 mg         -- IV Every 8 hours (non-standard times) 06/17/23 1434    06/16/23 1600  cefTRIAXone (ROCEPHIN) 2 g in dextrose 5 % in water (D5W) 5 % 100 mL IVPB (MB+)         -- IV Every 24 hours (non-standard times) 06/16/23 1514        Latest lactate reviewed-  No results for input(s): LACTATE in the last 72 hours.  Organ dysfunction indicated by Acute kidney injury    Fluid challenge Not needed - patient is not hypotensive      Post- resuscitation assessment Yes Perfusion exam was performed within 6 hours of septic shock presentation after bolus shows Adequate tissue perfusion assessed by non-invasive monitoring       Will Not start Pressors- Levophed for MAP of 65  Source control achieved by:  S/p vanc/Zosyn in the ER.  Continue vancomycin, cefepime, Flagyl     CT lower extremity with contrast showed significant bilateral edema in distal for legs/feet, R>L.  Scattered foci of subcutaneous emphysema within plantar aspect of right hindfoot.  MRI confirmed early changes of R heel osteomyelitis.  Arterial ultrasound bilaterally showed no focal hemodynamic stenosis.    Renal ultrasound showed mild bilateral hydronephrosis.  Distended urinary bladder with echogenic material which could be blood or sequelae of infection.  Chronic indwelling Knight catheter switched by Urology on 06/13    Blood cultures 6/11 growing coagulase negative staph, probable contaminant.  Rapid ID by PCR negative for staph aureus.  Repeat cultures 6/13 pending    Urine culture 6/11 growing multiple organisms.  Concern for infection given chronic catheter    Infectious disease on board.  Stop vancomycin. On  ceftriaxone, flagyl. Will need 6 weeks IV antibiotics    S/p heel & sacral wound debridement in OR 6/14    Sacral wound culture growing ESBL proteus, R foot wound cultures growing proteus sensitive to cephalosporins    Will reach out to ID for switch to carbapenems.

## 2023-06-17 NOTE — PROGRESS NOTES
Nephrology Progress Note       Consult Requested By: So Fowler MD  Reason for Consult: [place]      SUBJECTIVE:       Review of Systems   Constitutional:  Negative for chills and fever.   Respiratory:  Negative for cough and shortness of breath.    Cardiovascular:  Positive for leg swelling. Negative for chest pain.   Gastrointestinal:  Negative for nausea.   Neurological:  Positive for weakness.     Past Medical History:   Diagnosis Date    Anemia     Cataract     Chronic cough     Diabetes mellitus type II     GERD (gastroesophageal reflux disease)     Glaucoma     Hyperlipidemia           OBJECTIVE:     Vital Signs (Most Recent)  Vitals:    06/17/23 0524 06/17/23 0745 06/17/23 0758 06/17/23 1030   BP:   (!) 142/56    BP Location:   Left arm    Patient Position:   Lying    Pulse:   92    Resp: 20 18 18   Temp:   97 °F (36.1 °C)    TempSrc:   Oral    SpO2:  97% (!) 94%    Weight:       Height:             Date 06/17/23 0700 - 06/18/23 0659   Shift 5968-9342 1542-7969 5357-6543 24 Hour Total   INTAKE   Shift Total(mL/kg)       OUTPUT   Urine(mL/kg/hr) 1100   1100   Shift Total(mL/kg) 1100(15.5)   1100(15.5)   Weight (kg) 70.8 70.8 70.8 70.8             Medications:   acetaminophen  1,000 mg Oral TID    cefTRIAXone (ROCEPHIN) IVPB  2 g Intravenous Q24H    diphenhydrAMINE  50 mg Intravenous Q6H    enoxparin  30 mg Subcutaneous Daily    metroNIDAZOLE  500 mg Oral Q8H    senna  8.6 mg Oral Daily    tamsulosin  0.4 mg Oral QHS           Physical Exam  Vitals and nursing note reviewed.   Constitutional:       General: He is not in acute distress.     Appearance: He is ill-appearing and toxic-appearing. He is not diaphoretic.   HENT:      Head: Normocephalic and atraumatic.      Mouth/Throat:      Pharynx: No oropharyngeal exudate.   Eyes:      General: No scleral icterus.     Conjunctiva/sclera: Conjunctivae normal.      Pupils: Pupils are equal, round, and reactive to light.   Cardiovascular:      Rate and Rhythm:  Normal rate and regular rhythm.      Heart sounds: Normal heart sounds. No murmur heard.  Pulmonary:      Effort: Pulmonary effort is normal. No respiratory distress.      Breath sounds: Normal breath sounds.   Abdominal:      General: Bowel sounds are normal. There is no distension.      Palpations: Abdomen is soft.      Tenderness: There is no abdominal tenderness.   Genitourinary:     Comments: Knight   Musculoskeletal:         General: Normal range of motion.      Cervical back: Normal range of motion and neck supple.      Right lower leg: Edema present.      Left lower leg: Edema present.   Skin:     General: Skin is warm and dry.      Findings: No erythema.   Neurological:      Mental Status: He is alert and oriented to person, place, and time.      Cranial Nerves: No cranial nerve deficit.       Laboratory:  Recent Labs   Lab 06/15/23  0816 06/16/23  0407 06/17/23  0402   WBC 21.89* 17.31* 17.07*   HGB 7.6* 7.9* 8.4*   HCT 23.6* 24.5* 26.2*   * 878* 943*   MONO 5.1  1.1* 3.0* 6.6  1.1*       Recent Labs   Lab 06/15/23  0816 06/16/23  0407 06/17/23  0402   * 136 140   K 4.2 4.5 4.3    103 104   CO2 23 23 22*   BUN 43* 46* 45*   CREATININE 3.0* 3.2* 3.3*   CALCIUM 7.7* 8.1* 8.5*         Diagnostic Results:  X-Ray: Reviewed  US: Reviewed  Echo: Reviewed  ASSESSMENT/PLAN:     1. [place]NA -baseline creatinine 0.7-1.2       at this point  multifactorial, ATN, Sepsis, on top High Vanco levels 36.9 =>33.4  now on irrigating  Knight - obstruction US kidney   Bilateral mild hydronephrosis.  Note, there is distention of the urinary bladder despite Knight catheter, hydronephrosis is suspected to be on the basis of reflux related to urinary bladder distension.  -- Urology consulted s/p  Knight irrigation appreciate assistance     -- Making Urine supportive therapy for now - hopefully no need for RRT as his levels drops  Cr seems platoued 3.2  monitor for now   Vanc Levels in AM as well   -- Daily Renal  Function Panel  -- Avoid Hypotension.  -- Renally dose all meds  -- Please avoid nephrotoxins, including NSAIDs, aminoglycosides, IV contrast (unless absolutely necessary), gadolinium, fleets and other phosphorous-based laxatives. Caution with antibiotics.    Palliative care consult noted    2. HTN (I10) - controlled   3. Anemia multifactorial of chronic kidney disease and acute renal failure   Recent Labs   Lab 06/15/23  0816 06/16/23  0407 06/17/23  0402   HGB 7.6* 7.9* 8.4*   HCT 23.6* 24.5* 26.2*   * 878* 943*         Iron   Lab Results   Component Value Date    IRON 40 (L) 07/23/2022    TIBC 198 (L) 07/23/2022    FERRITIN 334 (H) 07/23/2022       4. MBD (E88.9 M90.80) -    Recent Labs   Lab 06/12/23  0513 06/13/23  0320 06/14/23  0251   MG 1.1* 2.5 1.7         Lab Results   Component Value Date    CALCIUM 8.5 (L) 06/17/2023    PHOS 3.0 09/19/2021     No results found for: [id number]    Lab Results   Component Value Date    CO2 22 (L) 06/17/2023       5. Nutrition/Hypoalbuminemia   Recent Labs   Lab 06/11/23  2334   ALBUMIN 2.2*       Protein supplement Nepro for now        Thank you for allowing me to participate in care of your patient  With any question please call       [facility]  WILL Pedro MD,   MD LISA Solis MD E. V. Harmon, NP  [id number]   [place], [address] [phone]  After hours answering service: [phone]

## 2023-06-18 LAB
25(OH)D3+25(OH)D2 SERPL-MCNC: 20 NG/ML (ref 30–96)
ALBUMIN SERPL BCP-MCNC: 1.7 G/DL (ref 3.5–5.2)
ANION GAP SERPL CALC-SCNC: 16 MMOL/L (ref 8–16)
BACTERIA BLD CULT: NORMAL
BACTERIA BLD CULT: NORMAL
BACTERIA SPEC AEROBE CULT: ABNORMAL
BUN SERPL-MCNC: 42 MG/DL (ref 8–23)
CALCIUM SERPL-MCNC: 8.5 MG/DL (ref 8.7–10.5)
CHLORIDE SERPL-SCNC: 107 MMOL/L (ref 95–110)
CO2 SERPL-SCNC: 23 MMOL/L (ref 23–29)
CREAT SERPL-MCNC: 2.7 MG/DL (ref 0.5–1.4)
EST. GFR  (NO RACE VARIABLE): 24 ML/MIN/1.73 M^2
GLUCOSE SERPL-MCNC: 86 MG/DL (ref 70–110)
MAGNESIUM SERPL-MCNC: 1.8 MG/DL (ref 1.6–2.6)
PHOSPHATE SERPL-MCNC: 4.6 MG/DL (ref 2.7–4.5)
POCT GLUCOSE: 118 MG/DL (ref 70–110)
POCT GLUCOSE: 148 MG/DL (ref 70–110)
POCT GLUCOSE: 78 MG/DL (ref 70–110)
POCT GLUCOSE: 84 MG/DL (ref 70–110)
POTASSIUM SERPL-SCNC: 4 MMOL/L (ref 3.5–5.1)
SODIUM SERPL-SCNC: 146 MMOL/L (ref 136–145)

## 2023-06-18 PROCEDURE — 82306 VITAMIN D 25 HYDROXY: CPT | Performed by: INTERNAL MEDICINE

## 2023-06-18 PROCEDURE — 11000001 HC ACUTE MED/SURG PRIVATE ROOM

## 2023-06-18 PROCEDURE — 99232 SBSQ HOSP IP/OBS MODERATE 35: CPT | Mod: ,,, | Performed by: UROLOGY

## 2023-06-18 PROCEDURE — 83735 ASSAY OF MAGNESIUM: CPT | Performed by: INTERNAL MEDICINE

## 2023-06-18 PROCEDURE — 94761 N-INVAS EAR/PLS OXIMETRY MLT: CPT

## 2023-06-18 PROCEDURE — 80069 RENAL FUNCTION PANEL: CPT | Performed by: INTERNAL MEDICINE

## 2023-06-18 PROCEDURE — 99232 SBSQ HOSP IP/OBS MODERATE 35: CPT | Mod: ,,, | Performed by: INTERNAL MEDICINE

## 2023-06-18 PROCEDURE — 99900035 HC TECH TIME PER 15 MIN (STAT)

## 2023-06-18 PROCEDURE — 63600175 PHARM REV CODE 636 W HCPCS: Performed by: STUDENT IN AN ORGANIZED HEALTH CARE EDUCATION/TRAINING PROGRAM

## 2023-06-18 PROCEDURE — 25000003 PHARM REV CODE 250: Performed by: INTERNAL MEDICINE

## 2023-06-18 PROCEDURE — 92610 EVALUATE SWALLOWING FUNCTION: CPT

## 2023-06-18 PROCEDURE — 99232 PR SUBSEQUENT HOSPITAL CARE,LEVL II: ICD-10-PCS | Mod: ,,, | Performed by: INTERNAL MEDICINE

## 2023-06-18 PROCEDURE — 25000003 PHARM REV CODE 250: Performed by: STUDENT IN AN ORGANIZED HEALTH CARE EDUCATION/TRAINING PROGRAM

## 2023-06-18 PROCEDURE — S0030 INJECTION, METRONIDAZOLE: HCPCS | Performed by: STUDENT IN AN ORGANIZED HEALTH CARE EDUCATION/TRAINING PROGRAM

## 2023-06-18 PROCEDURE — 99232 PR SUBSEQUENT HOSPITAL CARE,LEVL II: ICD-10-PCS | Mod: ,,, | Performed by: UROLOGY

## 2023-06-18 PROCEDURE — 63600175 PHARM REV CODE 636 W HCPCS: Performed by: INTERNAL MEDICINE

## 2023-06-18 PROCEDURE — 36415 COLL VENOUS BLD VENIPUNCTURE: CPT | Performed by: INTERNAL MEDICINE

## 2023-06-18 RX ORDER — MAGNESIUM SULFATE HEPTAHYDRATE 40 MG/ML
2 INJECTION, SOLUTION INTRAVENOUS ONCE
Status: COMPLETED | OUTPATIENT
Start: 2023-06-18 | End: 2023-06-18

## 2023-06-18 RX ORDER — DEXTROSE MONOHYDRATE 50 MG/ML
INJECTION, SOLUTION INTRAVENOUS ONCE
Status: COMPLETED | OUTPATIENT
Start: 2023-06-18 | End: 2023-06-18

## 2023-06-18 RX ADMIN — DIPHENHYDRAMINE HYDROCHLORIDE 12.5 MG: 50 INJECTION, SOLUTION INTRAMUSCULAR; INTRAVENOUS at 05:06

## 2023-06-18 RX ADMIN — METRONIDAZOLE 500 MG: 5 INJECTION, SOLUTION INTRAVENOUS at 06:06

## 2023-06-18 RX ADMIN — HYDROMORPHONE HYDROCHLORIDE 1 MG: 1 INJECTION, SOLUTION INTRAMUSCULAR; INTRAVENOUS; SUBCUTANEOUS at 05:06

## 2023-06-18 RX ADMIN — METRONIDAZOLE 500 MG: 5 INJECTION, SOLUTION INTRAVENOUS at 12:06

## 2023-06-18 RX ADMIN — MAGNESIUM SULFATE 2 G: 2 INJECTION INTRAVENOUS at 02:06

## 2023-06-18 RX ADMIN — DEXTROSE MONOHYDRATE: 50 INJECTION, SOLUTION INTRAVENOUS at 02:06

## 2023-06-18 RX ADMIN — DIPHENHYDRAMINE HYDROCHLORIDE 12.5 MG: 50 INJECTION, SOLUTION INTRAMUSCULAR; INTRAVENOUS at 11:06

## 2023-06-18 RX ADMIN — HYDROMORPHONE HYDROCHLORIDE 1 MG: 1 INJECTION, SOLUTION INTRAMUSCULAR; INTRAVENOUS; SUBCUTANEOUS at 09:06

## 2023-06-18 RX ADMIN — DIPHENHYDRAMINE HYDROCHLORIDE 12.5 MG: 50 INJECTION, SOLUTION INTRAMUSCULAR; INTRAVENOUS at 12:06

## 2023-06-18 RX ADMIN — MEROPENEM 500 MG: 500 INJECTION, POWDER, FOR SOLUTION INTRAVENOUS at 04:06

## 2023-06-18 RX ADMIN — ERTAPENEM 500 MG: 1 INJECTION INTRAMUSCULAR; INTRAVENOUS at 04:06

## 2023-06-18 RX ADMIN — HYDROMORPHONE HYDROCHLORIDE 1 MG: 1 INJECTION, SOLUTION INTRAMUSCULAR; INTRAVENOUS; SUBCUTANEOUS at 02:06

## 2023-06-18 NOTE — ASSESSMENT & PLAN NOTE
Advance Care Planning     Palliative care consulted to assist with overall goals of care discussion, pain management.  Appreciate assistance.  Per discussion with patient's sister Norma Ro -patient is a DNR.  Pending improvement in clinical status, sister agreeable to hospice. Given current clinical course, hospice will be appropriate for the patient if sister in agreement.

## 2023-06-18 NOTE — PROGRESS NOTES
[facility] - Telemetry  Urology  Progress Note    Patient Name: Mateo Foreman  MRN: [id number]  Admission Date: 6/11/2023  Hospital Length of Stay: 6 days      Subjective:     Interval History: Urine clear with CBI clamped     Objective:     Temp:  [96.1 °F (35.6 °C)-98 °F (36.7 °C)] 96.8 °F (36 °C)  Pulse:  [] 98  Resp:  [18-22] 18  SpO2:  [91 %-99 %] 94 %  BP: (105-146)/(53-89) 105/53       NAD  RRR  CTAB  ABD S/ND/NTTP       Penis erosion with urethra to right infapubic area       Knight yellow       Ext: wounds    Significant Labs:  BMP:  Recent Labs   Lab 06/16/23  0407 06/17/23  0402 06/18/23  0425    140 146*   K 4.5 4.3 4.0    104 107   CO2 23 22* 23   BUN 46* 45* 42*   CREATININE 3.2* 3.3* 2.7*   CALCIUM 8.1* 8.5* 8.5*       CBC:  Recent Labs   Lab 06/15/23  0816 06/16/23  0407 06/17/23  0402   WBC 21.89* 17.31* 17.07*   HGB 7.6* 7.9* 8.4*   HCT 23.6* 24.5* 26.2*   * 878* 943*         Urology Specific Assessment:     Gross Hematuria    Plan:   Recommend hold lovenox given renal function and hematuria  Okay for diet, CBI cIamped, urine clear yellow  Contact urology on call with questions. Urology will sign off. Should follow up with his outside urologist.    Chidi Nash MD  Urology

## 2023-06-18 NOTE — SUBJECTIVE & OBJECTIVE
Interval History: WBC trending down. Wound Cxs with esbl proteus, concern for osteo     Review of Systems   Constitutional:  Negative for chills and fever.   HENT:  Negative for hearing loss.    Respiratory:  Negative for cough and shortness of breath.    Cardiovascular:  Negative for chest pain.   Gastrointestinal:  Negative for nausea and vomiting.   Genitourinary:  Positive for hematuria.   Skin:  Positive for color change and wound.   Neurological:  Negative for numbness.   Objective:     Vital Signs (Most Recent):  Temp: 97.4 °F (36.3 °C) (06/18/23 1135)  Pulse: 106 (06/18/23 1140)  Resp: 18 (06/18/23 1135)  BP: 131/83 (06/18/23 1135)  SpO2: 95 % (06/18/23 1135) Vital Signs (24h Range):  Temp:  [96.1 °F (35.6 °C)-97.6 °F (36.4 °C)] 97.4 °F (36.3 °C)  Pulse:  [] 106  Resp:  [18-22] 18  SpO2:  [91 %-99 %] 95 %  BP: (105-146)/(53-89) 131/83     Weight: 70.8 kg (156 lb)  Body mass index is 24.43 kg/m².    Estimated Creatinine Clearance: 23.1 mL/min (A) (based on SCr of 2.7 mg/dL (H)).     Physical Exam     Constitutional:       General: He is not in acute distress.     Appearance: Normal appearance. He is ill-appearing. He is not toxic-appearing or diaphoretic.      Comments: Appears older than stated age, unkempt, malodorous   HENT:      Head: Normocephalic and atraumatic.      Nose: Nose normal.      Mouth/Throat:      Mouth: Mucous membranes are dry.      Pharynx: Oropharynx is clear. No oropharyngeal exudate.   Eyes:      Extraocular Movements: Extraocular movements intact.      Conjunctiva/sclera: Conjunctivae normal.      Pupils: Pupils are equal, round, and reactive to light.   Cardiovascular:      Rate and Rhythm: Normal rate and regular rhythm.      Pulses: Normal pulses.      Heart sounds: No murmur heard.  Pulmonary:      Effort: No respiratory distress.      Breath sounds: Normal breath sounds. No wheezing or rales.   Abdominal:      General: Abdomen is flat. Bowel sounds are normal. There is no  distension.      Palpations: Abdomen is soft.      Tenderness: There is no abdominal tenderness.   Genitourinary:     Comments: +felipe catheter +diaper  Musculoskeletal:         General: Swelling, tenderness and deformity present.      Cervical back: Normal range of motion and neck supple.   Skin:     General: Skin is warm.      Capillary Refill: Capillary refill takes 2 to 3 seconds.      Findings: Erythema and lesion present.      Comments: Chronic stasis changes to bilateral LE with maceration and skin breakdown throughout. 2 painful lesions to both heels - R with eschar formation, L actively oozing/bleeding. Very tender to light touch.    Neurological:      General: No focal deficit present.      Mental Status: He is alert.   Significant Labs: All pertinent labs within the past 24 hours have been reviewed.    Significant Imaging: I have reviewed all pertinent imaging results/findings within the past 24 hours.

## 2023-06-18 NOTE — ASSESSMENT & PLAN NOTE
This patient does have evidence of infective focus  My overall impression is sepsis.  Source: Urinary Tract and Skin and Soft Tissue (location Bilateral feet open diabetic wounds), sacral decubitus wounds  Antibiotics given-   Antibiotics (72h ago, onward)    Start     Stop Route Frequency Ordered    06/18/23 1400  ertapenem (INVANZ) 1 g in sodium chloride 0.9 % 100 mL IVPB (MB+)         -- IV Every 24 hours (non-standard times) 06/18/23 1259        Latest lactate reviewed-  No results for input(s): LACTATE in the last 72 hours.  Organ dysfunction indicated by Acute kidney injury    Fluid challenge Not needed - patient is not hypotensive      Post- resuscitation assessment Yes Perfusion exam was performed within 6 hours of septic shock presentation after bolus shows Adequate tissue perfusion assessed by non-invasive monitoring       Will Not start Pressors- Levophed for MAP of 65  Source control achieved by:  S/p vanc/Zosyn in the ER.  Continue vancomycin, cefepime, Flagyl     CT lower extremity with contrast showed significant bilateral edema in distal for legs/feet, R>L.  Scattered foci of subcutaneous emphysema within plantar aspect of right hindfoot.  MRI confirmed early changes of R heel osteomyelitis.  Arterial ultrasound bilaterally showed no focal hemodynamic stenosis.    Renal ultrasound showed mild bilateral hydronephrosis.  Distended urinary bladder with echogenic material which could be blood or sequelae of infection.  Chronic indwelling Knight catheter switched by Urology on 06/13    Blood cultures 6/11 growing coagulase negative staph, probable contaminant.  Rapid ID by PCR negative for staph aureus.  Repeat cultures 6/13 negative.     Urine culture 6/11 growing multiple organisms.  Concern for infection given chronic catheter    S/p heel & sacral wound debridement in OR 6/14    Sacral wound culture growing ESBL proteus, R foot wound cultures growing proteus sensitive to cephalosporins  Infectious  disease on board. Discussed with ID attending. Antibiotics switched to ertapenem renally dosed based on final cultures. Will need 6 weeks IV antibiotics (end date 7/30)

## 2023-06-18 NOTE — PROGRESS NOTES
[facility] Medicine  Progress Note    Patient Name: Mateo Foreman  MRN: [id number]  Patient Class: IP- Inpatient   Admission Date: 6/11/2023  Length of Stay: 6 days  Attending Physician: So Fowler MD  Primary Care Provider: Tremaine Finch MD        Subjective:     Principal Problem:Severe sepsis      HPI:  73-year-old past medical history of type 2 diabetes, GERD, hyperlipidemia, anemia, cataracts presenting from his nursing home with increased bilateral lower extremity swelling weeping and foul smelling. Patient is known to be unkempt.   As per patient had chronic wounds of feet were wrapped weeks ago have not been rewrapped by weeks. Patient is not compliant with care. Patient mentions having abnormal drainage from his heels.  Denies any chest pain, cough, nausea vomiting diarrhea or abdominal pain fevers chills.Not hypotensive on arrival. Tachycardic , WBC 23 K - admitted for sepsis due to SSTI/UTI      Overview/Hospital Course:  No notes on file    Interval History:  clinically unchanged. Not speaking, does not appear in distress when not attempting to communicate. Moans when attempting to communicate. Poor po intake.     Review of Systems  Objective:     Vital Signs (Most Recent):  Temp: 97.4 °F (36.3 °C) (06/18/23 1135)  Pulse: 106 (06/18/23 1140)  Resp: 18 (06/18/23 1135)  BP: 131/83 (06/18/23 1135)  SpO2: 95 % (06/18/23 1135) Vital Signs (24h Range):  Temp:  [96.1 °F (35.6 °C)-97.6 °F (36.4 °C)] 97.4 °F (36.3 °C)  Pulse:  [] 106  Resp:  [18-22] 18  SpO2:  [91 %-99 %] 95 %  BP: (105-146)/(53-89) 131/83     Weight: 70.8 kg (156 lb)  Body mass index is 24.43 kg/m².    Intake/Output Summary (Last 24 hours) at 6/18/2023 1331  Last data filed at 6/18/2023 0530  Gross per 24 hour   Intake 99.7 ml   Output 900 ml   Net -800.3 ml           Physical Exam  Vitals and nursing note reviewed.   Constitutional:       Appearance: He is well-developed.      Comments: Moaning, not talking     Cardiovascular:      Rate and Rhythm: Normal rate and regular rhythm.   Pulmonary:      Effort: Pulmonary effort is normal. No respiratory distress.      Breath sounds: Normal breath sounds.   Abdominal:      Palpations: Abdomen is soft.      Tenderness: There is no abdominal tenderness.      Comments: Knight catheter in place with clear urine   Musculoskeletal:      Right lower leg: Edema present.      Left lower leg: Edema present.      Comments:   Chronic LE edema  Bilateral feet dressed   Skin:     Findings: Rash present.      Comments: Sacral wounds present on admission  Old & new scattered maculopapular scabs b/l UE, chest & abdomen  Dry scaly skin bilateral lower extremities     Neurological:      Mental Status: He is alert.           Significant Labs: All pertinent labs within the past 24 hours have been reviewed.    Significant Imaging: I have reviewed all pertinent imaging results/findings within the past 24 hours.      Assessment/Plan:      * Severe sepsis  This patient does have evidence of infective focus  My overall impression is sepsis.  Source: Urinary Tract and Skin and Soft Tissue (location Bilateral feet open diabetic wounds), sacral decubitus wounds  Antibiotics given-   Antibiotics (72h ago, onward)    Start     Stop Route Frequency Ordered    06/18/23 1400  ertapenem (INVANZ) 1 g in sodium chloride 0.9 % 100 mL IVPB (MB+)         -- IV Every 24 hours (non-standard times) 06/18/23 1259        Latest lactate reviewed-  No results for input(s): LACTATE in the last 72 hours.  Organ dysfunction indicated by Acute kidney injury    Fluid challenge Not needed - patient is not hypotensive      Post- resuscitation assessment Yes Perfusion exam was performed within 6 hours of septic shock presentation after bolus shows Adequate tissue perfusion assessed by non-invasive monitoring       Will Not start Pressors- Levophed for MAP of 65  Source control achieved by:  S/p vanc/Zosyn in the ER.  Continue  vancomycin, cefepime, Flagyl     CT lower extremity with contrast showed significant bilateral edema in distal for legs/feet, R>L.  Scattered foci of subcutaneous emphysema within plantar aspect of right hindfoot.  MRI confirmed early changes of R heel osteomyelitis.  Arterial ultrasound bilaterally showed no focal hemodynamic stenosis.    Renal ultrasound showed mild bilateral hydronephrosis.  Distended urinary bladder with echogenic material which could be blood or sequelae of infection.  Chronic indwelling Knight catheter switched by Urology on 06/13    Blood cultures 6/11 growing coagulase negative staph, probable contaminant.  Rapid ID by PCR negative for staph aureus.  Repeat cultures 6/13 negative.     Urine culture 6/11 growing multiple organisms.  Concern for infection given chronic catheter    S/p heel & sacral wound debridement in OR 6/14    Sacral wound culture growing ESBL proteus, R foot wound cultures growing proteus sensitive to cephalosporins  Infectious disease on board. Discussed with ID attending. Antibiotics switched to ertapenem renally dosed based on final cultures. Will need 6 weeks IV antibiotics (end date 7/30)              Goals of care, counseling/discussion  Advance Care Planning     Palliative care consulted to assist with overall goals of care discussion, pain management.  Appreciate assistance.  Per discussion with patient's sister Norma Ro -patient is a DNR.  Pending improvement in clinical status, sister agreeable to hospice. Given current clinical course, hospice will be appropriate for the patient if sister in agreement.       Rash  Present on admission   Diffuse old and new maculopapular rash on chest, bilateral upper extremities  ?scabies  Trial of permethrin, antihistamines (benadryl dose decreased as patient lethargic to take anything po)      Acute encephalopathy  Onset 6/15 overnight  Suspect delirium in setting of pain, infection/renal injury, rash/itching  CT head to  rule out organic etiology - negative for acute abn  Pain control - uptitrating narcotics for better control of pain (patient unable to verbalize location), schedule tylenol. Avoid NSAIDs given renal injury  Delirium precautions      Sacral decubitus ulcer  Present on admission   Necrotic   Inpatient wound care consult    S/p debridement by general surgery      [place] (acute kidney injury)  Patient with acute kidney injury likely due to pre-renal azotemia, acute tubular necrosis and post-obstructive d/t clotted felipe catheter [place] is currently improving. Labs reviewed- Renal function/electrolytes with Estimated Creatinine Clearance: 23.1 mL/min (A) (based on SCr of 2.7 mg/dL (H)). according to latest data. Monitor urine output and serial BMP and adjust therapy as needed. Avoid nephrotoxins and renally dose meds for GFR listed above.     Renal US showed mild bilateral hydronephrosis on admission  ATN- multifactorial 2/2 ?Vancomycin level supratherapeutic (Received vanc/ zosyn) +/- sepsis +/- IV contrast +/- obstructive     Felipe catheter replaced 6/13.  On continuous bladder irrigation.  CT abdomen pelvis without contrast showed no hydronephrosis or renal masses.  Given mild-to-moderate pleural effusions with bibasilar atelectasis, will hold IV fluids at this time.  Incentive spirometer. lovenox held per urology recommendation    Nephrology, urology consult       Acute osteomyelitis of right foot  As above      Urinary tract infection associated with indwelling urethral catheter  As above       Decubitus ulcer of heel, bilateral, unstageable  As above      Nursing home resident  Noted       Hypomagnesemia  Mg 1.1 on admission, improved with repletion      Hypokalemia  Potassium 2.9 improved with repletion      Hypertension associated with diabetes  Controlled     Peripheral vascular disorder due to diabetes mellitus  Aspirin currently on hold in anticipation for procedure    Lymphedema of both lower extremities  Wound  consult    HTN, goal below 130/80    Controlled.  Continue home medications    Type 2 diabetes mellitus with hypoglycemia, with long-term current use of insulin  Patient's FSGs are controlled on current medication regimen.  Last A1c reviewed-   Lab Results   Component Value Date    HGBA1C >14.0 (H) 07/22/2022     Most recent fingerstick glucose reviewed-   Recent Labs   Lab 06/13/23  0209 06/13/23  0613 06/13/23  1210 06/13/23  1659   POCTGLUCOSE 216* 257* 188* 159*     Current correctional scale  Medium  Maintain anti-hyperglycemic dose as follows-   Antihyperglycemics (From admission, onward)    Start     Stop Route Frequency Ordered    06/12/23 0305  insulin aspart U-100 pen 0-5 Units         -- SubQ Before meals & nightly PRN 06/12/23 0209        Hold Oral hypoglycemics while patient is in the hospital.    VTE Risk Mitigation (From admission, onward)         Ordered     IP VTE HIGH RISK PATIENT  Once         06/12/23 0209     Place sequential compression device  Until discontinued         06/12/23 0209                Discharge Planning   CAROL:      Code Status: DNR   Is the patient medically ready for discharge?:     Reason for patient still in hospital (select all that apply): Patient trending condition, Treatment and Consult recommendations             So Fowler MD  Department of Hospital Medicine   [place] [facility]

## 2023-06-18 NOTE — PLAN OF CARE
No distress, will continue to monitor. Pt has not been able to perform IS within 48hrs after given appropriate instructions and directions.

## 2023-06-18 NOTE — PLAN OF CARE
Problem: SLP  Goal: SLP Goal  Description: Short Term Goals:  1. Pt will participate in a clinical swallow eval to determine least restrictive diet.  2. Pt will orient x4 via any communication modality with mod-max assist  3. Pt will express basic wants/needs via any communication modality with mod-max assist  *further goals pending pt's progress/GOC discussion*  Outcome: Ongoing, Not Progressing     6/18/23:  Pt seen this AM for a clinical swallow eval. Pt awake; however, waxing and waning alertness and dcr'd awareness/attention. Clinical s/s of oral and pharyngeal dysphagia noted x1 small tsp sip of water -- pt deferred all ice chip trials and additional tsp sip trial by turning head away and/or closing lips together upon presentation.  Severe cognitive-communication deficits noted -- no meaningful speech, pt only producing groaning that appeared to be the same response for all SLP inquiries and/or tactile cues provided.     SLP rec:s Continue Strict NPO with consideration for transitioning PO meds to IV administration, as pt is not safe for anything by mouth at this time. Please continue frequent oral care. SLP will advance pt's diet as able.

## 2023-06-18 NOTE — PLAN OF CARE
Problem: Adult Inpatient Plan of Care  Goal: Plan of Care Review  6/18/2023 0922 by Peggy Webb RN  Outcome: Ongoing, Progressing

## 2023-06-18 NOTE — PROGRESS NOTES
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 Afebrile and tachycardic on admission. WBC 23.3 with lactic acidosis to 2.4. UA cloudy with pyuria and RBC. CT foot with bilateral (R>L) cellulitic changes with possible ulceration, also subcutaneous emphysema in posterior plantar soft tissues of right hindfoot. Now on vancomycin and pip-tazo.      RECOMMENDATIONS:  - agree with stopping current antibiotics  - start ertapenem dosed for CrCl  - would treat with 6 weeks of ertapenem (stop date 7/30)  - urine cx with multiple organisms, concern for infection given chronic catheter, will likely be treated by ertapenem  - please fax weekly cbc, cmp, crp, and esr to [phone]  - ID will sign off, call with questions   - If plan is for hospice on discharge will not need ID follow up, just end antibiotics on 7/30 as planned      Thank you for allowing us to participate in the care of this patient. Please contact me if you have any questions regarding this consult.          Thank you for your consult. I will sign off. Please contact us if you have any additional  questions.    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 As per patient had chronic wounds of feet were wrapped weeks ago have not been rewrapped by weeks. Patient is not compliant with care, also does not bathe regularly. Patient mentions having abnormal drainage from his heels.  Denies any chest pain, cough, nausea vomiting diarrhea or abdominal pain fevers chills.     Afebrile and tachycardic. WBC 23.3 on admission with lactic acidosis to 2.4. UA cloudy with pyuria and RBC. CT foot with bilateral (R>L) cellulitic changes with possible ulceration, also subcutaneous emphysema in posterior plantar soft tissues of right hindfoot. Now on vancomycin and pip-tazo.     Interval History: WBC trending down. Wound Cxs with esbl proteus, concern for osteo     Review of Systems   Constitutional:  Negative for chills and fever.   HENT:  Negative for hearing loss.    Respiratory:  Negative for cough and shortness of breath.    Cardiovascular:  Negative for chest pain.   Gastrointestinal:  Negative for nausea and vomiting.   Genitourinary:  Positive for hematuria.   Skin:  Positive for color change and wound.   Neurological:  Negative for numbness.   Objective:     Vital Signs (Most Recent):  Temp: 97.4 °F (36.3 °C) (06/18/23 1135)  Pulse: 106 (06/18/23 1140)  Resp: 18 (06/18/23 1135)  BP: 131/83 (06/18/23 1135)  SpO2: 95 % (06/18/23 1135) Vital Signs (24h Range):  Temp:  [96.1 °F (35.6 °C)-97.6 °F (36.4 °C)] 97.4 °F (36.3 °C)  Pulse:  [] 106  Resp:  [18-22] 18  SpO2:  [91 %-99 %] 95 %  BP:  (105-146)/(53-89) 131/83     Weight: 70.8 kg (156 lb)  Body mass index is 24.43 kg/m².    Estimated Creatinine Clearance: 23.1 mL/min (A) (based on SCr of 2.7 mg/dL (H)).     Physical Exam     Constitutional:       General: He is not in acute distress.     Appearance: Normal appearance. He is ill-appearing. He is not toxic-appearing or diaphoretic.      Comments: Appears older than stated age, unkempt, malodorous   HENT:      Head: Normocephalic and atraumatic.      Nose: Nose normal.      Mouth/Throat:      Mouth: Mucous membranes are dry.      Pharynx: Oropharynx is clear. No oropharyngeal exudate.   Eyes:      Extraocular Movements: Extraocular movements intact.      Conjunctiva/sclera: Conjunctivae normal.      Pupils: Pupils are equal, round, and reactive to light.   Cardiovascular:      Rate and Rhythm: Normal rate and regular rhythm.      Pulses: Normal pulses.      Heart sounds: No murmur heard.  Pulmonary:      Effort: No respiratory distress.      Breath sounds: Normal breath sounds. No wheezing or rales.   Abdominal:      General: Abdomen is flat. Bowel sounds are normal. There is no distension.      Palpations: Abdomen is soft.      Tenderness: There is no abdominal tenderness.   Genitourinary:     Comments: +felipe catheter +diaper  Musculoskeletal:         General: Swelling, tenderness and deformity present.      Cervical back: Normal range of motion and neck supple.   Skin:     General: Skin is warm.      Capillary Refill: Capillary refill takes 2 to 3 seconds.      Findings: Erythema and lesion present.      Comments: Chronic stasis changes to bilateral LE with maceration and skin breakdown throughout. 2 painful lesions to both heels - R with eschar formation, L actively oozing/bleeding. Very tender to light touch.    Neurological:      General: No focal deficit present.      Mental Status: He is alert.   Significant Labs: All pertinent labs within the past 24 hours have been reviewed.    Significant  Imaging: I have reviewed all pertinent imaging results/findings within the past 24 hours.

## 2023-06-18 NOTE — PROGRESS NOTES
RAPID RESPONSE NURSE PROACTIVE ROUNDING NOTE       Admit Date: 2023  LOS: 6  Code Status: DNR   Date of Visit: 2023  : 1950  Age: 73 y.o.  Sex: male  Race: White  Bed: K477/K477 A:   MRN: [id number]  Was the patient discharged from an ICU this admission? No   Was the patient discharged from a PACU within last 24 hours? No   Did the patient receive conscious sedation/general anesthesia in last 24 hours? No   Was the patient in the ED within the past 24 hours? No   Was the patient on NIPPV within the past 24 hours? No   Attending Physician: So Fowler MD  Primary Service: Hospitalist     SITUATION    Notified by Epic patient alert  Reason for alert: High MEWs score    Diagnosis: Severe sepsis   has a past medical history of Anemia, Cataract, Chronic cough, Diabetes mellitus type II, GERD (gastroesophageal reflux disease), Glaucoma, and Hyperlipidemia.    Last Vitals:  Temp: 96.4 °F (35.8 °C) (520)  Pulse: 112 (520)  Resp: 20 (520)  BP: 146/74 (520)  SpO2: 99 % (520)    24 Hour Vitals Range:  Temp:  [96.1 °F (35.6 °C)-98 °F (36.7 °C)]   Pulse:  []   Resp:  [18-22]   BP: (136-146)/(56-89)   SpO2:  [91 %-99 %]     Clinical Issues: Circulatory    ASSESSMENT/INTERVENTIONS  Chart reviewed.    RECOMMENDATIONS  Pt currently on rapid response alerts.  Will continue to follow up with proactive rounding.    Discussed plan of care with bedside RNLuma.    PROVIDER ESCALATION    Physician escalation: No    Orders received and case discussed with NA.    Disposition:Remain in room 477    FOLLOW UP    Call back the Rapid Response NurseSharyn at [phone] for additional questions or concerns.

## 2023-06-18 NOTE — PLAN OF CARE
Problem: Adult Inpatient Plan of Care  Goal: Plan of Care Review  Outcome: Ongoing, Progressing  Goal: Absence of Hospital-Acquired Illness or Injury  Outcome: Ongoing, Progressing  Goal: Optimal Comfort and Wellbeing  Outcome: Ongoing, Progressing     Problem: Skin Injury Risk Increased  Goal: Skin Health and Integrity  Outcome: Ongoing, Progressing     Problem: Impaired Wound Healing  Goal: Optimal Wound Healing  Outcome: Ongoing, Progressing     Problem: Fall Injury Risk  Goal: Absence of Fall and Fall-Related Injury  Outcome: Ongoing, Progressing     Problem: Glycemic Control Impaired (Surgery Nonspecified)  Goal: Blood Glucose Level Within Targeted Range  Outcome: Ongoing, Progressing     Problem: Pain (Surgery Nonspecified)  Goal: Acceptable Pain Control  Outcome: Ongoing, Progressing     POC reviewed with patient. All questions and concerns reviewed. Vital signs stable throughout shift. For full assessment please refer to flowsheet. Fall/ safety precautions implemented & maintained. Bed locked in lowest position, bed alarm activated & audible, & call light in reach. Will continue to monitor.

## 2023-06-18 NOTE — ASSESSMENT & PLAN NOTE
Patient with acute kidney injury likely due to pre-renal azotemia, acute tubular necrosis and post-obstructive d/t clotted felipe catheter [place] is currently improving. Labs reviewed- Renal function/electrolytes with Estimated Creatinine Clearance: 23.1 mL/min (A) (based on SCr of 2.7 mg/dL (H)). according to latest data. Monitor urine output and serial BMP and adjust therapy as needed. Avoid nephrotoxins and renally dose meds for GFR listed above.     Renal US showed mild bilateral hydronephrosis on admission  ATN- multifactorial 2/2 ?Vancomycin level supratherapeutic (Received vanc/ zosyn) +/- sepsis +/- IV contrast +/- obstructive     Felipe catheter replaced 6/13.  On continuous bladder irrigation.  CT abdomen pelvis without contrast showed no hydronephrosis or renal masses.  Given mild-to-moderate pleural effusions with bibasilar atelectasis, will hold IV fluids at this time.  Incentive spirometer. lovenox held per urology recommendation    Nephrology, urology consult

## 2023-06-18 NOTE — PROGRESS NOTES
Nephrology Progress Note       Consult Requested By: So Fowler MD  Reason for Consult: [place]      SUBJECTIVE:       Review of Systems   Constitutional:  Negative for chills and fever.   Respiratory:  Negative for cough and shortness of breath.    Cardiovascular:  Positive for leg swelling. Negative for chest pain.   Gastrointestinal:  Negative for nausea.   Neurological:  Positive for weakness.     Past Medical History:   Diagnosis Date    Anemia     Cataract     Chronic cough     Diabetes mellitus type II     GERD (gastroesophageal reflux disease)     Glaucoma     Hyperlipidemia           OBJECTIVE:     Vital Signs (Most Recent)  Vitals:    06/18/23 0750 06/18/23 0805 06/18/23 1135 06/18/23 1140   BP:  (!) 105/53 131/83    BP Location:  Left arm Left arm    Patient Position:  Lying Lying    Pulse:  98 101 106   Resp:  18 18    Temp:  96.8 °F (36 °C) 97.4 °F (36.3 °C)    TempSrc:  Oral Oral    SpO2: 99% (!) 94% 95%    Weight:       Height:                       Medications:   acetaminophen  1,000 mg Oral TID    diphenhydrAMINE  12.5 mg Intravenous Q6H    ergocalciferol  50,000 Units Oral Q7 Days    ertapenem (INVANZ) IVPB  500 mg Intravenous Q24H    senna  8.6 mg Oral Daily    tamsulosin  0.4 mg Oral QHS           Physical Exam  Vitals and nursing note reviewed.   Constitutional:       General: He is not in acute distress.     Appearance: He is ill-appearing and toxic-appearing. He is not diaphoretic.   HENT:      Head: Normocephalic and atraumatic.      Mouth/Throat:      Pharynx: No oropharyngeal exudate.   Eyes:      General: No scleral icterus.     Conjunctiva/sclera: Conjunctivae normal.      Pupils: Pupils are equal, round, and reactive to light.   Cardiovascular:      Rate and Rhythm: Normal rate and regular rhythm.      Heart sounds: Normal heart sounds. No murmur heard.  Pulmonary:      Effort: Pulmonary effort is normal. No respiratory distress.      Breath sounds: Normal breath sounds.   Abdominal:       General: Bowel sounds are normal. There is no distension.      Palpations: Abdomen is soft.      Tenderness: There is no abdominal tenderness.   Genitourinary:     Comments: Knight   Musculoskeletal:         General: Normal range of motion.      Cervical back: Normal range of motion and neck supple.      Right lower leg: Edema present.      Left lower leg: Edema present.   Skin:     General: Skin is warm and dry.      Findings: No erythema.   Neurological:      Mental Status: He is alert and oriented to person, place, and time.      Cranial Nerves: No cranial nerve deficit.       Laboratory:  Recent Labs   Lab 06/15/23  0816 06/16/23  0407 06/17/23  0402   WBC 21.89* 17.31* 17.07*   HGB 7.6* 7.9* 8.4*   HCT 23.6* 24.5* 26.2*   * 878* 943*   MONO 5.1  1.1* 3.0* 6.6  1.1*       Recent Labs   Lab 06/16/23  0407 06/17/23  0402 06/18/23  0425    140 146*   K 4.5 4.3 4.0    104 107   CO2 23 22* 23   BUN 46* 45* 42*   CREATININE 3.2* 3.3* 2.7*   CALCIUM 8.1* 8.5* 8.5*   PHOS  --   --  4.6*         Diagnostic Results:  X-Ray: Reviewed  US: Reviewed  Echo: Reviewed  ASSESSMENT/PLAN:     1. [place] -baseline creatinine 0.7-1.2       at this point  multifactorial, ATN, Sepsis, on top High Vanco levels 36.9 =>33.4  now on irrigating  Knight - obstruction US kidney   Bilateral mild hydronephrosis.  Note, there is distention of the urinary bladder despite Knight catheter, hydronephrosis is suspected to be on the basis of reflux related to urinary bladder distension.  -- Urology consulted s/p  Knight irrigation appreciate assistance     -- Making Urine supportive therapy for now - hopefully no need for RRT as his levels drops  Cr seems platoued 3.2  monitor for now   Vanc Levels in AM as well   -- Daily Renal Function Panel  -- Avoid Hypotension.  -- Renally dose all meds  -- Please avoid nephrotoxins, including NSAIDs, aminoglycosides, IV contrast (unless absolutely necessary), gadolinium, fleets and other  phosphorous-based laxatives. Caution with antibiotics.    Hypernatremia noted, we will give 1 L of D5W today, encourage p.o. intake  Palliative care consult noted    2. HTN (I10) - controlled   3. Anemia multifactorial of chronic kidney disease and acute renal failure   Recent Labs   Lab 06/15/23  0816 06/16/23  0407 06/17/23  0402   HGB 7.6* 7.9* 8.4*   HCT 23.6* 24.5* 26.2*   * 878* 943*         Iron   Lab Results   Component Value Date    IRON 40 (L) 07/23/2022    TIBC 198 (L) 07/23/2022    FERRITIN 334 (H) 07/23/2022       4. MBD (E88.9 M90.80) -magnesium is still low repeat 2 g IV    Recent Labs   Lab 06/13/23  0320 06/14/23  0251 06/18/23  0425   MG 2.5 1.7 1.8         Lab Results   Component Value Date    CALCIUM 8.5 (L) 06/18/2023    PHOS 4.6 (H) 06/18/2023     No results found for: [id number]    Lab Results   Component Value Date    CO2 23 06/18/2023       5. Nutrition/Hypoalbuminemia   Recent Labs   Lab 06/11/23  2334 06/18/23  0425   ALBUMIN 2.2* 1.7*       Protein supplement Nepro for now        Thank you for allowing me to participate in care of your patient  With any question please call       [facility]  WILL Pedro MD,   OMD LISA Mayo MD [address]. Harmon, MICHELLE  [address]   [place], [address] [phone]  After hours answering service: [phone]

## 2023-06-18 NOTE — PT/OT/SLP EVAL
Speech Language Pathology Evaluation  Bedside Swallow    Patient Name:  Mateo Foreman   MRN:  [id number]  Admitting Diagnosis: Severe sepsis    Recommendations:                 General Recommendations:  Ongoing Swallow evaluation     Diet recommendations:  Continue Strict NPO with consideration for transitioning PO meds to IV administration, as pt is not safe for anything by mouth at this time. Please continue frequent oral care. SLP will advance pt's diet as able.    Aspiration Precautions: Strict aspiration precautions     General Precautions: Standard, aspiration, fall, contact  Communication strategies:   Limited communication ability at this time -- relies heavily on communication partner to anticipate all wants/needs    Assessment:     Mateo Foreman is a 73 y.o. male with an SLP diagnosis of  Cognitive-communication deficits and Dysphagia.  He presents with severe cognitive-communication deficits c/b poor attention and awareness, inability to follow commands, answer Y/N questions consistently or reliably and non meaningful speech/unable to express any basic wants/needs at this time. Pt currently relies heavily on all communication partners to anticipate all basic wants/needs. Clinical s/s of oral and pharyngeal dysphagia, which in part appears 2/2 current cognitive status -- pt with limited oral acceptance and delayed, inefficient swallow with wet vocal quality s/p swallow x1 small tsp of water.      History:   HPI: 73-year-old past medical history of type 2 diabetes, GERD, hyperlipidemia, anemia, cataracts presenting from his nursing home with increased bilateral lower extremity swelling weeping and foul smelling. Patient is known to be unkempt.   As per patient had chronic wounds of feet were wrapped weeks ago have not been rewrapped by weeks. Patient is not compliant with care. Patient mentions having abnormal drainage from his heels.  Denies any chest pain, cough, nausea vomiting diarrhea or abdominal pain  fevers chills.Not hypotensive on arrival. Tachycardic , WBC 23 K - admitted for sepsis due to SSTI/UTI     Overview/Hospital Course:  No notes on file     Interval History:  Alert, moaning.  Does not answer questions or communicate.  Appears more comfortable than yesterday.  No family at bedside      Updated patient's sister Norma Ro via telephone.  Explained patient is more comfortable but not alert enough to communicate or eat.  Sister understands.  She will be returning from out of town on Monday and considering hospice.  Discussed at present we will continue antibiotics and wound care.    Past Medical History:   Diagnosis Date    Anemia     Cataract     Chronic cough     Diabetes mellitus type II     GERD (gastroesophageal reflux disease)     Glaucoma     Hyperlipidemia        Past Surgical History:   Procedure Laterality Date    DEBRIDEMENT OF FOOT Bilateral 6/14/2023    Procedure: DEBRIDEMENT, FOOT;  Surgeon: Gunnar Rodney DPM;  Location: [facility] OR;  Service: Podiatry;  Laterality: Bilateral;  Jamshidi needle, Vancomycin powder    PRESSURE ULCER DEBRIDEMENT Bilateral 6/14/2023    Procedure: DEBRIDEMENT, PRESSURE ULCER;  Surgeon: Gunnar Rodney DPM;  Location: [facility] OR;  Service: Podiatry;  Laterality: Bilateral;       Social History: Patient lives at a NH    Prior Intubation HX:  N/A    Modified Barium Swallow: None on file, per EMR    Chest X-Rays: Worsening pleural effusions and passive atelectasis versus airspace disease in the lung bases, right side greater than left.     CT Head without Contrast:   1. No acute intracranial process with no significant change.   2. Left frontal parasagittal encephalomalacia suggesting remote left LETTY infarction.  No significant change.  Stable probable minimal encephalomalacia of the right parasagittal frontal lobe anterior corpus callosum also.  No significant change.   3. Paranasal sinus disease.     Prior diet: Unknown at this time -- pt unable to  communicate efficiently.      Subjective     SLP consulted for clinical swallow eval. SLP confirmed with LPN prior to entry. Pt found in bed asleep, woke easily with verbal cueing. However, waxing and waning alertness with poor attention throughout session. Also, observed with difficulty communicating basic wants/needs, as pt only produced the same non-meaningful groan/moan for each question, command and/or tactile cue SLP presented.     Patient goals: Unable to determine at this time given pt's communication deficit     Pain/Comfort:  Pain Rating 1: other (see comments) (pt unable to adequately communicate)    Respiratory Status: Room air    Objective:     Cognition/Communication: Pt awake; however, waxing and waning alertness and dcr'd awareness/attention with difficulty redirecting -- would intermittently attend to SLP when given verbal cue; however, unable to sustain attention or eye contact. Unable to follow any simple commands, answer any Y/N, orientation, and/or open-ended questions. Pt with no meaningful speech, at this time. Pt only producing groaning that appeared to be the same response for all SLP inquiries and/or tactile cues provided.     Oral Musculature Evaluation  Oral Musculature: other (see comments) (Unable to fully assess -- only subjective observations able to be obtained at this time)  Dentition: upper and lower dentures  Mucosal Quality: dry, sticky  Volitional Swallow:  (unable to assess 2/2 pt's cognitive deficits)  Voice Prior to PO Intake:  (clear vocal quality, per subjective observation from pt's nonmeaningful vocalizations)    Bedside Swallow Eval: Pt seen this AM for a clinical swallow eval. Clinical s/s of oral and pharyngeal dysphagia noted x1 small tsp sip of water -- pt with poor oral acceptance by deferring all ice chip trials and additional tsp sip trial by turning head away and/or closing lips together upon presentation. Demonstrated delayed, inefficient swallow with slightly  wet vocal quality s/p swallow -- unable to clear despite multiple attempts made by SLP and cueing provided.     Consistencies Assessed:  Thin liquids - presented x3 ice chip trials, pt deferred all x3 trials. SLP presented x2 small (less than 1/2) tsp sips of water; pt only accepted x1.      Oral Phase:   Decreased closure around utensil  Poor oral acceptance  Suspected reduced bolus control and premature spillage, as oral cavity clear and open mouth posture prior to pt initiating a delayed swallow.     Pharyngeal Phase:   decreased hyolaryngeal excursion to palpation  delayed swallow initation  wet vocal quality after swallow  Unable to further assess    Compensatory Strategies  None - pt with no attempt to ind'ly clear wet vocal quality and pt unable to follow any simple commands.     Treatment: SLP will continue to follow and treat pt x2-3/wk -- will advance pt's diet as able/tolerated.     Goals:   Multidisciplinary Problems       SLP Goals          Problem: SLP    Goal Priority Disciplines Outcome   SLP Goal     SLP Ongoing, Not Progressing   Description: Short Term Goals:  1. Pt will participate in a clinical swallow eval to determine least restrictive diet.  2. Pt will orient x4 via any communication modality with mod-max assist  3. Pt will express basic wants/needs via any communication modality with mod-max assist  *further goals pending pt's progress/GOC discussion*                       Plan:     Patient to be seen:  2 x/week, 3 x/week   Plan of Care expires:  07/18/23  Plan of Care reviewed with:  patient, other (see comments) (LPN Jan)   SLP Follow-Up:  Yes       Discharge recommendations:  other (see comments) (TBD; per MD note, possible discussing hopsice on 6/19/23 with family)     Barriers to Discharge:  Level of Skilled Assistance Needed, Safety Awareness and nutritional status    Time Tracking:     SLP Treatment Date:   06/18/23  Speech Start Time:  0829  Speech Stop Time:  0838     Speech Total  Time (min):  9 min    Billable Minutes: Eval Swallow and Oral Function 9    06/18/2023

## 2023-06-18 NOTE — SUBJECTIVE & OBJECTIVE
Interval History:  clinically unchanged. Not speaking, does not appear in distress when not attempting to communicate. Moans when attempting to communicate. Poor po intake.     Review of Systems  Objective:     Vital Signs (Most Recent):  Temp: 97.4 °F (36.3 °C) (06/18/23 1135)  Pulse: 106 (06/18/23 1140)  Resp: 18 (06/18/23 1135)  BP: 131/83 (06/18/23 1135)  SpO2: 95 % (06/18/23 1135) Vital Signs (24h Range):  Temp:  [96.1 °F (35.6 °C)-97.6 °F (36.4 °C)] 97.4 °F (36.3 °C)  Pulse:  [] 106  Resp:  [18-22] 18  SpO2:  [91 %-99 %] 95 %  BP: (105-146)/(53-89) 131/83     Weight: 70.8 kg (156 lb)  Body mass index is 24.43 kg/m².    Intake/Output Summary (Last 24 hours) at 6/18/2023 1331  Last data filed at 6/18/2023 0530  Gross per 24 hour   Intake 99.7 ml   Output 900 ml   Net -800.3 ml           Physical Exam  Vitals and nursing note reviewed.   Constitutional:       Appearance: He is well-developed.      Comments: Moaning, not talking    Cardiovascular:      Rate and Rhythm: Normal rate and regular rhythm.   Pulmonary:      Effort: Pulmonary effort is normal. No respiratory distress.      Breath sounds: Normal breath sounds.   Abdominal:      Palpations: Abdomen is soft.      Tenderness: There is no abdominal tenderness.      Comments: Knight catheter in place with clear urine   Musculoskeletal:      Right lower leg: Edema present.      Left lower leg: Edema present.      Comments:   Chronic LE edema  Bilateral feet dressed   Skin:     Findings: Rash present.      Comments: Sacral wounds present on admission  Old & new scattered maculopapular scabs b/l UE, chest & abdomen  Dry scaly skin bilateral lower extremities     Neurological:      Mental Status: He is alert.           Significant Labs: All pertinent labs within the past 24 hours have been reviewed.    Significant Imaging: I have reviewed all pertinent imaging results/findings within the past 24 hours.

## 2023-06-19 LAB
ALBUMIN SERPL BCP-MCNC: 1.7 G/DL (ref 3.5–5.2)
ANION GAP SERPL CALC-SCNC: 12 MMOL/L (ref 8–16)
BACTERIA SPEC AEROBE CULT: ABNORMAL
BACTERIA SPEC AEROBE CULT: ABNORMAL
BACTERIA SPEC ANAEROBE CULT: NORMAL
BUN SERPL-MCNC: 33 MG/DL (ref 8–23)
CALCIUM SERPL-MCNC: 8.5 MG/DL (ref 8.7–10.5)
CHLORIDE SERPL-SCNC: 107 MMOL/L (ref 95–110)
CO2 SERPL-SCNC: 26 MMOL/L (ref 23–29)
CREAT SERPL-MCNC: 1.9 MG/DL (ref 0.5–1.4)
EST. GFR  (NO RACE VARIABLE): 37 ML/MIN/1.73 M^2
FINAL PATHOLOGIC DIAGNOSIS: NORMAL
GLUCOSE SERPL-MCNC: 131 MG/DL (ref 70–110)
GROSS: NORMAL
Lab: NORMAL
MAGNESIUM SERPL-MCNC: 1.8 MG/DL (ref 1.6–2.6)
PHOSPHATE SERPL-MCNC: 3.1 MG/DL (ref 2.7–4.5)
POCT GLUCOSE: 141 MG/DL (ref 70–110)
POCT GLUCOSE: 168 MG/DL (ref 70–110)
POCT GLUCOSE: 181 MG/DL (ref 70–110)
POCT GLUCOSE: 199 MG/DL (ref 70–110)
POCT GLUCOSE: 206 MG/DL (ref 70–110)
POTASSIUM SERPL-SCNC: 3.9 MMOL/L (ref 3.5–5.1)
SODIUM SERPL-SCNC: 145 MMOL/L (ref 136–145)

## 2023-06-19 PROCEDURE — 36415 COLL VENOUS BLD VENIPUNCTURE: CPT | Performed by: INTERNAL MEDICINE

## 2023-06-19 PROCEDURE — 11000001 HC ACUTE MED/SURG PRIVATE ROOM

## 2023-06-19 PROCEDURE — 25000003 PHARM REV CODE 250: Performed by: STUDENT IN AN ORGANIZED HEALTH CARE EDUCATION/TRAINING PROGRAM

## 2023-06-19 PROCEDURE — 51700 IRRIGATION OF BLADDER: CPT

## 2023-06-19 PROCEDURE — 94761 N-INVAS EAR/PLS OXIMETRY MLT: CPT

## 2023-06-19 PROCEDURE — 25000003 PHARM REV CODE 250: Performed by: INTERNAL MEDICINE

## 2023-06-19 PROCEDURE — 63600175 PHARM REV CODE 636 W HCPCS: Performed by: STUDENT IN AN ORGANIZED HEALTH CARE EDUCATION/TRAINING PROGRAM

## 2023-06-19 PROCEDURE — 83735 ASSAY OF MAGNESIUM: CPT | Performed by: INTERNAL MEDICINE

## 2023-06-19 PROCEDURE — 63600175 PHARM REV CODE 636 W HCPCS: Performed by: INTERNAL MEDICINE

## 2023-06-19 PROCEDURE — 27000207 HC ISOLATION

## 2023-06-19 PROCEDURE — 99233 PR SUBSEQUENT HOSPITAL CARE,LEVL III: ICD-10-PCS | Mod: ,,, | Performed by: STUDENT IN AN ORGANIZED HEALTH CARE EDUCATION/TRAINING PROGRAM

## 2023-06-19 PROCEDURE — 92526 ORAL FUNCTION THERAPY: CPT

## 2023-06-19 PROCEDURE — 99233 SBSQ HOSP IP/OBS HIGH 50: CPT | Mod: ,,, | Performed by: STUDENT IN AN ORGANIZED HEALTH CARE EDUCATION/TRAINING PROGRAM

## 2023-06-19 PROCEDURE — 80069 RENAL FUNCTION PANEL: CPT | Performed by: INTERNAL MEDICINE

## 2023-06-19 RX ADMIN — SENNOSIDES 8.6 MG: 8.6 TABLET, FILM COATED ORAL at 08:06

## 2023-06-19 RX ADMIN — OXYCODONE HYDROCHLORIDE 10 MG: 5 TABLET ORAL at 09:06

## 2023-06-19 RX ADMIN — INSULIN ASPART 2 UNITS: 100 INJECTION, SOLUTION INTRAVENOUS; SUBCUTANEOUS at 12:06

## 2023-06-19 RX ADMIN — DIPHENHYDRAMINE HYDROCHLORIDE 12.5 MG: 50 INJECTION, SOLUTION INTRAMUSCULAR; INTRAVENOUS at 06:06

## 2023-06-19 RX ADMIN — ERTAPENEM 1 G: 1 INJECTION INTRAMUSCULAR; INTRAVENOUS at 01:06

## 2023-06-19 RX ADMIN — TAMSULOSIN HYDROCHLORIDE 0.4 MG: 0.4 CAPSULE ORAL at 09:06

## 2023-06-19 RX ADMIN — HYDROMORPHONE HYDROCHLORIDE 1 MG: 1 INJECTION, SOLUTION INTRAMUSCULAR; INTRAVENOUS; SUBCUTANEOUS at 06:06

## 2023-06-19 RX ADMIN — ACETAMINOPHEN 1000 MG: 500 TABLET ORAL at 02:06

## 2023-06-19 RX ADMIN — ACETAMINOPHEN 1000 MG: 500 TABLET ORAL at 09:06

## 2023-06-19 RX ADMIN — DIPHENHYDRAMINE HYDROCHLORIDE 12.5 MG: 50 INJECTION, SOLUTION INTRAMUSCULAR; INTRAVENOUS at 11:06

## 2023-06-19 RX ADMIN — DIPHENHYDRAMINE HYDROCHLORIDE 12.5 MG: 50 INJECTION, SOLUTION INTRAMUSCULAR; INTRAVENOUS at 12:06

## 2023-06-19 NOTE — PROGRESS NOTES
"Palliative Care Daily Progress Note     S: Medical record reviewed, followed up with patient and family regarding patient's condition. Pt's mental status is markedly improved c/p changing cefepime to CTX and decreasing IV benadryl dosing, now fully conversant with age appropriate health literacy.     Pt's top priority is keeping his foot and regarding amputation he states "Fuck that!" He accepts he will no longer be able to ambulate independently and will need a wheelchair.    Met with pt's sister Norma at bedside who is pleased with pt's improved mental status. Advised her that given the incurable nature of pt's osteo and his succinct refusal of amputation this will be a chronic condition. She has appropriate insight and agrees to a 2 week course of IV abx followed by a transition to full comfort measures; does not wish for pt to be hospitalized again.    Notified primary team and CM of preferences. Will be referred to hospice in AM to confirm they will accept this abx regimen and enroll pt prior to discharge.    All questions answered to pt's and family's satisfaction.    B: Code Status: DNR   Advanced Directives:   DNR (per historic documentation discussed 6/16 with HCPOA)  Family/Support: HCPOA present at bedside   Physician's Plan of Care Goal is finalize abx course and enroll in hospice for return to NH tomorrow.   Labs: noncontributory   Diagnostics: noncontributory     Physical Exam  Constitutional:       General: He is not in acute distress.     Appearance: Normal appearance. He is ill-appearing.   HENT:      Head: Normocephalic and atraumatic.      Mouth/Throat:      Mouth: Mucous membranes are moist.      Pharynx: Oropharynx is clear.   Eyes:      Extraocular Movements: Extraocular movements intact.      Pupils: Pupils are equal, round, and reactive to light.   Cardiovascular:      Rate and Rhythm: Normal rate and regular rhythm.      Pulses: Normal pulses.      Heart sounds: Normal heart sounds. No " murmur heard.    No friction rub. No gallop.   Pulmonary:      Effort: Pulmonary effort is normal.      Breath sounds: Normal breath sounds. No wheezing or rales.   Abdominal:      General: Abdomen is flat. There is no distension.      Palpations: Abdomen is soft.      Tenderness: There is no abdominal tenderness.   Musculoskeletal:         General: Signs of injury (deep heel pressure injury dressed c/d/i) present. Normal range of motion.   Skin:     Coloration: Skin is pale.   Neurological:      General: No focal deficit present.      Mental Status: He is alert and oriented to person, place, and time. Mental status is at baseline.   Psychiatric:         Mood and Affect: Mood normal.         Behavior: Behavior normal.         Thought Content: Thought content normal.         Judgment: Judgment normal.     A: Patient's current condition fair.   Patient Symptom Assessment Flowsheet has been completed.   Family is present and pleased with care to date.  Discharge Planning: to [facility] with hospice tomorrow     R: Support offered at this time.   Palliative Care Team will continue to follow patient.     Derick Juarez MD  Hospice and Palliative Medicine  Palliative Care Pager: [phone]    Total time spent: 52 minutes  > 50% of 52 minute visit spent in chart review, face to face discussion of symptoms, coordination of care w/ other specialties, and dc planning.

## 2023-06-19 NOTE — ASSESSMENT & PLAN NOTE
This patient does have evidence of infective focus  My overall impression is sepsis.  Source: Urinary Tract and Skin and Soft Tissue (location Bilateral feet open diabetic wounds), sacral decubitus wounds  Antibiotics given-   Antibiotics (72h ago, onward)    Start     Stop Route Frequency Ordered    06/19/23 1400  ertapenem (INVANZ) 1 g in sodium chloride 0.9 % 100 mL IVPB (MB+)         -- IV Every 24 hours (non-standard times) 06/19/23 0928        Latest lactate reviewed-  No results for input(s): LACTATE in the last 72 hours.  Organ dysfunction indicated by Acute kidney injury    Fluid challenge Not needed - patient is not hypotensive      Post- resuscitation assessment Yes Perfusion exam was performed within 6 hours of septic shock presentation after bolus shows Adequate tissue perfusion assessed by non-invasive monitoring       Will Not start Pressors- Levophed for MAP of 65  Source control achieved by:  S/p vanc/Zosyn in the ER.  Continue vancomycin, cefepime, Flagyl     CT lower extremity with contrast showed significant bilateral edema in distal for legs/feet, R>L.  Scattered foci of subcutaneous emphysema within plantar aspect of right hindfoot.  MRI confirmed early changes of R heel osteomyelitis.  Arterial ultrasound bilaterally showed no focal hemodynamic stenosis.    Renal ultrasound showed mild bilateral hydronephrosis.  Distended urinary bladder with echogenic material which could be blood or sequelae of infection.  Chronic indwelling Knight catheter switched by Urology on 06/13    Blood cultures 6/11 growing coagulase negative staph, probable contaminant.  Rapid ID by PCR negative for staph aureus.  Repeat cultures 6/13 negative.     Urine culture 6/11 growing multiple organisms.  Concern for infection given chronic catheter    S/p heel & sacral wound debridement in OR 6/14    Sacral wound culture growing ESBL proteus, R foot wound cultures growing proteus sensitive to cephalosporins  Infectious  disease on board. Discussed with ID attending. Antibiotics switched to ertapenem renally dosed based on final cultures. Recommend 6 weeks IV antibiotics (end date 7/30)    Discussed with sister Norma. In accordance with patient's wishes for comfort, plan for 2 weeks antibiotics with plan for hospice at [place].

## 2023-06-19 NOTE — PLAN OF CARE
Discharge order pending palliative care follow-up. AVS preparation initiated. Will finalize once final discharge orders and medication reconciliation are complete.

## 2023-06-19 NOTE — PROGRESS NOTES
Progress Note  Nephrology      Consult Requested By: So Fowler MD      SUBJECTIVE:     Overnight events  Patient is a 73 y.o. male     Patient Active Problem List   Diagnosis    Dyslipidemia associated with type 2 diabetes mellitus    Other and unspecified hyperlipidemia    Insomnia    Open angle with borderline findings, low risk    Nuclear sclerosis    Type 2 diabetes mellitus with hypoglycemia, with long-term current use of insulin    HTN, goal below 130/80    Cerebral infarction due to embolism of left middle cerebral artery    PVD (peripheral vascular disease)    COPD (chronic obstructive pulmonary disease)    Gastro-esophageal reflux disease without esophagitis    Male erectile dysfunction    Type 2 diabetes mellitus with hyperglycemia    Hemiplegia and hemiparesis following cerebral infarction affecting right dominant side    Systolic dysfunction    Lymphedema of both lower extremities    Cerebrovascular accident    Hemiplegia of dominant side as late effect of cerebrovascular disease    Peripheral vascular disorder due to diabetes mellitus    Goals of care, counseling/discussion    Pain    Closed fracture of neck of right femur    Oxygen desaturation    Hypertension associated with diabetes    H/O: CVA (cerebrovascular accident)    Leukocytosis    Hypokalemia    Fall    Prolonged Q-T interval on ECG    C. difficile colitis    Colitis    Severe sepsis    Hypomagnesemia    Nursing home resident    Decubitus ulcer of heel, bilateral, unstageable    Urinary tract infection associated with indwelling urethral catheter    Acute osteomyelitis of right foot    [place] (acute kidney injury)    Sacral decubitus ulcer    Gross hematuria    Acute encephalopathy    Rash    Palliative care encounter     Past Medical History:   Diagnosis Date    Anemia     Cataract     Chronic cough     Diabetes mellitus type II     GERD (gastroesophageal reflux disease)     Glaucoma     Hyperlipidemia               OBJECTIVE:      Vitals:    06/19/23 0754 06/19/23 1151 06/19/23 1212 06/19/23 1559   BP: 133/63 (!) 141/63     BP Location: Right arm Right arm     Patient Position: Lying Lying     Pulse: 93 107 106 91   Resp: 18 16     Temp: 98.5 °F (36.9 °C) 96.8 °F (36 °C)     TempSrc: Axillary Axillary     SpO2: (!) 92% 95%     Weight:       Height:           Temp: 96.8 °F (36 °C) (06/19/23 1151)  Pulse: 91 (06/19/23 1559)  Resp: 16 (06/19/23 1151)  BP: (!) 141/63 (06/19/23 1151)  SpO2: 95 % (06/19/23 1151)    Date 06/19/23 0700 - 06/20/23 0659   Shift 9767-1397 8255-8655 5562-1068 24 Hour Total   INTAKE   P.O. 240   240   Shift Total(mL/kg) 240(3.7)   240(3.7)   OUTPUT   Urine(mL/kg/hr) 475(0.9)   475   Shift Total(mL/kg) 475(7.3)   475(7.3)   Weight (kg) 65.4 65.4 65.4 65.4             Medications:   acetaminophen  1,000 mg Oral TID    diphenhydrAMINE  12.5 mg Intravenous Q6H    ergocalciferol  50,000 Units Oral Q7 Days    ertapenem (INVANZ) IVPB  1 g Intravenous Q24H    senna  8.6 mg Oral Daily    tamsulosin  0.4 mg Oral QHS     Physical Exam:  General appearance:NAD  Lungs: diminished breath sounds  Heart: Pulse 85  Abdomen: soft  Extremities: no edema  Skin: dry    Laboratory:  ABG  Labs reviewed  Recent Results (from the past 336 hour(s))   Basic Metabolic Panel    Collection Time: 06/17/23  4:02 AM   Result Value Ref Range    Sodium 140 136 - 145 mmol/L    Potassium 4.3 3.5 - 5.1 mmol/L    Chloride 104 95 - 110 mmol/L    CO2 22 (L) 23 - 29 mmol/L    BUN 45 (H) 8 - 23 mg/dL    Creatinine 3.3 (H) 0.5 - 1.4 mg/dL    Calcium 8.5 (L) 8.7 - 10.5 mg/dL    Anion Gap 14 8 - 16 mmol/L   Basic Metabolic Panel    Collection Time: 06/16/23  4:07 AM   Result Value Ref Range    Sodium 136 136 - 145 mmol/L    Potassium 4.5 3.5 - 5.1 mmol/L    Chloride 103 95 - 110 mmol/L    CO2 23 23 - 29 mmol/L    BUN 46 (H) 8 - 23 mg/dL    Creatinine 3.2 (H) 0.5 - 1.4 mg/dL    Calcium 8.1 (L) 8.7 - 10.5 mg/dL    Anion Gap 10 8 - 16 mmol/L   Basic Metabolic Panel     Collection Time: 06/15/23  8:16 AM   Result Value Ref Range    Sodium 134 (L) 136 - 145 mmol/L    Potassium 4.2 3.5 - 5.1 mmol/L    Chloride 101 95 - 110 mmol/L    CO2 23 23 - 29 mmol/L    BUN 43 (H) 8 - 23 mg/dL    Creatinine 3.0 (H) 0.5 - 1.4 mg/dL    Calcium 7.7 (L) 8.7 - 10.5 mg/dL    Anion Gap 10 8 - 16 mmol/L     Recent Results (from the past 336 hour(s))   CBC auto differential    Collection Time: 06/17/23  4:02 AM   Result Value Ref Range    WBC 17.07 (H) 3.90 - 12.70 K/uL    Hemoglobin 8.4 (L) 14.0 - 18.0 g/dL    Hematocrit 26.2 (L) 40.0 - 54.0 %    Platelets 943 (H) 150 - 450 K/uL   CBC auto differential    Collection Time: 06/16/23  4:07 AM   Result Value Ref Range    WBC 17.31 (H) 3.90 - 12.70 K/uL    Hemoglobin 7.9 (L) 14.0 - 18.0 g/dL    Hematocrit 24.5 (L) 40.0 - 54.0 %    Platelets 878 (H) 150 - 450 K/uL   CBC auto differential    Collection Time: 06/15/23  8:16 AM   Result Value Ref Range    WBC 21.89 (H) 3.90 - 12.70 K/uL    Hemoglobin 7.6 (L) 14.0 - 18.0 g/dL    Hematocrit 23.6 (L) 40.0 - 54.0 %    Platelets 769 (H) 150 - 450 K/uL     Urinalysis  No results for input(s): COLORU, CLARITYU, SPECGRAV, PHUR, PROTEINUA, GLUCOSEU, BILIRUBINCON, BLOODU, WBCU, RBCU, BACTERIA, MUCUS, NITRITE, LEUKOCYTESUR, UROBILINOGEN, HYALINECASTS in the last 24 hours.    Diagnostic Results:  X-Ray: Reviewed  US: Reviewed  Echo: Reviewed  ACCESS    ASSESSMENT/PLAN:     [place]/ CKD 3  US  Bilateral elevated resistive indices suggesting medical renal disease.  Bilateral mild hydronephrosis.  Note, there is distention of the urinary bladder despite Knight catheter, hydronephrosis is suspected to be on the basis of reflux related to urinary bladder distension.     The urinary bladder is distended noting echogenic material layers dependently.  Findings may reflect blood products or sequela of infection.     CT  Retained renal cortical contrast.  No hydronephrosis or renal masses.  Bladder decompressed with Knight catheter.  On  flomax.   Metabolic bone disease  Mag 1.8   Anemia multifactorial  Hb 8.5  Poor nutrition  Albumin 1.7  Hypertension  /63  Weight daily   I and O  Avoid hypotension, nephrotoxic agents, hypovolemia.  Renal diet as tolerated

## 2023-06-19 NOTE — SUBJECTIVE & OBJECTIVE
Interval History:  More alert this morning.  Cleared by speech for liquid diet.  Intermittently moans and complains of leg pain.    Spoke with patient's sister Norma via telephone in the morning.  Stated she would be coming to the hospital today.  Subsequently met with patient's sister Norma at bedside.  Sister in agreement with hospice and 2 weeks of antibiotics if possible on hospice.  Plan for discharge back to nursing home with hospice tomorrow.    Review of Systems  Objective:     Vital Signs (Most Recent):  Temp: 97.1 °F (36.2 °C) (06/19/23 1621)  Pulse: 85 (06/19/23 1621)  Resp: 18 (06/19/23 1621)  BP: (!) 163/72 (06/19/23 1621)  SpO2: (!) 94 % (06/19/23 1621) Vital Signs (24h Range):  Temp:  [96.1 °F (35.6 °C)-98.5 °F (36.9 °C)] 97.1 °F (36.2 °C)  Pulse:  [] 85  Resp:  [16-18] 18  SpO2:  [92 %-97 %] 94 %  BP: (130-163)/(56-73) 163/72     Weight: 65.4 kg (144 lb 2.9 oz)  Body mass index is 22.58 kg/m².    Intake/Output Summary (Last 24 hours) at 6/19/2023 1740  Last data filed at 6/19/2023 1641  Gross per 24 hour   Intake 240 ml   Output 2375 ml   Net -2135 ml           Physical Exam  Vitals and nursing note reviewed.   Constitutional:       Appearance: He is ill-appearing.   Cardiovascular:      Rate and Rhythm: Normal rate and regular rhythm.   Pulmonary:      Effort: Pulmonary effort is normal. No respiratory distress.      Breath sounds: Normal breath sounds.   Abdominal:      Palpations: Abdomen is soft.      Tenderness: There is no abdominal tenderness.      Comments: Knight catheter in place with clear urine   Musculoskeletal:      Right lower leg: Edema present.      Left lower leg: Edema present.      Comments:   Chronic LE edema  Bilateral feet dressed   Skin:     Findings: Rash present.      Comments: Sacral wounds present on admission  Old & new scattered maculopapular scabs b/l UE, chest & abdomen  Dry scaly skin bilateral lower extremities     Neurological:      Mental Status: He is  alert.      Comments: Oriented to self, more communicative           Significant Labs: All pertinent labs within the past 24 hours have been reviewed.    Significant Imaging: I have reviewed all pertinent imaging results/findings within the past 24 hours.

## 2023-06-19 NOTE — PT/OT/SLP PROGRESS
"Speech Language Pathology Treatment    Patient Name:  Mateo Foreman   MRN:  [id number]  Admitting Diagnosis: Severe sepsis    Recommendations:                 General Recommendations:  Per Dr. Fowler, pt with DC orders for hospice  Diet recommendations:  NPO, Liquid Diet Level: Full liquids   Aspiration Precautions: daily and frequent oral care, slow rate, pleasure feeds of FULL liquid trials to include: water, jello, applesauce,pudding and pureed textures   General Precautions: Standard, fall, aspiration, special contact  Communication strategies:  use  or family to translate, pt can speak Malagasy    Assessment:     Mateo Foreman is a 73 y.o. male admitted with severe sepsis with an SLP diagnosis of dysphagia with risk of failure to thrive. Pt and family have elected hospice measures at this time.     Subjective     Pt seen this date in room, Dr. Fowler having AM huddle with her team. No family in room.   Patient goals: none stated     Pain/Comfort:  Pain Rating 1: 0/10    Respiratory Status: room air     Objective:     Has the patient been evaluated by SLP for swallowing?   Yes  Keep patient NPO? No       Swallowing: Pt seen in room for PO trial and swallow reassessment. No family at bedside. Pt was awake and alert. Pt with signs of dehydration as evidenced by dry mucous and flaky skin around labial corners. Pt did allow SLP to elevate HOB to 90 deg, pt did moan when pt was elevated but remained cooperative. Pt presented with ice chips, tsp swallows of water, single straw swallows of water and pudding trials. Oral phase- fair labial seal and closure, slow ap transfer, no drooling or pocketing noted. Pharyngeal phase- swallow palpated, diminished laryngeal lift  present, no wet voice noted and no audible coughing or choking. Pt refused solid trials and reported he was "done and tired."    Notified primary MD team and RN of pleasure feed diet to initiate FULL Liquid trials with strict aspiration precautions.   "     Goals:   Multidisciplinary Problems       SLP Goals          Problem: SLP    Goal Priority Disciplines Outcome   SLP Goal     SLP Adequate for Care Transition   Description: Short Term Goals:  1. Pt will participate in a clinical swallow eval to determine least restrictive diet.  2. Pt will orient x4 via any communication modality with mod-max assist  3. Pt will express basic wants/needs via any communication modality with mod-max assist  *further goals pending pt's progress/GOC discussion*                       Plan:     Patient to be seen:  2 x/week, 3 x/week   Plan of Care expires:  07/18/23  Plan of Care reviewed with:  patient   SLP Follow-Up:  No       Discharge recommendations:   (DC to hospice per chart)   Barriers to Discharge:  none    Time Tracking:     SLP Treatment Date:   06/19/23  Speech Start Time:  0919  Speech Stop Time:  0932     Speech Total Time (min):  13 min    Billable Minutes: Treatment Swallowing Dysfunction 13    06/19/2023

## 2023-06-19 NOTE — PLAN OF CARE
Problem: SLP  Goal: SLP Goal  Description: Short Term Goals:  1. Pt will participate in a clinical swallow eval to determine least restrictive diet.  2. Pt will orient x4 via any communication modality with mod-max assist  3. Pt will express basic wants/needs via any communication modality with mod-max assist  *further goals pending pt's progress/GOC discussion*  Outcome: Adequate for Care Transition   Pt seen this am, spoke to Dr. Fowler who reported that pt will DC with hospice. Pt safe for initiation of FULL liquid diet with strict aspiration precautions. Notified team via secure chat.

## 2023-06-19 NOTE — PLAN OF CARE
Pt appears to be progressively becoming more alert to person, place and time with some confusion requiring redirection. C/o pain to wounds with movement/activity only. Wound care dressings changed per nursing communication orders. Barrier cream also applied to buttocks and groin area. Knight cath flushed with 30ml of sterile water. No clots noted, urine appears cloudy yellow with sediments.   Seen by Dr. Juarez with palliative care. Dr. Fowler spoke with sister (Norma).  Plan for d/c back to [facility] tomorrow.     Problem: Adult Inpatient Plan of Care  Goal: Plan of Care Review  Outcome: Ongoing, Progressing  Goal: Patient-Specific Goal (Individualized)  Outcome: Ongoing, Progressing  Goal: Absence of Hospital-Acquired Illness or Injury  Outcome: Ongoing, Progressing  Goal: Optimal Comfort and Wellbeing  Outcome: Ongoing, Progressing  Goal: Readiness for Transition of Care  Outcome: Ongoing, Progressing     Problem: Skin Injury Risk Increased  Goal: Skin Health and Integrity  Outcome: Ongoing, Progressing     Problem: Diabetes Comorbidity  Goal: Blood Glucose Level Within Targeted Range  Outcome: Ongoing, Progressing     Problem: Adjustment to Illness (Sepsis/Septic Shock)  Goal: Optimal Coping  Outcome: Ongoing, Progressing     Problem: Glycemic Control Impaired (Sepsis/Septic Shock)  Goal: Blood Glucose Level Within Desired Range  Outcome: Ongoing, Progressing     Problem: Infection Progression (Sepsis/Septic Shock)  Goal: Absence of Infection Signs and Symptoms  Outcome: Ongoing, Progressing     Problem: Nutrition Impaired (Sepsis/Septic Shock)  Goal: Optimal Nutrition Intake  Outcome: Ongoing, Progressing     Problem: Oral Intake Inadequate (Acute Kidney Injury/Impairment)  Goal: Optimal Nutrition Intake  Outcome: Ongoing, Progressing     Problem: Infection  Goal: Absence of Infection Signs and Symptoms  Outcome: Ongoing, Progressing     Problem: Fluid and Electrolyte Imbalance (Acute Kidney  Injury/Impairment)  Goal: Fluid and Electrolyte Balance  Outcome: Ongoing, Progressing     Problem: Fall Injury Risk  Goal: Absence of Fall and Fall-Related Injury  Outcome: Ongoing, Progressing     Problem: Pain (Surgery Nonspecified)  Goal: Acceptable Pain Control  Outcome: Ongoing, Progressing

## 2023-06-19 NOTE — PROGRESS NOTES
Pharmacist Renal Dose Adjustment Note    Mateo Foreman is a 73 y.o. male being treated with the medication ertapenem    Patient Data:    Vital Signs (Most Recent):  Temp: 98.5 °F (36.9 °C) (06/19/23 0754)  Pulse: 93 (06/19/23 0754)  Resp: 18 (06/19/23 0754)  BP: 133/63 (06/19/23 0754)  SpO2: (!) 92 % (06/19/23 0754) Vital Signs (72h Range):  Temp:  [96.1 °F (35.6 °C)-98.5 °F (36.9 °C)]   Pulse:  []   Resp:  [12-22]   BP: (105-157)/(53-89)   SpO2:  [91 %-99 %]      Recent Labs   Lab 06/17/23  0402 06/18/23  0425 06/19/23  0452   CREATININE 3.3* 2.7* 1.9*     Serum creatinine: 1.9 mg/dL (H) 06/19/23 0452  Estimated creatinine clearance: 32.5 mL/min (A)    Medication:ertapenem dose: 500mg frequency q24h will be changed to medication:ertapenem dose:1000mg frequency:q24h    Pharmacist's Name: Dallas Moya  Pharmacist's Extension: [phone]

## 2023-06-19 NOTE — DISCHARGE INSTRUCTIONS
Communication strategies:  use  or family to translate, pt can speak Faroese   Aspiration Precautions: daily and frequent oral care, slow rate, pleasure feeds of FULL liquid trials to include: water, jello, applesauce,pudding and pureed textures

## 2023-06-19 NOTE — PLAN OF CARE
ERIN contacted pts sister Norma Ro (Sister) [phone] to discuss dc planning. Pts sister reports that she will be at hospital within next 1-2 hours.     SW will continue to follow pt throughout her transitions of care and assist with any dc needs.      06/19/23 5923   Post-Acute Status   Post-Acute Authorization Hospice

## 2023-06-20 VITALS
HEART RATE: 86 BPM | HEIGHT: 67 IN | OXYGEN SATURATION: 99 % | TEMPERATURE: 97 F | RESPIRATION RATE: 19 BRPM | DIASTOLIC BLOOD PRESSURE: 74 MMHG | SYSTOLIC BLOOD PRESSURE: 160 MMHG | WEIGHT: 144.19 LBS | BODY MASS INDEX: 22.63 KG/M2

## 2023-06-20 LAB
ALBUMIN SERPL BCP-MCNC: 1.8 G/DL (ref 3.5–5.2)
ANION GAP SERPL CALC-SCNC: 9 MMOL/L (ref 8–16)
BASOPHILS # BLD AUTO: 0.04 K/UL (ref 0–0.2)
BASOPHILS NFR BLD: 0.2 % (ref 0–1.9)
BUN SERPL-MCNC: 27 MG/DL (ref 8–23)
CALCIUM SERPL-MCNC: 8.5 MG/DL (ref 8.7–10.5)
CHLORIDE SERPL-SCNC: 106 MMOL/L (ref 95–110)
CO2 SERPL-SCNC: 29 MMOL/L (ref 23–29)
CREAT SERPL-MCNC: 1.2 MG/DL (ref 0.5–1.4)
DIFFERENTIAL METHOD: ABNORMAL
EOSINOPHIL # BLD AUTO: 0.6 K/UL (ref 0–0.5)
EOSINOPHIL NFR BLD: 3.3 % (ref 0–8)
ERYTHROCYTE [DISTWIDTH] IN BLOOD BY AUTOMATED COUNT: 15 % (ref 11.5–14.5)
EST. GFR  (NO RACE VARIABLE): >60 ML/MIN/1.73 M^2
GLUCOSE SERPL-MCNC: 164 MG/DL (ref 70–110)
HCT VFR BLD AUTO: 25.2 % (ref 40–54)
HGB BLD-MCNC: 8 G/DL (ref 14–18)
IMM GRANULOCYTES # BLD AUTO: 0.29 K/UL (ref 0–0.04)
IMM GRANULOCYTES NFR BLD AUTO: 1.5 % (ref 0–0.5)
LYMPHOCYTES # BLD AUTO: 1.1 K/UL (ref 1–4.8)
LYMPHOCYTES NFR BLD: 5.9 % (ref 18–48)
MAGNESIUM SERPL-MCNC: 1.5 MG/DL (ref 1.6–2.6)
MCH RBC QN AUTO: 26.4 PG (ref 27–31)
MCHC RBC AUTO-ENTMCNC: 31.7 G/DL (ref 32–36)
MCV RBC AUTO: 83 FL (ref 82–98)
MONOCYTES # BLD AUTO: 1.4 K/UL (ref 0.3–1)
MONOCYTES NFR BLD: 7.5 % (ref 4–15)
NEUTROPHILS # BLD AUTO: 15.5 K/UL (ref 1.8–7.7)
NEUTROPHILS NFR BLD: 81.6 % (ref 38–73)
NRBC BLD-RTO: 0 /100 WBC
PHOSPHATE SERPL-MCNC: 2.5 MG/DL (ref 2.7–4.5)
PLATELET # BLD AUTO: 943 K/UL (ref 150–450)
PMV BLD AUTO: 8.6 FL (ref 9.2–12.9)
POCT GLUCOSE: 147 MG/DL (ref 70–110)
POCT GLUCOSE: 209 MG/DL (ref 70–110)
POCT GLUCOSE: 243 MG/DL (ref 70–110)
POTASSIUM SERPL-SCNC: 3.3 MMOL/L (ref 3.5–5.1)
RBC # BLD AUTO: 3.03 M/UL (ref 4.6–6.2)
SODIUM SERPL-SCNC: 144 MMOL/L (ref 136–145)
WBC # BLD AUTO: 19.03 K/UL (ref 3.9–12.7)

## 2023-06-20 PROCEDURE — 85025 COMPLETE CBC W/AUTO DIFF WBC: CPT | Performed by: HOSPITALIST

## 2023-06-20 PROCEDURE — 63600175 PHARM REV CODE 636 W HCPCS: Performed by: HOSPITALIST

## 2023-06-20 PROCEDURE — 25000003 PHARM REV CODE 250: Performed by: STUDENT IN AN ORGANIZED HEALTH CARE EDUCATION/TRAINING PROGRAM

## 2023-06-20 PROCEDURE — 80069 RENAL FUNCTION PANEL: CPT | Performed by: INTERNAL MEDICINE

## 2023-06-20 PROCEDURE — 63600175 PHARM REV CODE 636 W HCPCS: Performed by: STUDENT IN AN ORGANIZED HEALTH CARE EDUCATION/TRAINING PROGRAM

## 2023-06-20 PROCEDURE — 94761 N-INVAS EAR/PLS OXIMETRY MLT: CPT

## 2023-06-20 PROCEDURE — 83735 ASSAY OF MAGNESIUM: CPT | Performed by: INTERNAL MEDICINE

## 2023-06-20 PROCEDURE — 36415 COLL VENOUS BLD VENIPUNCTURE: CPT | Performed by: INTERNAL MEDICINE

## 2023-06-20 PROCEDURE — 25000003 PHARM REV CODE 250: Performed by: HOSPITALIST

## 2023-06-20 RX ORDER — SULFAMETHOXAZOLE AND TRIMETHOPRIM 200; 40 MG/5ML; MG/5ML
20 SUSPENSION ORAL EVERY 12 HOURS
Start: 2023-06-20

## 2023-06-20 RX ORDER — NAPROXEN SODIUM 220 MG/1
81 TABLET, FILM COATED ORAL DAILY
Refills: 0
Start: 2023-06-20 | End: 2024-06-19

## 2023-06-20 RX ORDER — MAGNESIUM SULFATE HEPTAHYDRATE 40 MG/ML
2 INJECTION, SOLUTION INTRAVENOUS ONCE
Status: COMPLETED | OUTPATIENT
Start: 2023-06-20 | End: 2023-06-20

## 2023-06-20 RX ORDER — POTASSIUM CHLORIDE 20 MEQ/1
20 TABLET, EXTENDED RELEASE ORAL ONCE
Status: DISCONTINUED | OUTPATIENT
Start: 2023-06-20 | End: 2023-06-20 | Stop reason: HOSPADM

## 2023-06-20 RX ORDER — SENNOSIDES 8.6 MG/1
1 TABLET ORAL DAILY
Start: 2023-06-21

## 2023-06-20 RX ORDER — POLYETHYLENE GLYCOL 3350 17 G/17G
17 POWDER, FOR SOLUTION ORAL DAILY PRN
Refills: 0
Start: 2023-06-20

## 2023-06-20 RX ORDER — OXYCODONE HYDROCHLORIDE 10 MG/1
10 TABLET ORAL EVERY 4 HOURS PRN
Qty: 15 TABLET | Refills: 0 | Status: SHIPPED | OUTPATIENT
Start: 2023-06-20

## 2023-06-20 RX ADMIN — SENNOSIDES 8.6 MG: 8.6 TABLET, FILM COATED ORAL at 09:06

## 2023-06-20 RX ADMIN — ERTAPENEM 1 G: 1 INJECTION INTRAMUSCULAR; INTRAVENOUS at 02:06

## 2023-06-20 RX ADMIN — MAGNESIUM SULFATE 2 G: 2 INJECTION INTRAVENOUS at 10:06

## 2023-06-20 RX ADMIN — OXYCODONE HYDROCHLORIDE 10 MG: 5 TABLET ORAL at 02:06

## 2023-06-20 RX ADMIN — ACETAMINOPHEN 1000 MG: 500 TABLET ORAL at 09:06

## 2023-06-20 RX ADMIN — INSULIN ASPART 2 UNITS: 100 INJECTION, SOLUTION INTRAVENOUS; SUBCUTANEOUS at 12:06

## 2023-06-20 RX ADMIN — POTASSIUM PHOSPHATE, MONOBASIC AND POTASSIUM PHOSPHATE, DIBASIC 20 MMOL: 224; 236 INJECTION, SOLUTION, CONCENTRATE INTRAVENOUS at 12:06

## 2023-06-20 NOTE — PLAN OF CARE
ERIN sent facility transfer orders via careport. ERIN was provided clearance by Delma with [facility] for pt to return to NH. Admission staff made aware that orders express to consult hospice. Delma expressed that they will setup hospice care once pt returns to facility. SW expressed understanding. ERIN contacted pts sister Norma to discuss dc planning. Pts sister aware that pt will return to NH today and NH will assist with setting up hospice. Pts sister expressed understanding.     SW contacted [facility]. Staff made aware that pt is dc today and orders express to return to NH and consult hospice. Admissions expressed that they will contact NH tomorrow.     Report:[phone]  Room:[facility]  Transportation:SW setup ambulance transport for 5:30 pm. ETA pending   Transport packet at nursing station.   Facility: [facility].   Transport auth: Requested auth from [facility](Pending)    Cleared from CM . Bedside Nurse and VN notified.    CarePort Alert: YES response from [facility]sm re: Referral [id number] for patient in [facility] [id number] A: Yes, willing to accept patient call report to [address] [place] [address] nurse - room# 0118Q       06/20/23 1537   Final Note   Assessment Type Final Discharge Note   Anticipated Discharge Disposition [facility] Resources/Appts/Education Provided Appointments scheduled by Navigator/Coordinator   Post-Acute Status   Discharge Delays None known at this time

## 2023-06-20 NOTE — PROGRESS NOTES
Progress Note  Nephrology      Consult Requested By: Indio Kaminski, *      SUBJECTIVE:     Overnight events  Patient is a 73 y.o. male     Patient Active Problem List   Diagnosis    Dyslipidemia associated with type 2 diabetes mellitus    Other and unspecified hyperlipidemia    Insomnia    Open angle with borderline findings, low risk    Nuclear sclerosis    Type 2 diabetes mellitus with hypoglycemia, with long-term current use of insulin    HTN, goal below 130/80    Cerebral infarction due to embolism of left middle cerebral artery    PVD (peripheral vascular disease)    COPD (chronic obstructive pulmonary disease)    Gastro-esophageal reflux disease without esophagitis    Male erectile dysfunction    Type 2 diabetes mellitus with hyperglycemia    Hemiplegia and hemiparesis following cerebral infarction affecting right dominant side    Systolic dysfunction    Lymphedema of both lower extremities    Cerebrovascular accident    Hemiplegia of dominant side as late effect of cerebrovascular disease    Peripheral vascular disorder due to diabetes mellitus    Goals of care, counseling/discussion    Pain    Closed fracture of neck of right femur    Oxygen desaturation    Hypertension associated with diabetes    H/O: CVA (cerebrovascular accident)    Leukocytosis    Hypokalemia    Fall    Prolonged Q-T interval on ECG    C. difficile colitis    Colitis    Severe sepsis    Hypomagnesemia    Nursing home resident    Decubitus ulcer of heel, bilateral, unstageable    Urinary tract infection associated with indwelling urethral catheter    Acute osteomyelitis of right foot    GEORGIANA (acute kidney injury)    Sacral decubitus ulcer    Gross hematuria    Acute encephalopathy    Rash    Palliative care encounter     Past Medical History:   Diagnosis Date    Anemia     Cataract     Chronic cough     Diabetes mellitus type II     GERD (gastroesophageal reflux disease)     Glaucoma     Hyperlipidemia               OBJECTIVE:  "    Vitals:    06/20/23 0756 06/20/23 1109 06/20/23 1127 06/20/23 1158   BP: (!) 159/70  (!) 141/67    BP Location: Right arm  Right arm    Patient Position: Lying  Lying    Pulse: 86  83 80   Resp: 18  19    Temp: 97 °F (36.1 °C) 97 °F (36.1 °C) 96.9 °F (36.1 °C)    TempSrc: Axillary  Axillary    SpO2: 96%  96%    Weight:  65.4 kg (144 lb 2.9 oz)     Height:  5' 7" (1.702 m)         Temp: 96.9 °F (36.1 °C) (06/20/23 1127)  Pulse: 80 (06/20/23 1158)  Resp: 19 (06/20/23 1127)  BP: (!) 141/67 (06/20/23 1127)  SpO2: 96 % (06/20/23 1127)    Date 06/20/23 0700 - 06/21/23 0659   Shift 9206-4798 6242-9865 3639-1900 24 Hour Total   INTAKE   P.O. 750   750   Shift Total(mL/kg) 750(11.5)   750(11.5)   OUTPUT   Shift Total(mL/kg)       Weight (kg) 65.4 65.4 65.4 65.4             Medications:   acetaminophen  1,000 mg Oral TID    ergocalciferol  50,000 Units Oral Q7 Days    ertapenem (INVANZ) IVPB  1 g Intravenous Q24H    potassium phosphate IVPB  20 mmol Intravenous Once    senna  8.6 mg Oral Daily    tamsulosin  0.4 mg Oral QHS                 Physical Exam:  General appearance:NAD   Weak  Lungs: diminished breath sounds  Heart: Pulse 80  Abdomen: soft  Extremities: no edema  Skin: dry  Laboratory:  ABG  Labs reviewed  Recent Results (from the past 336 hour(s))   Basic Metabolic Panel    Collection Time: 06/17/23  4:02 AM   Result Value Ref Range    Sodium 140 136 - 145 mmol/L    Potassium 4.3 3.5 - 5.1 mmol/L    Chloride 104 95 - 110 mmol/L    CO2 22 (L) 23 - 29 mmol/L    BUN 45 (H) 8 - 23 mg/dL    Creatinine 3.3 (H) 0.5 - 1.4 mg/dL    Calcium 8.5 (L) 8.7 - 10.5 mg/dL    Anion Gap 14 8 - 16 mmol/L   Basic Metabolic Panel    Collection Time: 06/16/23  4:07 AM   Result Value Ref Range    Sodium 136 136 - 145 mmol/L    Potassium 4.5 3.5 - 5.1 mmol/L    Chloride 103 95 - 110 mmol/L    CO2 23 23 - 29 mmol/L    BUN 46 (H) 8 - 23 mg/dL    Creatinine 3.2 (H) 0.5 - 1.4 mg/dL    Calcium 8.1 (L) 8.7 - 10.5 mg/dL    Anion Gap 10 8 - 16 " mmol/L   Basic Metabolic Panel    Collection Time: 06/15/23  8:16 AM   Result Value Ref Range    Sodium 134 (L) 136 - 145 mmol/L    Potassium 4.2 3.5 - 5.1 mmol/L    Chloride 101 95 - 110 mmol/L    CO2 23 23 - 29 mmol/L    BUN 43 (H) 8 - 23 mg/dL    Creatinine 3.0 (H) 0.5 - 1.4 mg/dL    Calcium 7.7 (L) 8.7 - 10.5 mg/dL    Anion Gap 10 8 - 16 mmol/L     Recent Results (from the past 336 hour(s))   CBC Auto Differential    Collection Time: 06/20/23  4:48 AM   Result Value Ref Range    WBC 19.03 (H) 3.90 - 12.70 K/uL    Hemoglobin 8.0 (L) 14.0 - 18.0 g/dL    Hematocrit 25.2 (L) 40.0 - 54.0 %    Platelets 943 (H) 150 - 450 K/uL   CBC auto differential    Collection Time: 06/17/23  4:02 AM   Result Value Ref Range    WBC 17.07 (H) 3.90 - 12.70 K/uL    Hemoglobin 8.4 (L) 14.0 - 18.0 g/dL    Hematocrit 26.2 (L) 40.0 - 54.0 %    Platelets 943 (H) 150 - 450 K/uL   CBC auto differential    Collection Time: 06/16/23  4:07 AM   Result Value Ref Range    WBC 17.31 (H) 3.90 - 12.70 K/uL    Hemoglobin 7.9 (L) 14.0 - 18.0 g/dL    Hematocrit 24.5 (L) 40.0 - 54.0 %    Platelets 878 (H) 150 - 450 K/uL     Urinalysis  No results for input(s): COLORU, CLARITYU, SPECGRAV, PHUR, PROTEINUA, GLUCOSEU, BILIRUBINCON, BLOODU, WBCU, RBCU, BACTERIA, MUCUS, NITRITE, LEUKOCYTESUR, UROBILINOGEN, HYALINECASTS in the last 24 hours.    Diagnostic Results:  X-Ray: Reviewed  US: Reviewed  Echo: Reviewed  ACCESS    ASSESSMENT/PLAN:   GEORGIANA/ CKD 3  US  Bilateral elevated resistive indices suggesting medical renal disease.  Bilateral mild hydronephrosis.  Note, there is distention of the urinary bladder despite Knight catheter, hydronephrosis is suspected to be on the basis of reflux related to urinary bladder distension.     The urinary bladder is distended noting echogenic material layers dependently.  Findings may reflect blood products or sequela of infection.     CT  Retained renal cortical contrast.  No hydronephrosis or renal masses.  Bladder  decompressed with Knight catheter.  On flomax.  Metabolic bone disease  Mag 1.8 - 1.5  Phos 2.5  K 3.3  Replace prn  Anemia multifactorial  Hb 8.5-8  Poor nutrition  Albumin 1.7- 1.8  Hypertension  /63  Weight daily   I and O  Avoid hypotension, nephrotoxic agents, hypovolemia.  Renal diet as tolerated

## 2023-06-20 NOTE — PROGRESS NOTES
[facility]  Adult Nutrition  Progress Note    SUMMARY       Recommendations    1. Recommend stan bid to promote wound healing    2. Continue with appropriate texture diet per SLP and code status   3. Collaboration with medical providers    Goals: Patient to continue on appropriate diet prior to RD follow up  Nutrition Goal Status: new (other: DNR pending hospice)  Communication of RD Recs: other (comment) (poc)    Assessment and Plan    Nutrition Problem  Inadequate nutrition     Related to (etiology):   Condition associated with diagnoses    Signs and Symptoms (as evidenced by):   Unhealing wounds     Interventions(treatment strategy):  Collaboration with medical providers     Nutrition Diagnosis Status:   Other: DNR status with possible pending hospice at discharge      Malnutrition Assessment                                       Reason for Assessment    Reason For Assessment: length of stay  Diagnosis: infection/sepsis  Relevant Medical History: past medical history of type 2 diabetes, GERD, hyperlipidemia, anemia, cataracts presenting from his nursing home with increased bilateral lower extremity swelling weeping and foul smelling.  Patient Active Problem List   Diagnosis    Dyslipidemia associated with type 2 diabetes mellitus    Other and unspecified hyperlipidemia    Insomnia    Open angle with borderline findings, low risk    Nuclear sclerosis    Type 2 diabetes mellitus with hypoglycemia, with long-term current use of insulin    HTN, goal below 130/80    Cerebral infarction due to embolism of left middle cerebral artery    PVD (peripheral vascular disease)    COPD (chronic obstructive pulmonary disease)    Gastro-esophageal reflux disease without esophagitis    Male erectile dysfunction    Type 2 diabetes mellitus with hyperglycemia    Hemiplegia and hemiparesis following cerebral infarction affecting right dominant side    Systolic dysfunction    Lymphedema of both lower extremities     "Cerebrovascular accident    Hemiplegia of dominant side as late effect of cerebrovascular disease    Peripheral vascular disorder due to diabetes mellitus    Goals of care, counseling/discussion    Pain    Closed fracture of neck of right femur    Oxygen desaturation    Hypertension associated with diabetes    H/O: CVA (cerebrovascular accident)    Leukocytosis    Hypokalemia    Fall    Prolonged Q-T interval on ECG    C. difficile colitis    Colitis    Severe sepsis    Hypomagnesemia    Nursing home resident    Decubitus ulcer of heel, bilateral, unstageable    Urinary tract infection associated with indwelling urethral catheter    Acute osteomyelitis of right foot    GEORGIANA (acute kidney injury)    Sacral decubitus ulcer    Gross hematuria    Acute encephalopathy    Rash    Palliative care encounter       Interdisciplinary Rounds: did not attend (RD remote)    General Information Comments:   6/20/2023: Patient on a full thin liquids diet for noted pleasure feeds.  Patient to be discharged to nursing home with hospice.  Wounds noted with partial thickness wound to buttocks.  Bilateral leg edema present on admit. Patient refused recommended amputation of leg.  Labs reviewed.  NKFA.  Currently DNR status.  RD to continue to provide appropriate care associated to code status.  Suspected malnutrition risks.  On site RD to follow up with [facility] if appropriate with code status.    Nutrition Discharge Planning: Patient to continue with recommended full thin liquid diet post discharge    Nutrition Risk Screen    Nutrition Risk Screen: large or nonhealing wound, burn or pressure injury, difficulty chewing/swallowing    Nutrition/Diet History    Spiritual, Cultural Beliefs, Yarsani Practices, Values that Affect Care: no  Food Allergies: NKFA    Anthropometrics    Temp: 96.9 °F (36.1 °C)  Height Method: Stated  Height: 5' 7" (170.2 cm)  Height (inches): 67 in  Weight Method: Bed Scale  Weight: 65.4 kg (144 lb 2.9 oz)  Weight " (lb): 144.18 lb  Ideal Body Weight (IBW), Male: 148 lb  % Ideal Body Weight, Male (lb): 97.42 %  BMI (Calculated): 22.6  BMI Grade: 18.5-24.9 - normal  Weight Loss: unintentional  Usual Body Weight (UBW), k.5 kg (Weight on 2022)  % Usual Body Weight: 83.49  % Weight Change From Usual Weight: -16.69 %     Wt Readings from Last 8 Encounters:   23 65.4 kg (144 lb 2.9 oz)   23 72.6 kg (160 lb)   22 63.5 kg (140 lb)   10/13/22 67.6 kg (149 lb)   22 67.7 kg (149 lb 4 oz)   22 78.5 kg (173 lb)   22 78.5 kg (173 lb)   22 78.5 kg (173 lb)       Lab/Procedures/Meds    Pertinent Labs Reviewed: reviewed  BMP  Lab Results   Component Value Date     2023    K 3.3 (L) 2023     2023    CO2 29 2023    BUN 27 (H) 2023    CREATININE 1.2 2023    CALCIUM 8.5 (L) 2023    ANIONGAP 9 2023    EGFRNORACEVR >60 2023     Lab Results   Component Value Date    HGBA1C >14.0 (H) 2022     Glucose   Date Value Ref Range Status   2023 164 (H) 70 - 110 mg/dL Final     Lab Results   Component Value Date    CALCIUM 8.5 (L) 2023    PHOS 2.5 (L) 2023       Pertinent Medications Reviewed: reviewed  Scheduled Meds:   acetaminophen  1,000 mg Oral TID    ergocalciferol  50,000 Units Oral Q7 Days    ertapenem (INVANZ) IVPB  1 g Intravenous Q24H    magnesium sulfate IVPB  2 g Intravenous Once    potassium phosphate IVPB  20 mmol Intravenous Once    senna  8.6 mg Oral Daily    tamsulosin  0.4 mg Oral QHS     Continuous Infusions:  PRN Meds:.dextrose 10%, dextrose 10%, dextrose, dextrose, dextrose, dextrose, glucagon (human recombinant), HYDROmorphone, insulin aspart U-100, melatonin, naloxone, ondansetron, ondansetron, oxybutynin, oxyCODONE, polyethylene glycol, potassium bicarbonate, simethicone, sodium chloride 0.9%, sodium chloride 0.9%    Physical Findings/Assessment         Estimated/Assessed Needs    Weight Used For  Calorie Calculations: 65.4 kg (144 lb 2.9 oz)  Energy Calorie Requirements (kcal): 25-30kcals/kg (1635-1962kcals/day)  Energy Need Method: Kcal/kg  Protein Requirements: 0.8-1.0g/kg (52-65g/day)  Weight Used For Protein Calculations: 65.4 kg (144 lb 2.9 oz)  Fluid Requirements (mL): 1ml/kcal or per md order  Estimated Fluid Requirement Method: RDA Method  RDA Method (mL): 25  CHO Requirement: 225-250      Nutrition Prescription Ordered    Current Diet Order: Full thin liquids  Oral Nutrition Supplement: stan bid    Evaluation of Received Nutrient/Fluid Intake    % Kcal Needs: documented at 100%  % Protein Needs: %  I/O: -[id number] since admit  Energy Calories Required: not meeting needs  Protein Required: not meeting needs  Fluid Required: not meeting needs  Comments: LBM: 6/15/2023  Tolerance: tolerating  % Intake of Estimated Energy Needs: Other: varies   % Meal Intake: 75 - 100 %,varies     Nutrition Risk    Level of Risk/Frequency of Follow-up: low     Monitor and Evaluation    Food and Nutrient Intake: energy intake, food and beverage intake  Food and Nutrient Adminstration: diet order  Physical Activity and Function: nutrition-related ADLs and IADLs, factors affecting access to physical activity  Anthropometric Measurements: height/length, weight, weight change, body mass index  Biochemical Data, Medical Tests and Procedures: electrolyte and renal panel, gastrointestinal profile, glucose/endocrine profile, inflammatory profile, lipid profile  Nutrition-Focused Physical Findings: skin     Nutrition Follow-Up    RD Follow-up?: Yes  Yanci Armstrong, MS, RDN, LDN

## 2023-06-20 NOTE — PLAN OF CARE
Nutrition Plan of Care:    Recommendations     1. Recommend stan bid to promote wound healing    2. Continue with appropriate texture diet per SLP and code status   3. Collaboration with medical providers     Goals: Patient to continue on appropriate diet prior to RD follow up  Nutrition Goal Status: new (other: DNR pending hospice)  Communication of RD Recs: other (comment) (poc)     Assessment and Plan     Nutrition Problem  Inadequate nutrition      Related to (etiology):   Condition associated with diagnoses     Signs and Symptoms (as evidenced by):   Unhealing wounds      Interventions(treatment strategy):  Collaboration with medical providers      Nutrition Diagnosis Status:   Other: DNR status with possible pending hospice at discharge      Yanci Armstrong MS, RDN, LDN

## 2023-06-20 NOTE — PLAN OF CARE
"Wound care orders for bilateral heels:  Cleanse wound site bilateral foot with Vauche and pat dry. Apply hydrofera ready blue to wounds followed by 4" cast padding rolls x 2 wrapped around the foot secured with flexinet 3x/wk.    Please continue strict offloading of the heels bilateral with z-flex boots and check them q shift to ensure they are positioned properly.    "

## 2023-06-20 NOTE — PROGRESS NOTES
"Surgery follow up  BP (!) 159/70 (BP Location: Right arm, Patient Position: Lying)   Pulse 86   Temp 97 °F (36.1 °C) (Axillary)   Resp 18   Ht 5' 7" (1.702 m)   Wt 65.4 kg (144 lb 2.9 oz)   SpO2 96%   BMI 22.58 kg/m²   I/O last 3 completed shifts:  In: 1255 [P.O.:1255]  Out: 2620 [Urine:2620]  I/O this shift:  In: 250 [P.O.:250]  Out: -     Recent Results (from the past 336 hour(s))   CBC Auto Differential    Collection Time: 06/20/23  4:48 AM   Result Value Ref Range    WBC 19.03 (H) 3.90 - 12.70 K/uL    Hemoglobin 8.0 (L) 14.0 - 18.0 g/dL    Hematocrit 25.2 (L) 40.0 - 54.0 %    Platelets 943 (H) 150 - 450 K/uL   CBC auto differential    Collection Time: 06/17/23  4:02 AM   Result Value Ref Range    WBC 17.07 (H) 3.90 - 12.70 K/uL    Hemoglobin 8.4 (L) 14.0 - 18.0 g/dL    Hematocrit 26.2 (L) 40.0 - 54.0 %    Platelets 943 (H) 150 - 450 K/uL   CBC auto differential    Collection Time: 06/16/23  4:07 AM   Result Value Ref Range    WBC 17.31 (H) 3.90 - 12.70 K/uL    Hemoglobin 7.9 (L) 14.0 - 18.0 g/dL    Hematocrit 24.5 (L) 40.0 - 54.0 %    Platelets 878 (H) 150 - 450 K/uL   Wounds ok left buttock continue packing a wound care .  "

## 2023-06-20 NOTE — PLAN OF CARE
[facility]  Department of Hospital Medicine  [address] [place] [address]  [phone] after hours  [phone] fax    HOSPICE  ORDERS    06/20/2023    Admit to Nursing Home with Hospice Consult    Diagnoses:   Active Hospital Problems    Diagnosis  POA    *Severe sepsis [A41.9, R65.20]  Yes    Acute encephalopathy [G93.40]  No    Rash [R21]  Yes    Palliative care encounter [Z51.5]  Not Applicable    Gross hematuria [R31.0]  Yes    Acute osteomyelitis of right foot [M86.171]  Yes    [place]NA (acute kidney injury) [N17.9]  Yes    Sacral decubitus ulcer [L89.159]  Yes    Hypomagnesemia [E83.42]  Yes    Nursing home resident [Z59.3]  Not Applicable    Decubitus ulcer of heel, bilateral, unstageable [L89.610, L89.620]  Yes    Urinary tract infection associated with indwelling urethral catheter [T83.511A, N39.0]  Yes    Hypokalemia [E87.6]  Yes    Hypertension associated with diabetes [E11.59, I15.2]  Yes    Goals of care, counseling/discussion [Z71.89]  Not Applicable    Peripheral vascular disorder due to diabetes mellitus [E11.51]  Yes    Lymphedema of both lower extremities [I89.0]  Yes    Type 2 diabetes mellitus with hypoglycemia, with long-term current use of insulin [E11.649, Z79.4]  Not Applicable    HTN, goal below 130/80 [I10]  Yes      Resolved Hospital Problems   No resolved problems to display.       Hospice Qualifying Diagnoses:        Patient has a life expectancy < 6 months due to:  Primary Hospice Diagnosis:  osteomyelitis  Comorbid Conditions Contributing to Decline:  chronic sacral and foot wounds    Vital Signs: Routine per Hospice Protocol.    Code Status: DNR    Allergies: Review of patient's allergies indicates:  No Known Allergies    Diet: full liquid, thin; pleasure feeds    Activities: As tolerated    Goals of Care Treatment Preferences:  Code Status: DNR          What is most important right now is to focus on remaining as independent as possible,  "symptom/pain control, quality of life, even if it means sacrificing a little time, improvement in condition but with limits to invasive therapies, comfort and QOL .  Accordingly, we have decided that the best plan to meet the patient's goals includes continuing with treatment, enrolling in hospice care.      Nursing: Per Hospice Routine.      Suprapubic catheter Care: Empty Knight bag Q shift and PRN.  Change Knight every month.    Routine Skin for Bedridden Patients: Apply moisture barrier cream to all skin folds and   wet areas in perineal area daily and after baths and all bowel movements.          Wound Care:  Wound care orders for bilateral heels:  Cleanse wound site bilateral foot with Vauche and pat dry. Apply hydrofera ready blue to wounds followed by 4" cast padding rolls x 2 wrapped around the foot secured with flexinet 3x/wk.     Please continue strict offloading of the heels bilateral with z-flex boots and check them q shift to ensure they are positioned properly.    Nursing to cleanse L buttock and L hip wounds with Vashe wound cleanser and apply wet-to-dry vashe gauze to wounds- ensure to pack gauze dressing to undermining of L buttock wound. Cover with abd pad and secure with cloth tape.  Change BID and prn incontinent episode.    Nursing to apply moisture barrier to R buttock BID- see other orders for L buttock wounds.    Medications:      Medication List        START taking these medications      aspirin 81 MG Chew  Take 1 tablet (81 mg total) by mouth once daily.  Replaces: aspirin 325 MG tablet     oxyCODONE 10 mg Tab immediate release tablet  Commonly known as: ROXICODONE  Take 1 tablet (10 mg total) by mouth every 4 (four) hours as needed for Pain.     polyethylene glycol 17 gram Pwpk  Commonly known as: GLYCOLAX  Take 17 g by mouth daily as needed (constipation).     senna 8.6 mg tablet  Commonly known as: SENOKOT  Take 1 tablet by mouth once daily.  Start taking on: June 21, 2023   " "  sulfamethoxazole-trimethoprim 200-40 mg/5 ml 200-40 mg/5 mL Susp  Commonly known as: BACTRIM,SEPTRA  Take 20 mLs by mouth every 12 (twelve) hours. End 7/4/23            CHANGE how you take these medications      insulin detemir U-100 100 unit/mL injection  Commonly known as: Levemir  Inject 5 Units into the skin once daily. Inject 34 units into the skin every morning and 22 units at bedtime  What changed:   how much to take  when to take this     tamsulosin 0.4 mg Cap  Commonly known as: FLOMAX  Take 1 capsule (0.4 mg total) by mouth once daily.  What changed: when to take this            CONTINUE taking these medications      acetaminophen 650 MG Tbsr  Commonly known as: TYLENOL  Take 650 mg by mouth every 6 (six) hours as needed (pain).     furosemide 20 MG tablet  Commonly known as: LASIX  Take 20 mg by mouth once daily.     latanoprost 0.005 % ophthalmic solution  Place 1 drop into both eyes every evening.     multivitamin per tablet  Commonly known as: THERAGRAN  Take 1 tablet by mouth once daily.            STOP taking these medications      ammonium lactate 12 % Crea     ascorbic acid (vitamin C) 500 MG tablet  Commonly known as: VITAMIN C     aspirin 325 MG tablet  Replaced by: aspirin 81 MG Chew     blood sugar diagnostic Strp  Commonly known as: ONE TOUCH TEST     CALMOSEPTINE 0.44-20.6 % Oint  Generic drug: menthol-zinc oxide     ferrous sulfate 325 mg (65 mg iron) Tab tablet  Commonly known as: FEOSOL     metFORMIN 1000 MG tablet  Commonly known as: GLUCOPHAGE     pen needle, diabetic 32 gauge x 5/32" Ndle     pioglitazone 30 MG tablet  Commonly known as: ACTOS     sodium chloride 1 gram tablet                DIABETES CARE:   Nurse to perform and educate diabetic management with blood glucose monitoring:        Fingerstick blood sugar AC and HS     Fingerstick blood sugar every 6 hours if patient is unable to eat    Report CBG < 60 or > 350 to physician.         Insulin Sliding Scale        "  Glucose  Novolog Insulin Subcutaneous        0 - 60   Orange juice or glucose tablet      No insulin   201-250  2 units   251-300  4 units   301-350  6 units   351-400  8 units   >400   10 units then call physician      Future Orders:  Hospice Medical Director may dictate new orders for comfortable care measures & sign death certificate.        _________________________________  Indio Kaminski MD  06/20/2023

## 2023-06-20 NOTE — PROGRESS NOTES
[facility] Medicine  Progress Note    Patient Name: Mateo Foreman  MRN: [id number]  Patient Class: IP- Inpatient   Admission Date: 6/11/2023  Length of Stay: 7 days  Attending Physician: So Fowler MD  Primary Care Provider: Tremaine Finch MD        Subjective:     Principal Problem:Severe sepsis    HPI:  73-year-old past medical history of type 2 diabetes, GERD, hyperlipidemia, anemia, cataracts presenting from his nursing home with increased bilateral lower extremity swelling weeping and foul smelling. Patient is known to be unkempt.   As per patient had chronic wounds of feet were wrapped weeks ago have not been rewrapped by weeks. Patient is not compliant with care. Patient mentions having abnormal drainage from his heels.  Denies any chest pain, cough, nausea vomiting diarrhea or abdominal pain fevers chills.Not hypotensive on arrival. Tachycardic , WBC 23 K - admitted for sepsis due to SSTI/UTI      Overview/Hospital Course:  No notes on file    Interval History:  More alert this morning.  Cleared by speech for liquid diet.  Intermittently moans and complains of leg pain.    Spoke with patient's sister Norma via telephone in the morning.  Stated she would be coming to the hospital today.  Subsequently met with patient's sister Norma at bedside.  Sister in agreement with hospice and 2 weeks of antibiotics if possible on hospice.  Plan for discharge back to nursing home with hospice tomorrow.    Review of Systems  Objective:     Vital Signs (Most Recent):  Temp: 97.1 °F (36.2 °C) (06/19/23 1621)  Pulse: 85 (06/19/23 1621)  Resp: 18 (06/19/23 1621)  BP: (!) 163/72 (06/19/23 1621)  SpO2: (!) 94 % (06/19/23 1621) Vital Signs (24h Range):  Temp:  [96.1 °F (35.6 °C)-98.5 °F (36.9 °C)] 97.1 °F (36.2 °C)  Pulse:  [] 85  Resp:  [16-18] 18  SpO2:  [92 %-97 %] 94 %  BP: (130-163)/(56-73) 163/72     Weight: 65.4 kg (144 lb 2.9 oz)  Body mass index is 22.58 kg/m².    Intake/Output Summary (Last  24 hours) at 6/19/2023 1740  Last data filed at 6/19/2023 1641  Gross per 24 hour   Intake 240 ml   Output 2375 ml   Net -2135 ml           Physical Exam  Vitals and nursing note reviewed.   Constitutional:       Appearance: He is ill-appearing.   Cardiovascular:      Rate and Rhythm: Normal rate and regular rhythm.   Pulmonary:      Effort: Pulmonary effort is normal. No respiratory distress.      Breath sounds: Normal breath sounds.   Abdominal:      Palpations: Abdomen is soft.      Tenderness: There is no abdominal tenderness.      Comments: Knight catheter in place with clear urine   Musculoskeletal:      Right lower leg: Edema present.      Left lower leg: Edema present.      Comments:   Chronic LE edema  Bilateral feet dressed   Skin:     Findings: Rash present.      Comments: Sacral wounds present on admission  Old & new scattered maculopapular scabs b/l UE, chest & abdomen  Dry scaly skin bilateral lower extremities     Neurological:      Mental Status: He is alert.      Comments: Oriented to self, more communicative           Significant Labs: All pertinent labs within the past 24 hours have been reviewed.    Significant Imaging: I have reviewed all pertinent imaging results/findings within the past 24 hours.      Assessment/Plan:      * Severe sepsis  This patient does have evidence of infective focus  My overall impression is sepsis.  Source: Urinary Tract and Skin and Soft Tissue (location Bilateral feet open diabetic wounds), sacral decubitus wounds  Antibiotics given-   Antibiotics (72h ago, onward)    Start     Stop Route Frequency Ordered    06/19/23 1400  ertapenem (INVANZ) 1 g in sodium chloride 0.9 % 100 mL IVPB (MB+)         -- IV Every 24 hours (non-standard times) 06/19/23 0928        Latest lactate reviewed-  No results for input(s): LACTATE in the last 72 hours.  Organ dysfunction indicated by Acute kidney injury    Fluid challenge Not needed - patient is not hypotensive      Post-  resuscitation assessment Yes Perfusion exam was performed within 6 hours of septic shock presentation after bolus shows Adequate tissue perfusion assessed by non-invasive monitoring       Will Not start Pressors- Levophed for MAP of 65  Source control achieved by:  S/p vanc/Zosyn in the ER.  Continue vancomycin, cefepime, Flagyl     CT lower extremity with contrast showed significant bilateral edema in distal for legs/feet, R>L.  Scattered foci of subcutaneous emphysema within plantar aspect of right hindfoot.  MRI confirmed early changes of R heel osteomyelitis.  Arterial ultrasound bilaterally showed no focal hemodynamic stenosis.    Renal ultrasound showed mild bilateral hydronephrosis.  Distended urinary bladder with echogenic material which could be blood or sequelae of infection.  Chronic indwelling Knight catheter switched by Urology on 06/13    Blood cultures 6/11 growing coagulase negative staph, probable contaminant.  Rapid ID by PCR negative for staph aureus.  Repeat cultures 6/13 negative.     Urine culture 6/11 growing multiple organisms.  Concern for infection given chronic catheter    S/p heel & sacral wound debridement in OR 6/14    Sacral wound culture growing ESBL proteus, R foot wound cultures growing proteus sensitive to cephalosporins  Infectious disease on board. Discussed with ID attending. Antibiotics switched to ertapenem renally dosed based on final cultures. Recommend 6 weeks IV antibiotics (end date 7/30)    Discussed with sister Norma. In accordance with patient's wishes for comfort, plan for 2 weeks antibiotics with plan for hospice at [place].                   Goals of care, counseling/discussion  Advance Care Planning     Palliative care consulted to assist with overall goals of care discussion, pain management.  Appreciate assistance.  Per discussion with patient's sister Norma Ro -patient is a DNR. Plan for DC back to NH on hospice tomorrow.       Palliative care  encounter        Rash  Present on admission   Diffuse old and new maculopapular rash on chest, bilateral upper extremities  ?scabies  Trial of permethrin, antihistamines (benadryl dose decreased as patient lethargic to take anything po)      Acute encephalopathy  Onset 6/15 overnight  Suspect delirium in setting of pain, infection/renal injury, rash/itching  CT head to rule out organic etiology - negative for acute abn  Pain control - uptitrating narcotics for better control of pain (patient unable to verbalize location), schedule tylenol. Avoid NSAIDs given renal injury  Delirium precautions      Sacral decubitus ulcer  Present on admission   Necrotic   Inpatient wound care consult    S/p debridement by general surgery      GEORGIANA (acute kidney injury)  Patient with acute kidney injury likely due to pre-renal azotemia, acute tubular necrosis and post-obstructive d/t clotted felipe catheter GE[place]GIANA is currently improving. Labs reviewed- Renal function/electrolytes with Estimated Creatinine Clearance: 23.1 mL/min (A) (based on SCr of 2.7 mg/dL (H)). according to latest data. Monitor urine output and serial BMP and adjust therapy as needed. Avoid nephrotoxins and renally dose meds for GFR listed above.     Renal US showed mild bilateral hydronephrosis on admission  ATN- multifactorial 2/2 ?Vancomycin level supratherapeutic (Received vanc/ zosyn) +/- sepsis +/- IV contrast +/- obstructive     Felipe catheter replaced 6/13.  On continuous bladder irrigation.  CT abdomen pelvis without contrast showed no hydronephrosis or renal masses.  Given mild-to-moderate pleural effusions with bibasilar atelectasis, will hold IV fluids at this time.  Incentive spirometer. lovenox held per urology recommendation    Nephrology, urology consult       Acute osteomyelitis of right foot  As above      Urinary tract infection associated with indwelling urethral catheter  As above       Decubitus ulcer of heel, bilateral, unstageable  As  above      Nursing home resident  Noted       Hypomagnesemia  Mg 1.1 on admission, improved with repletion      Hypokalemia  Potassium 2.9 improved with repletion      Hypertension associated with diabetes  Controlled     Peripheral vascular disorder due to diabetes mellitus  Aspirin currently on hold in anticipation for procedure    Lymphedema of both lower extremities  Wound consult    HTN, goal below 130/80    Controlled.  Continue home medications    Type 2 diabetes mellitus with hypoglycemia, with long-term current use of insulin  Patient's FSGs are controlled on current medication regimen.  Last A1c reviewed-   Lab Results   Component Value Date    HGBA1C >14.0 (H) 07/22/2022     Most recent fingerstick glucose reviewed-   Recent Labs   Lab 06/13/23  0209 06/13/23  0613 06/13/23  1210 06/13/23  1659   POCTGLUCOSE 216* 257* 188* 159*     Current correctional scale  Medium  Maintain anti-hyperglycemic dose as follows-   Antihyperglycemics (From admission, onward)    Start     Stop Route Frequency Ordered    06/12/23 0305  insulin aspart U-100 pen 0-5 Units         -- SubQ Before meals & nightly PRN 06/12/23 0209        Hold Oral hypoglycemics while patient is in the hospital.      VTE Risk Mitigation (From admission, onward)         Ordered     IP VTE HIGH RISK PATIENT  Once         06/12/23 0209     Place sequential compression device  Until discontinued         06/12/23 0209                Discharge Planning   CAROL: 6/19/2023     Code Status: DNR   Is the patient medically ready for discharge?:     Reason for patient still in hospital (select all that apply): Pending disposition                 So Fowler MD  Department of Hospital Medicine   [facility]

## 2023-06-20 NOTE — ASSESSMENT & PLAN NOTE
Advance Care Planning     Palliative care consulted to assist with overall goals of care discussion, pain management.  Appreciate assistance.  Per discussion with patient's sister Norma Ro -patient is a DNR. Plan for DC back to NH on hospice tomorrow.

## 2023-06-20 NOTE — HOSPITAL COURSE
"Mr. Foreman presented with sepsis with encephalopathy and GEORGIANA due to UTI and infected sacral and heel wounds. Initiated on BSABx and underwent debridement with general surgery and podiatry. Encephalopathy has improved towards baseline, but unfortunately with significant burden of disease, growing ESBL and  organisms. Discussed with patient and his sister; at this time, they express that what is most important is focusing on comfort and therefore would be interested in transition towards hospice care rather than aggressive interventions. Discussed that at this time, hospice would not cover IV antibiotic therapy and discussed transitioning to PO option (ESBL proteus is sensitive to bactrim). She is hoping that he will survive until additional family members able to visit in a week, but does not wish to put him through heroic measures that would lead to discomfort while waiting. Therefore, we will arrange for hospice care with PO antibiotics and wound care.    BP (!) 141/67 (BP Location: Right arm, Patient Position: Lying)   Pulse 80   Temp 96.9 °F (36.1 °C) (Axillary)   Resp 19   Ht 5' 7" (1.702 m)   Wt 65.4 kg (144 lb 2.9 oz)   SpO2 96%   BMI 22.58 kg/m²   Physical Exam  Vitals and nursing note reviewed.   Constitutional:       Appearance: He is ill-appearing.   Cardiovascular:      Rate and Rhythm: Normal rate and regular rhythm.   Pulmonary:      Effort: Pulmonary effort is normal. No respiratory distress.      Breath sounds: Normal breath sounds.   Abdominal:      Palpations: Abdomen is soft.      Tenderness: There is no abdominal tenderness.      Comments: Knight catheter in place with clear urine   Musculoskeletal:      Right lower leg: Edema present.      Left lower leg: Edema present.      Comments:   Chronic LE edema  Bilateral feet dressed   Skin:     Findings: Rash present.      Comments: Sacral wounds present on admission  Old & new scattered maculopapular scabs b/l UE, chest & abdomen  Dry scaly " skin bilateral lower extremities     Neurological:      Mental Status: He is alert.      Comments: Oriented to self, more communicative

## 2023-06-20 NOTE — PLAN OF CARE
ERIN sent referral via fax to [facility].        06/20/23 4134   Post-Acute Status   Post-Acute Authorization Hospice   Hospice Status Referrals Sent

## 2023-06-20 NOTE — PLAN OF CARE
ERIN spoke with [facility] staff. ERIN was made aware that pt will not be allowed on hospice care with IV abx. ERIN notified MD. ERIN will continue to follow pt throughout his transitions of care and assist with any dc needs.      06/20/23 1324   Post-Acute Status   Post-Acute Authorization Hospice

## 2023-06-20 NOTE — DISCHARGE SUMMARY
[facility] Medicine  Discharge Summary      Patient Name: Mateo Foreman  MRN: [id number]  [place]: [id number]  Patient Class: IP- Inpatient  Admission Date: 6/11/2023  Hospital Length of Stay: 8 days  Discharge Date and Time:  06/20/2023 3:11 PM  Attending Physician: Indio Kaminski, *   Discharging Provider: Indio Kaminski MD  Primary Care Provider: Tremaine Finch MD    Primary Care Team: Networked reference to record PCT     HPI:   73-year-old past medical history of type 2 diabetes, GERD, hyperlipidemia, anemia, cataracts presenting from his nursing home with increased bilateral lower extremity swelling weeping and foul smelling. Patient is known to be unkempt.   As per patient had chronic wounds of feet were wrapped weeks ago have not been rewrapped by weeks. Patient is not compliant with care. Patient mentions having abnormal drainage from his heels.  Denies any chest pain, cough, nausea vomiting diarrhea or abdominal pain fevers chills.Not hypotensive on arrival. Tachycardic , WBC 23 K - admitted for sepsis due to SSTI/UTI      Procedure(s) (LRB):  DEBRIDEMENT, FOOT (Bilateral)  DEBRIDEMENT, PRESSURE ULCER (Bilateral)      Hospital Course:   Mr. Foreman presented with sepsis with encephalopathy and GEORGIANA due to UTI and infected sacral and heel wounds. Initiated on BSABx and underwent debridement with general surgery and podiatry. Encephalopathy has improved towards baseline, but unfortunately with significant burden of disease, growing ESBL and  organisms. Discussed with patient and his sister; at this time, they express that what is most important is focusing on comfort and therefore would be interested in transition towards hospice care rather than aggressive interventions. Discussed that at this time, hospice would not cover IV antibiotic therapy and discussed transitioning to PO option (ESBL proteus is sensitive to bactrim). She is hoping that he will survive until additional  "family members able to visit in a week, but does not wish to put him through heroic measures that would lead to discomfort while waiting. Therefore, we will arrange for hospice care with PO antibiotics and wound care.    BP (!) 141/67 (BP Location: Right arm, Patient Position: Lying)   Pulse 80   Temp 96.9 °F (36.1 °C) (Axillary)   Resp 19   Ht 5' 7" (1.702 m)   Wt 65.4 kg (144 lb 2.9 oz)   SpO2 96%   BMI 22.58 kg/m²   Physical Exam  Vitals and nursing note reviewed.   Constitutional:       Appearance: He is ill-appearing.   Cardiovascular:      Rate and Rhythm: Normal rate and regular rhythm.   Pulmonary:      Effort: Pulmonary effort is normal. No respiratory distress.      Breath sounds: Normal breath sounds.   Abdominal:      Palpations: Abdomen is soft.      Tenderness: There is no abdominal tenderness.      Comments: Knight catheter in place with clear urine   Musculoskeletal:      Right lower leg: Edema present.      Left lower leg: Edema present.      Comments:   Chronic LE edema  Bilateral feet dressed   Skin:     Findings: Rash present.      Comments: Sacral wounds present on admission  Old & new scattered maculopapular scabs b/l UE, chest & abdomen  Dry scaly skin bilateral lower extremities     Neurological:      Mental Status: He is alert.      Comments: Oriented to self, more communicative        Goals of Care Treatment Preferences:  Code Status: DNR          What is most important right now is to focus on remaining as independent as possible, symptom/pain control, quality of life, even if it means sacrificing a little time, improvement in condition but with limits to invasive therapies, comfort and QOL .  Accordingly, we have decided that the best plan to meet the patient's goals includes continuing with treatment, enrolling in hospice care.      Consults:   Consults (From admission, onward)        Status Ordering Provider     Inpatient consult to Palliative Care  Once        Provider:  (Not yet " assigned)    Completed TIFFANY ARCINIEGA     Inpatient consult to [facility] (Mayela Bae, Lynsey)  Once        Provider:  (Not yet assigned)    Completed TIFFANY ARCINIEGA     Inpatient consult to General Surgery  Once        Provider:  Se Silveira MD    Acknowledged TIFFANY ARCINIEGA     IP consult to case management  Once        Provider:  (Not yet assigned)    Completed BELLA CORDOBA     Inpatient consult to Urology  Once        Provider:  Luis Alberto Gerber MD    Completed BELLA CORDOBA     Inpatient consult to Podiatry  Once        Provider:  Gunnar Rodney DPM    Completed BELLA CORDOBA     Consult to Pharmacogenomics  Once        Provider:  (Not yet assigned)    Acknowledged URI JONES     Infectious Diseases  Once        Provider:  (Not yet assigned)    Completed URI JONES          No new Assessment & Plan notes have been filed under this hospital service since the last note was generated.  Service: Hospital Medicine    Final Active Diagnoses:    Diagnosis Date Noted POA    PRINCIPAL PROBLEM:  Severe sepsis [A41.9, R65.20] 06/12/2023 Yes    Acute encephalopathy [G93.40] 06/16/2023 No    Rash [R21] 06/16/2023 Yes    Palliative care encounter [Z51.5] 06/16/2023 Not Applicable    Gross hematuria [R31.0] 06/15/2023 Yes    Acute osteomyelitis of right foot [M86.171] 06/13/2023 Yes    GEORGIANA (acute kidney injury) [N17.9] 06/13/2023 Yes    Sacral decubitus ulcer [L89.159] 06/13/2023 Yes    Hypomagnesemia [E83.42] 06/12/2023 Yes    Nursing home resident [Z59.3] 06/12/2023 Not Applicable    Decubitus ulcer of heel, bilateral, unstageable [L89.610, L89.620] 06/12/2023 Yes    Urinary tract infection associated with indwelling urethral catheter [T83.511A, N39.0] 06/12/2023 Yes    Hypokalemia [E87.6] 07/22/2022 Yes    Hypertension associated with diabetes [E11.59, I15.2]  Yes    Goals of care, counseling/discussion [Z71.89]  08/20/2021 Not Applicable    Peripheral vascular disorder due to diabetes mellitus [E11.51] 02/19/2021 Yes    Lymphedema of both lower extremities [I89.0] 10/06/2020 Yes    Type 2 diabetes mellitus with hypoglycemia, with long-term current use of insulin [E11.649, Z79.4] 09/21/2015 Not Applicable    HTN, goal below 130/80 [I10] 09/21/2015 Yes      Problems Resolved During this Admission:       Discharged Condition: poor    Disposition: Home or Self Care    Follow Up:    Patient Instructions:      Notify your health care provider if you experience any of the following:  temperature >100.4     Notify your health care provider if you experience any of the following:  persistent nausea and vomiting or diarrhea     Notify your health care provider if you experience any of the following:  severe uncontrolled pain     Notify your health care provider if you experience any of the following:  difficulty breathing or increased cough     Notify your health care provider if you experience any of the following:  severe persistent headache     Notify your health care provider if you experience any of the following:  persistent dizziness, light-headedness, or visual disturbances     Notify your health care provider if you experience any of the following:  increased confusion or weakness     Activity as tolerated       Significant Diagnostic Studies: N/A    Pending Diagnostic Studies:     Procedure Component Value Units Date/Time    Specimen to Pathology, Surgery Other [[id number]] Collected: 06/14/23 1400    Order Status: Sent Lab Status: In process Updated: 06/14/23 1508    Specimen: Tissue          Medications:  Reconciled Home Medications:      Medication List      START taking these medications    aspirin 81 MG Chew  Take 1 tablet (81 mg total) by mouth once daily.  Replaces: aspirin 325 MG tablet     oxyCODONE 10 mg Tab immediate release tablet  Commonly known as: ROXICODONE  Take 1 tablet (10 mg total) by mouth every 4  "(four) hours as needed for Pain.     polyethylene glycol 17 gram Pwpk  Commonly known as: GLYCOLAX  Take 17 g by mouth daily as needed (constipation).     senna 8.6 mg tablet  Commonly known as: SENOKOT  Take 1 tablet by mouth once daily.  Start taking on: June 21, 2023     sulfamethoxazole-trimethoprim 200-40 mg/5 ml 200-40 mg/5 mL Susp  Commonly known as: BACTRIM,SEPTRA  Take 20 mLs by mouth every 12 (twelve) hours. End 7/4/23        CHANGE how you take these medications    insulin detemir U-100 100 unit/mL injection  Commonly known as: Levemir  Inject 5 Units into the skin once daily. Inject 34 units into the skin every morning and 22 units at bedtime  What changed:   · how much to take  · when to take this     tamsulosin 0.4 mg Cap  Commonly known as: FLOMAX  Take 1 capsule (0.4 mg total) by mouth once daily.  What changed: when to take this        CONTINUE taking these medications    acetaminophen 650 MG Tbsr  Commonly known as: TYLENOL  Take 650 mg by mouth every 6 (six) hours as needed (pain).     furosemide 20 MG tablet  Commonly known as: LASIX  Take 20 mg by mouth once daily.     latanoprost 0.005 % ophthalmic solution  Place 1 drop into both eyes every evening.     multivitamin per tablet  Commonly known as: THERAGRAN  Take 1 tablet by mouth once daily.        STOP taking these medications    ammonium lactate 12 % Crea     ascorbic acid (vitamin C) 500 MG tablet  Commonly known as: VITAMIN C     aspirin 325 MG tablet  Replaced by: aspirin 81 MG Chew     blood sugar diagnostic Strp  Commonly known as: ONE TOUCH TEST     CALMOSEPTINE 0.44-20.6 % Oint  Generic drug: menthol-zinc oxide     ferrous sulfate 325 mg (65 mg iron) Tab tablet  Commonly known as: FEOSOL     metFORMIN 1000 MG tablet  Commonly known as: GLUCOPHAGE     pen needle, diabetic 32 gauge x 5/32" Ndle     pioglitazone 30 MG tablet  Commonly known as: ACTOS     sodium chloride 1 gram tablet            Indwelling Lines/Drains at time of " discharge:   Lines/Drains/Airways     Drain  Duration                Suprapubic Catheter 06/12/23 1211 8 days                Time spent on the discharge of patient: 45 minutes         Indio Kaminski MD  Department of Hospital Medicine  [facility]

## 2023-06-21 ENCOUNTER — PATIENT OUTREACH (OUTPATIENT)
Dept: ADMINISTRATIVE | Facility: CLINIC | Age: 73
End: 2023-06-21
Payer: MEDICARE

## 2023-06-21 LAB
BACTERIA SPEC AEROBE CULT: ABNORMAL
BACTERIA SPEC AEROBE CULT: ABNORMAL

## 2023-07-17 LAB
FUNGUS SPEC CULT: NORMAL

## 2023-08-03 LAB
ACID FAST MOD KINY STN SPEC: NORMAL
MYCOBACTERIUM SPEC QL CULT: NORMAL

## 2023-09-18 PROBLEM — N39.0 URINARY TRACT INFECTION ASSOCIATED WITH INDWELLING URETHRAL CATHETER: Status: RESOLVED | Noted: 2023-06-12 | Resolved: 2023-09-18

## 2023-09-18 PROBLEM — R65.20 SEVERE SEPSIS: Status: RESOLVED | Noted: 2023-06-12 | Resolved: 2023-09-18

## 2023-09-18 PROBLEM — A41.9 SEVERE SEPSIS: Status: RESOLVED | Noted: 2023-06-12 | Resolved: 2023-09-18

## 2023-09-18 PROBLEM — N17.9 AKI (ACUTE KIDNEY INJURY): Status: RESOLVED | Noted: 2023-06-13 | Resolved: 2023-09-18

## 2023-09-18 PROBLEM — T83.511A URINARY TRACT INFECTION ASSOCIATED WITH INDWELLING URETHRAL CATHETER: Status: RESOLVED | Noted: 2023-06-12 | Resolved: 2023-09-18

## (undated) DEVICE — BANDAGE DERMACEA STRETCH 4X1IN

## (undated) DEVICE — GLOVE BIOGEL PI MICRO INDIC 8

## (undated) DEVICE — SUT VICRYL PLUS 4-0 P3 18IN

## (undated) DEVICE — SUT ETHIBOND XTRA 1 CTX

## (undated) DEVICE — BLADE SCALP OPHTL BEVEL STR

## (undated) DEVICE — ELECTRODE REM PLYHSV RETURN 9

## (undated) DEVICE — PULSAVAC ZIMMER

## (undated) DEVICE — APPLICATOR CHLORAPREP ORN 26ML

## (undated) DEVICE — SYR 10CC LUER LOCK

## (undated) DEVICE — Device

## (undated) DEVICE — SUT VICRYL PLUS 0 CT1 18IN

## (undated) DEVICE — KIT COLLECTION E SWAB REGULAR

## (undated) DEVICE — GLOVE BIOGEL ECLIPSE SZ 8

## (undated) DEVICE — DRESSING XEROFORM 5X9IN

## (undated) DEVICE — PAD ABDOMINAL STERILE 5X9IN

## (undated) DEVICE — GOWN SURGICAL XX LARGE X LONG

## (undated) DEVICE — SEE MEDLINE ITEM 152529

## (undated) DEVICE — TOURNIQUET SB QC DP 18X4IN

## (undated) DEVICE — BANDAGE ESMARK ELASTIC ST 6X9

## (undated) DEVICE — GLOVE BIOGEL 7.5

## (undated) DEVICE — GLOVE BIOGEL 7.0

## (undated) DEVICE — SWAB CULTURETTE SINGLE

## (undated) DEVICE — STRIP PACKING ANTIMIC 1/4X1

## (undated) DEVICE — SUT 2/0 36IN COATED VICRYL

## (undated) DEVICE — BRUSH FEMORAL CANAL SUCT

## (undated) DEVICE — DRAPE INCISE IOBAN 2 23X23IN

## (undated) DEVICE — STOCKINET TUBULAR 1 PLY 6X60IN

## (undated) DEVICE — DRESSING XEROFORM NONADH 1X8IN

## (undated) DEVICE — SEE L#120831

## (undated) DEVICE — GLOVE BIOGEL PI MICRO INDIC 7

## (undated) DEVICE — DRESSING GAUZE XEROFORM 5X9

## (undated) DEVICE — NDL 18GA X1 1/2 REG BEVEL

## (undated) DEVICE — SUT PROLENE 4-0 MONO 18IN

## (undated) DEVICE — BANDAGE MATRIX HK LOOP 4IN 5YD

## (undated) DEVICE — DRAPE STERI INSTRUMENT 1018

## (undated) DEVICE — SUT MCRYL PLUS 4-0 PS2 27IN

## (undated) DEVICE — SEE MEDLINE ITEM 154981

## (undated) DEVICE — SUT JJ41G O VICRYL

## (undated) DEVICE — SEE MEDLINE ITEM 157216

## (undated) DEVICE — NDL HYPO REG 25G X 1 1/2

## (undated) DEVICE — DRESSING N ADH OIL EMUL 3X3

## (undated) DEVICE — STOCKINET 4INX48

## (undated) DEVICE — BLADE SURG #15 CARBON STEEL

## (undated) DEVICE — DRESSING TRANS 4X4 TEGADERM

## (undated) DEVICE — SEE MEDLINE ITEM 157131

## (undated) DEVICE — BIT BRILL 2.5

## (undated) DEVICE — PAD PREP CUFFED NS 24X48IN

## (undated) DEVICE — GAUZE SPONGE 4X4 12PLY

## (undated) DEVICE — BANDAGE ESMARK ELASTIC ST 4X9

## (undated) DEVICE — SEE MEDLINE ITEM 146313

## (undated) DEVICE — SEE MEDLINE ITEM 156953

## (undated) DEVICE — COVER OVERHEAD SURG LT BLUE

## (undated) DEVICE — SPONGE GAUZE 16PLY 4X4

## (undated) DEVICE — CHLORAPREP W TINT 26ML APPL